# Patient Record
Sex: FEMALE | Race: WHITE | Employment: UNEMPLOYED | URBAN - METROPOLITAN AREA
[De-identification: names, ages, dates, MRNs, and addresses within clinical notes are randomized per-mention and may not be internally consistent; named-entity substitution may affect disease eponyms.]

---

## 2017-01-18 RX ORDER — DULOXETIN HYDROCHLORIDE 30 MG/1
30 CAPSULE, DELAYED RELEASE ORAL DAILY
Qty: 30 CAP | Refills: 2 | Status: SHIPPED | OUTPATIENT
Start: 2017-01-18 | End: 2017-02-08

## 2017-02-08 ENCOUNTER — APPOINTMENT (OUTPATIENT)
Dept: ULTRASOUND IMAGING | Age: 57
DRG: 811 | End: 2017-02-08
Attending: HOSPITALIST
Payer: MEDICARE

## 2017-02-08 ENCOUNTER — APPOINTMENT (OUTPATIENT)
Dept: CT IMAGING | Age: 57
DRG: 811 | End: 2017-02-08
Attending: EMERGENCY MEDICINE
Payer: MEDICARE

## 2017-02-08 ENCOUNTER — HOSPITAL ENCOUNTER (INPATIENT)
Age: 57
LOS: 2 days | Discharge: HOME | DRG: 811 | End: 2017-02-10
Attending: EMERGENCY MEDICINE | Admitting: HOSPITALIST
Payer: MEDICARE

## 2017-02-08 ENCOUNTER — APPOINTMENT (OUTPATIENT)
Dept: GENERAL RADIOLOGY | Age: 57
DRG: 811 | End: 2017-02-08
Attending: EMERGENCY MEDICINE
Payer: MEDICARE

## 2017-02-08 DIAGNOSIS — D64.9 ANEMIA, UNSPECIFIED TYPE: ICD-10-CM

## 2017-02-08 DIAGNOSIS — K92.2 GASTROINTESTINAL HEMORRHAGE, UNSPECIFIED GASTROINTESTINAL HEMORRHAGE TYPE: Primary | ICD-10-CM

## 2017-02-08 LAB
ALBUMIN SERPL BCP-MCNC: 3.3 G/DL (ref 3.5–5)
ALBUMIN/GLOB SERPL: 0.9 {RATIO} (ref 1.1–2.2)
ALP SERPL-CCNC: 86 U/L (ref 45–117)
ALT SERPL-CCNC: 17 U/L (ref 12–78)
ANION GAP BLD CALC-SCNC: 10 MMOL/L (ref 5–15)
ANION GAP BLD CALC-SCNC: 8 MMOL/L (ref 5–15)
APPEARANCE UR: CLEAR
AST SERPL W P-5'-P-CCNC: 18 U/L (ref 15–37)
ATRIAL RATE: 89 BPM
BACTERIA URNS QL MICRO: NEGATIVE /HPF
BASOPHILS # BLD AUTO: 0 K/UL (ref 0–0.1)
BASOPHILS # BLD: 0 % (ref 0–1)
BILIRUB SERPL-MCNC: 0.2 MG/DL (ref 0.2–1)
BILIRUB UR QL: NEGATIVE
BUN SERPL-MCNC: 24 MG/DL (ref 6–20)
BUN SERPL-MCNC: 25 MG/DL (ref 6–20)
BUN/CREAT SERPL: 25 (ref 12–20)
BUN/CREAT SERPL: 28 (ref 12–20)
CALCIUM SERPL-MCNC: 8.1 MG/DL (ref 8.5–10.1)
CALCIUM SERPL-MCNC: 8.9 MG/DL (ref 8.5–10.1)
CALCULATED R AXIS, ECG10: 72 DEGREES
CALCULATED T AXIS, ECG11: 50 DEGREES
CHLORIDE SERPL-SCNC: 102 MMOL/L (ref 97–108)
CHLORIDE SERPL-SCNC: 106 MMOL/L (ref 97–108)
CO2 SERPL-SCNC: 24 MMOL/L (ref 21–32)
CO2 SERPL-SCNC: 26 MMOL/L (ref 21–32)
COLOR UR: NORMAL
CREAT SERPL-MCNC: 0.85 MG/DL (ref 0.55–1.02)
CREAT SERPL-MCNC: 0.99 MG/DL (ref 0.55–1.02)
DIAGNOSIS, 93000: NORMAL
DIFFERENTIAL METHOD BLD: ABNORMAL
EOSINOPHIL # BLD: 0.2 K/UL (ref 0–0.4)
EOSINOPHIL NFR BLD: 3 % (ref 0–7)
EPITH CASTS URNS QL MICRO: NORMAL /LPF
ERYTHROCYTE [DISTWIDTH] IN BLOOD BY AUTOMATED COUNT: 15.5 % (ref 11.5–14.5)
GLOBULIN SER CALC-MCNC: 3.6 G/DL (ref 2–4)
GLUCOSE SERPL-MCNC: 123 MG/DL (ref 65–100)
GLUCOSE SERPL-MCNC: 125 MG/DL (ref 65–100)
GLUCOSE UR STRIP.AUTO-MCNC: NEGATIVE MG/DL
HCT VFR BLD AUTO: 22.7 % (ref 35–47)
HGB BLD-MCNC: 6.8 G/DL (ref 11.5–16)
HGB BLD-MCNC: 8.4 G/DL (ref 11.5–16)
HGB UR QL STRIP: NEGATIVE
HYALINE CASTS URNS QL MICRO: NORMAL /LPF (ref 0–5)
KETONES UR QL STRIP.AUTO: NEGATIVE MG/DL
LEUKOCYTE ESTERASE UR QL STRIP.AUTO: NEGATIVE
LYMPHOCYTES # BLD AUTO: 11 % (ref 12–49)
LYMPHOCYTES # BLD: 0.6 K/UL (ref 0.8–3.5)
MCH RBC QN AUTO: 30.5 PG (ref 26–34)
MCHC RBC AUTO-ENTMCNC: 30 G/DL (ref 30–36.5)
MCV RBC AUTO: 101.8 FL (ref 80–99)
MONOCYTES # BLD: 0.3 K/UL (ref 0–1)
MONOCYTES NFR BLD AUTO: 5 % (ref 5–13)
NEUTS SEG # BLD: 4.7 K/UL (ref 1.8–8)
NEUTS SEG NFR BLD AUTO: 81 % (ref 32–75)
NITRITE UR QL STRIP.AUTO: NEGATIVE
PH UR STRIP: 6 [PH] (ref 5–8)
PLATELET # BLD AUTO: 162 K/UL (ref 150–400)
POTASSIUM SERPL-SCNC: 3.4 MMOL/L (ref 3.5–5.1)
POTASSIUM SERPL-SCNC: 3.5 MMOL/L (ref 3.5–5.1)
PROT SERPL-MCNC: 6.9 G/DL (ref 6.4–8.2)
PROT UR STRIP-MCNC: NEGATIVE MG/DL
Q-T INTERVAL, ECG07: 362 MS
QRS DURATION, ECG06: 106 MS
QTC CALCULATION (BEZET), ECG08: 430 MS
RBC # BLD AUTO: 2.23 M/UL (ref 3.8–5.2)
RBC #/AREA URNS HPF: NORMAL /HPF (ref 0–5)
RBC MORPH BLD: ABNORMAL
SODIUM SERPL-SCNC: 136 MMOL/L (ref 136–145)
SODIUM SERPL-SCNC: 140 MMOL/L (ref 136–145)
SP GR UR REFRACTOMETRY: 1.01 (ref 1–1.03)
TROPONIN I SERPL-MCNC: <0.04 NG/ML
UROBILINOGEN UR QL STRIP.AUTO: 0.2 EU/DL (ref 0.2–1)
VENTRICULAR RATE, ECG03: 85 BPM
WBC # BLD AUTO: 5.8 K/UL (ref 3.6–11)
WBC URNS QL MICRO: NORMAL /HPF (ref 0–4)

## 2017-02-08 PROCEDURE — 36430 TRANSFUSION BLD/BLD COMPNT: CPT

## 2017-02-08 PROCEDURE — 76700 US EXAM ABDOM COMPLETE: CPT

## 2017-02-08 PROCEDURE — 85018 HEMOGLOBIN: CPT

## 2017-02-08 PROCEDURE — 74011250636 HC RX REV CODE- 250/636: Performed by: EMERGENCY MEDICINE

## 2017-02-08 PROCEDURE — 65660000001 HC RM ICU INTERMED STEPDOWN

## 2017-02-08 PROCEDURE — 74011250636 HC RX REV CODE- 250/636: Performed by: HOSPITALIST

## 2017-02-08 PROCEDURE — C9113 INJ PANTOPRAZOLE SODIUM, VIA: HCPCS | Performed by: HOSPITALIST

## 2017-02-08 PROCEDURE — 74011000250 HC RX REV CODE- 250: Performed by: HOSPITALIST

## 2017-02-08 PROCEDURE — 80048 BASIC METABOLIC PNL TOTAL CA: CPT

## 2017-02-08 PROCEDURE — 86902 BLOOD TYPE ANTIGEN DONOR EA: CPT | Performed by: EMERGENCY MEDICINE

## 2017-02-08 PROCEDURE — 65660000000 HC RM CCU STEPDOWN

## 2017-02-08 PROCEDURE — 86921 COMPATIBILITY TEST INCUBATE: CPT | Performed by: EMERGENCY MEDICINE

## 2017-02-08 PROCEDURE — 86922 COMPATIBILITY TEST ANTIGLOB: CPT | Performed by: EMERGENCY MEDICINE

## 2017-02-08 PROCEDURE — 86920 COMPATIBILITY TEST SPIN: CPT | Performed by: EMERGENCY MEDICINE

## 2017-02-08 PROCEDURE — 71010 XR CHEST PORT: CPT

## 2017-02-08 PROCEDURE — 99285 EMERGENCY DEPT VISIT HI MDM: CPT

## 2017-02-08 PROCEDURE — 81001 URINALYSIS AUTO W/SCOPE: CPT | Performed by: EMERGENCY MEDICINE

## 2017-02-08 PROCEDURE — 74011250637 HC RX REV CODE- 250/637: Performed by: HOSPITALIST

## 2017-02-08 PROCEDURE — 80053 COMPREHEN METABOLIC PANEL: CPT | Performed by: EMERGENCY MEDICINE

## 2017-02-08 PROCEDURE — 85025 COMPLETE CBC W/AUTO DIFF WBC: CPT | Performed by: EMERGENCY MEDICINE

## 2017-02-08 PROCEDURE — 30233N1 TRANSFUSION OF NONAUTOLOGOUS RED BLOOD CELLS INTO PERIPHERAL VEIN, PERCUTANEOUS APPROACH: ICD-10-PCS | Performed by: EMERGENCY MEDICINE

## 2017-02-08 PROCEDURE — P9016 RBC LEUKOCYTES REDUCED: HCPCS | Performed by: EMERGENCY MEDICINE

## 2017-02-08 PROCEDURE — 84484 ASSAY OF TROPONIN QUANT: CPT | Performed by: EMERGENCY MEDICINE

## 2017-02-08 PROCEDURE — 96360 HYDRATION IV INFUSION INIT: CPT

## 2017-02-08 PROCEDURE — 93005 ELECTROCARDIOGRAM TRACING: CPT

## 2017-02-08 PROCEDURE — 86900 BLOOD TYPING SEROLOGIC ABO: CPT | Performed by: EMERGENCY MEDICINE

## 2017-02-08 PROCEDURE — 36415 COLL VENOUS BLD VENIPUNCTURE: CPT | Performed by: EMERGENCY MEDICINE

## 2017-02-08 RX ORDER — AMOXICILLIN 250 MG
1 CAPSULE ORAL
Status: ON HOLD | COMMUNITY
End: 2020-05-26

## 2017-02-08 RX ORDER — GUAIFENESIN 100 MG/5ML
200 SOLUTION ORAL
COMMUNITY
End: 2017-10-01

## 2017-02-08 RX ORDER — OXYCODONE HYDROCHLORIDE 5 MG/1
5 TABLET ORAL
COMMUNITY
End: 2017-02-08

## 2017-02-08 RX ORDER — LORATADINE 10 MG/1
10 TABLET ORAL DAILY
COMMUNITY
End: 2017-10-01

## 2017-02-08 RX ORDER — DULOXETIN HYDROCHLORIDE 30 MG/1
30 CAPSULE, DELAYED RELEASE ORAL DAILY
COMMUNITY
End: 2017-10-01

## 2017-02-08 RX ORDER — MELATONIN 5 MG
5 CAPSULE ORAL
COMMUNITY
End: 2017-10-01

## 2017-02-08 RX ORDER — BUDESONIDE AND FORMOTEROL FUMARATE DIHYDRATE 160; 4.5 UG/1; UG/1
2 AEROSOL RESPIRATORY (INHALATION) 2 TIMES DAILY
Status: ON HOLD | COMMUNITY
End: 2020-03-16

## 2017-02-08 RX ORDER — ONDANSETRON 2 MG/ML
4 INJECTION INTRAMUSCULAR; INTRAVENOUS
Status: DISCONTINUED | OUTPATIENT
Start: 2017-02-08 | End: 2017-02-10 | Stop reason: HOSPADM

## 2017-02-08 RX ORDER — HYDROXYZINE 25 MG/1
50 TABLET, FILM COATED ORAL
Status: DISCONTINUED | OUTPATIENT
Start: 2017-02-08 | End: 2017-02-10 | Stop reason: HOSPADM

## 2017-02-08 RX ORDER — SPIRONOLACTONE 50 MG/1
50 TABLET, FILM COATED ORAL
Status: ON HOLD | COMMUNITY
End: 2020-05-26

## 2017-02-08 RX ORDER — SODIUM CHLORIDE 9 MG/ML
100 INJECTION, SOLUTION INTRAVENOUS AS NEEDED
Status: DISCONTINUED | OUTPATIENT
Start: 2017-02-08 | End: 2017-02-10 | Stop reason: HOSPADM

## 2017-02-08 RX ORDER — FUROSEMIDE 40 MG/1
40 TABLET ORAL
Status: ON HOLD | COMMUNITY
End: 2020-03-16

## 2017-02-08 RX ORDER — SODIUM CHLORIDE 0.9 % (FLUSH) 0.9 %
5-10 SYRINGE (ML) INJECTION AS NEEDED
Status: DISCONTINUED | OUTPATIENT
Start: 2017-02-08 | End: 2017-02-10 | Stop reason: HOSPADM

## 2017-02-08 RX ORDER — SODIUM CHLORIDE 0.9 % (FLUSH) 0.9 %
5-10 SYRINGE (ML) INJECTION EVERY 8 HOURS
Status: DISCONTINUED | OUTPATIENT
Start: 2017-02-08 | End: 2017-02-10 | Stop reason: HOSPADM

## 2017-02-08 RX ORDER — GUAIFENESIN 100 MG/5ML
200 SOLUTION ORAL
Status: DISCONTINUED | OUTPATIENT
Start: 2017-02-08 | End: 2017-02-10 | Stop reason: HOSPADM

## 2017-02-08 RX ORDER — AMOXICILLIN 250 MG
1-2 CAPSULE ORAL
Status: DISCONTINUED | OUTPATIENT
Start: 2017-02-08 | End: 2017-02-10 | Stop reason: HOSPADM

## 2017-02-08 RX ORDER — CITALOPRAM 20 MG/1
20 TABLET, FILM COATED ORAL DAILY
Status: DISCONTINUED | OUTPATIENT
Start: 2017-02-09 | End: 2017-02-10 | Stop reason: HOSPADM

## 2017-02-08 RX ORDER — SPIRONOLACTONE 25 MG/1
50 TABLET ORAL
Status: DISCONTINUED | OUTPATIENT
Start: 2017-02-08 | End: 2017-02-10 | Stop reason: HOSPADM

## 2017-02-08 RX ORDER — PANTOPRAZOLE SODIUM 40 MG/1
40 TABLET, DELAYED RELEASE ORAL DAILY
Status: ON HOLD | COMMUNITY
End: 2020-04-09 | Stop reason: SDUPTHER

## 2017-02-08 RX ORDER — LORATADINE 10 MG/1
10 TABLET ORAL DAILY
Status: DISCONTINUED | OUTPATIENT
Start: 2017-02-09 | End: 2017-02-10 | Stop reason: HOSPADM

## 2017-02-08 RX ORDER — SODIUM CHLORIDE 9 MG/ML
250 INJECTION, SOLUTION INTRAVENOUS AS NEEDED
Status: DISCONTINUED | OUTPATIENT
Start: 2017-02-08 | End: 2017-02-08

## 2017-02-08 RX ORDER — ARFORMOTEROL TARTRATE 15 UG/2ML
15 SOLUTION RESPIRATORY (INHALATION)
Status: DISCONTINUED | OUTPATIENT
Start: 2017-02-08 | End: 2017-02-10 | Stop reason: HOSPADM

## 2017-02-08 RX ORDER — DULOXETIN HYDROCHLORIDE 30 MG/1
30 CAPSULE, DELAYED RELEASE ORAL DAILY
Status: DISCONTINUED | OUTPATIENT
Start: 2017-02-09 | End: 2017-02-10 | Stop reason: HOSPADM

## 2017-02-08 RX ORDER — DICLOFENAC SODIUM 10 MG/G
2 GEL TOPICAL 4 TIMES DAILY
COMMUNITY
End: 2017-02-08

## 2017-02-08 RX ORDER — PREDNISONE 5 MG/1
5 TABLET ORAL DAILY
Status: DISCONTINUED | OUTPATIENT
Start: 2017-02-09 | End: 2017-02-10 | Stop reason: HOSPADM

## 2017-02-08 RX ORDER — IPRATROPIUM BROMIDE AND ALBUTEROL SULFATE 2.5; .5 MG/3ML; MG/3ML
3 SOLUTION RESPIRATORY (INHALATION)
Status: DISCONTINUED | OUTPATIENT
Start: 2017-02-08 | End: 2017-02-10 | Stop reason: HOSPADM

## 2017-02-08 RX ORDER — LANOLIN ALCOHOL/MO/W.PET/CERES
325 CREAM (GRAM) TOPICAL
Status: ON HOLD | COMMUNITY
End: 2020-03-16

## 2017-02-08 RX ORDER — ONDANSETRON 4 MG/1
4 TABLET, ORALLY DISINTEGRATING ORAL
Status: ON HOLD | COMMUNITY
End: 2020-03-16

## 2017-02-08 RX ORDER — POTASSIUM CHLORIDE 750 MG/1
20 TABLET, FILM COATED, EXTENDED RELEASE ORAL
Status: COMPLETED | OUTPATIENT
Start: 2017-02-08 | End: 2017-02-08

## 2017-02-08 RX ORDER — BUDESONIDE 0.5 MG/2ML
500 INHALANT ORAL
Status: DISCONTINUED | OUTPATIENT
Start: 2017-02-08 | End: 2017-02-10 | Stop reason: HOSPADM

## 2017-02-08 RX ORDER — HYDROXYZINE PAMOATE 50 MG/1
50 CAPSULE ORAL
COMMUNITY
End: 2017-10-01

## 2017-02-08 RX ORDER — CITALOPRAM 20 MG/1
20 TABLET, FILM COATED ORAL DAILY
COMMUNITY
End: 2017-10-01

## 2017-02-08 RX ORDER — ALBUTEROL SULFATE 90 UG/1
2 AEROSOL, METERED RESPIRATORY (INHALATION)
Status: ON HOLD | COMMUNITY
End: 2020-05-26

## 2017-02-08 RX ORDER — PREDNISONE 5 MG/1
5 TABLET ORAL DAILY
COMMUNITY
End: 2017-10-01

## 2017-02-08 RX ORDER — IPRATROPIUM BROMIDE AND ALBUTEROL SULFATE 2.5; .5 MG/3ML; MG/3ML
3 SOLUTION RESPIRATORY (INHALATION)
Status: ON HOLD | COMMUNITY
End: 2020-05-26

## 2017-02-08 RX ORDER — THERA TABS 400 MCG
1 TAB ORAL DAILY
Status: DISCONTINUED | OUTPATIENT
Start: 2017-02-09 | End: 2017-02-10 | Stop reason: HOSPADM

## 2017-02-08 RX ORDER — LANOLIN ALCOHOL/MO/W.PET/CERES
325 CREAM (GRAM) TOPICAL
Status: DISCONTINUED | OUTPATIENT
Start: 2017-02-08 | End: 2017-02-10 | Stop reason: HOSPADM

## 2017-02-08 RX ADMIN — Medication 10 ML: at 21:49

## 2017-02-08 RX ADMIN — POTASSIUM CHLORIDE 20 MEQ: 750 TABLET, FILM COATED, EXTENDED RELEASE ORAL at 17:15

## 2017-02-08 RX ADMIN — Medication 10 ML: at 18:24

## 2017-02-08 RX ADMIN — SODIUM CHLORIDE 40 MG: 9 INJECTION INTRAMUSCULAR; INTRAVENOUS; SUBCUTANEOUS at 21:48

## 2017-02-08 RX ADMIN — SODIUM CHLORIDE 1000 ML: 900 INJECTION, SOLUTION INTRAVENOUS at 10:25

## 2017-02-08 RX ADMIN — FERROUS SULFATE TAB 325 MG (65 MG ELEMENTAL FE) 325 MG: 325 (65 FE) TAB at 19:58

## 2017-02-08 NOTE — ED NOTES
Attempted to call report.  RN on floor states paging admitting provider for change of bed order and will call back for report when they get it changed

## 2017-02-08 NOTE — H&P
History and Physical  Primary Care Provider: Armando Harley MD    Subjective: Cesar Schulz is a 61 y.o. female with h/o cirrhosis, COPD, and prior GIB with h/o multiple AVMs s/p multiple cauterizations who resides at [facility] presenting with c/o fatigue and intermittent dizziness x 3days. Reports that she has been sleeping a lot, more than usual. Also reports loose stools. Pt denies any evidence of BRBPR, no hemoptysis, or hemetemsis. She does take fe tabs therefore reports that her stools are usually dark at baseline. Upon arrival to ED, pt was found to have Hb 6.8. Per ED physician, pt was heme positive on rectal exam.She denies any use of NSAIDs nor ASA. Denies any recen etoh use, last consumed approx 3yrs ago. Denies any swelling in legs or abd. Pt was T/S and already recv pRBC tfx in ED. Pt of note has h/o prior AVM. She was last hosp 10/25-10/27/16 for GIB, felt to be sec to small bowel AVMs. Pt has been found to have at least 7 AVMs req cauterization numerous times. She has h/o multiple EGDs and recently underwent Enteroscopy 9/16, found to have nonbleeding telangiectasias. During last hosp, pt was also seen by Hematology, Dr. Malia Noriega. Pt also prev tx with Venofer in past as well. She reports that she has not seen GI nor Hematology [facility] hosp in October sec to insurance issues and financial limitations. Prim GI-dr Genaro Bynum  Prim heme-dr garcía    Review of Systems:  Further 10 point review of systems is benign. A comprehensive review of systems was negative except for that written in the History of Present Illness. Past Medical History   Diagnosis Date    Anemia     Arthritis      Rheumatoid    CHF (congestive heart failure) (HCC)     Chronic obstructive pulmonary disease (HCC)     Cirrhosis of liver (HCC)     COPD (chronic obstructive pulmonary disease) (HCC)     Gastrointestinal disorder      \"lacerations in the large part of my small intestine. \"    Gastrointestinal disorder liver cirrhosis    Heart failure ([facility] [address]     Internal bleeding     Tachycardia 2016      Past Surgical History   Procedure Laterality Date    Hx gi       Cholecystectomoy    Hx gyn        x 2.    Pr endoscopy upper small intestine  2016          Hx cholecystectomy       Prior to Admission medications    Medication Sig Start Date End Date Taking? Authorizing Provider   DULoxetine (CYMBALTA) 30 mg capsule Take 30 mg by mouth daily. Yes Historical Provider   furosemide (LASIX) 40 mg tablet Take 40 mg by mouth every Monday, Wednesday, Friday. Yes Historical Provider   predniSONE (DELTASONE) 5 mg tablet Take 5 mg by mouth daily. Yes Historical Provider   citalopram (CELEXA) 20 mg tablet Take 20 mg by mouth daily. Yes Historical Provider   loratadine (CLARITIN) 10 mg tablet Take 10 mg by mouth daily. Yes Historical Provider   pantoprazole (PROTONIX) 40 mg tablet Take 40 mg by mouth daily. Yes Historical Provider   multivitamins-minerals-lutein (CEROVITE SENIOR) tab tablet Take 1 Tab by mouth daily. Yes Historical Provider   budesonide-formoterol (SYMBICORT) 160-4.5 mcg/actuation HFA inhaler Take 2 Puffs by inhalation two (2) times a day. Yes Historical Provider   ferrous sulfate 325 mg (65 mg iron) tablet Take 325 mg by mouth three (3) times daily (with meals). Yes Historical Provider   albuterol-ipratropium (DUO-NEB) 2.5 mg-0.5 mg/3 ml nebu 3 mL by Nebulization route three (3) times daily. Yes Historical Provider   melatonin 5 mg cap capsule Take 5 mg by mouth nightly. Yes Historical Provider   guaiFENesin (ROBITUSSIN) 100 mg/5 mL liquid Take 200 mg by mouth four (4) times daily as needed for Cough. Yes Historical Provider   senna-docusate (SENNA PLUS) 8.6-50 mg per tablet Take 1-2 Tabs by mouth nightly as needed for Constipation.    Yes Historical Provider   albuterol (VENTOLIN HFA) 90 mcg/actuation inhaler Take 2 Puffs by inhalation four (4) times daily as needed for Wheezing. Yes Historical Provider   hydrOXYzine pamoate (VISTARIL) 50 mg capsule Take 50 mg by mouth daily as needed for Anxiety. Yes Historical Provider   ondansetron (ZOFRAN ODT) 4 mg disintegrating tablet Take 4 mg by mouth every eight (8) hours as needed for Nausea. Yes Historical Provider   spironolactone (ALDACTONE) 50 mg tablet Take 50 mg by mouth daily as needed (excess fluid). Yes Historical Provider     Allergies   Allergen Reactions    Pcn [Penicillins] Angioedema     Childhood reaction      Family History   Problem Relation Age of Onset    Cancer Mother     Alcohol abuse Father     Cancer Sister         SOCIAL HISTORY:  Patient resides at [facility]. Smoking history: cigarettes, 3/4  per day  Alcohol history: denies, last etoh 3yrs ago    Objective:       Physical Exam:   Visit Vitals    /64    Pulse 78    Temp 99 °F (37.2 °C)    Resp 22    Ht 5' (1.524 m)    Wt 56.7 kg (125 lb)    SpO2 100%    BMI 24.41 kg/m2     General:  Alert, cooperative, no distress, appears stated age. Head:  Normocephalic, without obvious abnormality, atraumatic. Eyes:  Conjunctivae/corneas clear. PERRL, EOMs intact. Fundi benign. Ears:  Normal TMs and external ear canals both ears. Nose: Nares normal. Septum midline. Mucosa normal. No drainage or sinus tenderness. Throat: Lips, mucosa, and tongue normal. Teeth and gums normal.   Neck: Supple, symmetrical, trachea midline, no adenopathy, thyroid: no enlargement/tenderness/nodules, no carotid bruit and no JVD. Back:   Symmetric, no curvature. ROM normal. No CVA tenderness. Lungs:   Clear to auscultation bilaterally. Chest wall:  No tenderness or deformity. Heart:  Regular rate and rhythm, S1, S2 normal, no murmur, click, rub or gallop. Breast Exam:  No tenderness, masses, or nipple abnormality. Abdomen:   Soft, non-tender. Bowel sounds normal. No masses,  No organomegaly. Genitalia:  Normal female without lesion, discharge or tenderness. Rectal:  Normal tone,  no masses or tenderness  Guaiac negative stool. Extremities: Extremities normal, atraumatic, no cyanosis or edema. Pulses: 2+ and symmetric all extremities. Skin: Skin color, texture, turgor normal. No rashes or lesions. Lymph nodes: Cervical, supraclavicular, and axillary nodes normal.   Neurologic: CNII-XII intact. Normal strength, sensation and reflexes throughout. ECG:  normal EKG, normal sinus rhythm, unchanged from previous tracings     Data Review: All diagnostic labs and studies have been reviewed. Chest x-ray was negative for infiltrate, effusion, pneumothorax, or wide mediastinum. Assessment:     Active Problems:    GIB (gastrointestinal bleeding) (2/8/2017)        Plan:   MELENA/ GIB, with h/o prior multiple AVM req cauterization  ACUTE ON CHRONIC MACROCYTIC ANEMIA  CIRRHOSIS  TOBACCO ABUSE  COPD  H/O RA, ion chronic prednisone  MILD HYPOKALEMIA      PLAN:  -admit to IMCU if beds avail, inpt status  -currently revc pRBC tfx in ED.   -chk h/h serially  -chk occult stool. B12/folate in am. Will hold off on fe studies for now since actively being tfx  -chk abd us, also to assess for any ascites  -had CT a/p 12/28/16 with no evidence for active hemorrhage, no acute abnml. Will hold off on repeat for now and defer to GI if repeat ct needed  -c/s GI  -c/s Heme  -IVF, CLD for now no red products. Npo after MN in case any antic procedure  -iv ppi bid  -cont po Fe for now. May potentially benefit from repeat venofer, will defer to heme  -cont duoneb, symbicort alternative. O2 suppl prn  -pt declines nicotine patch. Smoking cessaation encouraged  -cont aldactone, hold off on lasix while recv ivf. For cirrhosis  -cont chronic prednisone use  -replete K.  Bmp in am    DVT p/x: SCDs only sec to severe anemia      Signed By: Koffi Swartz MD     February 8, 2017

## 2017-02-08 NOTE — ED TRIAGE NOTES
[facility] for dizziness and nausea x 4 days. Reports hx lower GI bleed, takes Iron supplement 3x daily. Denies bright blood in stool.  +generalized fatigue. Pt A&Ox4.

## 2017-02-08 NOTE — ED PROVIDER NOTES
Patient is a 61 y.o. female presenting with dizziness and nausea. Dizziness   Associated symptoms include nausea. Nausea           59y F with hx of CHF, COPD, cirrhosis here with generalized fatigue x 3 days. Also reports episodes of dizziness that last 2-3 seconds and are not positional in nature. No fever. No vomiting. (+) nausea. (+) lower abd pain. No back or flank pain. (+) loose stool. No blood in stool. No syncope. No rash. Has been sleeping more than normal - slept 34h in a row 3 days ago then 15h yesterday. No chest pain. No trouble breathing. No focal numbness/weakness. No gait or speech problems. Past Medical History:   Diagnosis Date    Anemia     Arthritis      Rheumatoid    CHF (congestive heart failure) (HCC)     Chronic obstructive pulmonary disease (HCC)     Cirrhosis of liver (HCC)     COPD (chronic obstructive pulmonary disease) (HCC)     Gastrointestinal disorder      \"lacerations in the large part of my small intestine. \"    Gastrointestinal disorder      liver cirrhosis    Heart failure ([facility] [address]     Internal bleeding     Tachycardia 2016       Past Surgical History:   Procedure Laterality Date    Hx gi       Cholecystectomoy    Hx gyn        x 2.    Pr endoscopy upper small intestine  2016          Hx cholecystectomy           Family History:   Problem Relation Age of Onset    Cancer Mother     Alcohol abuse Father     Cancer Sister        Social History     Social History    Marital status: SINGLE     Spouse name: N/A    Number of children: N/A    Years of education: N/A     Occupational History    Not on file. Social History Main Topics    Smoking status: Current Every Day Smoker     Packs/day: 0.75     Years: 45.00    Smokeless tobacco: Never Used    Alcohol use No      Comment: in the past was an alcoholic. Has been sober x 2 years.     Drug use: No    Sexual activity: Yes     Partners: Male     Other Topics Concern    Not on file Social History Narrative         ALLERGIES: Pcn [penicillins]    Review of Systems   Gastrointestinal: Positive for nausea. Neurological: Positive for dizziness. Review of Systems   Constitutional: (-) weight loss. HEENT: (-) stiff neck   Eyes: (-) discharge. Respiratory: (-) for cough. Cardiovascular: (-) syncope. Gastrointestinal: (-) blood in stool. Genitourinary: (-) hematuria. Musculoskeletal: (-) myalgias. Neurological: (-) seizure. Skin: (-) petechiae  Lymph/Immunologic: (-) enlarged lymph nodes  All other systems reviewed and are negative. Vitals:    02/08/17 0947   BP: 127/57   Pulse: 90   Resp: 16   Temp: 98.3 °F (36.8 °C)   SpO2: 100%   Weight: 56.7 kg (125 lb)   Height: 5' (1.524 m)            Physical Exam Nursing note and vitals reviewed. Constitutional: oriented to person, place, and time. appears elderly and frail. No distress. Head: Normocephalic and atraumatic. Sclera anicteric  Nose: No rhinorrhea  Mouth/Throat: Oropharynx is clear and moist. Pharynx normal  Eyes: Conjunctivae are normal. Pupils are equal, round, and reactive to light. Right eye exhibits no discharge. Left eye exhibits no discharge. Neck: Painless normal range of motion. Neck supple. No LAD. Cardiovascular: Normal rate, regular rhythm, normal heart sounds and intact distal pulses. Exam reveals no gallop and no friction rub. No murmur heard. Pulmonary/Chest:  No respiratory distress. No wheezes. No rales. No rhonchi. No increased work of breathing. No accessory muscle use. Good air exchange throughout. Abdominal: soft, non-tender, no rebound or guarding. No hepatosplenomegaly. Normal bowel sounds throughout. Back: no tenderness to palpation, no deformities, no CVA tenderness  Extremities/Musculoskeletal: Normal range of motion. no tenderness. No edema. Distal extremities are neurovasc intact. Lymphadenopathy:   No adenopathy. Neurological:  Alert and oriented to person, place, and time. Coordination normal. CN 2-12 intact. Motor and sensory function intact. Skin: Skin is warm and dry. No rash noted. No pallor. MDM 59y F here with generalized fatigue x 3 days. Non-focal exam. Will check labs, cxr, ct abd/pelvis and ct head. ED Course       Procedures      ED EKG interpretation:  Rhythm: sinus rhythm; and regular . Rate (approx.): 85; Axis: normal; P wave: prolonged; QRS interval: normal ; ST/T wave: normal;  This EKG was interpreted by Ger Merrill MD,ED Provider. 11:21 AM  H/H low. Melena from below. Blood ordered. Will admit.

## 2017-02-08 NOTE — PROGRESS NOTES
Admission Medication Reconciliation:    Information obtained from: [facility] MAR/RN at [facility]    Significant PMH/Disease States:   Past Medical History   Diagnosis Date    Anemia     Arthritis      Rheumatoid    CHF (congestive heart failure) (HCC)     Chronic obstructive pulmonary disease (HCC)     Cirrhosis of liver (HCC)     COPD (chronic obstructive pulmonary disease) (HCC)     Gastrointestinal disorder      \"lacerations in the large part of my small intestine. \"    Gastrointestinal disorder      liver cirrhosis    Heart failure ([address]     Internal bleeding     Tachycardia 1/27/2016     Chief Complaint for this Admission:    Chief Complaint   Patient presents with    Dizziness    Nausea     Allergies:  Pcn [penicillins]    Prior to Admission Medications:   Prior to Admission Medications   Prescriptions Last Dose Informant Patient Reported? Taking? DULoxetine (CYMBALTA) 30 mg capsule 2/8/2017 at Unknown time  Yes Yes   Sig: Take 30 mg by mouth daily. albuterol (VENTOLIN HFA) 90 mcg/actuation inhaler   Yes Yes   Sig: Take 2 Puffs by inhalation four (4) times daily as needed for Wheezing. albuterol-ipratropium (DUO-NEB) 2.5 mg-0.5 mg/3 ml nebu 2/8/2017 at Unknown time  Yes Yes   Sig: 3 mL by Nebulization route three (3) times daily. budesonide-formoterol (SYMBICORT) 160-4.5 mcg/actuation HFA inhaler 2/8/2017 at Unknown time  Yes Yes   Sig: Take 2 Puffs by inhalation two (2) times a day. citalopram (CELEXA) 20 mg tablet 2/8/2017 at Unknown time  Yes Yes   Sig: Take 20 mg by mouth daily. ferrous sulfate 325 mg (65 mg iron) tablet 2/8/2017 at Unknown time  Yes Yes   Sig: Take 325 mg by mouth three (3) times daily (with meals). furosemide (LASIX) 40 mg tablet 2/8/2017 at Unknown time  Yes Yes   Sig: Take 40 mg by mouth every Monday, Wednesday, Friday. guaiFENesin (ROBITUSSIN) 100 mg/5 mL liquid   Yes Yes   Sig: Take 200 mg by mouth four (4) times daily as needed for Cough. hydrOXYzine pamoate (VISTARIL) 50 mg capsule 2/8/2017 at Unknown time  Yes Yes   Sig: Take 50 mg by mouth daily as needed for Anxiety. loratadine (CLARITIN) 10 mg tablet 2/8/2017 at Unknown time  Yes Yes   Sig: Take 10 mg by mouth daily. melatonin 5 mg cap capsule 2/7/2017 at Unknown time  Yes Yes   Sig: Take 5 mg by mouth nightly. multivitamins-minerals-lutein (CEROVITE SENIOR) tab tablet 2/8/2017 at Unknown time  Yes Yes   Sig: Take 1 Tab by mouth daily. ondansetron (ZOFRAN ODT) 4 mg disintegrating tablet   Yes Yes   Sig: Take 4 mg by mouth every eight (8) hours as needed for Nausea. pantoprazole (PROTONIX) 40 mg tablet 2/8/2017 at Unknown time  Yes Yes   Sig: Take 40 mg by mouth daily. predniSONE (DELTASONE) 5 mg tablet 2/8/2017 at Unknown time  Yes Yes   Sig: Take 5 mg by mouth daily. senna-docusate (SENNA PLUS) 8.6-50 mg per tablet 2/7/2017 at Unknown time  Yes Yes   Sig: Take 1-2 Tabs by mouth nightly as needed for Constipation. spironolactone (ALDACTONE) 50 mg tablet   Yes Yes   Sig: Take 50 mg by mouth daily as needed (excess fluid). Facility-Administered Medications: None     Comments/Recommendations:   1)  Due to insurance issues patient has no been able to obtain Oxy IR 5 mg BID prn pain per RN at facility  2)  Patient reports not using Diclofenac d/t lack of efficacy per patient report.

## 2017-02-08 NOTE — PROGRESS NOTES
Pharmacist Note    The following herbal, alternative, and/or nutritional supplement product(s) has been discontinued per [employer] approved policy:    - melatonin    Please reorder upon discharge if appropriate. Vickie Schrader D., BCPS

## 2017-02-08 NOTE — PROGRESS NOTES
1645: Attempted to call report on pt for transfer to [facility], was on hold over 7 minutes. Awaiting call back. 1715: Received report from Agueda Rasmussen, [facility].    9006: Pt arrived to unit from ED. Primary Nurse Héctor Doll and Zaki Lloyd RN performed a dual skin assessment on this patient No impairment noted  Roger score is 20.        2000: Bedside shift change report given to Kalpana Hatfield RN (oncoming nurse) by Kecia Bradley RN (offgoing nurse). Report included the following information SBAR, Kardex, ED Summary, Procedure Summary, Intake/Output, MAR, Recent Results and Med Rec Status. Opportunity for questions provided. q1h rounds completed.

## 2017-02-08 NOTE — IP AVS SNAPSHOT
2700 [facility] [address] [place] [address] 
[phone] Patient: Ashlyn Childs MRN: [id number] GNY:[birth year] You are allergic to the following Allergen Reactions Pcn (Penicillins) Angioedema Childhood reaction Immunizations Administered for This Admission Name Date Influenza Vaccine (Quad) PF 2/10/2017 Recent Documentation Height Weight BMI OB Status Smoking Status 1.524 m 55.2 kg 23.77 kg/m2 Postmenopausal Current Every Day Smoker Unresulted Labs Order Current Status TYPE & CROSSMATCH Preliminary result Emergency Contacts  (Rel.) Home Phone Work Phone Mobile Phone Lyles Laughter (Boyfriend) [phone] -- --  
 Lyles Laughter (Friend) [phone] -- -- About your hospitalization You were admitted on:  February 8, 2017 You last received care in the:  [facility] You were discharged on:  February 10, 2017 Why you were hospitalized Your primary diagnosis was:  Gib (Gastrointestinal Bleeding) Providers Seen During Your Hospitalizations Provider Role Specialty Primary office phone Ahmet Rodriguez MD Attending Provider Emergency Medicine [phone] Marylee Rafter, MD Attending Provider Internal Medicine [phone] Marcus Barry MD Attending Provider Internal Medicine [phone] Your Primary Care Physician (PCP) Primary Care Physician Office Phone Office Fax Quitman Collet [phone] Follow-up Information Follow up With Details Comments Contact Info Josue Hurd MD  Please have Dr. Yousuf Pruett or his NP from [facility] evaluate you on Monday, 2/13 as follow up from the hospital [address] [place] [address] [address] [place] [address] 
[phone] Current Discharge Medication List  
  
START taking these medications Dose & Instructions Dispensing Information Comments Morning Noon Evening Bedtime  
 oxyCODONE IR 5 mg immediate release tablet Commonly known as:  [place] Erickal Your next dose is: Today, Tomorrow Other:  _________ Dose:  5 mg Take 1 Tab by mouth every twelve (12) hours as needed for Pain (Arthritis pain). Max Daily Amount: 10 mg.  
 Quantity:  15 Tab Refills:  0 CONTINUE these medications which have NOT CHANGED Dose & Instructions Dispensing Information Comments Morning Noon Evening Bedtime  
 albuterol-ipratropium 2.5 mg-0.5 mg/3 ml Nebu Commonly known as:  Ivon Torre Your next dose is: Today, Tomorrow Other:  _________ Dose:  3 mL  
3 mL by Nebulization route three (3) times daily. Refills:  0 ALDACTONE 50 mg tablet Generic drug:  spironolactone Your next dose is: Today, Tomorrow Other:  _________ Dose:  50 mg Take 50 mg by mouth daily as needed (excess fluid). Refills:  0  
     
   
   
   
  
 CEROVITE SENIOR Tab tablet Generic drug:  multivitamins-minerals-lutein Your next dose is: Today, Tomorrow Other:  _________ Dose:  1 Tab Take 1 Tab by mouth daily. Refills:  0  
     
   
   
   
  
 citalopram 20 mg tablet Commonly known as:  David Bio Your next dose is: Today, Tomorrow Other:  _________ Dose:  20 mg Take 20 mg by mouth daily. Refills:  0 DULoxetine 30 mg capsule Commonly known as:  CYMBALTA Your next dose is: Today, Tomorrow Other:  _________ Dose:  30 mg Take 30 mg by mouth daily. Refills:  0  
     
   
   
   
  
 ferrous sulfate 325 mg (65 mg iron) tablet Your next dose is: Today, Tomorrow Other:  _________ Dose:  325 mg Take 325 mg by mouth three (3) times daily (with meals). Refills:  0 guaiFENesin 100 mg/5 mL liquid Commonly known as:  ROBITUSSIN Your next dose is: Today, Tomorrow Other:  _________ Dose:  200 mg Take 200 mg by mouth four (4) times daily as needed for Cough. Refills:  0  
     
   
   
   
  
 LASIX 40 mg tablet Generic drug:  furosemide Your next dose is: Today, Tomorrow Other:  _________ Dose:  40 mg Take 40 mg by mouth every Monday, Wednesday, Friday. Refills:  0  
     
   
   
   
  
 loratadine 10 mg tablet Commonly known as:  Rosan Kanabec Your next dose is: Today, Tomorrow Other:  _________ Dose:  10 mg Take 10 mg by mouth daily. Refills:  0  
     
   
   
   
  
 melatonin 5 mg Cap capsule Your next dose is: Today, Tomorrow Other:  _________ Dose:  5 mg Take 5 mg by mouth nightly. Refills:  0  
     
   
   
   
  
 predniSONE 5 mg tablet Commonly known as:  Anaya Roers Your next dose is: Today, Tomorrow Other:  _________ Dose:  5 mg Take 5 mg by mouth daily. Refills:  0 PROTONIX 40 mg tablet Generic drug:  pantoprazole Your next dose is: Today, Tomorrow Other:  _________ Dose:  40 mg Take 40 mg by mouth daily. Refills:  0 SENNA PLUS 8.6-50 mg per tablet Generic drug:  senna-docusate Your next dose is: Today, Tomorrow Other:  _________ Dose:  1-2 Tab Take 1-2 Tabs by mouth nightly as needed for Constipation. Refills:  0  
     
   
   
   
  
 SYMBICORT 160-4.5 mcg/actuation HFA inhaler Generic drug:  budesonide-formoterol Your next dose is: Today, Tomorrow Other:  _________ Dose:  2 Puff Take 2 Puffs by inhalation two (2) times a day. Refills:  0 VENTOLIN HFA 90 mcg/actuation inhaler Generic drug:  albuterol Your next dose is: Today, Tomorrow Other:  _________ Dose:  2 Puff Take 2 Puffs by inhalation four (4) times daily as needed for Wheezing. Refills:  0  
     
   
   
   
  
 VISTARIL 50 mg capsule Generic drug:  hydrOXYzine pamoate Your next dose is: Today, Tomorrow Other:  _________ Dose:  50 mg Take 50 mg by mouth daily as needed for Anxiety. Refills:  0 ZOFRAN ODT 4 mg disintegrating tablet Generic drug:  ondansetron Your next dose is: Today, Tomorrow Other:  _________ Dose:  4 mg Take 4 mg by mouth every eight (8) hours as needed for Nausea. Refills:  0 Where to Get Your Medications Information on where to get these meds will be given to you by the nurse or doctor. ! Ask your nurse or doctor about these medications  
  oxyCODONE IR 5 mg immediate release tablet Discharge Instructions Discharge Instructions PATIENT ID: Ashlyn Childs MRN: [id number] YOB: 1960 DATE OF ADMISSION: 2/8/2017  9:38 AM   
DATE OF DISCHARGE: 2/10/2017 PRIMARY CARE PROVIDER: Jess Grover MD  
 
ATTENDING PHYSICIAN: Marcus Barry MD 
DISCHARGING PROVIDER: No Dorman NP To contact this individual call [phone] and ask the  to page. If unavailable ask to be transferred the Adult Hospitalist Department. 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 
IP CONSULT TO HEMATOLOGY PROCEDURES/SURGERIES: * No surgery found * PENDING TEST RESULTS:  
At the time of discharge the following test results are still pending: None FOLLOW UP APPOINTMENTS:  
Follow-up Information Follow up With Details Comments Contact Info Josue Hurd MD  Please have Dr. Yousuf Pruett or his NP from [facility] evaluate you on Monday, 2/13 as follow up from the hospital [address] [place] [address] [phone] ADDITIONAL CARE RECOMMENDATIONS:  
 
1. PCP/Haritha Kurtz - Hgb on Monday, February 13th. If less than 7.0 please call Dr. Lorri Freedman to see if outpatient transfusion is needed. 2. Ms. Rozina Burks needs to see Dr. Lorri Freedman with [facility] in 2-3 weeks as follow up for her anemia and likely need for iron transfusions as an outpatient. I discussed this and she is trying to get some Medicare issues straight but please ensure she gets this appointment. DIET: Resume previous diet ACTIVITY: Activity as tolerated DISCHARGE MEDICATIONS: 
 See Medication Reconciliation Form · It is important that you take the medication exactly as they are prescribed. · Keep your medication in the bottles provided by the pharmacist and keep a list of the medication names, dosages, and times to be taken in your wallet. · Do not take other medications without consulting your doctor. NOTIFY YOUR PHYSICIAN FOR ANY OF THE FOLLOWING:  
Fever over 101 degrees for 24 hours. Chest pain, shortness of breath, fever, chills, nausea, vomiting, diarrhea, change in mentation, falling, weakness, bleeding. Severe pain or pain not relieved by medications. Or, any other signs or symptoms that you may have questions about. DISPOSITION: 
 x Home With: 
 OT  PT  [facility]  RN  
  
 SNF/Inpatient Rehab/LTAC Independent/assisted living Hospice Other: CDMP Checked:  
Yes x PROBLEM LIST Updated: 
Yes x Signed:  
Michelle Gates NP 
2/10/2017 
9:12 AM 
 
Discharge Orders None Introducing [facility]! Maulik Grullon introduces Moodsnap patient portal. Now you can access parts of your medical record, email your doctor's office, and request medication refills online. 1. In your internet browser, go to [email or web address] 2. Click on the First Time User? Click Here link in the Sign In box. You will see the New Member Sign Up page. 3. Enter your DoorDash Access Code exactly as it appears below. You will not need to use this code after youve completed the sign-up process. If you do not sign up before the expiration date, you must request a new code. · DoorDash Access Code: [email or web address] Expires: 5/9/2017 10:04 AM 
 
4. Enter the last four digits of your Social Security Number (xxxx) and Date of Birth (mm/dd/yyyy) as indicated and click Submit. You will be taken to the next sign-up page. 5. Create a DoorDash ID. This will be your DoorDash login ID and cannot be changed, so think of one that is secure and easy to remember. 6. Create a DoorDash password. You can change your password at any time. 7. Enter your Password Reset Question and Answer. This can be used at a later time if you forget your password. 8. Enter your e-mail address. You will receive e-mail notification when new information is available in [address] 9. Click Sign Up. You can now view and download portions of your medical record. 10. Click the Download Summary menu link to download a portable copy of your medical information. If you have questions, please visit the Frequently Asked Questions section of the DoorDash website. Remember, DoorDash is NOT to be used for urgent needs. For medical emergencies, dial 911. Now available from your iPhone and Android! General Information Please provide this summary of care documentation to your next provider. Patient Signature:  ____________________________________________________________ Date:  ____________________________________________________________  
  
Janyth Mins Provider Signature:  ____________________________________________________________ Date:  ____________________________________________________________

## 2017-02-08 NOTE — ROUTINE PROCESS
TRANSFER - OUT REPORT:    Verbal report given to Chad Melchor RN (name) on Marai R Vogt  being transferred to [facility] (unit) for routine progression of care       Report consisted of patients Situation, Background, Assessment and   Recommendations(SBAR). Information from the following report(s) SBAR, ED Summary, Intake/Output, MAR and Recent Results was reviewed with the receiving nurse. Lines:   Peripheral IV 02/08/17 Left Forearm (Active)       Peripheral IV 02/08/17 Right Antecubital (Active)        Opportunity for questions and clarification was provided.       Patient transported with:   Monitor  Tech

## 2017-02-09 ENCOUNTER — TELEPHONE (OUTPATIENT)
Dept: ONCOLOGY | Age: 57
End: 2017-02-09

## 2017-02-09 LAB
BASOPHILS # BLD AUTO: 0 K/UL (ref 0–0.1)
BASOPHILS # BLD: 0 % (ref 0–1)
EOSINOPHIL # BLD: 0.2 K/UL (ref 0–0.4)
EOSINOPHIL NFR BLD: 4 % (ref 0–7)
ERYTHROCYTE [DISTWIDTH] IN BLOOD BY AUTOMATED COUNT: 17.4 % (ref 11.5–14.5)
FOLATE SERPL-MCNC: 35.2 NG/ML (ref 5–21)
HCT VFR BLD AUTO: 25.6 % (ref 35–47)
HGB BLD-MCNC: 8.1 G/DL (ref 11.5–16)
INR PPP: 1 (ref 0.9–1.1)
LYMPHOCYTES # BLD AUTO: 25 % (ref 12–49)
LYMPHOCYTES # BLD: 1.2 K/UL (ref 0.8–3.5)
MCH RBC QN AUTO: 30.3 PG (ref 26–34)
MCHC RBC AUTO-ENTMCNC: 31.6 G/DL (ref 30–36.5)
MCV RBC AUTO: 95.9 FL (ref 80–99)
MONOCYTES # BLD: 0.6 K/UL (ref 0–1)
MONOCYTES NFR BLD AUTO: 12 % (ref 5–13)
NEUTS SEG # BLD: 2.9 K/UL (ref 1.8–8)
NEUTS SEG NFR BLD AUTO: 59 % (ref 32–75)
PLATELET # BLD AUTO: 149 K/UL (ref 150–400)
PROTHROMBIN TIME: 10.3 SEC (ref 9–11.1)
RBC # BLD AUTO: 2.67 M/UL (ref 3.8–5.2)
VIT B12 SERPL-MCNC: 544 PG/ML (ref 211–911)
WBC # BLD AUTO: 4.9 K/UL (ref 3.6–11)

## 2017-02-09 PROCEDURE — 74011250637 HC RX REV CODE- 250/637: Performed by: HOSPITALIST

## 2017-02-09 PROCEDURE — 74011000250 HC RX REV CODE- 250: Performed by: HOSPITALIST

## 2017-02-09 PROCEDURE — 85610 PROTHROMBIN TIME: CPT

## 2017-02-09 PROCEDURE — 74011250637 HC RX REV CODE- 250/637: Performed by: FAMILY MEDICINE

## 2017-02-09 PROCEDURE — 74011636637 HC RX REV CODE- 636/637: Performed by: HOSPITALIST

## 2017-02-09 PROCEDURE — 74011250636 HC RX REV CODE- 250/636: Performed by: HOSPITALIST

## 2017-02-09 PROCEDURE — 82607 VITAMIN B-12: CPT

## 2017-02-09 PROCEDURE — 65270000032 HC RM SEMIPRIVATE

## 2017-02-09 PROCEDURE — 74011250636 HC RX REV CODE- 250/636: Performed by: INTERNAL MEDICINE

## 2017-02-09 PROCEDURE — 82746 ASSAY OF FOLIC ACID SERUM: CPT

## 2017-02-09 PROCEDURE — C9113 INJ PANTOPRAZOLE SODIUM, VIA: HCPCS | Performed by: HOSPITALIST

## 2017-02-09 PROCEDURE — 77030029684 HC NEB SM VOL KT MONA -A

## 2017-02-09 PROCEDURE — 36415 COLL VENOUS BLD VENIPUNCTURE: CPT

## 2017-02-09 PROCEDURE — 85025 COMPLETE CBC W/AUTO DIFF WBC: CPT

## 2017-02-09 PROCEDURE — 94640 AIRWAY INHALATION TREATMENT: CPT

## 2017-02-09 RX ORDER — OXYCODONE HCL 5 MG/5 ML
2.5 SOLUTION, ORAL ORAL
Status: COMPLETED | OUTPATIENT
Start: 2017-02-09 | End: 2017-02-09

## 2017-02-09 RX ADMIN — PREDNISONE 5 MG: 5 TABLET ORAL at 10:48

## 2017-02-09 RX ADMIN — IRON SUCROSE 300 MG: 20 INJECTION, SOLUTION INTRAVENOUS at 13:10

## 2017-02-09 RX ADMIN — ARFORMOTEROL TARTRATE 15 MCG: 15 SOLUTION RESPIRATORY (INHALATION) at 19:34

## 2017-02-09 RX ADMIN — Medication 10 ML: at 22:16

## 2017-02-09 RX ADMIN — DULOXETINE HYDROCHLORIDE 30 MG: 30 CAPSULE, DELAYED RELEASE ORAL at 10:48

## 2017-02-09 RX ADMIN — SODIUM CHLORIDE 40 MG: 9 INJECTION INTRAMUSCULAR; INTRAVENOUS; SUBCUTANEOUS at 10:48

## 2017-02-09 RX ADMIN — FERROUS SULFATE TAB 325 MG (65 MG ELEMENTAL FE) 325 MG: 325 (65 FE) TAB at 17:22

## 2017-02-09 RX ADMIN — CITALOPRAM HYDROBROMIDE 20 MG: 20 TABLET ORAL at 10:48

## 2017-02-09 RX ADMIN — LORATADINE 10 MG: 10 TABLET ORAL at 10:48

## 2017-02-09 RX ADMIN — Medication 5 ML: at 14:00

## 2017-02-09 RX ADMIN — SODIUM CHLORIDE 40 MG: 9 INJECTION INTRAMUSCULAR; INTRAVENOUS; SUBCUTANEOUS at 22:16

## 2017-02-09 RX ADMIN — Medication 10 ML: at 06:00

## 2017-02-09 RX ADMIN — Medication 2.5 MG: at 23:29

## 2017-02-09 RX ADMIN — BUDESONIDE 500 MCG: 0.5 INHALANT RESPIRATORY (INHALATION) at 19:34

## 2017-02-09 RX ADMIN — FERROUS SULFATE TAB 325 MG (65 MG ELEMENTAL FE) 325 MG: 325 (65 FE) TAB at 10:52

## 2017-02-09 RX ADMIN — THERA TABS 1 TABLET: TAB at 10:48

## 2017-02-09 NOTE — ROUTINE PROCESS
Patient admitted for GI bleed. Patient has a history of cirrhosis of liver, nicotine dependence, and acute blood loss anemia. Patient lives at [facility], [phone]. She uses a walker to ambulate. She is followed by Cayden Mckeon NP who visits [facility]. This NP works with Dr. Saleem Doll. The patient has home oxygen and a nebulizer from [address] [facility]. Patient listed as self-pay, however, she does have Medicaid. Message sent to [facility] asking them to update Facesheet on patient's EMS. Patient uses [address] [facility] [place] for her medications. Care Management will continue to follow her disposition. Care Management Interventions  PCP Verified by CM: Yes  Last Visit to PCP: 11/17/16  Mode of Transport at Discharge:  Other (see comment) (Friend or family)  Current Support Network: Assisted Living ([facility], [phone])  Discharge Location  Discharge Placement: Assisted Living

## 2017-02-09 NOTE — PROGRESS NOTES
Bedside shift change report given to MIKE Cole (oncoming nurse) by Rudolph Alexander RN (offgoing nurse). Report included the following information SBAR, Kardex, ED Summary, Procedure Summary, Intake/Output, MAR, Accordion, Recent Results and Cardiac Rhythm NSR.

## 2017-02-09 NOTE — PROGRESS NOTES
TRANSFER - IN REPORT:    Verbal report received from [address] RN(name) on [address]  being received from [facility]) for routine progression of care      Report consisted of patients Situation, Background, Assessment and   Recommendations(SBAR). Information from the following report(s) SBAR, MAR, Recent Results and Med Rec Status was reviewed with the receiving nurse. Opportunity for questions and clarification was provided. Assessment completed upon patients arrival to unit and care assumed.

## 2017-02-09 NOTE — CONSULTS
[address] [facility] [address] [facility]   [address] [facility]       Name:  Britton Alfonso   MR#:  [id number]   :  1960   Account #:  [de-identified]    Date of Consultation:  2017   Date of Adm:  2017       HISTORY OF PRESENT ILLNESS: The patient is a 50-year-old   woman with a history of cirrhosis and chronic GI blood loss, who was   admitted to [facility] on  for symptomatic anemia. The   patient takes oral iron at home and her stools are dark. She does not   recall having had any bright red blood in the stool of late. She has a   history of chronic gastrointestinal blood loss secondary to AVMs. She   has had multiple cauterizations procedures. She has undergone EGD   several times, as well as a capsule endoscopy in 2016. PAST MEDICAL HISTORY: Also includes congestive heart failure,   arthritis, COPD, cirrhosis. PAST SURGICAL HISTORY: Includes cholecystectomy,  x2,   upper endoscopy, colonoscopy, capsule endoscopy. SOCIAL HISTORY: She resides at [facility]. She is a current smoker. She has had no alcohol in 3 years. FAMILY HISTORY: Her mother and sister both had cancer. ALLERGIES: PENICILLIN. REVIEW OF SYSTEMS: As noted above, otherwise noncontributory. PHYSICAL EXAMINATION   GENERAL: She is a pleasant, well-developed, well-nourished female   in no apparent distress. VITAL SIGNS: Temperature 98, pulse 78, /58, respirations 20,   O2 saturation 94% on room air. HEENT: EOMI, nonicteric sclerae. NECK: Supple. LUNGS: Clear. CARDIAC: Regular rate and rhythm, no murmur, no S3.   ABDOMEN: Scaphoid, nontender. No hepatosplenomegaly noted. EXTREMITIES: No clubbing, cyanosis or edema. NEUROLOGIC: A and O x3, nonfocal.     LABORATORY DATA: Hemoglobin upon admission was 6.8, status   post transfusion, has come up to 8.1. White count and platelet count   are both normal. INR is 1.0.     IMPRESSION: Anemia secondary to chronic GI blood loss. This   patient has been worked up multiple times for this problem and sees   my partner, Dr. Georg Phalen in the office. No additional investigation   is required at this time. She did have a B12 and folate level drawn this   admission, which were normal. She may benefit from Venofer infusion,   I will order that for today. If Gastroenterology feels that her condition is   stable, she can be discharged at any point and follow up with Dr. Blayne Carrillo in the office within the next 2-3 weeks.         MD Kell Sales / Arina Boland   D:  02/09/2017   11:42   T:  02/09/2017   12:09   Job #:  [id number]

## 2017-02-09 NOTE — CONSULTS
[address] [address]        GASTROENTEROLOGY CONSULTATION NOTE  [facility]  [phone] office  [phone] NP in-hospital cell phone M-F until 4:30  After 5pm or on weekends, please call  for physician on call        NAME:  Kyle Anderson   :   1960   MRN:   [id number]       Referring Physician: Daiana Powers     Consult Date: 2017 3:18 PM    Chief Complaint: black stool     History of Present Illness:  Patient is a 61 y.o. who is seen in consultation at the request of Dr. Orlin Pablo for GI bleed. Pt PMH as below. Pt presented to ER for GI bleed. She is well-known to our practice and has been seen for recurrent GI bleeds, usually from distal AVMs which are not amenable or reachable by deep enteroscopy. We had discussed outpatient transfusions and pt states [facility] was planning for IV iron infusions and possible [place] when she thinks she is bleeding to prevent admissions. Pt states insurance was not cooperating and she has not seen any docs since last admission. Pt had dark stools, fatigue. Hgb in 6's. Has increased to 8s after transfusion. Pt denies any pain, dyspnea, rectal bleeding. No stool today. Pt states she feels much better and not bleeding. I have reviewed the emergency room note, hospital admission note, notes by all other physicians who have seen the patient during this hospitalization to date. I have reviewed the problem list and the reason for this hospitalization. I have reviewed the allergies and the medications the patient was taking at home prior to this hospitalization.     PMH:  Past Medical History   Diagnosis Date    Anemia     Arthritis      Rheumatoid    CHF (congestive heart failure) (HCC)     Chronic obstructive pulmonary disease (HCC)     Cirrhosis of liver (HCC)     COPD (chronic obstructive pulmonary disease) (HCC)     Gastrointestinal disorder      \"lacerations in the large part of my small intestine. \"    Gastrointestinal disorder      liver cirrhosis    Heart failure ([facility] [address]     Internal bleeding     Tachycardia 2016       PSH:  Past Surgical History   Procedure Laterality Date    Hx gi       Cholecystectomoy    Hx gyn        x 2.    Pr endoscopy upper small intestine  2016          Hx cholecystectomy         Allergies: Allergies   Allergen Reactions    Pcn [Penicillins] Angioedema     Childhood reaction       Home Medications:  Prior to Admission Medications   Prescriptions Last Dose Informant Patient Reported? Taking? DULoxetine (CYMBALTA) 30 mg capsule 2017 at Unknown time  Yes Yes   Sig: Take 30 mg by mouth daily. albuterol (VENTOLIN HFA) 90 mcg/actuation inhaler   Yes Yes   Sig: Take 2 Puffs by inhalation four (4) times daily as needed for Wheezing. albuterol-ipratropium (DUO-NEB) 2.5 mg-0.5 mg/3 ml nebu 2017 at Unknown time  Yes Yes   Sig: 3 mL by Nebulization route three (3) times daily. budesonide-formoterol (SYMBICORT) 160-4.5 mcg/actuation HFA inhaler 2017 at Unknown time  Yes Yes   Sig: Take 2 Puffs by inhalation two (2) times a day. citalopram (CELEXA) 20 mg tablet 2017 at Unknown time  Yes Yes   Sig: Take 20 mg by mouth daily. ferrous sulfate 325 mg (65 mg iron) tablet 2017 at Unknown time  Yes Yes   Sig: Take 325 mg by mouth three (3) times daily (with meals). furosemide (LASIX) 40 mg tablet 2017 at Unknown time  Yes Yes   Sig: Take 40 mg by mouth every Monday, Wednesday, Friday. guaiFENesin (ROBITUSSIN) 100 mg/5 mL liquid   Yes Yes   Sig: Take 200 mg by mouth four (4) times daily as needed for Cough. hydrOXYzine pamoate (VISTARIL) 50 mg capsule 2017 at Unknown time  Yes Yes   Sig: Take 50 mg by mouth daily as needed for Anxiety. loratadine (CLARITIN) 10 mg tablet 2017 at Unknown time  Yes Yes   Sig: Take 10 mg by mouth daily.    melatonin 5 mg cap capsule 2017 at Unknown time  Yes Yes   Sig: Take 5 mg by mouth nightly. multivitamins-minerals-lutein (CEROVITE SENIOR) tab tablet 2/8/2017 at Unknown time  Yes Yes   Sig: Take 1 Tab by mouth daily. ondansetron (ZOFRAN ODT) 4 mg disintegrating tablet   Yes Yes   Sig: Take 4 mg by mouth every eight (8) hours as needed for Nausea. pantoprazole (PROTONIX) 40 mg tablet 2/8/2017 at Unknown time  Yes Yes   Sig: Take 40 mg by mouth daily. predniSONE (DELTASONE) 5 mg tablet 2/8/2017 at Unknown time  Yes Yes   Sig: Take 5 mg by mouth daily. senna-docusate (SENNA PLUS) 8.6-50 mg per tablet 2/7/2017 at Unknown time  Yes Yes   Sig: Take 1-2 Tabs by mouth nightly as needed for Constipation. spironolactone (ALDACTONE) 50 mg tablet   Yes Yes   Sig: Take 50 mg by mouth daily as needed (excess fluid).       Facility-Administered Medications: None       Hospital Medications:  Current Facility-Administered Medications   Medication Dose Route Frequency    0.9% sodium chloride infusion  100 mL/hr IntraVENous PRN    hydrOXYzine HCl (ATARAX) tablet 50 mg  50 mg Oral DAILY PRN    loratadine (CLARITIN) tablet 10 mg  10 mg Oral DAILY    albuterol-ipratropium (DUO-NEB) 2.5 MG-0.5 MG/3 ML  3 mL Nebulization Q4H PRN    guaiFENesin (ROBITUSSIN) 100 mg/5 mL oral liquid 200 mg  200 mg Oral QID PRN    therapeutic multivitamin (THERAGRAN) tablet 1 Tab  1 Tab Oral DAILY    predniSONE (DELTASONE) tablet 5 mg  5 mg Oral DAILY    ferrous sulfate tablet 325 mg  325 mg Oral TID WITH MEALS    senna-docusate (PERICOLACE) 8.6-50 mg per tablet 1-2 Tab  1-2 Tab Oral QHS PRN    spironolactone (ALDACTONE) tablet 50 mg  50 mg Oral DAILY PRN    citalopram (CELEXA) tablet 20 mg  20 mg Oral DAILY    DULoxetine (CYMBALTA) capsule 30 mg  30 mg Oral DAILY    sodium chloride (NS) flush 5-10 mL  5-10 mL IntraVENous Q8H    sodium chloride (NS) flush 5-10 mL  5-10 mL IntraVENous PRN    ondansetron (ZOFRAN) injection 4 mg  4 mg IntraVENous Q4H PRN    pantoprazole (PROTONIX) 40 mg in sodium chloride 0.9 % 10 mL injection  40 mg IntraVENous BID    arformoterol (BROVANA) neb solution 15 mcg  15 mcg Nebulization BID RT    And    budesonide (PULMICORT) 500 mcg/2 ml nebulizer suspension  500 mcg Nebulization BID RT       Social History:  Social History   Substance Use Topics    Smoking status: Current Every Day Smoker     Packs/day: 0.75     Years: 45.00    Smokeless tobacco: Never Used    Alcohol use No      Comment: in the past was an alcoholic. Has been sober x 2 years.        Family History:  Family History   Problem Relation Age of Onset   Ronaldo Sridhar Cancer Mother     Alcohol abuse Father     Cancer Sister        Review of Systems:  Constitutional: negative fever, negative chills, negative weight loss  Eyes:   negative visual changes  ENT:   negative sore throat, tongue or lip swelling  Respiratory:  negative cough, negative dyspnea  Cards:  negative for chest pain, palpitations, lower extremity edema  GI:   See HPI  :  negative for frequency, dysuria  Integument:  negative for rash and pruritus  Heme:  negative for easy bruising and gum/nose bleeding  Musculoskel: negative for myalgias, back pain and muscle weakness  Neuro: negative for headaches, dizziness, vertigo  Psych:  negative for feelings of anxiety, depression     Objective:   Patient Vitals for the past 8 hrs:   BP Temp Pulse Resp SpO2   02/09/17 1129 106/58 98.2 °F (36.8 °C) 78 20 94 %   02/09/17 0721 110/43 97.7 °F (36.5 °C) 78 22 94 %             EXAM:     CONST:  Pale, pleasant   NEURO:  alert and oriented x 3   HEENT: EOMI, no scleral icterus   LUNGS: clear to ausculation, (-) wheeze   CARD:  regular rate and rhythm, S1 S2   ABD:  soft, no tenderness, no rebound, bowel sounds (+) all 4 quadrants, no masses, non distended   EXT:  no edema, warm   PSYCH: full, not anxious     Data Review     Recent Labs      02/09/17   0326  02/08/17   1823  02/08/17   0951   WBC  4.9   --   5.8   HGB  8.1*  8.4*  6.8*   HCT  25.6*   --   22.7*   PLT  149*   --   162 Recent Labs      02/08/17   1823  02/08/17   0951   NA  140  136   K  3.5  3.4*   CL  106  102   CO2  26  24   BUN  24*  25*   CREA  0.85  0.99   GLU  125*  123*   CA  8.1*  8.9     Recent Labs      02/08/17   0951   SGOT  18   AP  86   TP  6.9   ALB  3.3*   GLOB  3.6     Recent Labs      02/09/17   0326   INR  1.0   PTP  10.3          Assessment:   · Upper GI bleed: anemic with hemoccult positive stool. Patient Active Problem List   Diagnosis Code    Anemia D64.9    GI bleed K92.2    Cirrhosis of liver ([facility] [address] K74.60    Chronic respiratory failure with hypoxia (HCC) J96.11    Acute blood loss anemia D62    GIB (gastrointestinal bleeding) K92.2     Plan:   · Can eat general diet  · BID PPI  · Trend H/H  · Transfuse as necessary  · If hgb stable, can likely discharge. · Thank you for allowing me to participate in care of Kyle Anderson     Signed By: Say Davis. Lili Saab NP     2/9/2017  3:18 PM     I have examined the patient. I have reviewed the chart and agree with the documentation recorded by the NP, including the assessment, treatment plan, and disposition.         Shine Correa MD

## 2017-02-09 NOTE — CDMP QUERY
Based on the need for increased specificity in documentation for the new ICD 10 coding system, please include the following components in your documentation regarding:  CHF     Documentation for heart failure must:    Specify Acuity :   Acute, Chronic, acute on chronic   Identify Type:    Systolic, Diastolic, Combined systolic and diastolic failure    List the relationship of HTN to Heart Failure   Identify the underlying cause     Please clarify and document your clinical opinion in the progress notes and discharge summary including the definitive and/or presumptive diagnosis, (suspected or probable), related to the above clinical findings. Please include clinical findings supporting your diagnosis.     Thank you  Brittney Liu  [facility]  [phone]

## 2017-02-10 VITALS
WEIGHT: 121.69 LBS | DIASTOLIC BLOOD PRESSURE: 64 MMHG | HEIGHT: 60 IN | RESPIRATION RATE: 18 BRPM | TEMPERATURE: 98.6 F | HEART RATE: 76 BPM | BODY MASS INDEX: 23.89 KG/M2 | OXYGEN SATURATION: 94 % | SYSTOLIC BLOOD PRESSURE: 120 MMHG

## 2017-02-10 LAB
BASOPHILS # BLD AUTO: 0 K/UL (ref 0–0.1)
BASOPHILS # BLD: 0 % (ref 0–1)
EOSINOPHIL # BLD: 0.2 K/UL (ref 0–0.4)
EOSINOPHIL NFR BLD: 4 % (ref 0–7)
ERYTHROCYTE [DISTWIDTH] IN BLOOD BY AUTOMATED COUNT: 16.6 % (ref 11.5–14.5)
HCT VFR BLD AUTO: 24.7 % (ref 35–47)
HGB BLD-MCNC: 7.8 G/DL (ref 11.5–16)
LYMPHOCYTES # BLD AUTO: 20 % (ref 12–49)
LYMPHOCYTES # BLD: 1 K/UL (ref 0.8–3.5)
MCH RBC QN AUTO: 30.5 PG (ref 26–34)
MCHC RBC AUTO-ENTMCNC: 31.6 G/DL (ref 30–36.5)
MCV RBC AUTO: 96.5 FL (ref 80–99)
MONOCYTES # BLD: 0.6 K/UL (ref 0–1)
MONOCYTES NFR BLD AUTO: 11 % (ref 5–13)
NEUTS SEG # BLD: 3.3 K/UL (ref 1.8–8)
NEUTS SEG NFR BLD AUTO: 65 % (ref 32–75)
PLATELET # BLD AUTO: 137 K/UL (ref 150–400)
RBC # BLD AUTO: 2.56 M/UL (ref 3.8–5.2)
WBC # BLD AUTO: 5.2 K/UL (ref 3.6–11)

## 2017-02-10 PROCEDURE — 74011636637 HC RX REV CODE- 636/637: Performed by: HOSPITALIST

## 2017-02-10 PROCEDURE — 94640 AIRWAY INHALATION TREATMENT: CPT

## 2017-02-10 PROCEDURE — 74011000250 HC RX REV CODE- 250: Performed by: HOSPITALIST

## 2017-02-10 PROCEDURE — 85025 COMPLETE CBC W/AUTO DIFF WBC: CPT

## 2017-02-10 PROCEDURE — 74011250636 HC RX REV CODE- 250/636: Performed by: INTERNAL MEDICINE

## 2017-02-10 PROCEDURE — 74011250637 HC RX REV CODE- 250/637: Performed by: HOSPITALIST

## 2017-02-10 PROCEDURE — 77030029684 HC NEB SM VOL KT MONA -A

## 2017-02-10 PROCEDURE — 90686 IIV4 VACC NO PRSV 0.5 ML IM: CPT | Performed by: INTERNAL MEDICINE

## 2017-02-10 PROCEDURE — 74011250636 HC RX REV CODE- 250/636: Performed by: HOSPITALIST

## 2017-02-10 PROCEDURE — 36415 COLL VENOUS BLD VENIPUNCTURE: CPT

## 2017-02-10 PROCEDURE — C9113 INJ PANTOPRAZOLE SODIUM, VIA: HCPCS | Performed by: HOSPITALIST

## 2017-02-10 PROCEDURE — 90471 IMMUNIZATION ADMIN: CPT

## 2017-02-10 RX ORDER — OXYCODONE HYDROCHLORIDE 5 MG/1
5 TABLET ORAL
Qty: 15 TAB | Refills: 0 | Status: SHIPPED | OUTPATIENT
Start: 2017-02-10 | End: 2017-10-01

## 2017-02-10 RX ADMIN — CITALOPRAM HYDROBROMIDE 20 MG: 20 TABLET ORAL at 09:47

## 2017-02-10 RX ADMIN — SODIUM CHLORIDE 40 MG: 9 INJECTION INTRAMUSCULAR; INTRAVENOUS; SUBCUTANEOUS at 09:44

## 2017-02-10 RX ADMIN — DULOXETINE HYDROCHLORIDE 30 MG: 30 CAPSULE, DELAYED RELEASE ORAL at 09:47

## 2017-02-10 RX ADMIN — ARFORMOTEROL TARTRATE 15 MCG: 15 SOLUTION RESPIRATORY (INHALATION) at 07:39

## 2017-02-10 RX ADMIN — THERA TABS 1 TABLET: TAB at 09:47

## 2017-02-10 RX ADMIN — INFLUENZA VIRUS VACCINE 0.5 ML: 15; 15; 15; 15 SUSPENSION INTRAMUSCULAR at 09:46

## 2017-02-10 RX ADMIN — LORATADINE 10 MG: 10 TABLET ORAL at 09:47

## 2017-02-10 RX ADMIN — Medication 10 ML: at 07:33

## 2017-02-10 RX ADMIN — PREDNISONE 5 MG: 5 TABLET ORAL at 09:47

## 2017-02-10 RX ADMIN — BUDESONIDE 500 MCG: 0.5 INHALANT RESPIRATORY (INHALATION) at 07:39

## 2017-02-10 RX ADMIN — FERROUS SULFATE TAB 325 MG (65 MG ELEMENTAL FE) 325 MG: 325 (65 FE) TAB at 07:32

## 2017-02-10 NOTE — PROGRESS NOTES
Hospitalist Progress Note  Anisa Navas NP  Office: [phone]  Cell: [phone]      Date of Service:  2017  NAME:  Corrine Chung  :  1960  MRN:  [id number]      Admission Summary:     Patient presented to the hospital with weakness and lethargy. She is well known to GI practice and has been seen repeatedly for GI bleeds usually caused by distal AVMS which are not amenable or reachable by deep enteroscopy . Goal of care after last admission was outpatient IV iron transfusions/ blood transfusions as needed however patient states her insurance will not pay . She has been having dark stools ( which she attributes to PO iron) however on admission her hgb was 6.8 and occult stool heme +. Hgb increased to 8's after transfusion and she is feeling better. Interval history / Subjective:       \" I knew something was wrong when I was sleeping 36 hours at a time\". No complaints today , no BM in 2 days.       Assessment & Plan:     Acute Blood Loss Anemia in the setting of Chronic GI Bleed due to AVMs:   - PRBC x 1 unit, she is now in 8's   - s/p Venofer X 1 dose today   - in the past cause has been likely distal AVMs , repeated admissions for blood transfusions, would be helpful to have plan in place for outpatient transfusions   - followed closely by Dr. Jeremias Melendez with [facility] ( OP f/u in 2-3 weeks) although patient has not been compliant with followups since October   - advance to general diet   - per GI no interventions  - trend H/H     Compensated Alcoholic Liver Cirrhosis :   - continue aldactone  - resume lasix at discharge   - coags ok     Hx of COPD:   - continue inhalers     Tobacco Abuse:   - counseled, smokes 1 PPD     Hx of HTN:   - resume home meds     Code status: Full   DVT prophylaxis: SCDs    Care Plan discussed with: Patient/Family, Nurse,  and Consultant Paul Conde GI NP and Dr. Nik De León  Disposition: SNF/LTC - plan for discharge tomorrow if Hgb remains stable   Downgrade to medical      Hospital Problems  Date Reviewed: 11/17/2016          Codes Class Noted POA    GIB (gastrointestinal bleeding) ICD-10-CM: K92.2  ICD-9-CM: 578.9  2/8/2017 Unknown                Review of Systems:   A comprehensive review of systems was negative. Vital Signs:    Last 24hrs VS reviewed since prior progress note. Most recent are:  Visit Vitals    /73 (BP 1 Location: Right arm, BP Patient Position: At rest)    Pulse 69    Temp 97.1 °F (36.2 °C)    Resp 18    Ht 5' (1.524 m)    Wt 55.5 kg (122 lb 5.7 oz)    SpO2 92%    BMI 23.9 kg/m2         Intake/Output Summary (Last 24 hours) at 02/09/17 2012  Last data filed at 02/09/17 1811   Gross per 24 hour   Intake              240 ml   Output              400 ml   Net             -160 ml        Physical Examination:             Constitutional:  No acute distress, cooperative, pleasant    ENT:  Oral mucous moist, oropharynx benign. Neck supple,    Resp:  CTA bilaterally. No wheezing/rhonchi/rales. No accessory muscle use   CV:  Regular rhythm, normal rate, no murmurs, gallops, rubs    GI:  Soft, non distended, non tender. normoactive bowel sounds, no hepatosplenomegaly     Musculoskeletal:  No edema, warm, 2+ pulses throughout    Neurologic:  Moves all extremities. AAOx3  Flat Affect      Psych:  Good insight, Not anxious nor agitated.   Skin:  Good turgor, no rashes or ulcers       Data Review:    Review and/or order of clinical lab test  Review and/or order of tests in the radiology section of CPT  Review and/or order of tests in the medicine section of CPT      Labs:     Recent Labs      02/09/17   0326  02/08/17   1823  02/08/17   0951   WBC  4.9   --   5.8   HGB  8.1*  8.4*  6.8*   HCT  25.6*   --   22.7*   PLT  149*   --   162     Recent Labs      02/08/17   1823  02/08/17   0951   NA  140  136   K  3.5  3.4*   CL  106  102   CO2  26  24   BUN  24*  25*   CREA  0.85  0.99 GLU  125*  123*   CA  8.1*  8.9     Recent Labs      02/08/17   0951   SGOT  18   ALT  17   AP  86   TBILI  0.2   TP  6.9   ALB  3.3*   GLOB  3.6     Recent Labs      02/09/17   0326   INR  1.0   PTP  10.3      No results for input(s): FE, TIBC, PSAT, FERR in the last 72 hours. Lab Results   Component Value Date/Time    Folate 35.2 02/09/2017 03:26 AM      No results for input(s): PH, PCO2, PO2 in the last 72 hours.   Recent Labs      02/08/17   0951   TROIQ  <0.04     Lab Results   Component Value Date/Time    Cholesterol, total 191 05/31/2016 04:21 AM    HDL Cholesterol 69 05/31/2016 04:21 AM    LDL, calculated 108.8 05/31/2016 04:21 AM    Triglyceride 66 05/31/2016 04:21 AM    CHOL/HDL Ratio 2.8 05/31/2016 04:21 AM     Lab Results   Component Value Date/Time    Glucose (POC) 95 05/04/2016 02:48 PM     Lab Results   Component Value Date/Time    Color YELLOW/STRAW 02/08/2017 10:22 AM    Appearance CLEAR 02/08/2017 10:22 AM    Specific gravity 1.008 02/08/2017 10:22 AM    pH (UA) 6.0 02/08/2017 10:22 AM    Protein NEGATIVE  02/08/2017 10:22 AM    Glucose NEGATIVE  02/08/2017 10:22 AM    Ketone NEGATIVE  02/08/2017 10:22 AM    Bilirubin NEGATIVE  02/08/2017 10:22 AM    Urobilinogen 0.2 02/08/2017 10:22 AM    Nitrites NEGATIVE  02/08/2017 10:22 AM    Leukocyte Esterase NEGATIVE  02/08/2017 10:22 AM    Epithelial cells FEW 02/08/2017 10:22 AM    Bacteria NEGATIVE  02/08/2017 10:22 AM    WBC 0-4 02/08/2017 10:22 AM    RBC 0-5 02/08/2017 10:22 AM         Medications Reviewed:     Current Facility-Administered Medications   Medication Dose Route Frequency    0.9% sodium chloride infusion  100 mL/hr IntraVENous PRN    hydrOXYzine HCl (ATARAX) tablet 50 mg  50 mg Oral DAILY PRN    loratadine (CLARITIN) tablet 10 mg  10 mg Oral DAILY    albuterol-ipratropium (DUO-NEB) 2.5 MG-0.5 MG/3 ML  3 mL Nebulization Q4H PRN    guaiFENesin (ROBITUSSIN) 100 mg/5 mL oral liquid 200 mg  200 mg Oral QID PRN    therapeutic multivitamin (THERAGRAN) tablet 1 Tab  1 Tab Oral DAILY    predniSONE (DELTASONE) tablet 5 mg  5 mg Oral DAILY    ferrous sulfate tablet 325 mg  325 mg Oral TID WITH MEALS    senna-docusate (PERICOLACE) 8.6-50 mg per tablet 1-2 Tab  1-2 Tab Oral QHS PRN    spironolactone (ALDACTONE) tablet 50 mg  50 mg Oral DAILY PRN    citalopram (CELEXA) tablet 20 mg  20 mg Oral DAILY    DULoxetine (CYMBALTA) capsule 30 mg  30 mg Oral DAILY    sodium chloride (NS) flush 5-10 mL  5-10 mL IntraVENous Q8H    sodium chloride (NS) flush 5-10 mL  5-10 mL IntraVENous PRN    ondansetron (ZOFRAN) injection 4 mg  4 mg IntraVENous Q4H PRN    pantoprazole (PROTONIX) 40 mg in sodium chloride 0.9 % 10 mL injection  40 mg IntraVENous BID    arformoterol (BROVANA) neb solution 15 mcg  15 mcg Nebulization BID RT    And    budesonide (PULMICORT) 500 mcg/2 ml nebulizer suspension  500 mcg Nebulization BID RT     ______________________________________________________________________  EXPECTED LENGTH OF STAY: 2d 4h  ACTUAL LENGTH OF STAY:          1                 Jordana Avendano NP

## 2017-02-10 NOTE — PROGRESS NOTES
Pt. Did well with her iron infusion  No c/o this am  vss afeb   Exam no change    hgb 7.7    Imp . Iron def anemia. ... Secondary to chronic gi blood loss. .. Pt. Is stable for   D/c. .. Instructed to take 3 iron tabs daily. Will need to get in to see Dr. Jay Meade  In two weeks. ..     Yoselin Roe MD

## 2017-02-10 NOTE — DISCHARGE INSTRUCTIONS
Discharge Instructions       PATIENT ID: Jamila Ledbetter  MRN: [id number]   YOB: 1960    DATE OF ADMISSION: 2/8/2017  9:38 AM    DATE OF DISCHARGE: 2/10/2017    PRIMARY CARE PROVIDER: Albino Bean MD     ATTENDING PHYSICIAN: Martha Chappell MD  DISCHARGING PROVIDER: Alin Simms NP    To contact this individual call [phone] and ask the  to page. If unavailable ask to be transferred the Adult Hospitalist Department. DISCHARGE DIAGNOSES: Acute blood loss anemia from chronic AVMs    CONSULTATIONS: IP CONSULT TO GASTROENTEROLOGY  IP CONSULT TO HEMATOLOGY    PROCEDURES/SURGERIES: * No surgery found *    PENDING TEST RESULTS:   At the time of discharge the following test results are still pending: None    FOLLOW UP APPOINTMENTS:   Follow-up Information     Follow up With Details Comments Contact Info    Villa Siemens, MD  Please have  [facility] or his NP from [facility] evaluate you on Monday, 2/13 as follow up from the hospital [address] [place] [address]  [phone]             ADDITIONAL CARE RECOMMENDATIONS:     1. PCP/[facility] - [facility] on Monday, February 13th. If less than 7.0 please call Dr. Jay Meade to see if outpatient transfusion is needed. 2. Ms. Filipe Newton needs to see Dr. Jay Meade with [facility] in 2-3 weeks as follow up for her anemia and likely need for iron transfusions as an outpatient. I discussed this and she is trying to get some Medicare issues straight but please ensure she gets this appointment. DIET: Resume previous diet    ACTIVITY: Activity as tolerated      DISCHARGE MEDICATIONS:   See Medication Reconciliation Form    · It is important that you take the medication exactly as they are prescribed. · Keep your medication in the bottles provided by the pharmacist and keep a list of the medication names, dosages, and times to be taken in your wallet. · Do not take other medications without consulting your doctor.        NOTIFY YOUR PHYSICIAN FOR ANY OF THE FOLLOWING:   Fever over 101 degrees for 24 hours. Chest pain, shortness of breath, fever, chills, nausea, vomiting, diarrhea, change in mentation, falling, weakness, bleeding. Severe pain or pain not relieved by medications. Or, any other signs or symptoms that you may have questions about.       DISPOSITION:   x Home With:   OT  PT  [facility]  RN       SNF/Inpatient Rehab/LTAC    Independent/assisted living    Hospice    Other:     CDMP Checked:   Yes x     PROBLEM LIST Updated:  Yes x       Signed:   Luisa Sullivan NP  2/10/2017  9:12 AM

## 2017-02-10 NOTE — PROGRESS NOTES
[address]  [address], [address]       GI PROGRESS NOTE  [facility]  [phone]    NAME: Taiwo Leger   :  1960   MRN:  [id number]       Subjective:     No signs of bleeding or of symptomatic anemia    Objective:     VITALS:   Last 24hrs VS reviewed since prior progress note. Most recent are:  Visit Vitals    /64    Pulse 76    Temp 98.6 °F (37 °C)    Resp 18    Ht 5' (1.524 m)    Wt 55.2 kg (121 lb 11.1 oz)    SpO2 94%    BMI 23.77 kg/m2       PHYSICAL EXAM:  General: Cooperative, no acute distress    Neurologic:  Alert and oriented X 3. HEENT: PERRLA, EOMI. Lungs:  No distress  Heart:  s1 s2  Abdomen: Soft, Non distended, Non tender.  +Bowel sounds  Extremities: No edema      Lab Data Reviewed:     Recent Results (from the past 24 hour(s))   CBC WITH AUTOMATED DIFF    Collection Time: 02/10/17  4:48 AM   Result Value Ref Range    WBC 5.2 3.6 - 11.0 K/uL    RBC 2.56 (L) 3.80 - 5.20 M/uL    HGB 7.8 (L) 11.5 - 16.0 g/dL    HCT 24.7 (L) 35.0 - 47.0 %    MCV 96.5 80.0 - 99.0 FL    MCH 30.5 26.0 - 34.0 PG    MCHC 31.6 30.0 - 36.5 g/dL    RDW 16.6 (H) 11.5 - 14.5 %    PLATELET 239 (L) 750 - 400 K/uL    NEUTROPHILS 65 32 - 75 %    LYMPHOCYTES 20 12 - 49 %    MONOCYTES 11 5 - 13 %    EOSINOPHILS 4 0 - 7 %    BASOPHILS 0 0 - 1 %    ABS. NEUTROPHILS 3.3 1.8 - 8.0 K/UL    ABS. LYMPHOCYTES 1.0 0.8 - 3.5 K/UL    ABS. MONOCYTES 0.6 0.0 - 1.0 K/UL    ABS. EOSINOPHILS 0.2 0.0 - 0.4 K/UL    ABS.  BASOPHILS 0.0 0.0 - 0.1 K/UL         ________________________________________________________________________       Assessment:   · Chronic anemia with h/o small bowel AVMs - hgb low but stable     Patient Active Problem List   Diagnosis Code    Anemia D64.9    GI bleed K92.2    Cirrhosis of liver ([address] K74.60    Chronic respiratory failure with hypoxia (HCC) J96.11    Acute blood loss anemia D62    GIB (gastrointestinal bleeding) K92.2     Plan:   · Discussed with case management - her medicare and medicaid have been denied and she cannot get into see hematology.   They will look into her insurance coverage  · OK to go home per GI standpoint     Signed By: Manasa Juarez NP     2/10/2017  10:43 AM

## 2017-02-10 NOTE — PROGRESS NOTES
Bedside shift change report given to Fátima Alfaro (oncoming nurse) by Marivel Velazquez (offgoing nurse). Report included the following information SBAR.

## 2017-02-10 NOTE — PROGRESS NOTES
Bedside shift change report given to [facility], RN (oncoming nurse) by Sue Lara RN (offgoing nurse). Report included the following information SBAR.     2200: Patient complains of increased arthritis pain in lower back. Reports taking Oxycodone in past for this pain. Dr. Leoncio Kamara notified, ordered K pad and to monitor BP.    2300: Patient continues to complain of pain, received orders from Dr. Leoncio Kamara for one time dose of Oxycodone.

## 2017-02-12 LAB
ABO + RH BLD: NORMAL
ANTIGENS PRESENT RBC DONR: NORMAL
ANTIGENS PRESENT RBC DONR: NORMAL
BLD PROD TYP BPU: NORMAL
BLD PROD TYP BPU: NORMAL
BLOOD BANK CMNT PATIENT-IMP: NORMAL
BLOOD GROUP ANTIBODIES SERPL: NORMAL
BPU ID: NORMAL
BPU ID: NORMAL
CROSSMATCH RESULT,%XM: NORMAL
CROSSMATCH RESULT,%XM: NORMAL
SPECIMEN EXP DATE BLD: NORMAL
STATUS OF UNIT,%ST: NORMAL
STATUS OF UNIT,%ST: NORMAL
UNIT DIVISION, %UDIV: 0
UNIT DIVISION, %UDIV: 0

## 2017-02-13 NOTE — DISCHARGE SUMMARY
Discharge Summary       PATIENT ID: Taiwo Leger  MRN: [id number]   YOB: 1960    DATE OF ADMISSION: 2/8/2017  9:38 AM    DATE OF DISCHARGE: 2/10/2017   PRIMARY CARE PROVIDER: Pricilla Kumar MD     ATTENDING PHYSICIAN: Dr. Sofía Tijerina  DISCHARGING PROVIDER: Jose Suarez NP    To contact this individual call [phone] and ask the  to page. If unavailable ask to be transferred the Adult Hospitalist Department. CONSULTATIONS: None    PROCEDURES/SURGERIES: * No surgery found *    ADMITTING DIAGNOSES & HOSPITAL COURSE:   Ms. Jaren Santos is a 62 yo female with PMH significant for recurrent GI bleeds due to AVMs which have not been amenable to deep enteroscopy as per GI. She was supposed to be receiving IV iron infusions through [facility]; however, was unable to receive these due to insurance issues. She reportes feeling fairly weak recently with dark and tarry stools and came to the ED and was found to have a Hgb of < 7. She was transfused and felt much better and then received IV Iron with improvement. Seen by GI and Hematology during her admissions and again no intervention per GI and recommend supportive care. Dr. Efrem Villegas evaluated and she likely would benefit from OP transfusions of IV Venofer and/or PRBCs if this can be aranged with her insurance. Discussed the importance of F/U with Dr. Kathi Salazar which she said she will arrange after her insurance gets squared away. On day of d/c she is feeling well and is anxious to go home. Her Hgb is 7.8 and she is asymptomatic. Advised facility to recheck Hgb on 2/13 and to arrange transfusions if needed as an OP. She had no complaints on day of discharge and needs close F/U at her facility.      Acute Blood Loss Anemia in the setting of Chronic GI Bleed due to AVMs:   - Received 1 unit PRBC and Hgb 7.8 on day of discharge  - s/p Venofer X 1 this admission  - in the past cause has been likely distal AVMs , repeated admissions for blood transfusions, would be helpful to have plan in place for outpatient transfusions   - followed closely by Dr. Blayne Carrillo with [facility] ( OP f/u in 2-3 weeks) although patient has not been compliant with followups since October   - tolerating general diet  - per GI no interventions  - recheck hgb on 2/13     Compensated Alcoholic Liver Cirrhosis :   - continue aldactone  - resume lasix at discharge   - coags ok      Hx of COPD:   - continue inhalers      Tobacco Abuse:   - counseled, smokes 1 PPD      Hx of HTN:   - resume home meds     PENDING TEST RESULTS:   At the time of discharge the following test results are still pending: None    FOLLOW UP APPOINTMENTS:    Follow-up Information     Follow up With Details Comments Contact Info    Shikha Thomas MD  Please have Dr. Autumn Cha or his NP from [facility] evaluate you on Monday, 2/13 as follow up from the [address]  [phone]             ADDITIONAL CARE RECOMMENDATIONS:     1. PCP/[facility] [place] - Hgb on Monday, February 13th. If less than 7.0 please call Dr. Blayne Carrillo to see if outpatient transfusion is needed. 2. Ms. Jessica Calix needs to see Dr. Blayne Carrillo with [facility] in 2-3 weeks as follow up for her anemia and likely need for iron transfusions as an outpatient. I discussed this and she is trying to get some Medicare issues straight but please ensure she gets this appointment. DIET: Resume previous diet    ACTIVITY: Activity as tolerated        DISCHARGE MEDICATIONS:  Discharge Medication List as of 2/10/2017 11:22 AM      START taking these medications    Details   oxyCODONE IR (ROXICODONE) 5 mg immediate release tablet Take 1 Tab by mouth every twelve (12) hours as needed for Pain (Arthritis pain). Max Daily Amount: 10 mg., Print, Disp-15 Tab, R-0         CONTINUE these medications which have NOT CHANGED    Details   DULoxetine (CYMBALTA) 30 mg capsule Take 30 mg by mouth daily. , Historical Med      furosemide (LASIX) 40 mg tablet Take 40 mg by mouth every Monday, Wednesday, Friday., Historical Med      predniSONE (DELTASONE) 5 mg tablet Take 5 mg by mouth daily. , Historical Med      citalopram (CELEXA) 20 mg tablet Take 20 mg by mouth daily. , Historical Med      loratadine (CLARITIN) 10 mg tablet Take 10 mg by mouth daily. , Historical Med      pantoprazole (PROTONIX) 40 mg tablet Take 40 mg by mouth daily. , Historical Med      multivitamins-minerals-lutein (CEROVITE SENIOR) tab tablet Take 1 Tab by mouth daily. , Historical Med      budesonide-formoterol (SYMBICORT) 160-4.5 mcg/actuation HFA inhaler Take 2 Puffs by inhalation two (2) times a day., Historical Med      ferrous sulfate 325 mg (65 mg iron) tablet Take 325 mg by mouth three (3) times daily (with meals). , Historical Med      albuterol-ipratropium (DUO-NEB) 2.5 mg-0.5 mg/3 ml nebu 3 mL by Nebulization route three (3) times daily. , Historical Med      melatonin 5 mg cap capsule Take 5 mg by mouth nightly., Historical Med      guaiFENesin (ROBITUSSIN) 100 mg/5 mL liquid Take 200 mg by mouth four (4) times daily as needed for Cough., Historical Med      senna-docusate (SENNA PLUS) 8.6-50 mg per tablet Take 1-2 Tabs by mouth nightly as needed for Constipation. , Historical Med      albuterol (VENTOLIN HFA) 90 mcg/actuation inhaler Take 2 Puffs by inhalation four (4) times daily as needed for Wheezing., Historical Med      hydrOXYzine pamoate (VISTARIL) 50 mg capsule Take 50 mg by mouth daily as needed for Anxiety. , Historical Med      ondansetron (ZOFRAN ODT) 4 mg disintegrating tablet Take 4 mg by mouth every eight (8) hours as needed for Nausea., Historical Med      spironolactone (ALDACTONE) 50 mg tablet Take 50 mg by mouth daily as needed (excess fluid). , Historical Med               NOTIFY YOUR PHYSICIAN FOR ANY OF THE FOLLOWING:   Fever over 101 degrees for 24 hours. Chest pain, shortness of breath, fever, chills, nausea, vomiting, diarrhea, change in mentation, falling, weakness, bleeding.  Severe pain or pain not relieved by medications. Or, any other signs or symptoms that you may have questions about. DISPOSITION:  x  Home With:   OT  PT  HH  RN       Long term SNF/Inpatient Rehab    Independent/assisted living    Hospice    Other:       PATIENT CONDITION AT DISCHARGE:     Functional status    Poor     Deconditioned    x Independent      Cognition     Lucid     Forgetful     Dementia      Catheters/lines (plus indication)    Angel     PICC     PEG    x None      Code status   x  Full code     DNR      PHYSICAL EXAMINATION AT DISCHARGE:  General: No acute distress, cooperative, pleasant   EENT: EOMI. Anicteric sclerae. Oral mucous moist, oropharynx benign  Resp: CTA bilaterally. No wheezing/rhonchi/rales. No accessory muscle use  CV: Regular rhythm, normal rate, no murmurs, gallops, rubs  GI: Soft, non distended, non tender. normoactive bowel sounds,   Extremities: No edema, warm, 2+ pulses throughout  Neurologic: Moves all extremities. AAOx3, CN II-XII grossly intact  Psych: Good insight. Not anxious nor agitated.   Skin: Good Turgor, no rashes or ulcers      CHRONIC MEDICAL DIAGNOSES:  Problem List as of 2/10/2017  Date Reviewed: 2/10/2017          Codes Class Noted - Resolved    * (Principal)GIB (gastrointestinal bleeding) ICD-10-CM: K92.2  ICD-9-CM: 578.9  2/8/2017 - Present        Acute blood loss anemia ICD-10-CM: D62  ICD-9-CM: 285.1  10/25/2016 - Present        Cirrhosis of liver (HCC) (Chronic) ICD-10-CM: K74.60  ICD-9-CM: 571.5  8/30/2016 - Present        Chronic respiratory failure with hypoxia (HCC) (Chronic) ICD-10-CM: J96.11  ICD-9-CM: 518.83, 799.02  8/30/2016 - Present        Anemia ICD-10-CM: D64.9  ICD-9-CM: 285.9  7/19/2016 - Present        GI bleed ICD-10-CM: K92.2  ICD-9-CM: 578.9  7/19/2016 - Present        RESOLVED: Hypotension ICD-10-CM: I95.9  ICD-9-CM: 458.9  8/30/2016 - 10/18/2016        RESOLVED: Acute blood loss anemia ICD-10-CM: D62  ICD-9-CM: 285.1  8/30/2016 - 10/18/2016 RESOLVED: Nicotine dependence (Chronic) ICD-10-CM: F17.200  ICD-9-CM: 305.1  8/30/2016 - 11/17/2016        RESOLVED: Acute angina ([address] ICD-10-CM: I20.9  ICD-9-CM: 413.9  8/27/2016 - 8/30/2016        RESOLVED: GIB (gastrointestinal bleeding) ICD-10-CM: K92.2  ICD-9-CM: 578.9  7/8/2016 - 7/9/2016        RESOLVED: Tachycardia ICD-10-CM: R00.0  ICD-9-CM: 785.0  1/27/2016 - 6/11/2016        RESOLVED: GI bleed ICD-10-CM: K92.2  ICD-9-CM: 578.9  1/25/2016 - 6/11/2016              Greater than 25 minutes were spent with the patient on counseling and coordination of care    Signed:   Crystal Jcainto NP  2/13/2017  2:41 PM

## 2017-10-01 ENCOUNTER — HOSPITAL ENCOUNTER (INPATIENT)
Age: 57
LOS: 2 days | Discharge: HOME | DRG: 378 | End: 2017-10-03
Attending: STUDENT IN AN ORGANIZED HEALTH CARE EDUCATION/TRAINING PROGRAM | Admitting: INTERNAL MEDICINE
Payer: MEDICARE

## 2017-10-01 DIAGNOSIS — K92.0 COFFEE GROUND EMESIS: Primary | ICD-10-CM

## 2017-10-01 DIAGNOSIS — R19.5 STOOL GUAIAC POSITIVE: ICD-10-CM

## 2017-10-01 LAB
ALBUMIN SERPL-MCNC: 3.4 G/DL (ref 3.5–5)
ALBUMIN/GLOB SERPL: 1.1 {RATIO} (ref 1.1–2.2)
ALP SERPL-CCNC: 87 U/L (ref 45–117)
ALT SERPL-CCNC: 22 U/L (ref 12–78)
ANION GAP SERPL CALC-SCNC: 4 MMOL/L (ref 5–15)
APPEARANCE UR: CLEAR
APTT PPP: 24.4 SEC (ref 22.1–32.5)
AST SERPL-CCNC: 28 U/L (ref 15–37)
BACTERIA URNS QL MICRO: NEGATIVE /HPF
BASOPHILS # BLD: 0 K/UL (ref 0–0.1)
BASOPHILS NFR BLD: 0 % (ref 0–1)
BILIRUB SERPL-MCNC: 0.2 MG/DL (ref 0.2–1)
BILIRUB UR QL: NEGATIVE
BUN SERPL-MCNC: 17 MG/DL (ref 6–20)
BUN/CREAT SERPL: 19 (ref 12–20)
CALCIUM SERPL-MCNC: 9.2 MG/DL (ref 8.5–10.1)
CHLORIDE SERPL-SCNC: 104 MMOL/L (ref 97–108)
CO2 SERPL-SCNC: 33 MMOL/L (ref 21–32)
COLOR UR: NORMAL
CREAT SERPL-MCNC: 0.91 MG/DL (ref 0.55–1.02)
DIFFERENTIAL METHOD BLD: ABNORMAL
EOSINOPHIL # BLD: 0.2 K/UL (ref 0–0.4)
EOSINOPHIL NFR BLD: 4 % (ref 0–7)
EPITH CASTS URNS QL MICRO: NORMAL /LPF
ERYTHROCYTE [DISTWIDTH] IN BLOOD BY AUTOMATED COUNT: 17.1 % (ref 11.5–14.5)
GLOBULIN SER CALC-MCNC: 3.2 G/DL (ref 2–4)
GLUCOSE SERPL-MCNC: 92 MG/DL (ref 65–100)
GLUCOSE UR STRIP.AUTO-MCNC: NEGATIVE MG/DL
HCT VFR BLD AUTO: 30.2 % (ref 35–47)
HEMOCCULT STL QL: POSITIVE
HGB BLD-MCNC: 9.1 G/DL (ref 11.5–16)
HGB UR QL STRIP: NEGATIVE
HYALINE CASTS URNS QL MICRO: NORMAL /LPF (ref 0–5)
INR PPP: 1 (ref 0.9–1.1)
KETONES UR QL STRIP.AUTO: NEGATIVE MG/DL
LEUKOCYTE ESTERASE UR QL STRIP.AUTO: NEGATIVE
LYMPHOCYTES # BLD: 0.8 K/UL (ref 0.8–3.5)
LYMPHOCYTES NFR BLD: 17 % (ref 12–49)
MCH RBC QN AUTO: 31.1 PG (ref 26–34)
MCHC RBC AUTO-ENTMCNC: 30.1 G/DL (ref 30–36.5)
MCV RBC AUTO: 103.1 FL (ref 80–99)
MONOCYTES # BLD: 0.4 K/UL (ref 0–1)
MONOCYTES NFR BLD: 9 % (ref 5–13)
NEUTS SEG # BLD: 3.2 K/UL (ref 1.8–8)
NEUTS SEG NFR BLD: 70 % (ref 32–75)
NITRITE UR QL STRIP.AUTO: NEGATIVE
PH UR STRIP: 6 [PH] (ref 5–8)
PLATELET # BLD AUTO: 174 K/UL (ref 150–400)
POTASSIUM SERPL-SCNC: 4 MMOL/L (ref 3.5–5.1)
PROT SERPL-MCNC: 6.6 G/DL (ref 6.4–8.2)
PROT UR STRIP-MCNC: NEGATIVE MG/DL
PROTHROMBIN TIME: 9.8 SEC (ref 9–11.1)
RBC # BLD AUTO: 2.93 M/UL (ref 3.8–5.2)
RBC #/AREA URNS HPF: NORMAL /HPF (ref 0–5)
RBC MORPH BLD: ABNORMAL
SODIUM SERPL-SCNC: 141 MMOL/L (ref 136–145)
SP GR UR REFRACTOMETRY: 1.01 (ref 1–1.03)
THERAPEUTIC RANGE,PTTT: NORMAL SECS (ref 58–77)
UR CULT HOLD, URHOLD: NORMAL
UROBILINOGEN UR QL STRIP.AUTO: 0.2 EU/DL (ref 0.2–1)
WBC # BLD AUTO: 4.6 K/UL (ref 3.6–11)
WBC URNS QL MICRO: NORMAL /HPF (ref 0–4)

## 2017-10-01 PROCEDURE — 74011000250 HC RX REV CODE- 250: Performed by: STUDENT IN AN ORGANIZED HEALTH CARE EDUCATION/TRAINING PROGRAM

## 2017-10-01 PROCEDURE — 81001 URINALYSIS AUTO W/SCOPE: CPT | Performed by: STUDENT IN AN ORGANIZED HEALTH CARE EDUCATION/TRAINING PROGRAM

## 2017-10-01 PROCEDURE — 74011000250 HC RX REV CODE- 250: Performed by: INTERNAL MEDICINE

## 2017-10-01 PROCEDURE — 96361 HYDRATE IV INFUSION ADD-ON: CPT

## 2017-10-01 PROCEDURE — 74011250636 HC RX REV CODE- 250/636: Performed by: STUDENT IN AN ORGANIZED HEALTH CARE EDUCATION/TRAINING PROGRAM

## 2017-10-01 PROCEDURE — 86922 COMPATIBILITY TEST ANTIGLOB: CPT | Performed by: STUDENT IN AN ORGANIZED HEALTH CARE EDUCATION/TRAINING PROGRAM

## 2017-10-01 PROCEDURE — 85730 THROMBOPLASTIN TIME PARTIAL: CPT | Performed by: STUDENT IN AN ORGANIZED HEALTH CARE EDUCATION/TRAINING PROGRAM

## 2017-10-01 PROCEDURE — 36415 COLL VENOUS BLD VENIPUNCTURE: CPT | Performed by: STUDENT IN AN ORGANIZED HEALTH CARE EDUCATION/TRAINING PROGRAM

## 2017-10-01 PROCEDURE — 86921 COMPATIBILITY TEST INCUBATE: CPT | Performed by: STUDENT IN AN ORGANIZED HEALTH CARE EDUCATION/TRAINING PROGRAM

## 2017-10-01 PROCEDURE — 86920 COMPATIBILITY TEST SPIN: CPT | Performed by: STUDENT IN AN ORGANIZED HEALTH CARE EDUCATION/TRAINING PROGRAM

## 2017-10-01 PROCEDURE — 96374 THER/PROPH/DIAG INJ IV PUSH: CPT

## 2017-10-01 PROCEDURE — 74011000258 HC RX REV CODE- 258: Performed by: INTERNAL MEDICINE

## 2017-10-01 PROCEDURE — 80053 COMPREHEN METABOLIC PANEL: CPT | Performed by: STUDENT IN AN ORGANIZED HEALTH CARE EDUCATION/TRAINING PROGRAM

## 2017-10-01 PROCEDURE — 85025 COMPLETE CBC W/AUTO DIFF WBC: CPT | Performed by: STUDENT IN AN ORGANIZED HEALTH CARE EDUCATION/TRAINING PROGRAM

## 2017-10-01 PROCEDURE — C9113 INJ PANTOPRAZOLE SODIUM, VIA: HCPCS | Performed by: STUDENT IN AN ORGANIZED HEALTH CARE EDUCATION/TRAINING PROGRAM

## 2017-10-01 PROCEDURE — 82272 OCCULT BLD FECES 1-3 TESTS: CPT | Performed by: STUDENT IN AN ORGANIZED HEALTH CARE EDUCATION/TRAINING PROGRAM

## 2017-10-01 PROCEDURE — 65660000000 HC RM CCU STEPDOWN

## 2017-10-01 PROCEDURE — 99285 EMERGENCY DEPT VISIT HI MDM: CPT

## 2017-10-01 PROCEDURE — 85610 PROTHROMBIN TIME: CPT | Performed by: STUDENT IN AN ORGANIZED HEALTH CARE EDUCATION/TRAINING PROGRAM

## 2017-10-01 PROCEDURE — 86900 BLOOD TYPING SEROLOGIC ABO: CPT | Performed by: STUDENT IN AN ORGANIZED HEALTH CARE EDUCATION/TRAINING PROGRAM

## 2017-10-01 PROCEDURE — 74011250637 HC RX REV CODE- 250/637: Performed by: INTERNAL MEDICINE

## 2017-10-01 PROCEDURE — 96375 TX/PRO/DX INJ NEW DRUG ADDON: CPT

## 2017-10-01 RX ORDER — LANOLIN ALCOHOL/MO/W.PET/CERES
325 CREAM (GRAM) TOPICAL
Status: DISCONTINUED | OUTPATIENT
Start: 2017-10-01 | End: 2017-10-03 | Stop reason: HOSPADM

## 2017-10-01 RX ORDER — PANTOPRAZOLE SODIUM 40 MG/1
40 TABLET, DELAYED RELEASE ORAL
Status: DISCONTINUED | OUTPATIENT
Start: 2017-10-02 | End: 2017-10-01 | Stop reason: DRUGHIGH

## 2017-10-01 RX ORDER — AMOXICILLIN 250 MG
1-2 CAPSULE ORAL
Status: DISCONTINUED | OUTPATIENT
Start: 2017-10-01 | End: 2017-10-03 | Stop reason: HOSPADM

## 2017-10-01 RX ORDER — ARFORMOTEROL TARTRATE 15 UG/2ML
15 SOLUTION RESPIRATORY (INHALATION)
Status: DISCONTINUED | OUTPATIENT
Start: 2017-10-01 | End: 2017-10-03 | Stop reason: HOSPADM

## 2017-10-01 RX ORDER — ARIPIPRAZOLE 1 MG/ML
2 SOLUTION ORAL DAILY
Status: DISCONTINUED | OUTPATIENT
Start: 2017-10-02 | End: 2017-10-03 | Stop reason: HOSPADM

## 2017-10-01 RX ORDER — CARVEDILOL 3.12 MG/1
3.12 TABLET ORAL 2 TIMES DAILY WITH MEALS
COMMUNITY

## 2017-10-01 RX ORDER — IPRATROPIUM BROMIDE AND ALBUTEROL SULFATE 2.5; .5 MG/3ML; MG/3ML
3 SOLUTION RESPIRATORY (INHALATION)
Status: DISCONTINUED | OUTPATIENT
Start: 2017-10-01 | End: 2017-10-03

## 2017-10-01 RX ORDER — CARVEDILOL 3.12 MG/1
3.12 TABLET ORAL 2 TIMES DAILY WITH MEALS
Status: DISCONTINUED | OUTPATIENT
Start: 2017-10-01 | End: 2017-10-03 | Stop reason: HOSPADM

## 2017-10-01 RX ORDER — ALBUTEROL SULFATE 90 UG/1
2 AEROSOL, METERED RESPIRATORY (INHALATION)
Status: DISCONTINUED | OUTPATIENT
Start: 2017-10-01 | End: 2017-10-01 | Stop reason: CLARIF

## 2017-10-01 RX ORDER — GABAPENTIN 300 MG/1
300 CAPSULE ORAL 3 TIMES DAILY
Status: DISCONTINUED | OUTPATIENT
Start: 2017-10-01 | End: 2017-10-03 | Stop reason: HOSPADM

## 2017-10-01 RX ORDER — CHOLECALCIFEROL (VITAMIN D3) 125 MCG
4000 CAPSULE ORAL DAILY
COMMUNITY
End: 2020-06-12

## 2017-10-01 RX ORDER — FUROSEMIDE 40 MG/1
40 TABLET ORAL
Status: DISCONTINUED | OUTPATIENT
Start: 2017-10-02 | End: 2017-10-03 | Stop reason: HOSPADM

## 2017-10-01 RX ORDER — LANOLIN ALCOHOL/MO/W.PET/CERES
1000 CREAM (GRAM) TOPICAL DAILY
Status: DISCONTINUED | OUTPATIENT
Start: 2017-10-02 | End: 2017-10-03 | Stop reason: HOSPADM

## 2017-10-01 RX ORDER — BUDESONIDE 0.5 MG/2ML
500 INHALANT ORAL
Status: DISCONTINUED | OUTPATIENT
Start: 2017-10-01 | End: 2017-10-03 | Stop reason: HOSPADM

## 2017-10-01 RX ORDER — ARIPIPRAZOLE 2 MG/1
2 TABLET ORAL DAILY
Status: ON HOLD | COMMUNITY
End: 2020-03-16 | Stop reason: DRUGHIGH

## 2017-10-01 RX ORDER — LANOLIN ALCOHOL/MO/W.PET/CERES
1000 CREAM (GRAM) TOPICAL DAILY
Status: ON HOLD | COMMUNITY
End: 2020-03-16 | Stop reason: DRUGHIGH

## 2017-10-01 RX ORDER — ALBUTEROL SULFATE 0.83 MG/ML
2.5 SOLUTION RESPIRATORY (INHALATION)
Status: DISCONTINUED | OUTPATIENT
Start: 2017-10-01 | End: 2017-10-03 | Stop reason: HOSPADM

## 2017-10-01 RX ORDER — DEXTROSE MONOHYDRATE AND SODIUM CHLORIDE 5; .9 G/100ML; G/100ML
75 INJECTION, SOLUTION INTRAVENOUS CONTINUOUS
Status: DISCONTINUED | OUTPATIENT
Start: 2017-10-01 | End: 2017-10-02

## 2017-10-01 RX ORDER — FUROSEMIDE 20 MG/1
20 TABLET ORAL DAILY
COMMUNITY

## 2017-10-01 RX ORDER — GABAPENTIN 300 MG/1
300 CAPSULE ORAL 3 TIMES DAILY
Status: ON HOLD | COMMUNITY
End: 2020-03-16

## 2017-10-01 RX ORDER — THERA TABS 400 MCG
1 TAB ORAL DAILY
Status: DISCONTINUED | OUTPATIENT
Start: 2017-10-02 | End: 2017-10-03 | Stop reason: HOSPADM

## 2017-10-01 RX ORDER — MELATONIN
4000 DAILY
Status: DISCONTINUED | OUTPATIENT
Start: 2017-10-02 | End: 2017-10-03 | Stop reason: HOSPADM

## 2017-10-01 RX ORDER — SPIRONOLACTONE 25 MG/1
50 TABLET ORAL
Status: DISCONTINUED | OUTPATIENT
Start: 2017-10-01 | End: 2017-10-03 | Stop reason: HOSPADM

## 2017-10-01 RX ORDER — ONDANSETRON 2 MG/ML
4 INJECTION INTRAMUSCULAR; INTRAVENOUS
Status: COMPLETED | OUTPATIENT
Start: 2017-10-01 | End: 2017-10-01

## 2017-10-01 RX ORDER — FUROSEMIDE 20 MG/1
20 TABLET ORAL
Status: DISCONTINUED | OUTPATIENT
Start: 2017-10-01 | End: 2017-10-03 | Stop reason: HOSPADM

## 2017-10-01 RX ORDER — ONDANSETRON 4 MG/1
4 TABLET, ORALLY DISINTEGRATING ORAL
Status: DISCONTINUED | OUTPATIENT
Start: 2017-10-01 | End: 2017-10-03 | Stop reason: HOSPADM

## 2017-10-01 RX ORDER — LORATADINE 10 MG/1
10 TABLET ORAL DAILY
Status: DISCONTINUED | OUTPATIENT
Start: 2017-10-02 | End: 2017-10-03 | Stop reason: HOSPADM

## 2017-10-01 RX ORDER — LORATADINE 10 MG/1
10 TABLET ORAL DAILY
COMMUNITY

## 2017-10-01 RX ADMIN — SODIUM CHLORIDE 1000 ML: 900 INJECTION, SOLUTION INTRAVENOUS at 15:22

## 2017-10-01 RX ADMIN — IPRATROPIUM BROMIDE AND ALBUTEROL SULFATE 3 ML: .5; 3 SOLUTION RESPIRATORY (INHALATION) at 19:20

## 2017-10-01 RX ADMIN — DEXTROSE MONOHYDRATE AND SODIUM CHLORIDE 75 ML/HR: 5; .9 INJECTION, SOLUTION INTRAVENOUS at 19:21

## 2017-10-01 RX ADMIN — ONDANSETRON 4 MG: 2 INJECTION INTRAMUSCULAR; INTRAVENOUS at 15:22

## 2017-10-01 RX ADMIN — GABAPENTIN 300 MG: 300 CAPSULE ORAL at 21:45

## 2017-10-01 RX ADMIN — SODIUM CHLORIDE 80 MG: 9 INJECTION INTRAMUSCULAR; INTRAVENOUS; SUBCUTANEOUS at 16:15

## 2017-10-01 NOTE — PROGRESS NOTES
Admission Medication Reconciliation:    Information obtained from: [facility] at [facility] ([phone])    Significant PMH/Disease States:   Past Medical History:   Diagnosis Date    Anemia     Arthritis     Rheumatoid    CHF (congestive heart failure) (HCC)     Chronic obstructive pulmonary disease ([address]     Cirrhosis of liver ([address]     COPD (chronic obstructive pulmonary disease) ([address]     Gastrointestinal disorder     \"lacerations in the large part of my small intestine. \"    Gastrointestinal disorder     liver cirrhosis    Heart failure ([address]     Internal bleeding     Tachycardia 1/27/2016       Chief Complaint for this Admission:  GIB    Allergies:  Pcn [penicillins]    Prior to Admission Medications:   Prior to Admission Medications   Prescriptions Last Dose Informant Patient Reported? Taking? ARIPiprazole (ABILIFY) 2 mg tablet 10/1/2017 at am  Yes Yes   Sig: Take 2 mg by mouth daily. albuterol (VENTOLIN HFA) 90 mcg/actuation inhaler 10/1/2017 at Unknown time  Yes Yes   Sig: Take 2 Puffs by inhalation four (4) times daily as needed for Wheezing. albuterol-ipratropium (DUO-NEB) 2.5 mg-0.5 mg/3 ml nebu   Yes Yes   Sig: 3 mL by Nebulization route three (3) times daily. budesonide-formoterol (SYMBICORT) 160-4.5 mcg/actuation HFA inhaler 10/1/2017 at am  Yes Yes   Sig: Take 2 Puffs by inhalation two (2) times a day. carvedilol (COREG) 3.125 mg tablet 10/1/2017 at am  Yes Yes   Sig: Take 3.125 mg by mouth two (2) times daily (with meals). cholecalciferol, vitamin D3, (VITAMIN D3) 2,000 unit tab 10/1/2017 at am  Yes Yes   Sig: Take 4,000 Units by mouth daily. cyanocobalamin (VITAMIN B-12) 1,000 mcg tablet 10/1/2017 at am  Yes Yes   Sig: Take 1,000 mcg by mouth daily. ferrous sulfate 325 mg (65 mg iron) tablet 10/1/2017 at 0800  Yes Yes   Sig: Take 325 mg by mouth three (3) times daily (with meals).    furosemide (LASIX) 20 mg tablet 10/1/2017 at am  Yes Yes   Sig: Take 20 mg by mouth every Tuesday, Thursday, Saturday & Sunday. furosemide (LASIX) 40 mg tablet 9/30/2017 at am  Yes Yes   Sig: Take 40 mg by mouth every Monday, Wednesday, Friday. gabapentin (NEURONTIN) 300 mg capsule 10/1/2017 at am  Yes Yes   Sig: Take 300 mg by mouth three (3) times daily. loratadine (CLARITIN) 10 mg tablet 10/1/2017 at am  Yes Yes   Sig: Take 10 mg by mouth daily. multivitamins-minerals-lutein (CEROVITE SENIOR) tab tablet 10/1/2017 at am  Yes Yes   Sig: Take 1 Tab by mouth daily. ondansetron (ZOFRAN ODT) 4 mg disintegrating tablet 10/1/2017 at am  Yes Yes   Sig: Take 4 mg by mouth every eight (8) hours as needed for Nausea. pantoprazole (PROTONIX) 40 mg tablet 10/1/2017 at am  Yes Yes   Sig: Take 40 mg by mouth daily. senna-docusate (SENNA PLUS) 8.6-50 mg per tablet 9/30/2017 at pm  Yes Yes   Sig: Take 1-2 Tabs by mouth nightly as needed for Constipation. spironolactone (ALDACTONE) 50 mg tablet   Yes Yes   Sig: Take 50 mg by mouth daily as needed (excess fluid). Facility-Administered Medications: None         Comments/Recommendations: Patient is a poor historian, but she was able to tell me that some of her psych meds were stopped 9/28/2017 by NP at her facility (Citalopram and Duloxetine). I had review each medication with medication tech individually as lsit has changed considerably since last visit. Allergies were reviewed and confirmed. Added:  1. Ability  2. Gabapentin  3. Carvedilol  4. Loratadine  5. B 12  6. Vit D3    Revised:  1. Lasix (two doses, different schedules)    Deleted:  1. Citalopram  2, Duloxetine   3. Melatonin  4. Oxycodone  5. Prednisone (ended 9/29/17)  6. Guaifenesin    Thank you for allowing me to participate in the care of your patient.     Mihai Hoang PharmD, RN #[id number]

## 2017-10-01 NOTE — PROGRESS NOTES
TRANSFER - IN REPORT:    Verbal report received from [facility] on Homer Bachelor  being received from ED for routine progression of care      Report consisted of patients Situation, Background, Assessment and   Recommendations(SBAR). Information from the following report(s) ED Summary was reviewed with the receiving nurse. Opportunity for questions and clarification was provided. Assessment completed upon patients arrival to unit and care assumed. Received pt from ED. Vitals obtained. Discussed with Dr. Nilesh Lua for cardiac diet now, NPO after midnight, and to discontinue PO Protonix and start Protonix IV 40 mg Q12.

## 2017-10-01 NOTE — IP AVS SNAPSHOT
[address] 
[phone] Patient: Carlotta Ramirez MRN: [id number] HAM:7/36/0222 You are allergic to the following Allergen Reactions Pcn (Penicillins) Angioedema Childhood reaction Immunizations Administered for This Admission Name Date Influenza Vaccine (Quad) PF 10/3/2017 Recent Documentation Height Weight BMI OB Status Smoking Status 1.524 m 56.8 kg 24.46 kg/m2 Postmenopausal Current Every Day Smoker Unresulted Labs Order Current Status TYPE & SCREEN Preliminary result Emergency Contacts Name Discharge Info Relation Home Work Mobile Jorje Garcia DISCHARGE CAREGIVER [3] Boyfriend [17] [phone] About your hospitalization You were admitted on:  October 1, 2017 You last received care in the:  [facility] 4 SURG/BARIATRICS You were discharged on:  October 3, 2017 Unit phone number:  [phone] Why you were hospitalized Your primary diagnosis was:  Not on File Your diagnoses also included:  Gi Bleed Providers Seen During Your Hospitalizations Provider Role Specialty Primary office phone Ronaldo Cantu MD Attending Provider Emergency Medicine [phone] Claudean Spangle, MD Attending Provider Internal Medicine [phone] Percival Prader, MD Attending Provider Family Practice [phone] Your Primary Care Physician (PCP) Primary Care Physician Office Phone Office Fax OTHER, PHYS ** None ** ** None ** Follow-up Information Follow up With Details Comments Contact Info Not On File Bshsi   Not On File (62) Patient has a PCP but that physician is not listed in [facility]. Armand Sutton MD In 6 weeks  [address] [facility] [address] [place] [address] 
[phone] Current Discharge Medication List  
  
CONTINUE these medications which have NOT CHANGED Dose & Instructions Dispensing Information Comments Morning Noon Evening Bedtime ABILIFY 2 mg tablet Generic drug:  ARIPiprazole Your last dose was: Your next dose is:    
   
   
 Dose:  2 mg Take 2 mg by mouth daily. Refills:  0  
     
   
   
   
  
 albuterol-ipratropium 2.5 mg-0.5 mg/3 ml Nebu Commonly known as:  Maylon Reagin Your last dose was: Your next dose is:    
   
   
 Dose:  3 mL  
3 mL by Nebulization route three (3) times daily. Refills:  0 ALDACTONE 50 mg tablet Generic drug:  spironolactone Your last dose was: Your next dose is:    
   
   
 Dose:  50 mg Take 50 mg by mouth daily as needed (excess fluid). Refills:  0  
     
   
   
   
  
 CEROVITE SENIOR Tab tablet Generic drug:  multivitamins-minerals-lutein Your last dose was: Your next dose is:    
   
   
 Dose:  1 Tab Take 1 Tab by mouth daily. Refills:  0 COREG 3.125 mg tablet Generic drug:  carvedilol Your last dose was: Your next dose is:    
   
   
 Dose:  3.125 mg Take 3.125 mg by mouth two (2) times daily (with meals). Refills:  0  
     
   
   
   
  
 ferrous sulfate 325 mg (65 mg iron) tablet Your last dose was: Your next dose is:    
   
   
 Dose:  325 mg Take 325 mg by mouth three (3) times daily (with meals). Refills:  0  
     
   
   
   
  
 gabapentin 300 mg capsule Commonly known as:  NEURONTIN Your last dose was: Your next dose is:    
   
   
 Dose:  300 mg Take 300 mg by mouth three (3) times daily. Refills:  0  
     
   
   
   
  
 * LASIX 40 mg tablet Generic drug:  furosemide Your last dose was: Your next dose is:    
   
   
 Dose:  40 mg Take 40 mg by mouth every Monday, Wednesday, Friday. Refills:  0  
     
   
   
   
  
 * furosemide 20 mg tablet Commonly known as:  LASIX Your last dose was: Your next dose is:    
   
   
 Dose:  20 mg Take 20 mg by mouth every Tuesday, Thursday, Saturday & Sunday. Refills:  0  
     
   
   
   
  
 loratadine 10 mg tablet Commonly known as:  Araseli Birgitpurnima Your last dose was: Your next dose is:    
   
   
 Dose:  10 mg Take 10 mg by mouth daily. Refills:  0 PROTONIX 40 mg tablet Generic drug:  pantoprazole Your last dose was: Your next dose is:    
   
   
 Dose:  40 mg Take 40 mg by mouth daily. Refills:  0 SENNA PLUS 8.6-50 mg per tablet Generic drug:  senna-docusate Your last dose was: Your next dose is:    
   
   
 Dose:  1-2 Tab Take 1-2 Tabs by mouth nightly as needed for Constipation. Refills:  0  
     
   
   
   
  
 SYMBICORT 160-4.5 mcg/actuation Hfaa Generic drug:  budesonide-formoterol Your last dose was: Your next dose is:    
   
   
 Dose:  2 Puff Take 2 Puffs by inhalation two (2) times a day. Refills:  0 VENTOLIN HFA 90 mcg/actuation inhaler Generic drug:  albuterol Your last dose was: Your next dose is:    
   
   
 Dose:  2 Puff Take 2 Puffs by inhalation four (4) times daily as needed for Wheezing. Refills:  0  
     
   
   
   
  
 VITAMIN B-12 1,000 mcg tablet Generic drug:  cyanocobalamin Your last dose was: Your next dose is:    
   
   
 Dose:  1000 mcg Take 1,000 mcg by mouth daily. Refills:  0  
     
   
   
   
  
 VITAMIN D3 2,000 unit Tab Generic drug:  cholecalciferol (vitamin D3) Your last dose was: Your next dose is:    
   
   
 Dose:  4000 Units Take 4,000 Units by mouth daily. Refills:  0 ZOFRAN ODT 4 mg disintegrating tablet Generic drug:  ondansetron Your last dose was: Your next dose is:    
   
   
 Dose:  4 mg Take 4 mg by mouth every eight (8) hours as needed for Nausea. Refills:  0  
     
   
   
   
  
 * Notice: This list has 2 medication(s) that are the same as other medications prescribed for you. Read the directions carefully, and ask your doctor or other care provider to review them with you. Discharge Instructions Patient to avoid NSAIDs, alcohol, smoking. Follow up with physicians as scheduled. Discharge Orders None [employer] Announcement We are excited to announce that we are making your provider's discharge notes available to you in JLGOV. You will see these notes when they are completed and signed by the physician that discharged you from your recent hospital stay. If you have any questions or concerns about any information you see in JLGOV, please call the Health Information Department where you were seen or reach out to your Primary Care Provider for more information about your plan of care. Introducing [facility]! Jenna Retana introduces JLGOV patient portal. Now you can access parts of your medical record, email your doctor's office, and request medication refills online. 1. In your internet browser, go to [email or web address] 2. Click on the First Time User? Click Here link in the Sign In box. You will see the New Member Sign Up page. 3. Enter your JLGOV Access Code exactly as it appears below. You will not need to use this code after youve completed the sign-up process. If you do not sign up before the expiration date, you must request a new code. · JLGOV Access Code: [email or web address] Expires: 1/1/2018 11:07 AM 
 
4. Enter the last four digits of your Social Security Number (xxxx) and Date of Birth (mm/dd/yyyy) as indicated and click Submit. You will be taken to the next sign-up page. 5. Create a JLGOV ID.  This will be your JLGOV login ID and cannot be changed, so think of one that is secure and easy to remember. 6. Create a [employer] password. You can change your password at any time. 7. Enter your Password Reset Question and Answer. This can be used at a later time if you forget your password. 8. Enter your e-mail address. You will receive e-mail notification when new information is available in [address] 9. Click Sign Up. You can now view and download portions of your medical record. 10. Click the Download Summary menu link to download a portable copy of your medical information. If you have questions, please visit the Frequently Asked Questions section of the [employer] website. Remember, [employer] is NOT to be used for urgent needs. For medical emergencies, dial 911. Now available from your iPhone and Android! General Information Please provide this summary of care documentation to your next provider. Patient Signature:  ____________________________________________________________ Date:  ____________________________________________________________  
  
Jerrod Saldana Provider Signature:  ____________________________________________________________ Date:  ____________________________________________________________

## 2017-10-01 NOTE — ED TRIAGE NOTES
Pt states that for the last 3 days she has been dizzy with a headache, vomiting up coffee ground emesis and has had to have many blood transfusions in the past for \"internal bleeding\"

## 2017-10-01 NOTE — ROUTINE PROCESS
TRANSFER - OUT REPORT:    Verbal report given to Lul Pyle RN (name) on Corrine Chung  being transferred to [facility] (unit) for routine progression of care       Report consisted of patients Situation, Background, Assessment and   Recommendations(SBAR). Information from the following report(s) SBAR, ED Summary, Intake/Output, MAR and Recent Results was reviewed with the receiving nurse. Lines:   Peripheral IV 10/01/17 Left Antecubital (Active)   Site Assessment Clean, dry, & intact 10/1/2017  3:10 PM   Phlebitis Assessment 0 10/1/2017  3:10 PM   Infiltration Assessment 0 10/1/2017  3:10 PM   Dressing Status Clean, dry, & intact 10/1/2017  3:10 PM   Dressing Type Transparent 10/1/2017  3:10 PM   Hub Color/Line Status Pink;Flushed;Patent 10/1/2017  3:10 PM   Action Taken Blood drawn 10/1/2017  3:10 PM   Alcohol Cap Used No 10/1/2017  3:10 PM        Opportunity for questions and clarification was provided. Patient transported with:   Monitor     UPDATE: Pt assessment unchanged, VSS, no complaints at this time, waiting on transport to inpatient unit.      Visit Vitals    BP 99/51 (BP 1 Location: Left arm, BP Patient Position: At rest)    Pulse 75    Temp 97.8 °F (36.6 °C)    Resp 16    Ht 5' (1.524 m)    Wt 57.2 kg (126 lb)    SpO2 90%    BMI 24.61 kg/m2

## 2017-10-02 ENCOUNTER — ANESTHESIA (OUTPATIENT)
Dept: ENDOSCOPY | Age: 57
DRG: 378 | End: 2017-10-02
Payer: MEDICARE

## 2017-10-02 ENCOUNTER — ANESTHESIA EVENT (OUTPATIENT)
Dept: ENDOSCOPY | Age: 57
DRG: 378 | End: 2017-10-02
Payer: MEDICARE

## 2017-10-02 LAB
ANION GAP SERPL CALC-SCNC: 7 MMOL/L (ref 5–15)
BASOPHILS # BLD: 0 K/UL (ref 0–0.1)
BASOPHILS NFR BLD: 0 % (ref 0–1)
BUN SERPL-MCNC: 14 MG/DL (ref 6–20)
BUN/CREAT SERPL: 19 (ref 12–20)
CALCIUM SERPL-MCNC: 8.5 MG/DL (ref 8.5–10.1)
CHLORIDE SERPL-SCNC: 107 MMOL/L (ref 97–108)
CO2 SERPL-SCNC: 28 MMOL/L (ref 21–32)
CREAT SERPL-MCNC: 0.75 MG/DL (ref 0.55–1.02)
DIFFERENTIAL METHOD BLD: ABNORMAL
EOSINOPHIL # BLD: 0.1 K/UL (ref 0–0.4)
EOSINOPHIL # BLD: 0.2 K/UL (ref 0–0.4)
EOSINOPHIL # BLD: 0.2 K/UL (ref 0–0.4)
EOSINOPHIL NFR BLD: 5 % (ref 0–7)
ERYTHROCYTE [DISTWIDTH] IN BLOOD BY AUTOMATED COUNT: 16.8 % (ref 11.5–14.5)
ERYTHROCYTE [DISTWIDTH] IN BLOOD BY AUTOMATED COUNT: 16.9 % (ref 11.5–14.5)
ERYTHROCYTE [DISTWIDTH] IN BLOOD BY AUTOMATED COUNT: 17 % (ref 11.5–14.5)
GLUCOSE SERPL-MCNC: 149 MG/DL (ref 65–100)
HCT VFR BLD AUTO: 25.5 % (ref 35–47)
HCT VFR BLD AUTO: 26.5 % (ref 35–47)
HCT VFR BLD AUTO: 26.7 % (ref 35–47)
HGB BLD-MCNC: 7.6 G/DL (ref 11.5–16)
HGB BLD-MCNC: 7.9 G/DL (ref 11.5–16)
HGB BLD-MCNC: 8 G/DL (ref 11.5–16)
LYMPHOCYTES # BLD: 0.5 K/UL (ref 0.8–3.5)
LYMPHOCYTES # BLD: 0.6 K/UL (ref 0.8–3.5)
LYMPHOCYTES # BLD: 0.6 K/UL (ref 0.8–3.5)
LYMPHOCYTES NFR BLD: 17 % (ref 12–49)
LYMPHOCYTES NFR BLD: 18 % (ref 12–49)
LYMPHOCYTES NFR BLD: 19 % (ref 12–49)
MCH RBC QN AUTO: 30.4 PG (ref 26–34)
MCH RBC QN AUTO: 30.6 PG (ref 26–34)
MCH RBC QN AUTO: 30.9 PG (ref 26–34)
MCHC RBC AUTO-ENTMCNC: 29.8 G/DL (ref 30–36.5)
MCHC RBC AUTO-ENTMCNC: 29.8 G/DL (ref 30–36.5)
MCHC RBC AUTO-ENTMCNC: 30 G/DL (ref 30–36.5)
MCV RBC AUTO: 102 FL (ref 80–99)
MCV RBC AUTO: 102.7 FL (ref 80–99)
MCV RBC AUTO: 103.1 FL (ref 80–99)
MONOCYTES # BLD: 0.3 K/UL (ref 0–1)
MONOCYTES # BLD: 0.4 K/UL (ref 0–1)
MONOCYTES # BLD: 0.4 K/UL (ref 0–1)
MONOCYTES NFR BLD: 11 % (ref 5–13)
MONOCYTES NFR BLD: 12 % (ref 5–13)
MONOCYTES NFR BLD: 12 % (ref 5–13)
NEUTS SEG # BLD: 2 K/UL (ref 1.8–8)
NEUTS SEG # BLD: 2.1 K/UL (ref 1.8–8)
NEUTS SEG # BLD: 2.3 K/UL (ref 1.8–8)
NEUTS SEG NFR BLD: 64 % (ref 32–75)
NEUTS SEG NFR BLD: 66 % (ref 32–75)
NEUTS SEG NFR BLD: 66 % (ref 32–75)
PLATELET # BLD AUTO: 118 K/UL (ref 150–400)
PLATELET # BLD AUTO: 125 K/UL (ref 150–400)
PLATELET # BLD AUTO: 127 K/UL (ref 150–400)
POTASSIUM SERPL-SCNC: 3.3 MMOL/L (ref 3.5–5.1)
RBC # BLD AUTO: 2.5 M/UL (ref 3.8–5.2)
RBC # BLD AUTO: 2.58 M/UL (ref 3.8–5.2)
RBC # BLD AUTO: 2.59 M/UL (ref 3.8–5.2)
RBC MORPH BLD: ABNORMAL
SODIUM SERPL-SCNC: 142 MMOL/L (ref 136–145)
WBC # BLD AUTO: 2.9 K/UL (ref 3.6–11)
WBC # BLD AUTO: 3.3 K/UL (ref 3.6–11)
WBC # BLD AUTO: 3.5 K/UL (ref 3.6–11)

## 2017-10-02 PROCEDURE — 36415 COLL VENOUS BLD VENIPUNCTURE: CPT | Performed by: INTERNAL MEDICINE

## 2017-10-02 PROCEDURE — 77030010936 HC CLP LIG BSC -C: Performed by: INTERNAL MEDICINE

## 2017-10-02 PROCEDURE — 74011000250 HC RX REV CODE- 250: Performed by: INTERNAL MEDICINE

## 2017-10-02 PROCEDURE — 74011250636 HC RX REV CODE- 250/636

## 2017-10-02 PROCEDURE — 74011250636 HC RX REV CODE- 250/636: Performed by: INTERNAL MEDICINE

## 2017-10-02 PROCEDURE — 65660000000 HC RM CCU STEPDOWN

## 2017-10-02 PROCEDURE — 74011000258 HC RX REV CODE- 258: Performed by: INTERNAL MEDICINE

## 2017-10-02 PROCEDURE — 74011250637 HC RX REV CODE- 250/637: Performed by: INTERNAL MEDICINE

## 2017-10-02 PROCEDURE — 74011000258 HC RX REV CODE- 258

## 2017-10-02 PROCEDURE — 77030036656: Performed by: INTERNAL MEDICINE

## 2017-10-02 PROCEDURE — 94640 AIRWAY INHALATION TREATMENT: CPT

## 2017-10-02 PROCEDURE — 80048 BASIC METABOLIC PNL TOTAL CA: CPT | Performed by: INTERNAL MEDICINE

## 2017-10-02 PROCEDURE — 76060000031 HC ANESTHESIA FIRST 0.5 HR: Performed by: INTERNAL MEDICINE

## 2017-10-02 PROCEDURE — 77010033678 HC OXYGEN DAILY

## 2017-10-02 PROCEDURE — 77030035621 HC PRB COAG APC 360DEG ERBE -C: Performed by: INTERNAL MEDICINE

## 2017-10-02 PROCEDURE — 77030011640 HC PAD GRND REM COVD -A: Performed by: INTERNAL MEDICINE

## 2017-10-02 PROCEDURE — 0W3P8ZZ CONTROL BLEEDING IN GASTROINTESTINAL TRACT, VIA NATURAL OR ARTIFICIAL OPENING ENDOSCOPIC: ICD-10-PCS | Performed by: INTERNAL MEDICINE

## 2017-10-02 PROCEDURE — 74011000250 HC RX REV CODE- 250

## 2017-10-02 PROCEDURE — 85025 COMPLETE CBC W/AUTO DIFF WBC: CPT | Performed by: INTERNAL MEDICINE

## 2017-10-02 PROCEDURE — C9113 INJ PANTOPRAZOLE SODIUM, VIA: HCPCS | Performed by: INTERNAL MEDICINE

## 2017-10-02 PROCEDURE — 76040000019: Performed by: INTERNAL MEDICINE

## 2017-10-02 DEVICE — WORKING LENGTH 235CM, WORKING CHANNEL 2.8MM
Type: IMPLANTABLE DEVICE | Site: DUODENUM | Status: FUNCTIONAL
Brand: RESOLUTION 360 CLIP

## 2017-10-02 RX ORDER — EPINEPHRINE 0.1 MG/ML
1 INJECTION INTRACARDIAC; INTRAVENOUS
Status: DISCONTINUED | OUTPATIENT
Start: 2017-10-02 | End: 2017-10-02 | Stop reason: HOSPADM

## 2017-10-02 RX ORDER — DEXTROMETHORPHAN/PSEUDOEPHED 2.5-7.5/.8
1.2 DROPS ORAL
Status: DISCONTINUED | OUTPATIENT
Start: 2017-10-02 | End: 2017-10-02 | Stop reason: HOSPADM

## 2017-10-02 RX ORDER — POTASSIUM CHLORIDE 750 MG/1
40 TABLET, FILM COATED, EXTENDED RELEASE ORAL
Status: COMPLETED | OUTPATIENT
Start: 2017-10-03 | End: 2017-10-02

## 2017-10-02 RX ORDER — SODIUM CHLORIDE 9 MG/ML
100 INJECTION, SOLUTION INTRAVENOUS CONTINUOUS
Status: DISCONTINUED | OUTPATIENT
Start: 2017-10-02 | End: 2017-10-02

## 2017-10-02 RX ORDER — FENTANYL CITRATE 50 UG/ML
200 INJECTION, SOLUTION INTRAMUSCULAR; INTRAVENOUS
Status: DISCONTINUED | OUTPATIENT
Start: 2017-10-02 | End: 2017-10-02 | Stop reason: HOSPADM

## 2017-10-02 RX ORDER — FLUMAZENIL 0.1 MG/ML
0.2 INJECTION INTRAVENOUS
Status: DISCONTINUED | OUTPATIENT
Start: 2017-10-02 | End: 2017-10-02 | Stop reason: HOSPADM

## 2017-10-02 RX ORDER — MIDAZOLAM HYDROCHLORIDE 1 MG/ML
.25-1 INJECTION, SOLUTION INTRAMUSCULAR; INTRAVENOUS
Status: DISCONTINUED | OUTPATIENT
Start: 2017-10-02 | End: 2017-10-02 | Stop reason: HOSPADM

## 2017-10-02 RX ORDER — ATROPINE SULFATE 0.1 MG/ML
0.5 INJECTION INTRAVENOUS
Status: DISCONTINUED | OUTPATIENT
Start: 2017-10-02 | End: 2017-10-02 | Stop reason: HOSPADM

## 2017-10-02 RX ORDER — LIDOCAINE HYDROCHLORIDE 20 MG/ML
INJECTION, SOLUTION EPIDURAL; INFILTRATION; INTRACAUDAL; PERINEURAL AS NEEDED
Status: DISCONTINUED | OUTPATIENT
Start: 2017-10-02 | End: 2017-10-02 | Stop reason: HOSPADM

## 2017-10-02 RX ORDER — SODIUM CHLORIDE 0.9 % (FLUSH) 0.9 %
5-10 SYRINGE (ML) INJECTION EVERY 8 HOURS
Status: DISPENSED | OUTPATIENT
Start: 2017-10-02 | End: 2017-10-02

## 2017-10-02 RX ORDER — SODIUM CHLORIDE 0.9 % (FLUSH) 0.9 %
5-10 SYRINGE (ML) INJECTION AS NEEDED
Status: ACTIVE | OUTPATIENT
Start: 2017-10-02 | End: 2017-10-02

## 2017-10-02 RX ORDER — NALOXONE HYDROCHLORIDE 0.4 MG/ML
0.4 INJECTION, SOLUTION INTRAMUSCULAR; INTRAVENOUS; SUBCUTANEOUS
Status: DISCONTINUED | OUTPATIENT
Start: 2017-10-02 | End: 2017-10-02 | Stop reason: HOSPADM

## 2017-10-02 RX ORDER — SODIUM CHLORIDE 9 MG/ML
INJECTION, SOLUTION INTRAVENOUS
Status: DISCONTINUED | OUTPATIENT
Start: 2017-10-02 | End: 2017-10-02 | Stop reason: HOSPADM

## 2017-10-02 RX ORDER — PROPOFOL 10 MG/ML
INJECTION, EMULSION INTRAVENOUS AS NEEDED
Status: DISCONTINUED | OUTPATIENT
Start: 2017-10-02 | End: 2017-10-02 | Stop reason: HOSPADM

## 2017-10-02 RX ADMIN — SIMETHICONE 80 MG: 20 SUSPENSION/ DROPS ORAL at 11:47

## 2017-10-02 RX ADMIN — PROPOFOL 50 MG: 10 INJECTION, EMULSION INTRAVENOUS at 11:46

## 2017-10-02 RX ADMIN — POTASSIUM CHLORIDE 40 MEQ: 750 TABLET, FILM COATED, EXTENDED RELEASE ORAL at 23:50

## 2017-10-02 RX ADMIN — FERROUS SULFATE TAB 325 MG (65 MG ELEMENTAL FE) 325 MG: 325 (65 FE) TAB at 08:34

## 2017-10-02 RX ADMIN — VITAMIN D, TAB 1000IU (100/BT) 4000 UNITS: 25 TAB at 08:34

## 2017-10-02 RX ADMIN — PROPOFOL 25 MG: 10 INJECTION, EMULSION INTRAVENOUS at 11:56

## 2017-10-02 RX ADMIN — FERROUS SULFATE TAB 325 MG (65 MG ELEMENTAL FE) 325 MG: 325 (65 FE) TAB at 13:10

## 2017-10-02 RX ADMIN — PROPOFOL 50 MG: 10 INJECTION, EMULSION INTRAVENOUS at 11:43

## 2017-10-02 RX ADMIN — PROPOFOL 50 MG: 10 INJECTION, EMULSION INTRAVENOUS at 11:42

## 2017-10-02 RX ADMIN — PROPOFOL 25 MG: 10 INJECTION, EMULSION INTRAVENOUS at 11:48

## 2017-10-02 RX ADMIN — GABAPENTIN 300 MG: 300 CAPSULE ORAL at 08:35

## 2017-10-02 RX ADMIN — PROPOFOL 25 MG: 10 INJECTION, EMULSION INTRAVENOUS at 11:54

## 2017-10-02 RX ADMIN — FUROSEMIDE 40 MG: 40 TABLET ORAL at 16:10

## 2017-10-02 RX ADMIN — ONDANSETRON 4 MG: 4 TABLET, ORALLY DISINTEGRATING ORAL at 18:23

## 2017-10-02 RX ADMIN — DEXTROSE MONOHYDRATE AND SODIUM CHLORIDE 75 ML/HR: 5; .9 INJECTION, SOLUTION INTRAVENOUS at 13:10

## 2017-10-02 RX ADMIN — IPRATROPIUM BROMIDE AND ALBUTEROL SULFATE 3 ML: .5; 3 SOLUTION RESPIRATORY (INHALATION) at 16:06

## 2017-10-02 RX ADMIN — CARVEDILOL 3.12 MG: 3.12 TABLET, FILM COATED ORAL at 16:10

## 2017-10-02 RX ADMIN — SODIUM CHLORIDE: 9 INJECTION, SOLUTION INTRAVENOUS at 11:20

## 2017-10-02 RX ADMIN — CARVEDILOL 3.12 MG: 3.12 TABLET, FILM COATED ORAL at 08:34

## 2017-10-02 RX ADMIN — FERROUS SULFATE TAB 325 MG (65 MG ELEMENTAL FE) 325 MG: 325 (65 FE) TAB at 16:10

## 2017-10-02 RX ADMIN — IPRATROPIUM BROMIDE AND ALBUTEROL SULFATE 3 ML: .5; 3 SOLUTION RESPIRATORY (INHALATION) at 20:03

## 2017-10-02 RX ADMIN — GABAPENTIN 300 MG: 300 CAPSULE ORAL at 16:10

## 2017-10-02 RX ADMIN — SODIUM CHLORIDE 40 MG: 9 INJECTION INTRAMUSCULAR; INTRAVENOUS; SUBCUTANEOUS at 08:34

## 2017-10-02 RX ADMIN — BUDESONIDE 500 MCG: 0.5 INHALANT RESPIRATORY (INHALATION) at 20:03

## 2017-10-02 RX ADMIN — SODIUM CHLORIDE 40 MG: 9 INJECTION INTRAMUSCULAR; INTRAVENOUS; SUBCUTANEOUS at 21:24

## 2017-10-02 RX ADMIN — BUDESONIDE 500 MCG: 0.5 INHALANT RESPIRATORY (INHALATION) at 08:22

## 2017-10-02 RX ADMIN — THERA TABS 1 TABLET: TAB at 08:34

## 2017-10-02 RX ADMIN — GABAPENTIN 300 MG: 300 CAPSULE ORAL at 21:24

## 2017-10-02 RX ADMIN — LIDOCAINE HYDROCHLORIDE 60 MG: 20 INJECTION, SOLUTION EPIDURAL; INFILTRATION; INTRACAUDAL; PERINEURAL at 11:42

## 2017-10-02 RX ADMIN — Medication 1000 MCG: at 08:34

## 2017-10-02 RX ADMIN — PROPOFOL 50 MG: 10 INJECTION, EMULSION INTRAVENOUS at 11:44

## 2017-10-02 RX ADMIN — LORATADINE 10 MG: 10 TABLET ORAL at 08:34

## 2017-10-02 RX ADMIN — PROPOFOL 25 MG: 10 INJECTION, EMULSION INTRAVENOUS at 11:51

## 2017-10-02 RX ADMIN — ARFORMOTEROL TARTRATE 15 MCG: 15 SOLUTION RESPIRATORY (INHALATION) at 08:22

## 2017-10-02 NOTE — PROGRESS NOTES
TRANSFER - IN REPORT:    Verbal report received from [facility]) on Elliott Corbin  being received from [facility](unit) for routine progression of care  Lateral transfer    Report consisted of patients Situation, Background, Assessment and   Recommendations(SBAR). Information from the following report(s) SBAR, Kardex, Intake/Output, MAR, Med Rec Status and Cardiac Rhythm NSR was reviewed with the receiving nurse. Opportunity for questions and clarification was provided. Assessment completed upon patients arrival to unit and care assumed. Primary Nurse Brock Virk RN and Andrea Vasquez RN performed a dual skin assessment on this patient No impairment noted  Roger score is 20    0750 Bedside and Verbal shift change report given to Bell López (oncoming nurse) by Av Arevalo (offgoing nurse). Report included the following information SBAR, Kardex, ED Summary, Intake/Output, MAR, Accordion and Cardiac Rhythm NSR.

## 2017-10-02 NOTE — H&P
[address] [place] [address], [address] [place] [address]   HISTORY AND PHYSICAL       Name:  Britta Fernandes   MR#:  [id number]   :  1960   Account #:  [de-identified]        Date of Adm:  10/01/2017       CHIEF COMPLAINT: Vomiting, coffee-ground emesis x8 in the last 48   hours. HISTORY OF PRESENT ILLNESS: The patient is a 58-year-old female   with a past medical history of AV malformation in lower GI tract. As per   the patient, she was doing fine until a couple of days back and noted   yesterday that she had 4 episodes of vomiting, which was coffee   ground in color. Earlier today, she also had another 4 episodes of   vomiting, coffee ground. Patient has a history of chronic anemia   secondary to chronic GI blood loss. Also has  multiple AV malformations in   the GI tract, which tends to bleed. The patient is on a high dose of iron   supplements, claims that has a baseline dark, tarry, acholic stools on a   regular basis. However, she does claim to be feeling more tired and   short and breath more than normal. Did not complain of any chest pain   or chest pressure. She has been feeling dizziness. PAST MEDICAL HISTORY: History of chronic anemia secondary to   chronic GI blood loss. History of alcoholic cirrhosis. History of   congestive heart failure. COPD. History of rheumatoid arthritis. PAST SURGICAL HISTORY: History of cholecystectomy. History of   small bowel endoscopy. ALLERGIES: PATIENT ALLERGIC TO PENICILLIN, PER PATIENT. MEDICATIONS: Home medications as follows   1. cardura 2 mg p.o. daily. 2. Coreg 3.25 mg b.i.d.   3. Vitamin D3, 4000 international units once a day. 4. Vitamin B12 tablets 1000 mcg p.o. daily. 5. Iron supplements 325 mg 3 times day. 6. Lasix 20 mg 4 times a week. 7. Lasix 40 mg 3 times a week. 8. Neurontin 300 mg p.o. t.i.d.   9. Loratidine 10 mg p.o. daily. 10. Protonix 40 mg p.o. daily.    11. Albuterol DuoNebs every 4-6 hours as needed for inhalers. 12. Patient is also taking Aldactone 50 mg p.o. daily. FAMILY HISTORY: Father has a history of alcohol abuse. Mother and   sister both  of cancer. SOCIAL HISTORY: 35-pack-years of smoking. She had smoked a   pack a day for last 30 years approximately. Alcohol use: Has a history   of alcohol abuse in the past. Quit drinking about 2-1/2 years back. REVIEW OF SYSTEMS   CONSTITUTIONAL: Negative fever, negative chills, negative night   sweats. Positive fatigue. HEAD, EYES, EARS, NOSE AND THROAT: Negative sore throat,   negative cough, negative ear pain, negative headaches. PULMONARY: Negative cough. Negative shortness of breath. Negative   wheezing. CARDIOVASCULAR: Negative chest pain. GASTROINTESTINAL: Negative abdominal pain. Positive vomiting,   coffee ground. GENITOURINARY: Negative urgency. Negative frequency of urination. CENTRAL NERVOUS SYSTEM: Positive dizziness. Negative   numbness, tingling or weakness of the upper or lower extremities. PHYSICAL EXAMINATION   VITAL SIGNS: Patient's vitals are as follows: Blood pressure 117/55,   pulse rate 81, temperature 97.8, saturation 99%. HEAD, EYES, EARS, NOSE AND THROAT: Pupils equal, react to   light. Oral mucosa was moist.   NECK: Supple. LUNGS: Clear. CARDIOVASCULAR: S1, S2 audible. No S3, S4.   ABDOMEN: Soft, nontender. EXTREMITIES: There was no edema. LABORATORY DATA: Showed a WBC count of 4.6, hemoglobin of   9.1. The baseline being around 8.4. CMP shows a sodium of 141,   potassium of 4.0, bicarbonate of 33, BUN of 17, creatinine of 0.91. Liver enzymes are within normal limits. Urinalysis was within normal   limits. Stool occult was positive. ASSESSMENT AND PLAN   1. Anemia, secondary to gastrointestinal blood loss. The current   symptoms might be a result of the gastritis versus AV malformation. The patient has a history of alcoholic liver cirrhosis, possibility of an   Early varices is also present. Protonix will be administered. GI consult has   been taken. Patient will be kept n.p.o. after midnight for possible upper   endoscopy if needed in the morning. We will monitor the hemoglobin   A1c closely. 2. Congestive heart failure. Continue with carvedilol and Lasix for now at   the above dose. 3. Chronic obstructive pulmonary disease. Continue with the nebulizer   treatment at this time. Also will continue with Symbicort. 4. History of rheumatoid arthritis. Continue to monitor.          MD KISHA Mccollum / DONALD   D:  10/01/2017   17:50   T:  10/02/2017   09:46   Job #:  [id number]

## 2017-10-02 NOTE — PROCEDURES
[address] [place] [address], [address] [place] [address] (EGD) Procedure Note    Elliott Corbin  1960  [id number]      Procedure: Endoscopic Gastroduodenoscopy with control of bleeding    Indication:  Hematemesis     Pre-operative Diagnosis: see indication above    Post-operative Diagnosis: see findings below    : Ruben Jones MD    Referring Provider:  Not On File Bsi      Anesthesia/Sedation:  MAC anesthesia Propofol        Procedure Details     After infomed consent was obtained for the procedure, with all risks and benefits of procedure explained the patient was taken to the endoscopy suite and placed in the left lateral decubitus position. Following sequential administration of sedation as per above, the endoscope was inserted into the mouth and advanced under direct vision to third portion of the duodenum. A careful inspection was made as the gastroscope was withdrawn, including a retroflexed view of the proximal stomach; findings and interventions are described below. Findings:   Esophagus:hiatal hernia 3 Cm in size   Stomach: normal   Duodenum/jejunum: THERE was bleeding 1 cm AVM in second portion of duodenum, I treated first with APC without success then placed 2 hemoclips which stopped the bleeding      Therapies:  As above    Specimens: none         EBL: None      Complications:   None; patient tolerated the procedure well. Impression:    -See post-procedure diagnoses.     Recommendations:  -liquid diet for today  -monitor H/H  -will follow    Signed By: Ruben Jones MD     10/2/2017  11:59 AM

## 2017-10-02 NOTE — CONSULTS
[address] NOTE  [facility] office  [phone] NP in-hospital cell phone M-F until 4:30  After 5pm or on weekends, please call  for physician on call        NAME:  Brian Aguirre   :   1960   MRN:   [id number]       Referring Physician: Janneth Jules    Consult Date: 10/2/2017 8:22 AM    Chief Complaint: abdominal pain/GIB     History of Present Illness:  Patient is a 61 y.o. who is seen in consultation at the request of Dr. Cheema Reasons for GI bleed. Patient came to the hospital for a four day history of shortness of breath, fatigue, dizziness, and coffee ground emesis. She has had several episodes of GI bleeding in the past with AVMs. She is uncertain if she's had melena or hematochezia. She denies abdominal pain or chest pain. I have reviewed the emergency room note, hospital admission note, notes by all other clinicians who have seen the patient during this hospitalization to date. I have reviewed the problem list and the reason for this hospitalization. I have reviewed the allergies and the medications the patient was taking at home prior to this hospitalization. PMH:  Past Medical History:   Diagnosis Date    Anemia     Arthritis     Rheumatoid    CHF (congestive heart failure) (HCC)     Chronic obstructive pulmonary disease (HCC)     Cirrhosis of liver (HCC)     COPD (chronic obstructive pulmonary disease) (HCC)     Gastrointestinal disorder     \"lacerations in the large part of my small intestine. \"    Gastrointestinal disorder     liver cirrhosis    Heart failure ([address]     Internal bleeding     Tachycardia 2016       PSH:  Past Surgical History:   Procedure Laterality Date    HX CHOLECYSTECTOMY      HX GI      Cholecystectomoy    HX GYN       x 2.  [facility] UPPER SMALL INTESTINE  2016            Allergies:   Allergies   Allergen Reactions    Pcn [Penicillins] Angioedema     Childhood reaction       Home Medications:  Prior to Admission Medications   Prescriptions Last Dose Informant Patient Reported? Taking? ARIPiprazole (ABILIFY) 2 mg tablet 10/1/2017 at am  Yes Yes   Sig: Take 2 mg by mouth daily. albuterol (VENTOLIN HFA) 90 mcg/actuation inhaler 10/1/2017 at Unknown time  Yes Yes   Sig: Take 2 Puffs by inhalation four (4) times daily as needed for Wheezing. albuterol-ipratropium (DUO-NEB) 2.5 mg-0.5 mg/3 ml nebu   Yes Yes   Sig: 3 mL by Nebulization route three (3) times daily. budesonide-formoterol (SYMBICORT) 160-4.5 mcg/actuation HFA inhaler 10/1/2017 at am  Yes Yes   Sig: Take 2 Puffs by inhalation two (2) times a day. carvedilol (COREG) 3.125 mg tablet 10/1/2017 at am  Yes Yes   Sig: Take 3.125 mg by mouth two (2) times daily (with meals). cholecalciferol, vitamin D3, (VITAMIN D3) 2,000 unit tab 10/1/2017 at am  Yes Yes   Sig: Take 4,000 Units by mouth daily. cyanocobalamin (VITAMIN B-12) 1,000 mcg tablet 10/1/2017 at am  Yes Yes   Sig: Take 1,000 mcg by mouth daily. ferrous sulfate 325 mg (65 mg iron) tablet 10/1/2017 at 0800  Yes Yes   Sig: Take 325 mg by mouth three (3) times daily (with meals). furosemide (LASIX) 20 mg tablet 10/1/2017 at am  Yes Yes   Sig: Take 20 mg by mouth every Tuesday, Thursday, Saturday & Sunday. furosemide (LASIX) 40 mg tablet 9/30/2017 at am  Yes Yes   Sig: Take 40 mg by mouth every Monday, Wednesday, Friday. gabapentin (NEURONTIN) 300 mg capsule 10/1/2017 at am  Yes Yes   Sig: Take 300 mg by mouth three (3) times daily. loratadine (CLARITIN) 10 mg tablet 10/1/2017 at am  Yes Yes   Sig: Take 10 mg by mouth daily. multivitamins-minerals-lutein (CEROVITE SENIOR) tab tablet 10/1/2017 at am  Yes Yes   Sig: Take 1 Tab by mouth daily. ondansetron (ZOFRAN ODT) 4 mg disintegrating tablet 10/1/2017 at am  Yes Yes   Sig: Take 4 mg by mouth every eight (8) hours as needed for Nausea. pantoprazole (PROTONIX) 40 mg tablet 10/1/2017 at am  Yes Yes   Sig: Take 40 mg by mouth daily. senna-docusate (SENNA PLUS) 8.6-50 mg per tablet 9/30/2017 at pm  Yes Yes   Sig: Take 1-2 Tabs by mouth nightly as needed for Constipation. spironolactone (ALDACTONE) 50 mg tablet   Yes Yes   Sig: Take 50 mg by mouth daily as needed (excess fluid).       Facility-Administered Medications: None       Hospital Medications:  Current Facility-Administered Medications   Medication Dose Route Frequency    influenza vaccine 2017-18 (3 yrs+)(PF) (FLUZONE QUAD/FLUARIX QUAD) injection 0.5 mL  0.5 mL IntraMUSCular PRIOR TO DISCHARGE    ARIPiprazole (ABILIFY) 1 mg/mL oral soln 2 mg  2 mg Oral DAILY    carvedilol (COREG) tablet 3.125 mg  3.125 mg Oral BID WITH MEALS    cholecalciferol (VITAMIN D3) tablet 4,000 Units  4,000 Units Oral DAILY    cyanocobalamin (VITAMIN B12) tablet 1,000 mcg  1,000 mcg Oral DAILY    furosemide (LASIX) tablet 20 mg  20 mg Oral EVERY TUES,THUR,SAT,SUN    gabapentin (NEURONTIN) capsule 300 mg  300 mg Oral TID    loratadine (CLARITIN) tablet 10 mg  10 mg Oral DAILY    albuterol-ipratropium (DUO-NEB) 2.5 MG-0.5 MG/3 ML  3 mL Nebulization Q4H RT    ferrous sulfate tablet 325 mg  325 mg Oral TID WITH MEALS    furosemide (LASIX) tablet 40 mg  40 mg Oral Q MON, WED & FRI    therapeutic multivitamin (THERAGRAN) tablet 1 Tab  1 Tab Oral DAILY    ondansetron (ZOFRAN ODT) tablet 4 mg  4 mg Oral Q8H PRN    spironolactone (ALDACTONE) tablet 50 mg  50 mg Oral DAILY PRN    senna-docusate (PERICOLACE) 8.6-50 mg per tablet 1-2 Tab  1-2 Tab Oral QHS PRN    dextrose 5% and 0.9% NaCl infusion  75 mL/hr IntraVENous CONTINUOUS    albuterol (PROVENTIL VENTOLIN) nebulizer solution 2.5 mg  2.5 mg Nebulization Q4H PRN    arformoterol (BROVANA) neb solution 15 mcg  15 mcg Nebulization BID RT    And    budesonide (PULMICORT) 500 mcg/2 ml nebulizer suspension  500 mcg Nebulization BID RT    pantoprazole (PROTONIX) 40 mg in sodium chloride 0.9 % 10 mL injection  40 mg IntraVENous Q12H       Social History:  Social History   Substance Use Topics    Smoking status: Current Every Day Smoker     Packs/day: 0.75     Years: 45.00    Smokeless tobacco: Never Used    Alcohol use No      Comment: in the past was an alcoholic. Has been sober x 2 years.        Family History:  Family History   Problem Relation Age of Onset    Cancer Mother     Alcohol abuse Father     Cancer Sister        Review of Systems:  Constitutional: +fever, negative chills, negative weight loss  Eyes:   negative visual changes  ENT:   negative sore throat, tongue or lip swelling  Respiratory:  negative cough, +dyspnea  Cards:  negative for chest pain, palpitations, +lower extremity edema  GI:   See HPI  :  negative for frequency, dysuria  Integument:  negative for rash and pruritus  Heme:  + for easy bruising  Musculoskeletal:negative for myalgias, back pain and muscle weakness  Neuro:  negative for headaches, +dizziness  Psych: negative for feelings of anxiety, depression     Objective:   Patient Vitals for the past 8 hrs:   BP Temp Pulse Resp SpO2 Weight   10/02/17 0752 113/49 98.5 °F (36.9 °C) 75 18 100 % -   10/02/17 0200 114/56 98.3 °F (36.8 °C) 74 18 99 % 57.4 kg (126 lb 9.6 oz)   10/02/17 0124 129/65 98.1 °F (36.7 °C) 80 20 100 % -        09/30 1901 - 10/02 0700  In: 498.8 [I.V.:498.8]  Out: -     EXAM:     CONST:  Pleasant lady lying in bed, no acute distress   NEURO:  alert and oriented x 3   HEENT: EOMI, no scleral icterus   LUNGS: Coarse to ausculation, + wheeze, diminished bases   CARD:  regular rate and rhythm, S1 S2   ABD:  soft, no tenderness, no rebound, bowel sounds (+) all 4 quadrants, no masses, non distended   EXT:  +trace edema, warm   PSYCH: full, not anxious     Data Review     Recent Labs      10/02/17   0037  10/01/17   1451   WBC  3.3*  4.6   HGB  7.6*  9.1*   HCT  25.5*  30.2*   PLT  125*  174     Recent Labs 10/02/17   0037  10/01/17   1451   NA  142  141   K  3.3*  4.0   CL  107  104   CO2  28  33*   BUN  14  17   CREA  0.75  0.91   GLU  149*  92   CA  8.5  9.2     Recent Labs      10/01/17   1451   SGOT  28   AP  87   TP  6.6   ALB  3.4*   GLOB  3.2     Recent Labs      10/01/17   1451   INR  1.0   PTP  9.8   APTT  24.4      Assessment:   · GI bleed: anemic with hemoccult positive stool. Patient Active Problem List   Diagnosis Code    Anemia D64.9    GI bleed K92.2    Cirrhosis of liver ([facility] [address] K74.60    Chronic respiratory failure with hypoxia (HCC) J96.11    Acute blood loss anemia D62    GIB (gastrointestinal bleeding) K92.2     Plan:   · Plan for EGD today with Dr. Tereza Ortega  · NPO for procedure  · Agree with BID PPI  · Trend CBC, transfuse as necessary  · Thank you for allowing me to participate in care of [address] [place] [address] By: Malia Hadley NP     10/2/2017  8:22 AM     I have examined the patient. I have reviewed the chart and agree with the documentation recorded by the NP, including the assessment, treatment plan, and disposition.     EGD Desi Fair MD

## 2017-10-02 NOTE — ROUTINE PROCESS
TRANSFER - IN REPORT:    Verbal report received from Juju(name) on Camila Yu  being received from Endo(unit) for routine progression of care      Report consisted of patients Situation, Background, Assessment and   Recommendations(SBAR). Information from the following report(s) SBAR, Kardex, ED Summary, Procedure Summary, Intake/Output, MAR, Accordion, Recent Results, Med Rec Status and Cardiac Rhythm NSR was reviewed with the receiving nurse. Opportunity for questions and clarification was provided. Assessment completed upon patients arrival to unit and care assumed.

## 2017-10-02 NOTE — ANESTHESIA POSTPROCEDURE EVALUATION
Post-Anesthesia Evaluation and Assessment    Patient: Nakul Garcia MRN: [id number]  SSN: [id number]    YOB: 1960  Age: 61 y.o. Sex: female       Cardiovascular Function/Vital Signs  Visit Vitals    /64 (BP 1 Location: Left arm, BP Patient Position: At rest)    Pulse 74    Temp 36.9 °C (98.4 °F)    Resp 16    Ht 5' (1.524 m)    Wt 57.4 kg (126 lb 9.6 oz)    SpO2 100%    BMI 24.72 kg/m2       Patient is status post MAC anesthesia for Procedure(s):  ESOPHAGOGASTRODUODENOSCOPY (EGD)  ENDOSCOPIC ARGON PLASMA COAGULATION  RESOLUTION CLIP. Nausea/Vomiting: None    Postoperative hydration reviewed and adequate. Pain:  Pain Scale 1: Numeric (0 - 10) (10/02/17 1234)  Pain Intensity 1: 0 (10/02/17 1234)   Managed    Neurological Status: At baseline    Mental Status and Level of Consciousness: Arousable    Pulmonary Status:   O2 Device: Nasal cannula (10/02/17 1606)   Adequate oxygenation and airway patent    Complications related to anesthesia: None    Post-anesthesia assessment completed.  No concerns    Signed By: Claudine Gilbert MD     October 2, 2017

## 2017-10-02 NOTE — PROGRESS NOTES
Care Management Assessment    RRAT 21/1/0  Core Measure    Jv Estrada is a 61 y.o. female who presents from home with chief complaint of vomiting. Pt c/o vomiting, HA, cough, weakness, and dizziness with occasional sweats for the past 3 days. ED workup:  GI consult, serial hemoglobin, PPI, and possible EGD. Verified face sheet and demographics. PCP:  NP/Donaldo Cho    NOK:  Chadd Gravely - boyfriend [phone]    Care Management Interventions  PCP Verified by CM: Yes (Larissa Perla [phone] NP)  Last Visit to PCP: 09/27/17  Transition of Care Consult (CM Consult): Discharge Planning (RRAT 21/1/0, Lives at [address] [facility])  [address]: No  Discharge Durable Medical Equipment: No (02 at [address] [place] [address] , nebulizer at [facility], walker)  Health Maintenance Reviewed: Yes  Physical Therapy Consult: No  Occupational Therapy Consult: No  Speech Therapy Consult: No  Current Support Network: Assisted Living (Resides at [facility], [address] [place] [address],)  Plan discussed with Pt/Family/Caregiver: Yes (Met with Ms. Radha Vinay to discuss discharge planning, she anticipates returning to [facility].  )    Care Management will continue to follow for discharge planning.     Paula Torres, RN, BSN, [facility]  ED Care Management  [phone]

## 2017-10-02 NOTE — PROGRESS NOTES
Pt awake, A&O without complaints during bedside verbal report. Pt on RA with sats 86-89%. States that she uses 2.5-3L all the time at home. Placed on 3LNC. Primary Nurse Jillian Soria, MIKE and Ibeth Gomez RN performed a dual skin assessment on this patient No impairment noted  Roger score is 22      Pt given a Healthy Choice cheese ravioli meal and coke per her request.  Ate 100%. Denies complaints. Pt resting well, without complaints when woken for lab draw. Pt notified of pending lateral transfer to [facility]. Pt is agreeable. 0055 Attempted to call report to [facility]. Room is not clean at this time so staff was unable to take report. Bedside and Verbal shift change report given to Junior Meade (oncoming nurse) by Rachel Head (offgoing nurse).  Report included the following information SBAR, Kardex, Intake/Output, MAR, Recent Results and Cardiac Rhythm SR.

## 2017-10-02 NOTE — PROGRESS NOTES

## 2017-10-02 NOTE — ANESTHESIA PREPROCEDURE EVALUATION
Anesthetic History   No history of anesthetic complications            Review of Systems / Medical History  Patient summary reviewed, nursing notes reviewed and pertinent labs reviewed    Pulmonary    COPD               Neuro/Psych   Within defined limits           Cardiovascular                    Comments: EF 55% 1/2016   GI/Hepatic/Renal           Liver disease    Comments: Cirrhosis  Small bowel AVM Endo/Other        Arthritis     Other Findings            Physical Exam    Airway  Mallampati: II  TM Distance: > 6 cm  Neck ROM: normal range of motion   Mouth opening: Normal     Cardiovascular    Rhythm: regular  Rate: normal         Dental  No notable dental hx       Pulmonary  Breath sounds clear to auscultation               Abdominal         Other Findings            Anesthetic Plan    ASA: 3  Anesthesia type: MAC          Induction: Intravenous  Anesthetic plan and risks discussed with: Patient

## 2017-10-02 NOTE — PROGRESS NOTES
Spiritual Care Partner Volunteer visited patient in Rm 439 on10/02/2017.    Documented by:  Chaplain Garza MDiv, MS, [address] (2007)

## 2017-10-02 NOTE — PROGRESS NOTES
Geo Burris  1960  [id number]    Situation:    Scheduled Procedure: Procedure(s):  ESOPHAGOGASTRODUODENOSCOPY (EGD)  Verbal report received from: Rhiannon Machuca RN  Preoperative diagnosis: Abdominal pain    Background:    Procedure: Procedure(s):  ESOPHAGOGASTRODUODENOSCOPY (EGD)  Physician performing procedure; Dr. Federica Zhu RN    NPO Status/Last PO Intake: 10/1/17    Pregnancy Test:No If yes, result: none    Is the patient taking Blood Thinners: NO If yes, list:  and last taken   Is the patient diabetic:no       If yes, what was the last BS:    Time taken? Anything given? no           Does the patient have a Pacemaker/Defibrillator in place?: no   Does the patient need antibiotics before/during/after procedure: no   If the patient is having a colon, How much prep was drank? What were the Colon prep results? Does the patient have SCD in place:no   Is patient on CONTACT precautions:no        If yes, what kind of CONTACT precautions:     Assessment:  Are the vital signs stable prior to patient coming to ENDO?  yes  Is the patient alert/oriented and able to sign consent for the procedures:yes  How does the patient's abdomen feel prior to coming to ENDO? round and soft yes   Does the patient have a patient IV in place?  yes     Recommendation:  Family or Friend present no     Permission to share finding with Family or Friend n/a

## 2017-10-02 NOTE — ROUTINE PROCESS
Shana Jordan  1960  [id number]    Situation:  Verbal report received from: Jay Owen  Procedure: Procedure(s):  ESOPHAGOGASTRODUODENOSCOPY (EGD)  ENDOSCOPIC ARGON PLASMA COAGULATION  RESOLUTION CLIP    Background:    Preoperative diagnosis: Abdominal pain  Postoperative diagnosis: 1. AVM bleeding    :  Dr. Favio Ramos): Endoscopy Technician-1: Alli Fritz IV  Endoscopy RN-1: Caitlin Allen RN    Specimens: * No specimens in log *  H. Pylori  no    Assessment:  Intra-procedure medications     Anesthesia gave intra-procedure sedation and medications, see anesthesia flow sheet yes    Intravenous fluids: NS@ KVO     Vital signs stable     Abdominal assessment: round and soft     Recommendation:  Discharge patient per MD order.   Return to floor  Family or Friend   Permission to share finding with family or friend yes

## 2017-10-02 NOTE — PROGRESS NOTES
TRANSFER - OUT REPORT:    Verbal report given to Kati(name) on [facility]  being transferred to room 439(unit) for routine post - op       Report consisted of patients Situation, Background, Assessment and   Recommendations(SBAR). Information from the following report(s) Procedure Summary was reviewed with the receiving nurse. Opportunity for questions and clarification was provided.       Patient transported with:   O2 @ 3 liters

## 2017-10-03 VITALS
WEIGHT: 125.22 LBS | RESPIRATION RATE: 15 BRPM | HEART RATE: 75 BPM | TEMPERATURE: 98.1 F | BODY MASS INDEX: 24.58 KG/M2 | DIASTOLIC BLOOD PRESSURE: 66 MMHG | SYSTOLIC BLOOD PRESSURE: 101 MMHG | OXYGEN SATURATION: 100 % | HEIGHT: 60 IN

## 2017-10-03 LAB
ANION GAP SERPL CALC-SCNC: 4 MMOL/L (ref 5–15)
BASOPHILS # BLD: 0 K/UL (ref 0–0.1)
BASOPHILS NFR BLD: 0 % (ref 0–1)
BUN SERPL-MCNC: 12 MG/DL (ref 6–20)
BUN/CREAT SERPL: 14 (ref 12–20)
CALCIUM SERPL-MCNC: 9.1 MG/DL (ref 8.5–10.1)
CHLORIDE SERPL-SCNC: 107 MMOL/L (ref 97–108)
CO2 SERPL-SCNC: 30 MMOL/L (ref 21–32)
CREAT SERPL-MCNC: 0.83 MG/DL (ref 0.55–1.02)
EOSINOPHIL # BLD: 0.2 K/UL (ref 0–0.4)
EOSINOPHIL NFR BLD: 3 % (ref 0–7)
ERYTHROCYTE [DISTWIDTH] IN BLOOD BY AUTOMATED COUNT: 16.7 % (ref 11.5–14.5)
GLUCOSE SERPL-MCNC: 99 MG/DL (ref 65–100)
HCT VFR BLD AUTO: 27.4 % (ref 35–47)
HGB BLD-MCNC: 8.2 G/DL (ref 11.5–16)
LYMPHOCYTES # BLD: 0.8 K/UL (ref 0.8–3.5)
LYMPHOCYTES NFR BLD: 13 % (ref 12–49)
MAGNESIUM SERPL-MCNC: 1.8 MG/DL (ref 1.6–2.4)
MCH RBC QN AUTO: 30.1 PG (ref 26–34)
MCHC RBC AUTO-ENTMCNC: 29.9 G/DL (ref 30–36.5)
MCV RBC AUTO: 100.7 FL (ref 80–99)
MONOCYTES # BLD: 0.5 K/UL (ref 0–1)
MONOCYTES NFR BLD: 9 % (ref 5–13)
NEUTS SEG # BLD: 4.2 K/UL (ref 1.8–8)
NEUTS SEG NFR BLD: 75 % (ref 32–75)
PLATELET # BLD AUTO: 136 K/UL (ref 150–400)
POTASSIUM SERPL-SCNC: 4.3 MMOL/L (ref 3.5–5.1)
RBC # BLD AUTO: 2.72 M/UL (ref 3.8–5.2)
SODIUM SERPL-SCNC: 141 MMOL/L (ref 136–145)
WBC # BLD AUTO: 5.6 K/UL (ref 3.6–11)

## 2017-10-03 PROCEDURE — 85025 COMPLETE CBC W/AUTO DIFF WBC: CPT | Performed by: INTERNAL MEDICINE

## 2017-10-03 PROCEDURE — 83735 ASSAY OF MAGNESIUM: CPT | Performed by: INTERNAL MEDICINE

## 2017-10-03 PROCEDURE — 74011250636 HC RX REV CODE- 250/636: Performed by: INTERNAL MEDICINE

## 2017-10-03 PROCEDURE — 90471 IMMUNIZATION ADMIN: CPT

## 2017-10-03 PROCEDURE — 94640 AIRWAY INHALATION TREATMENT: CPT

## 2017-10-03 PROCEDURE — 90686 IIV4 VACC NO PRSV 0.5 ML IM: CPT | Performed by: INTERNAL MEDICINE

## 2017-10-03 PROCEDURE — C9113 INJ PANTOPRAZOLE SODIUM, VIA: HCPCS | Performed by: INTERNAL MEDICINE

## 2017-10-03 PROCEDURE — 77010033678 HC OXYGEN DAILY

## 2017-10-03 PROCEDURE — 74011000250 HC RX REV CODE- 250: Performed by: FAMILY MEDICINE

## 2017-10-03 PROCEDURE — 80048 BASIC METABOLIC PNL TOTAL CA: CPT | Performed by: INTERNAL MEDICINE

## 2017-10-03 PROCEDURE — 36415 COLL VENOUS BLD VENIPUNCTURE: CPT | Performed by: INTERNAL MEDICINE

## 2017-10-03 PROCEDURE — 74011000250 HC RX REV CODE- 250: Performed by: INTERNAL MEDICINE

## 2017-10-03 PROCEDURE — 74011250637 HC RX REV CODE- 250/637: Performed by: INTERNAL MEDICINE

## 2017-10-03 RX ORDER — IPRATROPIUM BROMIDE AND ALBUTEROL SULFATE 2.5; .5 MG/3ML; MG/3ML
SOLUTION RESPIRATORY (INHALATION)
Status: DISCONTINUED
Start: 2017-10-03 | End: 2017-10-03 | Stop reason: HOSPADM

## 2017-10-03 RX ORDER — IPRATROPIUM BROMIDE AND ALBUTEROL SULFATE 2.5; .5 MG/3ML; MG/3ML
3 SOLUTION RESPIRATORY (INHALATION)
Status: DISCONTINUED | OUTPATIENT
Start: 2017-10-03 | End: 2017-10-03 | Stop reason: HOSPADM

## 2017-10-03 RX ADMIN — FERROUS SULFATE TAB 325 MG (65 MG ELEMENTAL FE) 325 MG: 325 (65 FE) TAB at 08:58

## 2017-10-03 RX ADMIN — SODIUM CHLORIDE 40 MG: 9 INJECTION INTRAMUSCULAR; INTRAVENOUS; SUBCUTANEOUS at 08:58

## 2017-10-03 RX ADMIN — VITAMIN D, TAB 1000IU (100/BT) 4000 UNITS: 25 TAB at 08:58

## 2017-10-03 RX ADMIN — INFLUENZA VIRUS VACCINE 0.5 ML: 15; 15; 15; 15 SUSPENSION INTRAMUSCULAR at 11:45

## 2017-10-03 RX ADMIN — Medication 1000 MCG: at 08:58

## 2017-10-03 RX ADMIN — IPRATROPIUM BROMIDE AND ALBUTEROL SULFATE 3 ML: .5; 3 SOLUTION RESPIRATORY (INHALATION) at 08:14

## 2017-10-03 RX ADMIN — CARVEDILOL 3.12 MG: 3.12 TABLET, FILM COATED ORAL at 08:58

## 2017-10-03 RX ADMIN — BUDESONIDE 500 MCG: 0.5 INHALANT RESPIRATORY (INHALATION) at 08:14

## 2017-10-03 RX ADMIN — THERA TABS 1 TABLET: TAB at 08:58

## 2017-10-03 RX ADMIN — FERROUS SULFATE TAB 325 MG (65 MG ELEMENTAL FE) 325 MG: 325 (65 FE) TAB at 13:34

## 2017-10-03 RX ADMIN — ARIPIPRAZOLE 2 MG: 1 SOLUTION ORAL at 09:02

## 2017-10-03 RX ADMIN — LORATADINE 10 MG: 10 TABLET ORAL at 08:58

## 2017-10-03 RX ADMIN — ARFORMOTEROL TARTRATE 15 MCG: 15 SOLUTION RESPIRATORY (INHALATION) at 08:14

## 2017-10-03 RX ADMIN — GABAPENTIN 300 MG: 300 CAPSULE ORAL at 08:58

## 2017-10-03 NOTE — PROGRESS NOTES
[address] [facility] [address], [address] [facility]    GI PROGRESS NOTE    NAME: Thurman Leventhal   :  1960   MRN:  [id number]       Subjective:   Doing well, no more bleed    Review of Systems    Constitutional: negative fever, negative chills, negative weight loss  Eyes:   negative visual changes  ENT:   negative sore throat, tongue or lip swelling  Respiratory:  negative cough, negative dyspnea  Cards:  negative for chest pain, palpitations, lower extremity edema  GI:   See HPI  :  negative for frequency, dysuria  Integument:  negative for rash and pruritus  Heme:  negative for easy bruising and gum/nose bleeding  Musculoskel: negative for myalgias,  back pain and muscle weakness  Neuro: negative for headaches, dizziness, vertigo  Psych:  negative for feelings of anxiety, depression         Objective:     VITALS:   Last 24hrs VS reviewed since prior progress note. Most recent are:  Visit Vitals    /59 (BP 1 Location: Left arm, BP Patient Position: At rest)    Pulse 80    Temp 97.7 °F (36.5 °C)    Resp 17    Ht 5' (1.524 m)    Wt 56.8 kg (125 lb 3.5 oz)    SpO2 100%    BMI 24.46 kg/m2       Intake/Output Summary (Last 24 hours) at 10/03/17 0749  Last data filed at 10/03/17 0313   Gross per 24 hour   Intake           1647.5 ml   Output              250 ml   Net           1397.5 ml     PHYSICAL EXAM:  General: WD, WN. Alert, cooperative, no acute distress    HEENT: NC, Atraumatic. PERRLA, EOMI. Anicteric sclerae. Lungs:  CTA Bilaterally. No Wheezing/Rhonchi/Rales. Heart:  Regular  rhythm,  No murmur (), No Rubs, No Gallops  Abdomen: Soft, Non distended, Non tender.  +Bowel sounds, no HSM  Extremities: No c/c/e  Neurologic:  CN 2-12 gi, Alert and oriented X 3. No acute neurological distress   Psych:   Good insight. Not anxious nor agitated.     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

## 2017-10-03 NOTE — PROGRESS NOTES
Care Management Interventions  PCP Verified by CM: Yes (Donaldo Cho, Latoya Wiley [phone] NP)  Last Visit to PCP: 09/27/17  Mode of Transport at Discharge: BLS ([facility] transport)  Transition of Care Consult (CM Consult): Discharge Planning (RRAT 21/1/0, Lives at [address])  [address] [place]: No  Discharge Durable Medical Equipment: No (02 at [facility] [employer] , nebulizer at [facility], walker)  Health Maintenance Reviewed: Yes  Physical Therapy Consult: No  Occupational Therapy Consult: No  Speech Therapy Consult: No  Current Support Network: Assisted Living (Resides at [facility], [address] [place] [address] [phone],)  Confirm Follow Up Transport: Cab  Plan discussed with Pt/Family/Caregiver: Yes (Met with Ms. Aguilar Has to discuss discharge planning, she anticipates returning to [facility].  )  Freedom of Choice Offered: Yes  Discharge Location  Discharge Placement: Assisted Living    CM reviewed chart and received discharge order. CM met with pt to introduce self and discuss discharge planning. Pt is returning to [facility]. CM called [facility] ([phone]) to confirm they could accept pt back today and faxed [phone]) them copies of the AVS and discharge summary. CM called [facility] transport ([phone]) to arrange transportation, they are sending an ambulance due to pt's O2 requirements, reference # [id number]. Ambulance packet is on the paper chart. Pt follows up with Latoya Wiley NP who visits the facility where pt resides. Pt has O2 set up at [facility] with [employer].    Leonides Feldman, [place], ACM

## 2017-10-03 NOTE — DISCHARGE SUMMARY
Discharge Summary       PATIENT ID: Taylor Montalvo  MRN: [id number]   YOB: 1960    DATE OF ADMISSION: 10/1/2017  2:33 PM    DATE OF DISCHARGE: 10/3/2017   PRIMARY CARE PROVIDER: Not On File Bsi     ATTENDING PHYSICIAN: Alejandrina Fabian  DISCHARGING PROVIDER: Chad Bowling MD    To contact this individual call [phone] and ask the  to page. If unavailable ask to be transferred the Adult Hospitalist Department. CONSULTATIONS: IP CONSULT TO GASTROENTEROLOGY  IP CONSULT TO HOSPITALIST    PROCEDURES/SURGERIES: Procedure(s):  ESOPHAGOGASTRODUODENOSCOPY (EGD)  ENDOSCOPIC ARGON PLASMA COAGULATION  RESOLUTION CLIP    ADMITTING DIAGNOSES & HOSPITAL COURSE:     HISTORY OF PRESENT ILLNESS: The patient is a 53-year-old female   with a past medical history of AV malformation in lower GI tract. As per   the patient, she was doing fine until a couple of days back and noted   yesterday that she had 4 episodes of vomiting, which was coffee   ground in color. Earlier today, she also had another 4 episodes of   vomiting, coffee ground. Patient has a history of chronic anemia   secondary to chronic GI blood loss. Multiple AV malformations in   the GI tract, which tends to bleed. The patient is on a high dose of iron   supplements, claims that has a baseline dark, tarry, acholic stools on a   regular basis. However, she does claim to be feeling more tired and   short and breath more than normal. Did not complain of any chest pain   or chest pressure. She has been feeling dizziness. Hospital course    Patient admitted for further evaluation of her hematamesis. Evaluated by GI and EGD done showing AVM at the duodenum which were treated with cautery first but bleeding was not controlled, and had 2 clips placed with subsequent hemostasis. Patient's diet was advanced and tolerated well. Discussed with patient about avoiding NSAIDs. Follow up with GI as scheduled. DISCHARGE DIAGNOSES / PLAN:      1.  Upper GI bleed  2. AVM duodenum  3. Acute blood loss anemia  4. Cirrhosis  5. CHF  6. COPD  7. RA       PENDING TEST RESULTS:   At the time of discharge the following test results are still pending: None    FOLLOW UP APPOINTMENTS:    Follow-up Information     Follow up With Details Comments Contact Info    Not On File Bshsi   Not On File (62) Patient has a PCP but that physician is not listed in [facility]. Armand Sutton MD In 6 weeks  [address] [facility]  [address] [facility] [address]  [phone]             ADDITIONAL CARE RECOMMENDATIONS: None    DIET: Cardiac Diet  Oral Nutritional Supplements: No Oral Supplement prescribed    ACTIVITY: Activity as tolerated    WOUND CARE: None    EQUIPMENT needed: Patient to continue with home oxygen 3 LPM.    DISCHARGE MEDICATIONS:  Current Discharge Medication List      CONTINUE these medications which have NOT CHANGED    Details   !! furosemide (LASIX) 20 mg tablet Take 20 mg by mouth every Tuesday, Thursday, Saturday & Sunday. gabapentin (NEURONTIN) 300 mg capsule Take 300 mg by mouth three (3) times daily. ARIPiprazole (ABILIFY) 2 mg tablet Take 2 mg by mouth daily. carvedilol (COREG) 3.125 mg tablet Take 3.125 mg by mouth two (2) times daily (with meals). cyanocobalamin (VITAMIN B-12) 1,000 mcg tablet Take 1,000 mcg by mouth daily. loratadine (CLARITIN) 10 mg tablet Take 10 mg by mouth daily. cholecalciferol, vitamin D3, (VITAMIN D3) 2,000 unit tab Take 4,000 Units by mouth daily. !! furosemide (LASIX) 40 mg tablet Take 40 mg by mouth every Monday, Wednesday, Friday. pantoprazole (PROTONIX) 40 mg tablet Take 40 mg by mouth daily. multivitamins-minerals-lutein (CEROVITE SENIOR) tab tablet Take 1 Tab by mouth daily. budesonide-formoterol (SYMBICORT) 160-4.5 mcg/actuation HFA inhaler Take 2 Puffs by inhalation two (2) times a day.       ferrous sulfate 325 mg (65 mg iron) tablet Take 325 mg by mouth three (3) times daily (with meals). albuterol-ipratropium (DUO-NEB) 2.5 mg-0.5 mg/3 ml nebu 3 mL by Nebulization route three (3) times daily. senna-docusate (SENNA PLUS) 8.6-50 mg per tablet Take 1-2 Tabs by mouth nightly as needed for Constipation. albuterol (VENTOLIN HFA) 90 mcg/actuation inhaler Take 2 Puffs by inhalation four (4) times daily as needed for Wheezing. ondansetron (ZOFRAN ODT) 4 mg disintegrating tablet Take 4 mg by mouth every eight (8) hours as needed for Nausea. spironolactone (ALDACTONE) 50 mg tablet Take 50 mg by mouth daily as needed (excess fluid). !! - Potential duplicate medications found. Please discuss with provider. NOTIFY YOUR PHYSICIAN FOR ANY OF THE FOLLOWING:   Fever over 101 degrees for 24 hours. Chest pain, shortness of breath, fever, chills, nausea, vomiting, diarrhea, change in mentation, falling, weakness, bleeding. Severe pain or pain not relieved by medications. Or, any other signs or symptoms that you may have questions about. DISPOSITION:    Home With:   OT  PT  HH  RN       Long term SNF/Inpatient Rehab   * Independent/assisted living    Hospice    Other:       PATIENT CONDITION AT DISCHARGE:     Functional status    Poor     Deconditioned    * Independent      Cognition   *  Lucid     Forgetful     Dementia      Catheters/lines (plus indication)    Angel     PICC     PEG    * None      Code status   *  Full code     DNR      PHYSICAL EXAMINATION AT DISCHARGE:     The physical exam is generally normal. Patient appears well, alert and oriented x 3, pleasant, cooperative. Vitals are as noted. Neck supple and free of adenopathy, or masses. No thyromegaly. BRITTNI. Ears, throat are normal. Lungs are clear to auscultation. Heart sounds are normal, no murmurs, clicks, gallops or rubs. Abdomen is soft, no tenderness, masses or organomegaly. Breasts: Deferred. Self exam is encouraged. Pelvis: Deferred.  Extremities are normal. Peripheral pulses are normal. Screening neurological exam is normal without focal findings. Skin is normal without suspicious lesions noted.       CHRONIC MEDICAL DIAGNOSES:  Problem List as of 10/3/2017  Date Reviewed: 2/10/2017          Codes Class Noted - Resolved    GIB (gastrointestinal bleeding) ICD-10-CM: K92.2  ICD-9-CM: 578.9  2/8/2017 - Present        Acute blood loss anemia ICD-10-CM: D62  ICD-9-CM: 285.1  10/25/2016 - Present        Cirrhosis of liver (HCC) (Chronic) ICD-10-CM: K74.60  ICD-9-CM: 571.5  8/30/2016 - Present        Chronic respiratory failure with hypoxia (HCC) (Chronic) ICD-10-CM: J96.11  ICD-9-CM: 518.83, 799.02  8/30/2016 - Present        Anemia ICD-10-CM: D64.9  ICD-9-CM: 285.9  7/19/2016 - Present        GI bleed ICD-10-CM: K92.2  ICD-9-CM: 578.9  7/19/2016 - Present        RESOLVED: Hypotension ICD-10-CM: I95.9  ICD-9-CM: 458.9  8/30/2016 - 10/18/2016        RESOLVED: Acute blood loss anemia ICD-10-CM: D62  ICD-9-CM: 285.1  8/30/2016 - 10/18/2016        RESOLVED: Nicotine dependence (Chronic) ICD-10-CM: F17.200  ICD-9-CM: 305.1  8/30/2016 - 11/17/2016        RESOLVED: Acute angina ([facility] [address] ICD-10-CM: I20.9  ICD-9-CM: 413.9  8/27/2016 - 8/30/2016        RESOLVED: GIB (gastrointestinal bleeding) ICD-10-CM: K92.2  ICD-9-CM: 578.9  7/8/2016 - 7/9/2016        RESOLVED: Tachycardia ICD-10-CM: R00.0  ICD-9-CM: 785.0  1/27/2016 - 6/11/2016        RESOLVED: GI bleed ICD-10-CM: K92.2  ICD-9-CM: 578.9  1/25/2016 - 6/11/2016              Greater than 30 minutes were spent with the patient on counseling and coordination of care    Signed:   Lani Meyers MD  10/3/2017  10:59 AM

## 2017-10-03 NOTE — ROUTINE PROCESS
1930: Bedside shift change report given to [address] (oncoming nurse) by Richard Gagnon (offgoing nurse). Report included the following information SBAR, Kardex, ED Summary, Procedure Summary, Intake/Output, MAR, Accordion, Recent Results, Med Rec Status and Cardiac Rhythm NSR.

## 2017-10-03 NOTE — PROGRESS NOTES
Faculty or Preceptor Review of Student Work    10/3/2017  - Shift times - [phone] to [phone]    The student documentation of patient care for Prerna Yun has been reviewed and approved. All medications have been administered under the direct supervision of the faculty or preceptor.     Kala Sharpe RN

## 2017-10-03 NOTE — PROGRESS NOTES
Bedside shift change report given to [address]  (oncoming nurse) by Steven Vázquez (offgoing nurse). Report included the following information SBAR, ED Summary, OR Summary, Intake/Output, Med Rec Status and Cardiac Rhythm NSR.

## 2017-10-03 NOTE — ROUTINE PROCESS
Reviewed discharge instructions with patient; patient verbalized understanding, denied having any further questions. PIV d/annie, tele d/annie, patient to be discharged via transport.

## 2017-10-03 NOTE — PROGRESS NOTES
ADULT PROTOCOL: JET AEROSOL ASSESSMENT    Patient  Giorgi Fonseca     61 y.o.   female     10/3/2017  7:33 AM    Breath Sounds Pre Procedure: Right Breath Sounds: Diminished                               Left Breath Sounds: Diminished    Breath Sounds Post Procedure:                                        Breathing pattern: Pre procedure Breathing Pattern: Regular          Post procedure      Heart Rate: Pre procedure Pulse: 72           Post procedure      Resp Rate: Pre procedure Respirations: 16           Post procedure      Peak Flow: Pre bronchodilator             Post bronchodilator       FVC/FEV1:      Incentive Spirometry:             Cough: Pre procedure Cough: Non-productive               Post procedure      Suctioned: NO    Sputum: Pre procedure                   Post procedure      Oxygen: O2 Device: Nasal cannula   Flow rate (L/min) 3L     Changed: NO    SpO2: Pre procedure     with oxygen              Post procedure    with oxygen    Nebulizer Therapy: Current medications Aerosolized Medications: DuoNeb, Pulmicort      Changed: YES; Duoneb changed to TID    Smoking History:     Problem List:   Patient Active Problem List   Diagnosis Code    Anemia D64.9    GI bleed K92.2    Cirrhosis of liver ([facility] [address] K74.60    Chronic respiratory failure with hypoxia (HCC) J96.11    Acute blood loss anemia D62    GIB (gastrointestinal bleeding) K92.2       Respiratory Therapist: Amarjit Jacobson, RT

## 2017-10-03 NOTE — PROGRESS NOTES
Problem: UPPER AND LOWER GI BLEED: DISCHARGE OUTCOMES  Goal: *Ability to perform ADLs and demonstrates progressive mobility and function  Outcome: Progressing Towards Goal  Reinforced patient teaching about s/s to report to medical staff

## 2017-10-03 NOTE — PROGRESS NOTES
Spiritual Care Partner Volunteer visited patient in [phone] on 10.3. 17.   Documented by:    Elizabeth Wells M.Div, M.S, [address] available at [address] [place] [address]([phone])

## 2017-10-03 NOTE — PROGRESS NOTES
Hospitalist Progress Note  Ben Garcia MD  Answering service: [phone] from in house phone        Date of Service:  10/2/2017  NAME:  Marcus Gresham  :  1960  MRN:  [id number]      Admission Summary:   Pt admitted for UGIB/coffee-ground emesis. EGD done and showed AVM clipped by GI. Interval history / Subjective:   Pt has no complaints at the present time. Hematemesis has resolved. She denies chest pain, SOB, D/C, F/C. Assessment & Plan:     60yo WF with an UGIB found to have AVM on EGD today. PROBLEM LIST:  1. upper gastrointestinal bleed  2. acute blood loss anemia  3. hypokalemia  4. arteriovenous malformation (duodenum)  5. thrombocytopenia  6. chronic respiratory failure with hypoxia  7. chronic nicotine/cigarette dependence  8. chronic obstructive pulmonary disease, not in acute exacerbation  9. diastolic congestive heart failure, unknown type, not in acute decompensation  10. rheumatoid arthritis  11. cirrhosis    PLAN: GI placed 2 hemoclips on duodenal AVM today. Continue pantoprazole. She has a clear liquid diet and will be advanced. Will monitor H/H which was .7. Will transfuse if Hb<7 or she becomes Sx. Continue her ferrous sulfate for her chronic anemia. . 1. Continue her vitamin B12. Will supplement KCl for her K of 3.3. BMP and Mg level in AM. Will continue carvedilol and furosemide for her diastolic CHF which is not decompensated. Continue Pulmicort, Brovana, and PRN DuoNebs for her COPD which is not currently exacerbated. Pt has been counseled on the importance tobacco cessation particular since she is on continuous O2.     Code status: FULL CODE  DVT prophylaxis: SCD hose    Care Plan discussed with: Patient/Family  Disposition: Home w/Family and TBD     Hospital Problems  Date Reviewed: 2/10/2017          Codes Class Noted POA    GI bleed ICD-10-CM: K92.2  ICD-9-CM: 578.9  2016 Unknown Review of Systems:   A comprehensive review of systems was negative except for that written in the HPI. Vital Signs:    Last 24hrs VS reviewed since prior progress note. Most recent are:  Visit Vitals    /69 (BP 1 Location: Left arm, BP Patient Position: At rest)    Pulse 66    Temp 98.3 °F (36.8 °C)    Resp 16    Ht 5' (1.524 m)    Wt 57.4 kg (126 lb 9.6 oz)    SpO2 100%    BMI 24.72 kg/m2         Intake/Output Summary (Last 24 hours) at 10/02/17 2321  Last data filed at 10/02/17 1931   Gross per 24 hour   Intake          1786.25 ml   Output                0 ml   Net          1786.25 ml        Physical Examination:             Constitutional:  WF, lying in bed, no acute distress, cooperative, pleasant    ENT:  Oral mucous moist, oropharynx benign. Neck supple,    Resp:  CTA bilaterally. No wheezing/rhonchi/rales. No accessory muscle use   CV:  Regular rhythm, normal rate, no murmurs, gallops, rubs    GI:  Soft, non distended, non tender. normoactive bowel sounds, no hepatosplenomegaly     Musculoskeletal:  No edema, warm, 2+ pulses throughout    Neurologic:  Moves all extremities. AAOx3, CN II-XII reviewed     Eyes:  EOMI. Anicteric sclerae, PERRL. Data Review:    Review and/or order of clinical lab test      Labs:     Recent Labs      10/02/17   1720  10/02/17   0920   WBC  3.5*  2.9*   HGB  8.0*  7.9*   HCT  26.7*  26.5*   PLT  127*  118*     Recent Labs      10/02/17   0037  10/01/17   1451   NA  142  141   K  3.3*  4.0   CL  107  104   CO2  28  33*   BUN  14  17   CREA  0.75  0.91   GLU  149*  92   CA  8.5  9.2     Recent Labs      10/01/17   1451   SGOT  28   ALT  22   AP  87   TBILI  0.2   TP  6.6   ALB  3.4*   GLOB  3.2     Recent Labs      10/01/17   1451   INR  1.0   PTP  9.8   APTT  24.4      No results for input(s): FE, TIBC, PSAT, FERR in the last 72 hours.    Lab Results   Component Value Date/Time    Folate 35.2 02/09/2017 03:26 AM      No results for input(s): PH, PCO2, PO2 in the last 72 hours. No results for input(s): CPK, CKNDX, TROIQ in the last 72 hours.     No lab exists for component: CPKMB  Lab Results   Component Value Date/Time    Cholesterol, total 191 05/31/2016 04:21 AM    HDL Cholesterol 69 05/31/2016 04:21 AM    LDL, calculated 108.8 05/31/2016 04:21 AM    Triglyceride 66 05/31/2016 04:21 AM    CHOL/HDL Ratio 2.8 05/31/2016 04:21 AM     Lab Results   Component Value Date/Time    Glucose (POC) 95 05/04/2016 02:48 PM     Lab Results   Component Value Date/Time    Color YELLOW/STRAW 10/01/2017 02:51 PM    Appearance CLEAR 10/01/2017 02:51 PM    Specific gravity 1.009 10/01/2017 02:51 PM    pH (UA) 6.0 10/01/2017 02:51 PM    Protein NEGATIVE  10/01/2017 02:51 PM    Glucose NEGATIVE  10/01/2017 02:51 PM    Ketone NEGATIVE  10/01/2017 02:51 PM    Bilirubin NEGATIVE  10/01/2017 02:51 PM    Urobilinogen 0.2 10/01/2017 02:51 PM    Nitrites NEGATIVE  10/01/2017 02:51 PM    Leukocyte Esterase NEGATIVE  10/01/2017 02:51 PM    Epithelial cells FEW 10/01/2017 02:51 PM    Bacteria NEGATIVE  10/01/2017 02:51 PM    WBC 0-4 10/01/2017 02:51 PM    RBC 0-5 10/01/2017 02:51 PM         Medications Reviewed:     Current Facility-Administered Medications   Medication Dose Route Frequency    influenza vaccine 2017-18 (3 yrs+)(PF) (FLUZONE QUAD/FLUARIX QUAD) injection 0.5 mL  0.5 mL IntraMUSCular PRIOR TO DISCHARGE    ARIPiprazole (ABILIFY) 1 mg/mL oral soln 2 mg  2 mg Oral DAILY    carvedilol (COREG) tablet 3.125 mg  3.125 mg Oral BID WITH MEALS    cholecalciferol (VITAMIN D3) tablet 4,000 Units  4,000 Units Oral DAILY    cyanocobalamin (VITAMIN B12) tablet 1,000 mcg  1,000 mcg Oral DAILY    furosemide (LASIX) tablet 20 mg  20 mg Oral EVERY TUES,THUR,SAT,SUN    gabapentin (NEURONTIN) capsule 300 mg  300 mg Oral TID    loratadine (CLARITIN) tablet 10 mg  10 mg Oral DAILY    albuterol-ipratropium (DUO-NEB) 2.5 MG-0.5 MG/3 ML  3 mL Nebulization Q4H RT    ferrous sulfate tablet 325 mg  325 mg Oral TID WITH MEALS    furosemide (LASIX) tablet 40 mg  40 mg Oral Q MON, WED & FRI    therapeutic multivitamin (THERAGRAN) tablet 1 Tab  1 Tab Oral DAILY    ondansetron (ZOFRAN ODT) tablet 4 mg  4 mg Oral Q8H PRN    spironolactone (ALDACTONE) tablet 50 mg  50 mg Oral DAILY PRN    senna-docusate (PERICOLACE) 8.6-50 mg per tablet 1-2 Tab  1-2 Tab Oral QHS PRN    albuterol (PROVENTIL VENTOLIN) nebulizer solution 2.5 mg  2.5 mg Nebulization Q4H PRN    arformoterol (BROVANA) neb solution 15 mcg  15 mcg Nebulization BID RT    And    budesonide (PULMICORT) 500 mcg/2 ml nebulizer suspension  500 mcg Nebulization BID RT    pantoprazole (PROTONIX) 40 mg in sodium chloride 0.9 % 10 mL injection  40 mg IntraVENous Q12H     ______________________________________________________________________  EXPECTED LENGTH OF STAY: 3d 4h  ACTUAL LENGTH OF STAY:          1                 Dilip Hu MD

## 2017-12-06 ENCOUNTER — HOSPITAL ENCOUNTER (OUTPATIENT)
Dept: MRI IMAGING | Age: 57
Discharge: HOME | End: 2017-12-06
Attending: PAIN MEDICINE
Payer: MEDICARE

## 2017-12-06 DIAGNOSIS — M41.9 SCOLIOSIS: ICD-10-CM

## 2017-12-06 DIAGNOSIS — M43.06 LUMBAR SPONDYLOLYSIS: ICD-10-CM

## 2017-12-06 DIAGNOSIS — M79.10 MYALGIA: ICD-10-CM

## 2017-12-06 PROCEDURE — 72148 MRI LUMBAR SPINE W/O DYE: CPT

## 2018-01-20 ENCOUNTER — HOSPITAL ENCOUNTER (EMERGENCY)
Age: 58
Discharge: HOME | End: 2018-01-20
Attending: EMERGENCY MEDICINE
Payer: MEDICARE

## 2018-01-20 ENCOUNTER — APPOINTMENT (OUTPATIENT)
Dept: GENERAL RADIOLOGY | Age: 58
End: 2018-01-20
Attending: PHYSICIAN ASSISTANT
Payer: MEDICARE

## 2018-01-20 VITALS
HEIGHT: 60 IN | RESPIRATION RATE: 15 BRPM | WEIGHT: 123 LBS | HEART RATE: 66 BPM | OXYGEN SATURATION: 98 % | BODY MASS INDEX: 24.15 KG/M2 | TEMPERATURE: 98.1 F | SYSTOLIC BLOOD PRESSURE: 96 MMHG | DIASTOLIC BLOOD PRESSURE: 51 MMHG

## 2018-01-20 DIAGNOSIS — R06.02 SOB (SHORTNESS OF BREATH): Primary | ICD-10-CM

## 2018-01-20 LAB
ALBUMIN SERPL-MCNC: 3.5 G/DL (ref 3.5–5)
ALBUMIN/GLOB SERPL: 1 {RATIO} (ref 1.1–2.2)
ALP SERPL-CCNC: 103 U/L (ref 45–117)
ALT SERPL-CCNC: 27 U/L (ref 12–78)
ANION GAP SERPL CALC-SCNC: 5 MMOL/L (ref 5–15)
APPEARANCE UR: CLEAR
ARTERIAL PATENCY WRIST A: ABNORMAL
AST SERPL-CCNC: 32 U/L (ref 15–37)
ATRIAL RATE: 416 BPM
BACTERIA URNS QL MICRO: NEGATIVE /HPF
BASE EXCESS BLDV CALC-SCNC: 9 MMOL/L
BASOPHILS # BLD: 0 K/UL (ref 0–0.1)
BASOPHILS NFR BLD: 0 % (ref 0–1)
BDY SITE: ABNORMAL
BILIRUB SERPL-MCNC: 0.2 MG/DL (ref 0.2–1)
BILIRUB UR QL: NEGATIVE
BUN SERPL-MCNC: 13 MG/DL (ref 6–20)
BUN/CREAT SERPL: 15 (ref 12–20)
CALCIUM SERPL-MCNC: 9.4 MG/DL (ref 8.5–10.1)
CALCULATED R AXIS, ECG10: 70 DEGREES
CALCULATED T AXIS, ECG11: 62 DEGREES
CHLORIDE SERPL-SCNC: 103 MMOL/L (ref 97–108)
CO2 SERPL-SCNC: 33 MMOL/L (ref 21–32)
COLOR UR: NORMAL
CREAT SERPL-MCNC: 0.87 MG/DL (ref 0.55–1.02)
DIAGNOSIS, 93000: NORMAL
DIFFERENTIAL METHOD BLD: ABNORMAL
EOSINOPHIL # BLD: 0.5 K/UL (ref 0–0.4)
EOSINOPHIL NFR BLD: 11 % (ref 0–7)
EPITH CASTS URNS QL MICRO: NORMAL /LPF
ERYTHROCYTE [DISTWIDTH] IN BLOOD BY AUTOMATED COUNT: 14.4 % (ref 11.5–14.5)
ETHANOL SERPL-MCNC: <10 MG/DL
FLUAV AG NPH QL IA: NEGATIVE
FLUBV AG NOSE QL IA: NEGATIVE
GAS FLOW.O2 O2 DELIVERY SYS: ABNORMAL L/MIN
GAS FLOW.O2 SETTING OXYMISER: 3 L/M
GLOBULIN SER CALC-MCNC: 3.4 G/DL (ref 2–4)
GLUCOSE SERPL-MCNC: 92 MG/DL (ref 65–100)
GLUCOSE UR STRIP.AUTO-MCNC: NEGATIVE MG/DL
HCO3 BLDV-SCNC: 34.6 MMOL/L (ref 23–28)
HCT VFR BLD AUTO: 35.9 % (ref 35–47)
HGB BLD-MCNC: 10.9 G/DL (ref 11.5–16)
HGB UR QL STRIP: NEGATIVE
HYALINE CASTS URNS QL MICRO: NORMAL /LPF (ref 0–5)
KETONES UR QL STRIP.AUTO: NEGATIVE MG/DL
LEUKOCYTE ESTERASE UR QL STRIP.AUTO: NEGATIVE
LYMPHOCYTES # BLD: 0.8 K/UL (ref 0.8–3.5)
LYMPHOCYTES NFR BLD: 18 % (ref 12–49)
MCH RBC QN AUTO: 30.1 PG (ref 26–34)
MCHC RBC AUTO-ENTMCNC: 30.4 G/DL (ref 30–36.5)
MCV RBC AUTO: 99.2 FL (ref 80–99)
MONOCYTES # BLD: 0.4 K/UL (ref 0–1)
MONOCYTES NFR BLD: 9 % (ref 5–13)
NEUTS SEG # BLD: 2.6 K/UL (ref 1.8–8)
NEUTS SEG NFR BLD: 62 % (ref 32–75)
NITRITE UR QL STRIP.AUTO: NEGATIVE
PCO2 BLDV: 60.3 MMHG (ref 41–51)
PH BLDV: 7.37 [PH] (ref 7.32–7.42)
PH UR STRIP: 7 [PH] (ref 5–8)
PLATELET # BLD AUTO: 122 K/UL (ref 150–400)
PO2 BLDV: 31 MMHG (ref 25–40)
POTASSIUM SERPL-SCNC: 3.9 MMOL/L (ref 3.5–5.1)
PROT SERPL-MCNC: 6.9 G/DL (ref 6.4–8.2)
PROT UR STRIP-MCNC: NEGATIVE MG/DL
Q-T INTERVAL, ECG07: 380 MS
QRS DURATION, ECG06: 94 MS
QTC CALCULATION (BEZET), ECG08: 395 MS
RBC # BLD AUTO: 3.62 M/UL (ref 3.8–5.2)
RBC #/AREA URNS HPF: NORMAL /HPF (ref 0–5)
RBC MORPH BLD: ABNORMAL
RBC MORPH BLD: ABNORMAL
SAO2 % BLDV: 55 % (ref 65–88)
SODIUM SERPL-SCNC: 141 MMOL/L (ref 136–145)
SP GR UR REFRACTOMETRY: 1.01 (ref 1–1.03)
SPECIMEN TYPE: ABNORMAL
TROPONIN I SERPL-MCNC: <0.04 NG/ML
UROBILINOGEN UR QL STRIP.AUTO: 0.2 EU/DL (ref 0.2–1)
VENTRICULAR RATE, ECG03: 65 BPM
WBC # BLD AUTO: 4.3 K/UL (ref 3.6–11)
WBC URNS QL MICRO: NORMAL /HPF (ref 0–4)

## 2018-01-20 PROCEDURE — 82803 BLOOD GASES ANY COMBINATION: CPT

## 2018-01-20 PROCEDURE — 94640 AIRWAY INHALATION TREATMENT: CPT

## 2018-01-20 PROCEDURE — 80053 COMPREHEN METABOLIC PANEL: CPT | Performed by: PHYSICIAN ASSISTANT

## 2018-01-20 PROCEDURE — 84484 ASSAY OF TROPONIN QUANT: CPT | Performed by: PHYSICIAN ASSISTANT

## 2018-01-20 PROCEDURE — 85025 COMPLETE CBC W/AUTO DIFF WBC: CPT | Performed by: PHYSICIAN ASSISTANT

## 2018-01-20 PROCEDURE — 74011000250 HC RX REV CODE- 250: Performed by: PHYSICIAN ASSISTANT

## 2018-01-20 PROCEDURE — 87804 INFLUENZA ASSAY W/OPTIC: CPT | Performed by: PHYSICIAN ASSISTANT

## 2018-01-20 PROCEDURE — 99285 EMERGENCY DEPT VISIT HI MDM: CPT

## 2018-01-20 PROCEDURE — 77030029684 HC NEB SM VOL KT MONA -A

## 2018-01-20 PROCEDURE — 81001 URINALYSIS AUTO W/SCOPE: CPT | Performed by: PHYSICIAN ASSISTANT

## 2018-01-20 PROCEDURE — 93005 ELECTROCARDIOGRAM TRACING: CPT

## 2018-01-20 PROCEDURE — 36415 COLL VENOUS BLD VENIPUNCTURE: CPT | Performed by: PHYSICIAN ASSISTANT

## 2018-01-20 PROCEDURE — 80307 DRUG TEST PRSMV CHEM ANLYZR: CPT | Performed by: PHYSICIAN ASSISTANT

## 2018-01-20 PROCEDURE — 71045 X-RAY EXAM CHEST 1 VIEW: CPT

## 2018-01-20 PROCEDURE — 74011636637 HC RX REV CODE- 636/637: Performed by: PHYSICIAN ASSISTANT

## 2018-01-20 RX ORDER — PREDNISONE 20 MG/1
60 TABLET ORAL
Status: COMPLETED | OUTPATIENT
Start: 2018-01-20 | End: 2018-01-20

## 2018-01-20 RX ORDER — PREDNISONE 20 MG/1
60 TABLET ORAL DAILY
Qty: 15 TAB | Refills: 0 | Status: SHIPPED | OUTPATIENT
Start: 2018-01-20 | End: 2018-01-25

## 2018-01-20 RX ADMIN — PREDNISONE 60 MG: 20 TABLET ORAL at 10:51

## 2018-01-20 RX ADMIN — ALBUTEROL SULFATE 1 DOSE: 2.5 SOLUTION RESPIRATORY (INHALATION) at 10:52

## 2018-01-20 NOTE — ED PROVIDER NOTES
HPI Comments: 61 y.o. female with past medical history significant for heart failure, COPD, cirrhosis of the liver presents with complaints of wheezing and shortness of breath. The pt is on 3 L of O2 at home. States that today she has been more short of breath. Pt denies pleuritic chest pain, hemoptysis, unilateral leg swelling, estrogen supplements, hx of blood clot, recent trauma/surgery, or hx of CA. There are no other acute medical complaints at this time. PCP: MD Cat Cuevas PA-C    Patient is a 61 y.o. female presenting with shortness of breath. Shortness of Breath   Pertinent negatives include no fever, no rhinorrhea, no sore throat, no ear pain, no cough, no wheezing, no chest pain, no vomiting, no abdominal pain and no rash. Past Medical History:   Diagnosis Date    Anemia     Arthritis     Rheumatoid    CHF (congestive heart failure) (HCC)     Chronic obstructive pulmonary disease (HCC)     Cirrhosis of liver (HCC)     COPD (chronic obstructive pulmonary disease) (HCC)     Gastrointestinal disorder     \"lacerations in the large part of my small intestine. \"    Gastrointestinal disorder     liver cirrhosis    Heart failure ([address]     Internal bleeding     Psychiatric disorder     depression, anxiety    Tachycardia 2016       Past Surgical History:   Procedure Laterality Date    HX CHOLECYSTECTOMY      HX GI      Cholecystectomoy    HX GYN       x 2.  IN ENDOSCOPY UPPER SMALL INTESTINE  2016              Family History:   Problem Relation Age of Onset    Cancer Mother     Alcohol abuse Father     Cancer Sister        Social History     Social History    Marital status: SINGLE     Spouse name: N/A    Number of children: N/A    Years of education: N/A     Occupational History    Not on file.      Social History Main Topics    Smoking status: Current Every Day Smoker     Packs/day: 0.75     Years: 45.00    Smokeless tobacco: Never Used    Alcohol use No      Comment: in the past was an alcoholic. Has been sober x 2 years.  Drug use: No    Sexual activity: Yes     Partners: Male     Other Topics Concern    Not on file     Social History Narrative         ALLERGIES: Pcn [penicillins]    Review of Systems   Constitutional: Negative for activity change, appetite change, diaphoresis and fever. HENT: Negative for ear discharge, ear pain, facial swelling, rhinorrhea, sore throat, tinnitus, trouble swallowing and voice change. Eyes: Negative for photophobia, pain, discharge, redness and visual disturbance. Respiratory: Positive for shortness of breath. Negative for cough, chest tightness, wheezing and stridor. Cardiovascular: Negative for chest pain and palpitations. Gastrointestinal: Negative for abdominal pain, constipation, diarrhea, nausea and vomiting. Endocrine: Negative for polydipsia and polyuria. Genitourinary: Negative for dysuria, flank pain and hematuria. Musculoskeletal: Negative for arthralgias, back pain and myalgias. Skin: Negative for color change and rash. Neurological: Negative for dizziness, syncope, speech difficulty, light-headedness and numbness. Psychiatric/Behavioral: Negative for behavioral problems. Vitals:    01/20/18 1245 01/20/18 1300 01/20/18 1330 01/20/18 1345   BP: 107/56 109/49 114/51 96/51   Pulse: 63 64 62 66   Resp: 14 14 13 15   Temp:    98.1 °F (36.7 °C)   SpO2: 96% 98% 98% 98%   Weight:       Height:                Physical Exam   Constitutional: She is oriented to person, place, and time. She appears well-developed and well-nourished. HENT:   Head: Normocephalic and atraumatic. Eyes: Conjunctivae are normal. Pupils are equal, round, and reactive to light. Right eye exhibits no discharge. Left eye exhibits no discharge. Neck: Normal range of motion. Neck supple. No thyromegaly present. Cardiovascular: Normal rate, regular rhythm and normal heart sounds.   Exam reveals no gallop and no friction rub. No murmur heard. Pulmonary/Chest: Effort normal. No respiratory distress. She has wheezes. There are wheezes at both bilateral lung bases. Abdominal: Soft. Bowel sounds are normal. She exhibits no distension. There is no tenderness. There is no rebound and no guarding. Musculoskeletal: Normal range of motion. Neurological: She is alert and oriented to person, place, and time. Skin: Skin is warm. Psychiatric: She has a normal mood and affect. MDM  Number of Diagnoses or Management Options  SOB (shortness of breath):   Diagnosis management comments: Bloodwork and imaging unremarkable. Pt O2 sats dropped to 90 ambulating. Offered to admit the patient but she refused. Gave pt strict return precautions. Pt given steroids and advised to follow up with family doctor for further evaluation of symptoms. Reviewed treatment plan with attending and they agree.   Kingsley Sandoval PA-AMANDA    ED Course       Procedures

## 2018-01-20 NOTE — ED NOTES
Pt ambulated to the restroom to void on room air per her request and desated to 80%. Placed back on 3L with sats up to 94%.

## 2018-01-20 NOTE — ED NOTES
ED MD EKG interpretation: NSR with rate 65 BPM. No ectopy noted./ Axis and intervals are normal. No acute ischemic changes noted.  Roger Arce MD

## 2018-01-20 NOTE — ED TRIAGE NOTES
Pt called EMS re: SOB for the past 2 days. Given one combivent en route with some relief of wheezing.

## 2018-01-20 NOTE — DISCHARGE INSTRUCTIONS
Shortness of Breath: Care Instructions  Your Care Instructions  Shortness of breath has many causes. Sometimes conditions such as anxiety can lead to shortness of breath. Some people get mild shortness of breath when they exercise. Trouble breathing also can be a symptom of a serious problem, such as asthma, lung disease, emphysema, heart problems, and pneumonia. If your shortness of breath continues, you may need tests and treatment. Watch for any changes in your breathing and other symptoms. Follow-up care is a key part of your treatment and safety. Be sure to make and go to all appointments, and call your doctor if you are having problems. It's also a good idea to know your test results and keep a list of the medicines you take. How can you care for yourself at home? · Do not smoke or allow others to smoke around you. If you need help quitting, talk to your doctor about stop-smoking programs and medicines. These can increase your chances of quitting for good. · Get plenty of rest and sleep. · Take your medicines exactly as prescribed. Call your doctor if you think you are having a problem with your medicine. · Find healthy ways to deal with stress. ¨ Exercise daily. ¨ Get plenty of sleep. ¨ Eat regularly and well. When should you call for help? Call 911 anytime you think you may need emergency care. For example, call if:  ? · You have severe shortness of breath. ? · You have symptoms of a heart attack. These may include:  ¨ Chest pain or pressure, or a strange feeling in the chest.  ¨ Sweating. ¨ Shortness of breath. ¨ Nausea or vomiting. ¨ Pain, pressure, or a strange feeling in the back, neck, jaw, or upper belly or in one or both shoulders or arms. ¨ Lightheadedness or sudden weakness. ¨ A fast or irregular heartbeat. After you call 911, the  may tell you to chew 1 adult-strength or 2 to 4 low-dose aspirin. Wait for an ambulance. Do not try to drive yourself.    ?Call your doctor now or seek immediate medical care if:  ? · Your shortness of breath gets worse or you start to wheeze. Wheezing is a high-pitched sound when you breathe. ? · You wake up at night out of breath or have to prop your head up on several pillows to breathe. ? · You are short of breath after only light activity or while at rest.   ? Watch closely for changes in your health, and be sure to contact your doctor if:  ? · You do not get better over the next 1 to 2 days. Where can you learn more? Go to http://huseyin-linda.info/. Enter S780 in the search box to learn more about \"Shortness of Breath: Care Instructions. \"  Current as of: May 12, 2017  Content Version: 11.4  © 1247-4040 [employer]. Care instructions adapted under license by [employer] (which disclaims liability or warranty for this information). If you have questions about a medical condition or this instruction, always ask your healthcare professional. [address] any warranty or liability for your use of this information. We hope that we have addressed all of your medical concerns. The examination and treatment you received in the Emergency Department were for an emergent problem and were not intended as complete care. It is important that you follow up with your healthcare provider(s) for ongoing care. If your symptoms worsen or do not improve as expected, and you are unable to reach your usual health care provider(s), you should return to the Emergency Department. Today's healthcare is undergoing tremendous change, and patient satisfaction surveys are one of the many tools to assess the quality of medical care. You may receive a survey from the [employer] organization regarding your experience in the Emergency Department. I hope that your experience has been completely positive, particularly the medical care that I provided.   As such, please participate in the survey; anything less than excellent does not meet my expectations or intentions. [facility] and [facility] participate in nationally recognized quality of care measures. If your blood pressure is greater than 120/80, as reported below, we urge that you seek medical care to address the potential of high blood pressure, commonly known as hypertension. Hypertension can be hereditary or can be caused by certain medical conditions, pain, stress, or \"white coat syndrome. \"       Please make an appointment with your health care provider(s) for follow up of your Emergency Department visit. VITALS:   Patient Vitals for the past 8 hrs:   Temp Pulse Resp BP SpO2   01/20/18 1130 - (!) 58 13 114/62 97 %   01/20/18 1115 - 68 16 112/56 93 %   01/20/18 1100 - 69 15 114/63 96 %   01/20/18 1052 - - - - 97 %   01/20/18 1042 98.5 °F (36.9 °C) 65 13 119/52 96 %          Thank you for allowing us to provide you with medical care today. We realize that you have many choices for your emergency care needs. Please choose us in the future for any continued health care needs. Charis Bradshaween Favian, [facility].   Office: [phone]            Recent Results (from the past 24 hour(s))   EKG, 12 LEAD, INITIAL    Collection Time: 01/20/18 10:39 AM   Result Value Ref Range    Ventricular Rate 65 BPM    Atrial Rate 416 BPM    QRS Duration 94 ms    Q-T Interval 380 ms    QTC Calculation (Bezet) 395 ms    Calculated R Axis 70 degrees    Calculated T Axis 62 degrees    Diagnosis       ** Poor data quality, interpretation may be adversely affected  Junctional rhythm  When compared with ECG of 08-FEB-2017 09:54,  Previous ECG has undetermined rhythm, needs review  Nonspecific T wave abnormality now evident in Anterior leads     CBC WITH AUTOMATED DIFF    Collection Time: 01/20/18 10:44 AM   Result Value Ref Range    WBC 4.3 3.6 - 11.0 K/uL    RBC 3.62 (L) 3.80 - 5.20 M/uL    HGB 10.9 (L) 11.5 - 16.0 g/dL    HCT 35.9 35.0 - 47.0 %    MCV 99.2 (H) 80.0 - 99.0 FL    MCH 30.1 26.0 - 34.0 PG    MCHC 30.4 30.0 - 36.5 g/dL    RDW 14.4 11.5 - 14.5 %    PLATELET 185 (L) 134 - 400 K/uL    NEUTROPHILS 62 32 - 75 %    LYMPHOCYTES 18 12 - 49 %    MONOCYTES 9 5 - 13 %    EOSINOPHILS 11 (H) 0 - 7 %    BASOPHILS 0 0 - 1 %    ABS. NEUTROPHILS 2.6 1.8 - 8.0 K/UL    ABS. LYMPHOCYTES 0.8 0.8 - 3.5 K/UL    ABS. MONOCYTES 0.4 0.0 - 1.0 K/UL    ABS. EOSINOPHILS 0.5 (H) 0.0 - 0.4 K/UL    ABS. BASOPHILS 0.0 0.0 - 0.1 K/UL    DF MANUAL      RBC COMMENTS ANISOCYTOSIS  1+        RBC COMMENTS MACROCYTOSIS  1+       METABOLIC PANEL, COMPREHENSIVE    Collection Time: 01/20/18 10:44 AM   Result Value Ref Range    Sodium 141 136 - 145 mmol/L    Potassium 3.9 3.5 - 5.1 mmol/L    Chloride 103 97 - 108 mmol/L    CO2 33 (H) 21 - 32 mmol/L    Anion gap 5 5 - 15 mmol/L    Glucose 92 65 - 100 mg/dL    BUN 13 6 - 20 MG/DL    Creatinine 0.87 0.55 - 1.02 MG/DL    BUN/Creatinine ratio 15 12 - 20      GFR est AA >60 >60 ml/min/1.73m2    GFR est non-AA >60 >60 ml/min/1.73m2    Calcium 9.4 8.5 - 10.1 MG/DL    Bilirubin, total 0.2 0.2 - 1.0 MG/DL    ALT (SGPT) 27 12 - 78 U/L    AST (SGOT) 32 15 - 37 U/L    Alk.  phosphatase 103 45 - 117 U/L    Protein, total 6.9 6.4 - 8.2 g/dL    Albumin 3.5 3.5 - 5.0 g/dL    Globulin 3.4 2.0 - 4.0 g/dL    A-G Ratio 1.0 (L) 1.1 - 2.2     TROPONIN I    Collection Time: 01/20/18 10:44 AM   Result Value Ref Range    Troponin-I, Qt. <0.04 <0.05 ng/mL   ETHYL ALCOHOL    Collection Time: 01/20/18 10:44 AM   Result Value Ref Range    ALCOHOL(ETHYL),SERUM <10 <10 MG/DL   POC VENOUS BLOOD GAS    Collection Time: 01/20/18 11:37 AM   Result Value Ref Range    Device: NASAL CANNULA      Flow rate (POC) 3 L/M    pH, venous (POC) 7.367 7.32 - 7.42      pCO2, venous (POC) 60.3 (H) 41 - 51 MMHG    pO2, venous (POC) 31 25 - 40 mmHg    HCO3, venous (POC) 34.6 (H) 23.0 - 28.0 MMOL/L    sO2, venous (POC) 55 (L) 65 - 88 %    Base excess, venous (POC) 9 mmol/L    Allens test (POC) N/A      Site OTHER      Specimen type (POC) VENOUS BLOOD     URINALYSIS W/MICROSCOPIC    Collection Time: 01/20/18 11:42 AM   Result Value Ref Range    Color YELLOW/STRAW      Appearance CLEAR CLEAR      Specific gravity 1.010 1.003 - 1.030      pH (UA) 7.0 5.0 - 8.0      Protein NEGATIVE  NEG mg/dL    Glucose NEGATIVE  NEG mg/dL    Ketone NEGATIVE  NEG mg/dL    Bilirubin NEGATIVE  NEG      Blood NEGATIVE  NEG      Urobilinogen 0.2 0.2 - 1.0 EU/dL    Nitrites NEGATIVE  NEG      Leukocyte Esterase NEGATIVE  NEG      WBC 0-4 0 - 4 /hpf    RBC 0-5 0 - 5 /hpf    Epithelial cells FEW FEW /lpf    Bacteria NEGATIVE  NEG /hpf    Hyaline cast 0-2 0 - 5 /lpf   INFLUENZA A & B AG (RAPID TEST)    Collection Time: 01/20/18 11:42 AM   Result Value Ref Range    Influenza A Antigen NEGATIVE  NEG      Influenza B Antigen NEGATIVE  NEG         Xr Chest Port    Result Date: 1/20/2018  PORTABLE CHEST RADIOGRAPH/S: 1/20/2018 10:51 AM INDICATION: Dyspnea for 2 days, wheezing. COMPARISON: 2/8/2017, 8/26/2016, 6/10/2016; CT abdomen and pelvis 12/28/2016. TECHNIQUE: Portable frontal upright radiograph/s of the chest. FINDINGS: The lungs are clear. The central airways are patent. No pneumothorax or pleural effusion. Cholecystectomy clips in the right upper quadrant. IMPRESSION: Clear lungs.

## 2018-01-20 NOTE — ED NOTES
Ride arranged for pt to get home through her insurance. Pt waiting out in the waiting room for her ride.

## 2018-01-20 NOTE — ED NOTES
Pt left ambulatory with discharge instructions on her own out to the waiting room to wait on her ride.

## 2018-04-04 ENCOUNTER — HOSPITAL ENCOUNTER (OUTPATIENT)
Dept: MAMMOGRAPHY | Age: 58
Discharge: HOME | End: 2018-04-04
Payer: MEDICARE

## 2018-04-04 DIAGNOSIS — Z12.39 BREAST SCREENING: ICD-10-CM

## 2018-04-04 PROCEDURE — 77067 SCR MAMMO BI INCL CAD: CPT

## 2018-04-11 ENCOUNTER — HOSPITAL ENCOUNTER (OUTPATIENT)
Dept: MAMMOGRAPHY | Age: 58
Discharge: HOME | End: 2018-04-11
Attending: INTERNAL MEDICINE
Payer: MEDICARE

## 2018-04-11 ENCOUNTER — HOSPITAL ENCOUNTER (OUTPATIENT)
Dept: MAMMOGRAPHY | Age: 58
Discharge: HOME | End: 2018-04-11
Payer: MEDICARE

## 2018-04-11 DIAGNOSIS — R92.8 ABNORMAL MAMMOGRAM: ICD-10-CM

## 2018-04-11 PROCEDURE — 76642 ULTRASOUND BREAST LIMITED: CPT

## 2018-04-11 PROCEDURE — 77065 DX MAMMO INCL CAD UNI: CPT

## 2018-08-20 ENCOUNTER — HOSPITAL ENCOUNTER (OUTPATIENT)
Dept: INFUSION THERAPY | Age: 58
Discharge: HOME | End: 2018-08-20
Payer: MEDICARE

## 2018-08-20 VITALS
SYSTOLIC BLOOD PRESSURE: 110 MMHG | DIASTOLIC BLOOD PRESSURE: 69 MMHG | RESPIRATION RATE: 18 BRPM | TEMPERATURE: 98.6 F | HEART RATE: 83 BPM

## 2018-08-20 LAB
BASOPHILS # BLD: 0 K/UL (ref 0–0.1)
BASOPHILS NFR BLD: 0 % (ref 0–1)
DIFFERENTIAL METHOD BLD: ABNORMAL
EOSINOPHIL # BLD: 0.2 K/UL (ref 0–0.4)
EOSINOPHIL NFR BLD: 3 % (ref 0–7)
ERYTHROCYTE [DISTWIDTH] IN BLOOD BY AUTOMATED COUNT: 16.2 % (ref 11.5–14.5)
HCT VFR BLD AUTO: 24.8 % (ref 35–47)
HGB BLD-MCNC: 7.3 G/DL (ref 11.5–16)
IMM GRANULOCYTES # BLD: 0 K/UL (ref 0–0.04)
IMM GRANULOCYTES NFR BLD AUTO: 0 % (ref 0–0.5)
LYMPHOCYTES # BLD: 0.9 K/UL (ref 0.8–3.5)
LYMPHOCYTES NFR BLD: 15 % (ref 12–49)
MCH RBC QN AUTO: 32.3 PG (ref 26–34)
MCHC RBC AUTO-ENTMCNC: 29.4 G/DL (ref 30–36.5)
MCV RBC AUTO: 109.7 FL (ref 80–99)
MONOCYTES # BLD: 0.7 K/UL (ref 0–1)
MONOCYTES NFR BLD: 13 % (ref 5–13)
NEUTS SEG # BLD: 3.9 K/UL (ref 1.8–8)
NEUTS SEG NFR BLD: 69 % (ref 32–75)
NRBC # BLD: 0 K/UL (ref 0–0.01)
NRBC BLD-RTO: 0 PER 100 WBC
PLATELET # BLD AUTO: 128 K/UL (ref 150–400)
PMV BLD AUTO: 10.7 FL (ref 8.9–12.9)
RBC # BLD AUTO: 2.26 M/UL (ref 3.8–5.2)
RBC MORPH BLD: ABNORMAL
WBC # BLD AUTO: 5.7 K/UL (ref 3.6–11)

## 2018-08-20 PROCEDURE — 86900 BLOOD TYPING SEROLOGIC ABO: CPT | Performed by: INTERNAL MEDICINE

## 2018-08-20 PROCEDURE — 86921 COMPATIBILITY TEST INCUBATE: CPT | Performed by: INTERNAL MEDICINE

## 2018-08-20 PROCEDURE — 85025 COMPLETE CBC W/AUTO DIFF WBC: CPT | Performed by: INTERNAL MEDICINE

## 2018-08-20 PROCEDURE — 86902 BLOOD TYPE ANTIGEN DONOR EA: CPT | Performed by: INTERNAL MEDICINE

## 2018-08-20 PROCEDURE — 86920 COMPATIBILITY TEST SPIN: CPT | Performed by: INTERNAL MEDICINE

## 2018-08-20 PROCEDURE — 36415 COLL VENOUS BLD VENIPUNCTURE: CPT | Performed by: INTERNAL MEDICINE

## 2018-08-20 PROCEDURE — 86922 COMPATIBILITY TEST ANTIGLOB: CPT | Performed by: INTERNAL MEDICINE

## 2018-08-20 RX ORDER — ACETAMINOPHEN 325 MG/1
650 TABLET ORAL ONCE
Status: ACTIVE | OUTPATIENT
Start: 2018-08-21 | End: 2018-08-21

## 2018-08-20 RX ORDER — DIPHENHYDRAMINE HCL 25 MG
25 CAPSULE ORAL ONCE
Status: ACTIVE | OUTPATIENT
Start: 2018-08-21 | End: 2018-08-21

## 2018-08-20 NOTE — PROGRESS NOTES
[facility] [address] and s/w MD office regarding incomplete Blood Consent; made aware that if completed consent isn't received by pt's [facility] appointment tomorrow morning then pt will not be able to receive blood  transfusion.

## 2018-08-20 NOTE — PROGRESS NOTES
Outpatient [facility]fusion [facility] Short Visit Progress Note    1600 Pt admit to [facility] for lab draw (CBC/ T&C) ambulatory with rolling walker in stable condition. Assessment completed. No new concerns voiced. Labs drawn peripherally and sent for processing. Patient Vitals for the past 12 hrs:   Temp Pulse Resp BP   08/20/18 1600 98.6 °F (37 °C) 83 18 110/69       1610 Pt tolerated treatment well. D/c home ambulatory with rolling walker in no distress. Pt aware of next appointment scheduled for 08/21/2018 for blood transfusion.

## 2018-08-21 ENCOUNTER — HOSPITAL ENCOUNTER (OUTPATIENT)
Dept: INFUSION THERAPY | Age: 58
Discharge: HOME | End: 2018-08-21
Payer: MEDICARE

## 2018-08-21 NOTE — PROGRESS NOTES
Called patients cell phone twice to ensure patient is coming. There was no response. Patient stated yesterday she was unsure if she would becoming due to transpiration issues.

## 2018-08-22 ENCOUNTER — HOSPITAL ENCOUNTER (OUTPATIENT)
Dept: INFUSION THERAPY | Age: 58
Discharge: HOME | End: 2018-08-22
Payer: MEDICARE

## 2018-08-22 VITALS
OXYGEN SATURATION: 97 % | TEMPERATURE: 98.4 F | RESPIRATION RATE: 18 BRPM | DIASTOLIC BLOOD PRESSURE: 66 MMHG | HEART RATE: 84 BPM | SYSTOLIC BLOOD PRESSURE: 100 MMHG

## 2018-08-22 PROCEDURE — 77030013169 SET IV BLD ICUM -A

## 2018-08-22 PROCEDURE — P9016 RBC LEUKOCYTES REDUCED: HCPCS | Performed by: INTERNAL MEDICINE

## 2018-08-22 PROCEDURE — 36430 TRANSFUSION BLD/BLD COMPNT: CPT

## 2018-08-22 PROCEDURE — 74011250637 HC RX REV CODE- 250/637: Performed by: INTERNAL MEDICINE

## 2018-08-22 RX ORDER — SODIUM CHLORIDE 0.9 % (FLUSH) 0.9 %
5-10 SYRINGE (ML) INJECTION AS NEEDED
Status: ACTIVE | OUTPATIENT
Start: 2018-08-22 | End: 2018-08-23

## 2018-08-22 RX ORDER — DIPHENHYDRAMINE HCL 25 MG
25 CAPSULE ORAL ONCE
Status: COMPLETED | OUTPATIENT
Start: 2018-08-22 | End: 2018-08-22

## 2018-08-22 RX ORDER — ACETAMINOPHEN 325 MG/1
650 TABLET ORAL ONCE
Status: DISPENSED | OUTPATIENT
Start: 2018-08-22 | End: 2018-08-22

## 2018-08-22 RX ORDER — SODIUM CHLORIDE 9 MG/ML
250 INJECTION, SOLUTION INTRAVENOUS AS NEEDED
Status: DISCONTINUED | OUTPATIENT
Start: 2018-08-22 | End: 2018-08-26 | Stop reason: HOSPADM

## 2018-08-22 RX ADMIN — Medication 10 ML: at 09:45

## 2018-08-22 RX ADMIN — DIPHENHYDRAMINE HYDROCHLORIDE 25 MG: 25 CAPSULE ORAL at 09:52

## 2018-08-22 NOTE — PROGRESS NOTES
[facility] Progress Note    Date: 2018    Name: Earnestine Milligan    MRN: [id number]         : 1960    0940. Ms. Ten Wilson Arrived ambulatory and in no distress for 1 U PRBC. Assessment was completed, patient reports that her dizziness, weakness & SOB continues. 22 G PIV established to L hand without difficulty. Clarified with Dr. Karrie East that patient does not need additional CBC drawn today and that her PRBC today do not have to be irratiated. Signs/symptoms of adverse blood reaction discussed with pt, voiced understanding. Patient Vitals for the past 12 hrs:   Temp Pulse Resp BP SpO2   18 1214 98.4 °F (36.9 °C) 84 18 100/66 97 %   18 1115 98.2 °F (36.8 °C) 82 18 (!) 89/58 95 %   18 1045 98.6 °F (37 °C) 79 18 94/62 95 %   18 1030 98.4 °F (36.9 °C) 86 18 92/60 95 %   18 1010 98.5 °F (36.9 °C) 79 18 (!) 89/57 95 %   18 0939 98.1 °F (36.7 °C) 89 18 93/57 96 %       Medications received:  NS @ KVO  Benadryl 25 mg PO  *Patient refused Tylenol     1015:  1st unit PRBCs started and infusing without difficulty, observed x 15 minutes  1214:  1st unit completed without adverse reaction noted, NS flushing line. 1315. Ms. Ten Wilson tolerated treatment well and was discharged from [place] [address] in stable condition. No further appointments needed at this time. D/C instructions reviewed, copy to pt, voiced understanding. Patient was monitored for 60 minutes post transfusion with no adverse effects noted.     Hilda Trevino RN  2018

## 2018-09-01 ENCOUNTER — HOSPITAL ENCOUNTER (EMERGENCY)
Age: 58
Discharge: HOME | End: 2018-09-01
Attending: EMERGENCY MEDICINE | Admitting: EMERGENCY MEDICINE
Payer: MEDICARE

## 2018-09-01 VITALS
TEMPERATURE: 98.3 F | HEART RATE: 83 BPM | DIASTOLIC BLOOD PRESSURE: 63 MMHG | HEIGHT: 60 IN | WEIGHT: 134.04 LBS | OXYGEN SATURATION: 100 % | BODY MASS INDEX: 26.32 KG/M2 | SYSTOLIC BLOOD PRESSURE: 114 MMHG | RESPIRATION RATE: 15 BRPM

## 2018-09-01 DIAGNOSIS — R53.83 MALAISE AND FATIGUE: ICD-10-CM

## 2018-09-01 DIAGNOSIS — D64.9 ANEMIA, UNSPECIFIED TYPE: Primary | ICD-10-CM

## 2018-09-01 DIAGNOSIS — R53.81 MALAISE AND FATIGUE: ICD-10-CM

## 2018-09-01 LAB
ALBUMIN SERPL-MCNC: 3.1 G/DL (ref 3.5–5)
ALBUMIN/GLOB SERPL: 0.8 {RATIO} (ref 1.1–2.2)
ALP SERPL-CCNC: 120 U/L (ref 45–117)
ALT SERPL-CCNC: 24 U/L (ref 12–78)
ANION GAP SERPL CALC-SCNC: 4 MMOL/L (ref 5–15)
APPEARANCE UR: CLEAR
AST SERPL-CCNC: 62 U/L (ref 15–37)
ATRIAL RATE: 77 BPM
BACTERIA URNS QL MICRO: NEGATIVE /HPF
BASOPHILS # BLD: 0 K/UL (ref 0–0.1)
BASOPHILS NFR BLD: 0 % (ref 0–1)
BILIRUB SERPL-MCNC: 0.3 MG/DL (ref 0.2–1)
BILIRUB UR QL: NEGATIVE
BUN SERPL-MCNC: 12 MG/DL (ref 6–20)
BUN/CREAT SERPL: 11 (ref 12–20)
CALCIUM SERPL-MCNC: 8.2 MG/DL (ref 8.5–10.1)
CALCULATED P AXIS, ECG09: 35 DEGREES
CALCULATED R AXIS, ECG10: 77 DEGREES
CALCULATED T AXIS, ECG11: 64 DEGREES
CHLORIDE SERPL-SCNC: 105 MMOL/L (ref 97–108)
CO2 SERPL-SCNC: 30 MMOL/L (ref 21–32)
COLOR UR: YELLOW
COMMENT, HOLDF: NORMAL
CREAT SERPL-MCNC: 1.09 MG/DL (ref 0.55–1.02)
DIAGNOSIS, 93000: NORMAL
DIFFERENTIAL METHOD BLD: ABNORMAL
EOSINOPHIL # BLD: 0.3 K/UL (ref 0–0.4)
EOSINOPHIL NFR BLD: 4 % (ref 0–7)
EPITH CASTS URNS QL MICRO: NORMAL /LPF
ERYTHROCYTE [DISTWIDTH] IN BLOOD BY AUTOMATED COUNT: 14.5 % (ref 11.5–14.5)
FERRITIN SERPL-MCNC: 18 NG/ML (ref 8–252)
GLOBULIN SER CALC-MCNC: 3.7 G/DL (ref 2–4)
GLUCOSE SERPL-MCNC: 152 MG/DL (ref 65–100)
GLUCOSE UR STRIP.AUTO-MCNC: NEGATIVE MG/DL
HCT VFR BLD AUTO: 27 % (ref 35–47)
HGB BLD-MCNC: 8.1 G/DL (ref 11.5–16)
HGB UR QL STRIP: NEGATIVE
IMM GRANULOCYTES # BLD: 0.1 K/UL (ref 0–0.04)
IMM GRANULOCYTES NFR BLD AUTO: 1 % (ref 0–0.5)
KETONES UR QL STRIP.AUTO: NEGATIVE MG/DL
LEUKOCYTE ESTERASE UR QL STRIP.AUTO: NEGATIVE
LIPASE SERPL-CCNC: 39 U/L (ref 73–393)
LYMPHOCYTES # BLD: 0.7 K/UL (ref 0.8–3.5)
LYMPHOCYTES NFR BLD: 11 % (ref 12–49)
MCH RBC QN AUTO: 31.4 PG (ref 26–34)
MCHC RBC AUTO-ENTMCNC: 30 G/DL (ref 30–36.5)
MCV RBC AUTO: 104.7 FL (ref 80–99)
MONOCYTES # BLD: 0.6 K/UL (ref 0–1)
MONOCYTES NFR BLD: 9 % (ref 5–13)
NEUTS SEG # BLD: 4.6 K/UL (ref 1.8–8)
NEUTS SEG NFR BLD: 75 % (ref 32–75)
NITRITE UR QL STRIP.AUTO: NEGATIVE
NRBC # BLD: 0 K/UL (ref 0–0.01)
NRBC BLD-RTO: 0 PER 100 WBC
P-R INTERVAL, ECG05: 148 MS
PH UR STRIP: 6 [PH] (ref 5–8)
PLATELET # BLD AUTO: 152 K/UL (ref 150–400)
PMV BLD AUTO: 11.1 FL (ref 8.9–12.9)
POTASSIUM SERPL-SCNC: 4.5 MMOL/L (ref 3.5–5.1)
PROT SERPL-MCNC: 6.8 G/DL (ref 6.4–8.2)
PROT UR STRIP-MCNC: NEGATIVE MG/DL
Q-T INTERVAL, ECG07: 360 MS
QRS DURATION, ECG06: 104 MS
QTC CALCULATION (BEZET), ECG08: 407 MS
RBC # BLD AUTO: 2.58 M/UL (ref 3.8–5.2)
RBC #/AREA URNS HPF: NORMAL /HPF (ref 0–5)
RBC MORPH BLD: ABNORMAL
RBC MORPH BLD: ABNORMAL
SAMPLES BEING HELD,HOLD: NORMAL
SODIUM SERPL-SCNC: 139 MMOL/L (ref 136–145)
SP GR UR REFRACTOMETRY: 1.01 (ref 1–1.03)
TROPONIN I SERPL-MCNC: <0.05 NG/ML
UR CULT HOLD, URHOLD: NORMAL
UROBILINOGEN UR QL STRIP.AUTO: 0.2 EU/DL (ref 0.2–1)
VENTRICULAR RATE, ECG03: 77 BPM
WBC # BLD AUTO: 6.3 K/UL (ref 3.6–11)
WBC URNS QL MICRO: NORMAL /HPF (ref 0–4)

## 2018-09-01 PROCEDURE — 82728 ASSAY OF FERRITIN: CPT | Performed by: EMERGENCY MEDICINE

## 2018-09-01 PROCEDURE — 36415 COLL VENOUS BLD VENIPUNCTURE: CPT | Performed by: EMERGENCY MEDICINE

## 2018-09-01 PROCEDURE — 96374 THER/PROPH/DIAG INJ IV PUSH: CPT

## 2018-09-01 PROCEDURE — 74011250636 HC RX REV CODE- 250/636: Performed by: EMERGENCY MEDICINE

## 2018-09-01 PROCEDURE — 83690 ASSAY OF LIPASE: CPT | Performed by: EMERGENCY MEDICINE

## 2018-09-01 PROCEDURE — 84484 ASSAY OF TROPONIN QUANT: CPT | Performed by: EMERGENCY MEDICINE

## 2018-09-01 PROCEDURE — 93005 ELECTROCARDIOGRAM TRACING: CPT

## 2018-09-01 PROCEDURE — 99285 EMERGENCY DEPT VISIT HI MDM: CPT

## 2018-09-01 PROCEDURE — 81003 URINALYSIS AUTO W/O SCOPE: CPT | Performed by: EMERGENCY MEDICINE

## 2018-09-01 PROCEDURE — 96375 TX/PRO/DX INJ NEW DRUG ADDON: CPT

## 2018-09-01 PROCEDURE — 74011000250 HC RX REV CODE- 250: Performed by: EMERGENCY MEDICINE

## 2018-09-01 PROCEDURE — 85025 COMPLETE CBC W/AUTO DIFF WBC: CPT | Performed by: EMERGENCY MEDICINE

## 2018-09-01 PROCEDURE — 80053 COMPREHEN METABOLIC PANEL: CPT | Performed by: EMERGENCY MEDICINE

## 2018-09-01 PROCEDURE — 96361 HYDRATE IV INFUSION ADD-ON: CPT

## 2018-09-01 RX ORDER — ONDANSETRON 2 MG/ML
4 INJECTION INTRAMUSCULAR; INTRAVENOUS
Status: COMPLETED | OUTPATIENT
Start: 2018-09-01 | End: 2018-09-01

## 2018-09-01 RX ORDER — OMEPRAZOLE 10 MG/1
10 CAPSULE, DELAYED RELEASE ORAL DAILY
Qty: 20 CAP | Refills: 0 | Status: SHIPPED | OUTPATIENT
Start: 2018-09-01 | End: 2018-09-21

## 2018-09-01 RX ORDER — SODIUM CHLORIDE 9 MG/ML
150 INJECTION, SOLUTION INTRAVENOUS CONTINUOUS
Status: DISCONTINUED | OUTPATIENT
Start: 2018-09-01 | End: 2018-09-01 | Stop reason: HOSPADM

## 2018-09-01 RX ORDER — KETOROLAC TROMETHAMINE 30 MG/ML
15 INJECTION, SOLUTION INTRAMUSCULAR; INTRAVENOUS
Status: COMPLETED | OUTPATIENT
Start: 2018-09-01 | End: 2018-09-01

## 2018-09-01 RX ADMIN — KETOROLAC TROMETHAMINE 15 MG: 30 INJECTION, SOLUTION INTRAMUSCULAR at 10:47

## 2018-09-01 RX ADMIN — FAMOTIDINE 20 MG: 10 INJECTION, SOLUTION INTRAVENOUS at 10:22

## 2018-09-01 RX ADMIN — SODIUM CHLORIDE 150 ML/HR: 900 INJECTION, SOLUTION INTRAVENOUS at 10:22

## 2018-09-01 RX ADMIN — ONDANSETRON 4 MG: 2 INJECTION INTRAMUSCULAR; INTRAVENOUS at 10:22

## 2018-09-01 NOTE — ED TRIAGE NOTES
TRIAGE NOTE: Arrives via EMS from home. Started with general malaise on Thursday. Received a unit of blood on Wednesday, hx of GI bleed in capillaries in large part of small intestine. Patient with dark tarry stools since Wednesday. Patient admits to taking iron supplements. Denies abdominal pain. Wears 3L of O2 at home for COPD.  Dustin is GI MD.

## 2018-09-01 NOTE — ED NOTES
Patient remains alert and oriented x4. NSR on the cardiac monitor, 99% on 3L. Pt denies any pain at this time. Pt aware of plan of care. Patient verbalizes understanding of discharge instructions. Opportunity for questions provided. Patient in no apparent distress,VSS. Patient ambulatory upon discharge. Visit Vitals  /63 (BP 1 Location: Left arm, BP Patient Position: At rest)  Pulse 83  Temp 98.3 °F (36.8 °C)  Resp 15  Ht 5' (1.524 m)  Wt 60.8 kg (134 lb 0.6 oz)  SpO2 100%  BMI 26.18 kg/m2 1218: Pt requested transportation. Contact Fredy SALAZAR, spoke with Bennie Palmer, set up transportation to arrive around [phone] today. Confirmation #: [id number]. Advised patient of  time.

## 2018-09-01 NOTE — DISCHARGE INSTRUCTIONS
Anemia: Care Instructions  Your Care Instructions    Anemia is a low level of red blood cells, which carry oxygen throughout your body. Many things can cause anemia. Lack of iron is one of the most common causes. Your body needs iron to make hemoglobin, a substance in red blood cells that carries oxygen from the lungs to your body's cells. Without enough iron, the body produces fewer and smaller red blood cells. As a result, your body's cells do not get enough oxygen, and you feel tired and weak. And you may have trouble concentrating. Bleeding is the most common cause of a lack of iron. You may have heavy menstrual bleeding or bleeding caused by conditions such as ulcers, hemorrhoids, or cancer. Regular use of aspirin or other anti-inflammatory medicines (such as ibuprofen) also can cause bleeding in some people. A lack of iron in your diet also can cause anemia, especially at times when the body needs more iron, such as during pregnancy, infancy, and the teen years. Your doctor may have prescribed iron pills. It may take several months of treatment for your iron levels to return to normal. Your doctor also may suggest that you eat foods that are rich in iron, such as meat and beans. There are many other causes of anemia. It is not always due to a lack of iron. Finding the specific cause of your anemia will help your doctor find the right treatment for you. Follow-up care is a key part of your treatment and safety. Be sure to make and go to all appointments, and call your doctor if you are having problems. It's also a good idea to know your test results and keep a list of the medicines you take. How can you care for yourself at home? · Take your medicines exactly as prescribed. Call your doctor if you think you are having a problem with your medicine. · If your doctor recommends iron pills, take them as directed:  ¨ Try to take the pills on an empty stomach about 1 hour before or 2 hours after meals. But you may need to take iron with food to avoid an upset stomach. ¨ Do not take antacids or drink milk or caffeine drinks (such as coffee, tea, or cola) at the same time or within 2 hours of the time that you take your iron. They can make it hard for your body to absorb the iron. ¨ Vitamin C (from food or supplements) helps your body absorb iron. Try taking iron pills with a glass of orange juice or some other food that is high in vitamin C, such as citrus fruits. ¨ Iron pills may cause stomach problems, such as heartburn, nausea, diarrhea, constipation, and cramps. Be sure to drink plenty of fluids, and include fruits, vegetables, and fiber in your diet each day. Iron pills often make your bowel movements dark or green. ¨ If you forget to take an iron pill, do not take a double dose of iron the next time you take a pill. ¨ Keep iron pills out of the reach of small children. An overdose of iron can be very dangerous. · Follow your doctor's advice about eating iron-rich foods. These include red meat, shellfish, poultry, eggs, beans, raisins, whole-grain bread, and leafy green vegetables. · Steam vegetables to help them keep their iron content. When should you call for help? Call 911 anytime you think you may need emergency care. For example, call if:    · You have symptoms of a heart attack. These may include:  ¨ Chest pain or pressure, or a strange feeling in the chest.  ¨ Sweating. ¨ Shortness of breath. ¨ Nausea or vomiting. ¨ Pain, pressure, or a strange feeling in the back, neck, jaw, or upper belly or in one or both shoulders or arms. ¨ Lightheadedness or sudden weakness. ¨ A fast or irregular heartbeat. After you call 911, the  may tell you to chew 1 adult-strength or 2 to 4 low-dose aspirin. Wait for an ambulance.  Do not try to drive yourself.     · You passed out (lost consciousness).    Call your doctor now or seek immediate medical care if:    · You have new or increased shortness of breath.     · You are dizzy or lightheaded, or you feel like you may faint.     · Your fatigue and weakness continue or get worse.     · You have any abnormal bleeding, such as:  ¨ Nosebleeds. ¨ Vaginal bleeding that is different (heavier, more frequent, at a different time of the month) than what you are used to. ¨ Bloody or black stools, or rectal bleeding. ¨ Bloody or pink urine.    Watch closely for changes in your health, and be sure to contact your doctor if:    · You do not get better as expected. Where can you learn more? Go to http://huseyin-linda.info/. Enter R301 in the search box to learn more about \"Anemia: Care Instructions. \"  Current as of: October 9, 2017  Content Version: 11.7  © 2431-7064 [employer]. Care instructions adapted under license by [employer] (which disclaims liability or warranty for this information). If you have questions about a medical condition or this instruction, always ask your healthcare professional. [place] [address] any warranty or liability for your use of this information. Fatigue: Care Instructions  Your Care Instructions    Fatigue is a feeling of tiredness, exhaustion, or lack of energy. You may feel fatigue because of too much or not enough activity. It can also come from stress, lack of sleep, boredom, and poor diet. Many medical problems, such as viral infections, can cause fatigue. Emotional problems, especially depression, are often the cause of fatigue. Fatigue is most often a symptom of another problem. Treatment for fatigue depends on the cause. For example, if you have fatigue because you have a certain health problem, treating this problem also treats your fatigue. If depression or anxiety is the cause, treatment may help. Follow-up care is a key part of your treatment and safety. Be sure to make and go to all appointments, and call your doctor if you are having problems.  It's also a good idea to know your test results and keep a list of the medicines you take. How can you care for yourself at home? · Get regular exercise. But don't overdo it. Go back and forth between rest and exercise. · Get plenty of rest.  · Eat a healthy diet. Do not skip meals, especially breakfast.  · Reduce your use of caffeine, tobacco, and alcohol. Caffeine is most often found in coffee, tea, cola drinks, and chocolate. · Limit medicines that can cause fatigue. This includes tranquilizers and cold and allergy medicines. When should you call for help? Watch closely for changes in your health, and be sure to contact your doctor if:    · You have new symptoms such as fever or a rash.     · Your fatigue gets worse.     · You have been feeling down, depressed, or hopeless. Or you may have lost interest in things that you usually enjoy.     · You are not getting better as expected. Where can you learn more? Go to http://huseyin-linda.info/. Enter W886 in the search box to learn more about \"Fatigue: Care Instructions. \"  Current as of: November 20, 2017  Content Version: 11.7  © 2917-9340 [employer]. Care instructions adapted under license by [employer] (which disclaims liability or warranty for this information). If you have questions about a medical condition or this instruction, always ask your healthcare professional. [place] [address] any warranty or liability for your use of this information. Weakness: Care Instructions  Your Care Instructions    Weakness is a lack of physical or muscle strength. You may feel that you need to make extra effort to move your arms, legs, or other muscles. Generalized weakness means that you feel weak in most areas of your body. Another type of weakness may affect just one muscle or group of muscles. You may feel weak and tired after you have done too much activity, such as taking an extra-long hike.  This is not a serious problem. It often goes away on its own. Feeling weak can also be caused by medical conditions like thyroid problems, depression, or a virus. Sometimes the cause can be serious. Your doctor may want to do more tests to try to find the cause of the weakness. The doctor has checked you carefully, but problems can develop later. If you notice any problems or new symptoms, get medical treatment right away. Follow-up care is a key part of your treatment and safety. Be sure to make and go to all appointments, and call your doctor if you are having problems. It's also a good idea to know your test results and keep a list of the medicines you take. How can you care for yourself at home? · Rest when you feel tired. · Be safe with medicines. If your doctor prescribed medicine, take it exactly as prescribed. Call your doctor if you think you are having a problem with your medicine. You will get more details on the specific medicines your doctor prescribes. · Do not skip meals. Eating a balanced diet may increase your energy level. · Get some physical activity every day, but do not get too tired. When should you call for help? Call your doctor now or seek immediate medical care if:    · You have new or worse weakness.     · You are dizzy or lightheaded, or you feel like you may faint.    Watch closely for changes in your health, and be sure to contact your doctor if:    · You do not get better as expected. Where can you learn more? Go to http://huseyin-linda.info/. Enter 808 [id number] 9233 in the search box to learn more about \"Weakness: Care Instructions. \"  Current as of: November 20, 2017  Content Version: 11.7  © 5531-9710 [employer]. Care instructions adapted under license by [employer] (which disclaims liability or warranty for this information).  If you have questions about a medical condition or this instruction, always ask your healthcare professional. [employer] disclaims any warranty or liability for your use of this information.

## 2018-09-01 NOTE — ED PROVIDER NOTES
Patient is a 60 y.o. female presenting with fatigue and melena. Fatigue Pertinent negatives include no shortness of breath, no vomiting and no headaches. Melena Pertinent negatives include no abdominal pain, no diarrhea, no vomiting and no headaches. Pt reports decreased energy level, dark stools (takes iron supplements) and one episode of non bloody vomiting this morning. She states that she received a blood transfusion on 18 and continues to have lack of SOB and lack of energy. Denies fever, cough, cold symptoms, headache, neck pain, visual changes, focal weakness or rash. Denies any difficulty breathing, difficulty swallowing, chest pain or abdominal pain. Denies any urinary symptoms, constipation or diarrhea. Pt. Reports that she has tolerated grits and coffee this morning after her one episode of vomiting. She has not had any medications today prior to arrival. 
 
Old charts reviewed Past Medical History:  
Diagnosis Date  Anemia  Arthritis Rheumatoid  CHF (congestive heart failure) [facility] [facility]  Chronic obstructive pulmonary disease ([facility]  Cirrhosis of liver ([facility] [address] [facility]  COPD (chronic obstructive pulmonary disease) ([facility])  Gastrointestinal disorder \"lacerations in the large part of my small intestine. \"  Gastrointestinal disorder   
 liver cirrhosis  Heart failure ([facility] [address] [facility]  Internal bleeding  Psychiatric disorder   
 depression, anxiety  Tachycardia 2016 Past Surgical History:  
Procedure Laterality Date  HX CHOLECYSTECTOMY  HX GI Cholecystectomoy  HX GYN    
  x 2.  MA ENDOSCOPY UPPER SMALL INTESTINE  2016 Family History:  
Problem Relation Age of Onset  Cancer Mother  Alcohol abuse Father  Cancer Sister Social History Social History  Marital status: SINGLE Spouse name: N/A  
 Number of children: N/A  
 Years of education: N/A Occupational History  Not on file. Social History Main Topics  Smoking status: Current Every Day Smoker Packs/day: 0.75 Years: 45.00  Smokeless tobacco: Never Used  Alcohol use No  
   Comment: in the past was an alcoholic. Has been sober x 2 years.  Drug use: No  
 Sexual activity: Yes  
  Partners: Male Other Topics Concern  Not on file Social History Narrative ALLERGIES: Pcn [penicillins] Review of Systems Constitutional: Positive for fatigue. Negative for activity change and appetite change. HENT: Negative for facial swelling, sore throat and trouble swallowing. Eyes: Negative. Respiratory: Negative for shortness of breath. Cardiovascular: Negative. Gastrointestinal: Positive for melena. Negative for abdominal pain, diarrhea and vomiting. Genitourinary: Negative for dysuria. Musculoskeletal: Negative for back pain and neck pain. Skin: Negative for color change. Neurological: Negative for headaches. Psychiatric/Behavioral: Negative. Vitals:  
 09/01/18 0945 BP: 118/54 Pulse: 83 Resp: 18 Temp: 98.5 °F (36.9 °C) SpO2: 100% Weight: 60.8 kg (134 lb 0.6 oz) Height: 5' (1.524 m) Physical Exam  
Constitutional: She is oriented to person, place, and time. She appears well-nourished. White female; smoker HENT:  
Head: Normocephalic. Mouth/Throat: Oropharynx is clear and moist.  
Eyes: Pupils are equal, round, and reactive to light. Neck: Normal range of motion. Neck supple. Cardiovascular: Normal rate and regular rhythm. Pulmonary/Chest: Effort normal and breath sounds normal.  
Abdominal: Soft. Bowel sounds are normal.  
Musculoskeletal: Normal range of motion. She exhibits edema. Lymphadenopathy:  
  She has no cervical adenopathy. Neurological: She is alert and oriented to person, place, and time. Skin: Skin is warm and dry. There is pallor. Nursing note and vitals reviewed. MDM 
 
 
ED Course Procedures EKG reveals NSR with ventricular rate of 77; without ectopy. Reviewed by Dr. Lillian Sneed and me. Cheri Jorgensen NP Pt has been re-examined and denies any c/o pain or discomfort. Patient's results and plan of care have been reviewed with her. Patient and/or family have verbally conveyed their understanding and agreement of the patient's signs, symptoms, diagnosis, treatment and prognosis and additionally agree to follow up as recommended or return to the Emergency Room should her condition change prior to follow-up. Discharge instructions have also been provided to the patient with some educational information regarding her diagnosis as well a list of reasons why she would want to return to the ER prior to her follow-up appointment should her condition change. Discussed plan of care with Dr. Mary Kate Villar.  Cheri Jorgensen NP

## 2018-10-03 ENCOUNTER — HOSPITAL ENCOUNTER (OUTPATIENT)
Dept: INFUSION THERAPY | Age: 58
End: 2018-10-03

## 2018-10-03 RX ORDER — ACETAMINOPHEN 325 MG/1
650 TABLET ORAL ONCE
Status: ACTIVE | OUTPATIENT
Start: 2018-10-04 | End: 2018-10-04

## 2018-10-03 RX ORDER — DIPHENHYDRAMINE HCL 25 MG
25 CAPSULE ORAL ONCE
Status: ACTIVE | OUTPATIENT
Start: 2018-10-04 | End: 2018-10-04

## 2018-10-04 ENCOUNTER — HOSPITAL ENCOUNTER (OUTPATIENT)
Dept: INFUSION THERAPY | Age: 58
Discharge: HOME | End: 2018-10-04
Payer: MEDICARE

## 2018-10-04 VITALS
RESPIRATION RATE: 16 BRPM | OXYGEN SATURATION: 92 % | TEMPERATURE: 98.2 F | DIASTOLIC BLOOD PRESSURE: 66 MMHG | HEART RATE: 75 BPM | SYSTOLIC BLOOD PRESSURE: 117 MMHG

## 2018-10-04 PROCEDURE — 86922 COMPATIBILITY TEST ANTIGLOB: CPT | Performed by: INTERNAL MEDICINE

## 2018-10-04 PROCEDURE — 77030013169 SET IV BLD ICUM -A

## 2018-10-04 PROCEDURE — 86920 COMPATIBILITY TEST SPIN: CPT | Performed by: INTERNAL MEDICINE

## 2018-10-04 PROCEDURE — 36415 COLL VENOUS BLD VENIPUNCTURE: CPT | Performed by: INTERNAL MEDICINE

## 2018-10-04 PROCEDURE — 86900 BLOOD TYPING SEROLOGIC ABO: CPT | Performed by: INTERNAL MEDICINE

## 2018-10-04 PROCEDURE — 86902 BLOOD TYPE ANTIGEN DONOR EA: CPT | Performed by: INTERNAL MEDICINE

## 2018-10-04 PROCEDURE — 86921 COMPATIBILITY TEST INCUBATE: CPT | Performed by: INTERNAL MEDICINE

## 2018-10-04 PROCEDURE — P9016 RBC LEUKOCYTES REDUCED: HCPCS | Performed by: INTERNAL MEDICINE

## 2018-10-04 PROCEDURE — 74011250636 HC RX REV CODE- 250/636: Performed by: INTERNAL MEDICINE

## 2018-10-04 PROCEDURE — 36430 TRANSFUSION BLD/BLD COMPNT: CPT

## 2018-10-04 RX ORDER — SODIUM CHLORIDE 9 MG/ML
250 INJECTION, SOLUTION INTRAVENOUS AS NEEDED
Status: DISCONTINUED | OUTPATIENT
Start: 2018-10-04 | End: 2018-10-08 | Stop reason: HOSPADM

## 2018-10-04 RX ADMIN — SODIUM CHLORIDE 250 ML: 900 INJECTION, SOLUTION INTRAVENOUS at 11:01

## 2018-10-04 NOTE — PROGRESS NOTES
Problem: Anemia Care Plan (Adult and Pediatric) Goal: *Labs within defined limits Outcome: Progressing Towards Goal 
Patient receiving one unit of PRBCs for HGB of 6.7 Problem: Knowledge Deficit Goal: *Verbalizes understanding of procedures and medications Outcome: Progressing Towards Goal 
Patient verbalized reason and understanding of today's infusion. Problem: Patient Education:  Go to Education Activity Goal: Patient/Family Education Outcome: Progressing Towards Goal 
Education provided regarding reason for blood transfusion and possible reactions. All questions and concerns regarding blood transfusion answered, patient voiced understanding.

## 2018-10-04 NOTE — PROGRESS NOTES
\[facility]\"" Progress Note Date: 2018 Name: Robert Thurman MRN: [id number] : 1960 
 
 
0800: Pt arrived at [facility] ambulatory with walker and in no distress for transfusion of 1 unit PRBCs. Assessment completed, no new complaints voiced. IV established in right wrist without difficulty. Signs/symptoms of adverse blood reaction discussed with pt, voiced understanding. Medications received: 
NS @ KVO 
*Pateint refused Tylenol and Benadryl due to compromised liver function* 
 
1105:  1st unit PRBCs started and infusing without difficulty, observed x 15 minutes 1312:  1st unit completed without adverse reaction noted, NS flushing line. Patient Vitals for the past 12 hrs: 
 Temp Pulse Resp BP SpO2  
10/04/18 1333 98.2 °F (36.8 °C) 75 16 117/66 -  
10/04/18 1305 98.5 °F (36.9 °C) 76 16 101/52 -  
10/04/18 1205 98.4 °F (36.9 °C) 74 16 106/51 -  
10/04/18 1135 98 °F (36.7 °C) 72 16 105/49 -  
10/04/18 1120 98.3 °F (36.8 °C) 75 16 95/55 -  
10/04/18 1102 97.9 °F (36.6 °C) 73 16 107/58 -  
10/04/18 0825 98.4 °F (36.9 °C) 76 18 96/55 92 % 1340 Tolerated transfusion  well, no adverse reaction noted. D/C instructions reviewed, copy to pt, voiced understanding. Patient declined 1 hour post transfusion observation. D/Cd from [facility] using walker and in no distress accompanied by self. Next appt: patient to follow up with referring MD.    
 
Olena Blizzard, RN 2018

## 2018-10-05 LAB
ABO + RH BLD: NORMAL
ANTIGENS PRESENT RBC DONR: NORMAL
BLD PROD TYP BPU: NORMAL
BLOOD BANK CMNT PATIENT-IMP: NORMAL
BLOOD GROUP ANTIBODIES SERPL: NORMAL
BPU ID: NORMAL
CROSSMATCH RESULT,%XM: NORMAL
SPECIMEN EXP DATE BLD: NORMAL
STATUS OF UNIT,%ST: NORMAL
UNIT DIVISION, %UDIV: 0

## 2019-04-12 ENCOUNTER — HOSPITAL ENCOUNTER (OUTPATIENT)
Dept: MAMMOGRAPHY | Age: 59
Discharge: HOME | End: 2019-04-12
Attending: INTERNAL MEDICINE
Payer: MEDICARE

## 2019-04-12 DIAGNOSIS — Z12.31 VISIT FOR SCREENING MAMMOGRAM: ICD-10-CM

## 2019-04-12 PROCEDURE — 77063 BREAST TOMOSYNTHESIS BI: CPT

## 2019-06-27 ENCOUNTER — OFFICE VISIT (OUTPATIENT)
Dept: HEMATOLOGY | Age: 59
End: 2019-06-27

## 2019-06-27 VITALS
HEIGHT: 60 IN | SYSTOLIC BLOOD PRESSURE: 89 MMHG | OXYGEN SATURATION: 96 % | WEIGHT: 133.2 LBS | HEART RATE: 72 BPM | BODY MASS INDEX: 26.15 KG/M2 | TEMPERATURE: 98.2 F | DIASTOLIC BLOOD PRESSURE: 55 MMHG | RESPIRATION RATE: 16 BRPM

## 2019-06-27 DIAGNOSIS — R74.8 ELEVATED LIVER ENZYMES: Primary | ICD-10-CM

## 2019-06-27 PROBLEM — I50.9 CHF (CONGESTIVE HEART FAILURE) (HCC): Status: ACTIVE | Noted: 2019-06-27

## 2019-06-27 LAB
BASOPHILS # BLD AUTO: 0 X10E3/UL (ref 0–0.2)
BASOPHILS NFR BLD AUTO: 0 %
EOSINOPHIL # BLD AUTO: 0.3 X10E3/UL (ref 0–0.4)
EOSINOPHIL NFR BLD AUTO: 5 %
ERYTHROCYTE [DISTWIDTH] IN BLOOD BY AUTOMATED COUNT: 16.6 % (ref 12.3–15.4)
HCT VFR BLD AUTO: 33.5 % (ref 34–46.6)
HGB BLD-MCNC: 10.4 G/DL (ref 11.1–15.9)
IMM GRANULOCYTES # BLD AUTO: 0 X10E3/UL (ref 0–0.1)
IMM GRANULOCYTES NFR BLD AUTO: 0 %
LYMPHOCYTES # BLD AUTO: 0.8 X10E3/UL (ref 0.7–3.1)
LYMPHOCYTES NFR BLD AUTO: 14 %
MCH RBC QN AUTO: 31.6 PG (ref 26.6–33)
MCHC RBC AUTO-ENTMCNC: 31 G/DL (ref 31.5–35.7)
MCV RBC AUTO: 102 FL (ref 79–97)
MONOCYTES # BLD AUTO: 0.6 X10E3/UL (ref 0.1–0.9)
MONOCYTES NFR BLD AUTO: 10 %
NEUTROPHILS # BLD AUTO: 3.9 X10E3/UL (ref 1.4–7)
NEUTROPHILS NFR BLD AUTO: 71 %
PLATELET # BLD AUTO: 145 X10E3/UL (ref 150–450)
RBC # BLD AUTO: 3.29 X10E6/UL (ref 3.77–5.28)
WBC # BLD AUTO: 5.5 X10E3/UL (ref 3.4–10.8)

## 2019-06-27 NOTE — PROGRESS NOTES
500 [facility] 137 [facility] Rita Henderson MD, Rudolph Ga [facility] MD Brennan Joe, PA-C Shay Apodaca, [facility] Meena AMBROSE [place], [facility]   Gladys Shelby, [facility] Jordana Escudero, [facility] Bebe Munoz [facility] De Rizvi 136 
  at [address], [facility] Yeimi Phelps  22. 
  [phone] FAX: [address] [place] [address] 
  [address], [address] [place], [address] 
  [phone] FAX: [phone] Patient Care Team: 
Tomasa Sexton MD as PCP - General (Internal Medicine) Anju Curiel MD (Gastroenterology) Nico Corona MD (Hematology and Oncology) Problem List  Date Reviewed: 2/10/2017 Codes Class Noted GIB (gastrointestinal bleeding) ICD-10-CM: K92.2 ICD-9-CM: 578.9  2/8/2017 Acute blood loss anemia ICD-10-CM: D62 
ICD-9-CM: 285.1  10/25/2016 Cirrhosis of liver (HCC) (Chronic) ICD-10-CM: K74.60 ICD-9-CM: 571.5  8/30/2016 Chronic respiratory failure with hypoxia (HCC) (Chronic) ICD-10-CM: J96.11 
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 The active problem list, all pertinent past medical history, medications,  
endoscopic studies, radiologic findings and laboratory findings related to the liver disorder were reviewed with the patient. The patient is a 60 y.o.  female who was found to have cirrhosis in 2014 when she developed iron deficiency anemia.   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 This is the first appointment at [place] [address] since 10/2016. Serologic studies for causes of chronic liver disease were positive for ASMA. THe patient became abstinent from alcohol in 2014 when she first found out she had cirrhosis. The patient has not developed any major complications of cirrhosis to date. The patient notes fatigue, shortness of breath, The patient has limitations in functional activities secondary to these symptoms. The patient has not experienced problems concentrating, swelling of the abdomen, swelling of the lower extremities, hematemesis, hematochezia. ASSESSMENT AND PLAN: 
Cirrhosis Cryptogenic cirrhosis. This may be due to previous alcohol use or AIH The diagnosis of cirrhosis is based upon imaging, laboratory studies The patient has normal liver function. The patient has never developed any complications of cirrhosis to date. The CTP is 5. Child class A. The MELD score is 8. Liver transaminases are elevated. ALP is elevated. Liver function is normal.  The platelet count is depressed. The ASMA is positive suggestinbg she may have autoimmune liver disease. Hypoxia This has been attributed to COPD but chest XR have been repeatedly normal.   
She may have hepatopulmonary syndrome with shunting. A previous ECHO in 2016 did not evalaute for shunt with bubble study. We should repeat this. Anemia This is due to chronic gI blood loss from AVMs and possibly portal HTN. HPS cn also be associated with intestinal AVMs. She has been undergoing EGD with APC treatment of AVMs. This is rarely on only partially effective Will obtain FE panel to assess for iron stores. She has been on PO Fe but the HB is only 10.4 gm. She will likely need IV Fe. Treatment of other medical problems in patients with chronic liver disease There are no contraindications for the patient to take most medications that are necessary for treatment of other medical issues. The patient has cirrhrosis and should avoid taking NSAIDs which are associated with a higher rate of developing XIN. Counseling for alcohol in patients with chronic liver disease The patient has cirrhosis and was advised to be abstinent from all alcohol including non-alcoholic beer which does contain some alcohol. The patient has not consumed alcohol since 2014. Vaccinations Vaccination for viral hepatitis A and B is recommended since the patient has no serologic evidence of previous exposure or vaccination with immunity. Routine vaccinations against other bacterial and viral agents can be performed as indicated. Annual flu vaccination should be administered if indicated. ALLERGIES Allergies Allergen Reactions  Pcn [Penicillins] Angioedema Childhood reaction MEDICATIONS Current Outpatient Medications Medication Sig  pregabalin (LYRICA PO) Take  by mouth.  oxycodone HCl/acetaminophen (OXYCODONE-ACETAMINOPHEN PO) Take  by mouth.  furosemide (LASIX) 20 mg tablet Take 20 mg by mouth every Tuesday, Thursday, Saturday & Sunday.  ARIPiprazole (ABILIFY) 2 mg tablet Take 2 mg by mouth daily.  carvedilol (COREG) 3.125 mg tablet Take 3.125 mg by mouth two (2) times daily (with meals).  cyanocobalamin (VITAMIN B-12) 1,000 mcg tablet Take 1,000 mcg by mouth daily.  loratadine (CLARITIN) 10 mg tablet Take 10 mg by mouth daily.  cholecalciferol, vitamin D3, (VITAMIN D3) 2,000 unit tab Take 4,000 Units by mouth daily.  furosemide (LASIX) 40 mg tablet Take 40 mg by mouth every Monday, Wednesday, Friday.  pantoprazole (PROTONIX) 40 mg tablet Take 40 mg by mouth daily.  multivitamins-minerals-lutein (CEROVITE SENIOR) tab tablet Take 1 Tab by mouth daily.   
 budesonide-formoterol (SYMBICORT) 160-4.5 mcg/actuation HFA inhaler Take 2 Puffs by inhalation two (2) times a day.  ferrous sulfate 325 mg (65 mg iron) tablet Take 325 mg by mouth three (3) times daily (with meals).  albuterol-ipratropium (DUO-NEB) 2.5 mg-0.5 mg/3 ml nebu 3 mL by Nebulization route three (3) times daily.  senna-docusate (SENNA PLUS) 8.6-50 mg per tablet Take 1-2 Tabs by mouth nightly as needed for Constipation.  albuterol (VENTOLIN HFA) 90 mcg/actuation inhaler Take 2 Puffs by inhalation four (4) times daily as needed for Wheezing.  ondansetron (ZOFRAN ODT) 4 mg disintegrating tablet Take 4 mg by mouth every eight (8) hours as needed for Nausea.  spironolactone (ALDACTONE) 50 mg tablet Take 50 mg by mouth daily as needed (excess fluid).  gabapentin (NEURONTIN) 300 mg capsule Take 300 mg by mouth three (3) times daily. No current facility-administered medications for this visit. SYSTEM REVIEW NOT RELATED TO LIVER DISEASE OR REVIEWED ABOVE: 
Constitution systems: Negative for fever, chills, weight gain, weight loss. Eyes: Negative for visual changes. ENT: Negative for sore throat, painful swallowing. Respiratory: Negative for cough, hemoptysis, SOB. Cardiology: Negative for chest pain, palpitations. GI:  Negative for constipation or diarrhea. : Negative for urinary frequency, dysuria, hematuria, nocturia. Skin: Negative for rash. Hematology: Negative for easy bruising, blood clots. Musculo-skelatal: Negative for back pain, muscle pain, weakness. Neurologic: Negative for headaches, dizziness, vertigo, memory problems not related to HE. Psychology: Negative for anxiety, depression. FAMILY HISTORY: 
The father  of CVA. He was an alcoholic The mother  of cancer.   
There is no family history of liver disease.  
  
SOCIAL HISTORY: 
The patient is .   
The patient has 2 children, The patient currently smokes 1 pack of tobacco daily. The patient has previously consumed alcohol in excess. The patient has been abstinent from alcohol since 2014. The patient used to work as a  at [employer]. The patient is currently receiving disability.   
  
 
PHYSICAL EXAMINATION: 
Visit Vitals BP (!) 89/55 (BP 1 Location: Left arm, BP Patient Position: Sitting) Pulse 72 Temp 98.2 °F (36.8 °C) (Tympanic) Resp 16 Ht 5' (1.524 m) Wt 133 lb 3.2 oz (60.4 kg) SpO2 96% BMI 26.01 kg/m² General: No acute distress. Eyes: Sclera anicteric. ENT: No oral lesions. Thyroid normal.  Nasal O2. Nodes: No adenopathy. Skin: No spider angiomata. No jaundice. No palmar erythema. Respiratory: Lungs clear to auscultation. Cardiovascular: Regular heart rate. No murmurs. No JVD. Abdomen: Soft non-tender. Liver size normal to percussion/palpation. Spleen not palpable. No obvious ascites. Extremities: No edema. No muscle wasting. No gross arthritic changes. Neurologic: Alert and oriented. Cranial nerves grossly intact. No asterixis. LABORATORY STUDIES: 
Liver [place] of [address] Units 6/27/2019 9/1/2018 WBC 3.4 - 10.8 x10E3/uL 5.5 6.3 ANC 1.4 - 7.0 x10E3/uL 3.9 4.6 HGB 11.1 - 15.9 g/dL 10.4 (L) 8.1 (L)  - 450 x10E3/uL 145 (L) 152 INR 0.8 - 1.2 1.0 AST 0 - 40 IU/L 48 (H) 62 (H) ALT 0 - 32 IU/L 34 (H) 24 Alk Phos 39 - 117 IU/L 159 (H) 120 (H) Bili, Total 0.0 - 1.2 mg/dL 0.3 0.3 Bili, Direct 0.00 - 0.40 mg/dL 0.09 Albumin 3.6 - 4.8 g/dL 4.1 3.1 (L) BUN 8 - 27 mg/dL 13 12 Creat 0.57 - 1.00 mg/dL 0.96 1.09 (H) Na 134 - 144 mmol/L 141 139  
K 3.5 - 5.2 mmol/L 5.1 4.5 Cl 96 - 106 mmol/L 100 105 CO2 20 - 29 mmol/L 30 (H) 30 Glucose 65 - 99 mg/dL 89 152 (H) SEROLOGIES: 
Serologies Latest Ref Rng 5/6/2016 Hep A Ab, Total NEGATIVE   NEGATIVE Hep B Surface Ag Index <0.10 Hep B Surface Ag Interp NEG   NEGATIVE Hep B Core Ab, Total NEGATIVE   NEGATIVE  
 Hep B Surface Ab mIU/mL <3.10 Hep B Surface Ab Interp  NONREACTIVE Ferritin 8 - 252 NG/ML 16 Iron % Saturation 20 - 50 % 6 (L) C-ANCA Neg:<1:20 titer <1:20  
P-ANCA Neg:<1:20 titer <1:20  
ANCA Neg:<1:20 titer <1:20  
ASMCA 0 - 19 Units 66 (H)  
M2 Ab 0.0 - 20.0 Units 4.3 Alpha-1 antitrypsin level 90 - 200 mg/dL 143 LIVER HISTOLOGY: 
Not available or performed ENDOSCOPIC PROCEDURES: 
2016. Enteroscopy by Dr Zheng Rae. Several AVMs were cauterized. 2016. M2 capsule. AVMS in jejunum. RADIOLOGY: 
2016. Ultrasound of liver. Echogenic consistent with cirrhosis. No liver mass lesions. No dilated bile ducts. No ascites. 2016. CT scan abdomen with IV contrast.  Changes consistent with cirrhosis. No liver mass lesions. No dilated bile ducts. No ascites. 2016. CXR. Clear lung fields. Normal heart size. OTHER TESTIN2016. Cardiac catheterization. Normal coronary arteries. 10/2016. ECHO heart. LVEF 60%, PAP 37 mm HG suggesting mild pulmonary HTN, Mild TR. FOLLOW-UP: 
All of the issues listed above in the Assessment and Plan were discussed with the patient. All questions were answered. The patient expressed a clear understanding of the above. [address] [place] [address] in 2 weeks to review all data and determine the treatment plan. Carletha Sandifer, MD [facility] 136 [address] [place] [address], [address] [facility] 22. 
[phone] [facility]7 [address]

## 2019-06-27 NOTE — PROGRESS NOTES
Visit Vitals BP (!) 89/55 (BP 1 Location: Left arm, BP Patient Position: Sitting) Pulse 72 Temp 98.2 °F (36.8 °C) (Tympanic) Resp 16 Ht 5' (1.524 m) Wt 133 lb 3.2 oz (60.4 kg) SpO2 96% BMI 26.01 kg/m² Carlo Quiroz is a 60 y.o. female Chief Complaint Patient presents with  Chronic Liver Disease Pt is here for a f/u for liver disease. 1. Have you been to the ER, urgent care clinic since your last visit? Hospitalized since your last visit? Yes  [facility] ER 17 June 2019. 
 
2. Have you seen or consulted any other health care providers outside of the [address] [place] [address] since your last visit? Include any pap smears or colon screening. NO Health Maintenance Due Topic Date Due  Pneumococcal 0-64 years (1 of 1 - PPSV23) 05/10/1965  PAP AKA CERVICAL CYTOLOGY  05/10/1980  Shingrix Vaccine Age 50> (1 of 2) 05/10/2009  MEDICARE YEARLY EXAM  03/20/2018  FOBT Q 1 YEAR AGE 50-75  10/01/2018

## 2019-06-27 NOTE — ASSESSMENT & PLAN NOTE
Key CAD CHF Meds   
    
  
 furosemide (LASIX) 20 mg tablet (Taking) Take 20 mg by mouth every Tuesday, Thursday, Saturday & Sunday. carvedilol (COREG) 3.125 mg tablet (Taking) Take 3.125 mg by mouth two (2) times daily (with meals). furosemide (LASIX) 40 mg tablet (Taking) Take 40 mg by mouth every Monday, Wednesday, Friday. spironolactone (ALDACTONE) 50 mg tablet (Taking) Take 50 mg by mouth daily as needed (excess fluid). Lab Results Component Value Date/Time  Sodium 139 09/01/2018 09:48 AM  
 Potassium 4.5 09/01/2018 09:48 AM

## 2019-06-27 NOTE — Clinical Note
8/18/19 Patient: Roseline Hare YOB: 1960 Date of Visit: 6/27/2019 Rohit Cole MD 
[address] [place] [id number] VIA In Basket Dear Rohit Cole MD, Thank you for referring Ms. Steve Garcia to [address] for evaluation. My notes for this consultation are attached. If you have questions, please do not hesitate to call me. I look forward to following your patient along with you. Sincerely, Santiago Swanson MD

## 2019-06-28 LAB
ALBUMIN SERPL-MCNC: 4.1 G/DL (ref 3.6–4.8)
ALP SERPL-CCNC: 159 IU/L (ref 39–117)
ALT SERPL-CCNC: 34 IU/L (ref 0–32)
AST SERPL-CCNC: 48 IU/L (ref 0–40)
BILIRUB DIRECT SERPL-MCNC: 0.09 MG/DL (ref 0–0.4)
BILIRUB SERPL-MCNC: 0.3 MG/DL (ref 0–1.2)
BUN SERPL-MCNC: 13 MG/DL (ref 8–27)
BUN/CREAT SERPL: 14 (ref 12–28)
CALCIUM SERPL-MCNC: 9.7 MG/DL (ref 8.7–10.3)
CHLORIDE SERPL-SCNC: 100 MMOL/L (ref 96–106)
CO2 SERPL-SCNC: 30 MMOL/L (ref 20–29)
CREAT SERPL-MCNC: 0.96 MG/DL (ref 0.57–1)
GLUCOSE SERPL-MCNC: 89 MG/DL (ref 65–99)
INR PPP: 1 (ref 0.8–1.2)
POTASSIUM SERPL-SCNC: 5.1 MMOL/L (ref 3.5–5.2)
PROT SERPL-MCNC: 7 G/DL (ref 6–8.5)
PROTHROMBIN TIME: 10.3 SEC (ref 9.1–12)
SODIUM SERPL-SCNC: 141 MMOL/L (ref 134–144)

## 2019-10-15 ENCOUNTER — HOSPITAL ENCOUNTER (OUTPATIENT)
Dept: INFUSION THERAPY | Age: 59
Discharge: HOME | End: 2019-10-15
Payer: MEDICARE

## 2019-10-15 VITALS
RESPIRATION RATE: 18 BRPM | DIASTOLIC BLOOD PRESSURE: 76 MMHG | HEART RATE: 81 BPM | SYSTOLIC BLOOD PRESSURE: 131 MMHG | TEMPERATURE: 99 F | OXYGEN SATURATION: 90 %

## 2019-10-15 PROCEDURE — 86921 COMPATIBILITY TEST INCUBATE: CPT

## 2019-10-15 PROCEDURE — P9040 RBC LEUKOREDUCED IRRADIATED: HCPCS

## 2019-10-15 PROCEDURE — 36430 TRANSFUSION BLD/BLD COMPNT: CPT

## 2019-10-15 PROCEDURE — 86902 BLOOD TYPE ANTIGEN DONOR EA: CPT

## 2019-10-15 PROCEDURE — 36415 COLL VENOUS BLD VENIPUNCTURE: CPT

## 2019-10-15 PROCEDURE — 86870 RBC ANTIBODY IDENTIFICATION: CPT

## 2019-10-15 PROCEDURE — 86922 COMPATIBILITY TEST ANTIGLOB: CPT

## 2019-10-15 PROCEDURE — 77030013169 SET IV BLD ICUM -A

## 2019-10-15 PROCEDURE — 74011250636 HC RX REV CODE- 250/636: Performed by: INTERNAL MEDICINE

## 2019-10-15 PROCEDURE — 74011250637 HC RX REV CODE- 250/637: Performed by: INTERNAL MEDICINE

## 2019-10-15 PROCEDURE — 86900 BLOOD TYPING SEROLOGIC ABO: CPT

## 2019-10-15 PROCEDURE — 86920 COMPATIBILITY TEST SPIN: CPT

## 2019-10-15 PROCEDURE — P9016 RBC LEUKOCYTES REDUCED: HCPCS

## 2019-10-15 RX ORDER — SODIUM CHLORIDE 9 MG/ML
250 INJECTION, SOLUTION INTRAVENOUS AS NEEDED
Status: DISCONTINUED | OUTPATIENT
Start: 2019-10-15 | End: 2019-10-19 | Stop reason: HOSPADM

## 2019-10-15 RX ORDER — ACETAMINOPHEN 325 MG/1
650 TABLET ORAL ONCE
Status: ACTIVE | OUTPATIENT
Start: 2019-10-15 | End: 2019-10-15

## 2019-10-15 RX ORDER — SODIUM CHLORIDE 9 MG/ML
25 INJECTION, SOLUTION INTRAVENOUS CONTINUOUS
Status: DISCONTINUED | OUTPATIENT
Start: 2019-10-15 | End: 2019-10-16 | Stop reason: HOSPADM

## 2019-10-15 RX ORDER — SODIUM CHLORIDE 9 MG/ML
250 INJECTION, SOLUTION INTRAVENOUS AS NEEDED
Status: DISCONTINUED | OUTPATIENT
Start: 2019-10-15 | End: 2019-10-16 | Stop reason: HOSPADM

## 2019-10-15 RX ORDER — DIPHENHYDRAMINE HCL 25 MG
25 CAPSULE ORAL ONCE
Status: COMPLETED | OUTPATIENT
Start: 2019-10-15 | End: 2019-10-15

## 2019-10-15 RX ADMIN — SODIUM CHLORIDE 25 ML/HR: 900 INJECTION, SOLUTION INTRAVENOUS at 15:01

## 2019-10-15 RX ADMIN — DIPHENHYDRAMINE HYDROCHLORIDE 25 MG: 25 CAPSULE ORAL at 11:55

## 2019-10-15 RX ADMIN — SODIUM CHLORIDE 25 ML/HR: 900 INJECTION, SOLUTION INTRAVENOUS at 12:00

## 2019-10-15 NOTE — PROGRESS NOTES
[facility] Note: 
0800Pt arrived at [facility] ambulatory and in no distress for Transfusion. Assessment stable, no new complaints voiced. O2 at 3 l prn  Hunter . Low BP Swelling in LE bilat. Pt did not start blood until afternoon because of antibody. Medications received: 
 
Tylenol not given because pt declined because of liver issues. Benadryl Pt declined staying for observation. 1800 Tolerated treatment well, no adverse reaction noted. D/Cd from [facility] ambulatory and in no distress accompanied by self. Next appt None Visit Vitals BP (P) 90/54 Pulse (P) 94 Temp (P) 98.3 °F (36.8 °C) Resp (P) 16 No results found for this or any previous visit (from the past 12 hour(s)).

## 2019-10-16 LAB
ABO + RH BLD: NORMAL
ANTIGENS PRESENT RBC DONR: NORMAL
ANTIGENS PRESENT RBC DONR: NORMAL
BLD PROD TYP BPU: NORMAL
BLD PROD TYP BPU: NORMAL
BLOOD BANK CMNT PATIENT-IMP: NORMAL
BLOOD GROUP ANTIBODIES SERPL: NORMAL
BLOOD GROUP ANTIBODIES SERPL: NORMAL
BPU ID: NORMAL
BPU ID: NORMAL
CROSSMATCH RESULT,%XM: NORMAL
CROSSMATCH RESULT,%XM: NORMAL
SPECIMEN EXP DATE BLD: NORMAL
STATUS OF UNIT,%ST: NORMAL
STATUS OF UNIT,%ST: NORMAL
UNIT DIVISION, %UDIV: 0
UNIT DIVISION, %UDIV: NORMAL

## 2019-10-24 ENCOUNTER — HOSPITAL ENCOUNTER (OUTPATIENT)
Dept: INFUSION THERAPY | Age: 59
Discharge: HOME | End: 2019-10-24
Payer: MEDICARE

## 2019-10-24 PROCEDURE — 86900 BLOOD TYPING SEROLOGIC ABO: CPT

## 2019-10-24 PROCEDURE — 36415 COLL VENOUS BLD VENIPUNCTURE: CPT

## 2019-10-24 PROCEDURE — 86920 COMPATIBILITY TEST SPIN: CPT

## 2019-10-24 PROCEDURE — 86921 COMPATIBILITY TEST INCUBATE: CPT

## 2019-10-24 PROCEDURE — 86902 BLOOD TYPE ANTIGEN DONOR EA: CPT

## 2019-10-24 PROCEDURE — 86922 COMPATIBILITY TEST ANTIGLOB: CPT

## 2019-10-24 RX ORDER — ACETAMINOPHEN 325 MG/1
650 TABLET ORAL ONCE
Status: ACTIVE | OUTPATIENT
Start: 2019-10-25 | End: 2019-10-25

## 2019-10-24 RX ORDER — DIPHENHYDRAMINE HCL 25 MG
25 CAPSULE ORAL ONCE
Status: ACTIVE | OUTPATIENT
Start: 2019-10-25 | End: 2019-10-25

## 2019-10-24 NOTE — PROGRESS NOTES
Pt arrived ambulatory and in no distress for a type and cross. Pt has appt for tomorrow. Advised patient that she does not have a consent from her DrDouglas And will need one prior to receiving blood.

## 2019-10-25 ENCOUNTER — HOSPITAL ENCOUNTER (OUTPATIENT)
Dept: INFUSION THERAPY | Age: 59
Discharge: HOME | End: 2019-10-25
Payer: MEDICARE

## 2019-10-25 VITALS
HEART RATE: 85 BPM | TEMPERATURE: 98.2 F | DIASTOLIC BLOOD PRESSURE: 73 MMHG | RESPIRATION RATE: 18 BRPM | SYSTOLIC BLOOD PRESSURE: 112 MMHG

## 2019-10-25 PROCEDURE — 36430 TRANSFUSION BLD/BLD COMPNT: CPT

## 2019-10-25 PROCEDURE — 77030013169 SET IV BLD ICUM -A

## 2019-10-25 PROCEDURE — P9016 RBC LEUKOCYTES REDUCED: HCPCS

## 2019-10-25 RX ORDER — SODIUM CHLORIDE 9 MG/ML
250 INJECTION, SOLUTION INTRAVENOUS AS NEEDED
Status: DISCONTINUED | OUTPATIENT
Start: 2019-10-25 | End: 2019-10-26 | Stop reason: HOSPADM

## 2019-10-25 NOTE — PROGRESS NOTES
Spiritual Care Partner Volunteer visited patient in [facility] on 10/25/19. Documented by: Chaplain Tucker MDiv, MACE 
[phone] PRAY ([phone])

## 2019-10-25 NOTE — PROGRESS NOTES
[facility] Progress Note Date: 2019 Name: Baldomero Michael MRN: [id number] : 1960 
 
 
0800Pt arrived at [facility] 2 units PRBC and in no distress for transfusion of 2 units PRBCs. Assessment completed, no new complaints voiced. IV established in PIV without difficulty. Signs/symptoms of adverse blood reaction discussed with pt, voiced understanding. Medications received: 
NS @ Shraddha Amadoprasannashelly Patient refused pre meds 8634:  1st unit PRBCs started and infusing without difficulty, observed x 15 minutes 1120:  1st unit completed without adverse reaction noted, NS flushing line. 1300:  2nd unit PRBCs started and infusing without difficulty, observed x 15 minutes 1530:  2nd unit completed without adverse reaction noted, NS flushing line. Patient Vitals for the past 12 hrs: 
 Temp Pulse Resp BP  
10/25/19 1630 98.2 °F (36.8 °C) 85 18 112/73  
10/25/19 1530 97.9 °F (36.6 °C) 77 18 119/76  
10/25/19 1500 98.6 °F (37 °C) 73 18 108/79  
10/25/19 1400 98 °F (36.7 °C) 88 18 113/65  
10/25/19 1330 97.7 °F (36.5 °C) 65 18 100/60  
10/25/19 1315 98.2 °F (36.8 °C) 80 18 119/68  
10/25/19 1250 97.9 °F (36.6 °C) 85 16 118/68  
10/25/19 1120 98.1 °F (36.7 °C) 82 16 108/77  
10/25/19 1055 97.7 °F (36.5 °C) 80 16 108/77  
10/25/19 0955 98.3 °F (36.8 °C) 80 16 112/60  
10/25/19 0925 97.9 °F (36.6 °C) 62 16 98/62  
10/25/19 0910 98 °F (36.7 °C) 65 18 (!) 88/68  
10/25/19 0850 97.9 °F (36.6 °C) 75 16 92/56  
10/25/19 0808 98.6 °F (37 °C) 99 (!) 99 98/61  
 
 
1630 Tolerated transfusion  well, no adverse reaction noted. D/C instructions reviewed, copy to pt, voiced understanding. Patient stayed 1 hour post transfusion observation. D/Cd from [address] [place] [address] and in no distress. Tierney Paredes RN 2019

## 2019-12-12 ENCOUNTER — APPOINTMENT (OUTPATIENT)
Dept: INFUSION THERAPY | Age: 59
End: 2019-12-12

## 2019-12-13 ENCOUNTER — HOSPITAL ENCOUNTER (OUTPATIENT)
Dept: INFUSION THERAPY | Age: 59
Discharge: HOME | End: 2019-12-13

## 2020-01-23 ENCOUNTER — HOSPITAL ENCOUNTER (OUTPATIENT)
Dept: INFUSION THERAPY | Age: 60
Discharge: HOME | End: 2020-01-23
Payer: MEDICARE

## 2020-01-23 VITALS
RESPIRATION RATE: 18 BRPM | TEMPERATURE: 98.4 F | HEART RATE: 85 BPM | DIASTOLIC BLOOD PRESSURE: 66 MMHG | OXYGEN SATURATION: 100 % | SYSTOLIC BLOOD PRESSURE: 113 MMHG

## 2020-01-23 PROCEDURE — 86902 BLOOD TYPE ANTIGEN DONOR EA: CPT

## 2020-01-23 PROCEDURE — 86920 COMPATIBILITY TEST SPIN: CPT

## 2020-01-23 PROCEDURE — 86900 BLOOD TYPING SEROLOGIC ABO: CPT

## 2020-01-23 PROCEDURE — 86922 COMPATIBILITY TEST ANTIGLOB: CPT

## 2020-01-23 PROCEDURE — 86921 COMPATIBILITY TEST INCUBATE: CPT

## 2020-01-23 RX ORDER — SODIUM CHLORIDE 9 MG/ML
250 INJECTION, SOLUTION INTRAVENOUS AS NEEDED
Status: DISCONTINUED | OUTPATIENT
Start: 2020-01-23 | End: 2020-01-24 | Stop reason: SDUPTHER

## 2020-01-23 NOTE — PROGRESS NOTES
[facility] Lab Visit: 
 
 
 
7674  Pt arrived ambulatory to [facility] to in stable condition, for lab draw. Labs drawn peripherally from Right AC arm and sent for processing. Tolerated well. Visit Vitals /66 (BP 1 Location: Right arm, BP Patient Position: Sitting) Pulse 85 Temp 98.4 °F (36.9 °C) Resp 18 SpO2 100% 1445 No new concerns voiced. D/c home ambulatory in no distress. Pt aware of next [facility] appointment scheduled. Labs available in CC once resulted.

## 2020-01-24 ENCOUNTER — HOSPITAL ENCOUNTER (OUTPATIENT)
Dept: INFUSION THERAPY | Age: 60
Discharge: HOME | End: 2020-01-24
Payer: MEDICARE

## 2020-01-24 VITALS
DIASTOLIC BLOOD PRESSURE: 63 MMHG | OXYGEN SATURATION: 97 % | HEART RATE: 81 BPM | RESPIRATION RATE: 18 BRPM | SYSTOLIC BLOOD PRESSURE: 117 MMHG | TEMPERATURE: 97.1 F

## 2020-01-24 PROCEDURE — 86922 COMPATIBILITY TEST ANTIGLOB: CPT

## 2020-01-24 PROCEDURE — 74011000258 HC RX REV CODE- 258

## 2020-01-24 PROCEDURE — 77030013169 SET IV BLD ICUM -A

## 2020-01-24 PROCEDURE — 36430 TRANSFUSION BLD/BLD COMPNT: CPT

## 2020-01-24 PROCEDURE — 86921 COMPATIBILITY TEST INCUBATE: CPT

## 2020-01-24 PROCEDURE — P9016 RBC LEUKOCYTES REDUCED: HCPCS

## 2020-01-24 RX ORDER — DIPHENHYDRAMINE HCL 25 MG
25 CAPSULE ORAL ONCE
Status: ACTIVE | OUTPATIENT
Start: 2020-01-24 | End: 2020-01-24

## 2020-01-24 RX ORDER — ACETAMINOPHEN 325 MG/1
650 TABLET ORAL ONCE
Status: ACTIVE | OUTPATIENT
Start: 2020-01-24 | End: 2020-01-24

## 2020-01-24 RX ORDER — SODIUM CHLORIDE 9 MG/ML
250 INJECTION, SOLUTION INTRAVENOUS AS NEEDED
Status: DISCONTINUED | OUTPATIENT
Start: 2020-01-24 | End: 2020-01-28 | Stop reason: HOSPADM

## 2020-01-24 RX ORDER — SODIUM CHLORIDE 9 MG/ML
250 INJECTION, SOLUTION INTRAVENOUS AS NEEDED
Status: DISCONTINUED | OUTPATIENT
Start: 2020-01-24 | End: 2020-01-24 | Stop reason: SDUPTHER

## 2020-01-24 RX ADMIN — SODIUM CHLORIDE 100 ML: 900 INJECTION, SOLUTION INTRAVENOUS at 08:20

## 2020-01-24 RX ADMIN — SODIUM CHLORIDE 100 ML: 900 INJECTION, SOLUTION INTRAVENOUS at 11:30

## 2020-01-24 NOTE — PROGRESS NOTES
[facility] Progress Note Date: 2020 Name: Jo Ann Salgado MRN: [id number] : 1960 
 
 
0810 Pt arrived at [facility] ambulatory with walker and in no distress for transfusion of 2 units PRBCs. Assessment completed, no new complaints voiced. IV established in right forearm without difficulty. Signs/symptoms of adverse blood reaction discussed with pt, voiced understanding. Medications received: 
NS @ Chiquis Anthony Pt refused pre-meds 0845:  1st unit PRBCs started and infusing without difficulty, observed x 15 minutes 1058:  1st unit completed without adverse reaction noted, NS flushing line. 1130:  2nd unit PRBCs started and infusing without difficulty, observed x 15 minutes 1349:  2nd unit completed without adverse reaction noted, NS flushing line. Patient Vitals for the past 12 hrs: 
 Temp Pulse Resp BP SpO2  
20 1417 97.1 °F (36.2 °C) 81 18 117/63   
20 1349 97.9 °F (36.6 °C) 78 18 112/67   
20 1330 98 °F (36.7 °C) 76 16 111/63   
20 1230 97.5 °F (36.4 °C) 82 16 115/65   
20 1200 97.8 °F (36.6 °C) 74 16 109/59   
20 1145 97.8 °F (36.6 °C) 76 16 106/66   
20 1118 98.5 °F (36.9 °C) 76 16 109/62   
20 1058 98.3 °F (36.8 °C) 71 16 103/60   
20 1045 97.2 °F (36.2 °C) 69 16 114/66   
20 0945 98 °F (36.7 °C) 72 16 106/65   
20 0915 98 °F (36.7 °C) 78 16 106/61   
20 0900 97.5 °F (36.4 °C) 78 18 112/65   
20 0842 98.3 °F (36.8 °C) 78 18 109/66   
20 0811 98.3 °F (36.8 °C) 77 18 100/57 97 % 1420 Tolerated transfusion  well, no adverse reaction noted. D/C instructions reviewed, copy to pt, voiced understanding. Patient declined 1 hour post transfusion observation. D/Cd from [facility] ambulatory and in no distress. No future appointments. Tammy Ramirez RN 
2020

## 2020-02-24 ENCOUNTER — HOSPITAL ENCOUNTER (OUTPATIENT)
Dept: INFUSION THERAPY | Age: 60
Discharge: HOME | End: 2020-02-24
Payer: MEDICARE

## 2020-02-24 VITALS
TEMPERATURE: 97.4 F | SYSTOLIC BLOOD PRESSURE: 102 MMHG | HEART RATE: 84 BPM | DIASTOLIC BLOOD PRESSURE: 56 MMHG | RESPIRATION RATE: 20 BRPM

## 2020-02-24 PROCEDURE — 36415 COLL VENOUS BLD VENIPUNCTURE: CPT

## 2020-02-24 PROCEDURE — 86922 COMPATIBILITY TEST ANTIGLOB: CPT

## 2020-02-24 PROCEDURE — 86921 COMPATIBILITY TEST INCUBATE: CPT

## 2020-02-24 PROCEDURE — 86900 BLOOD TYPING SEROLOGIC ABO: CPT

## 2020-02-24 PROCEDURE — 86920 COMPATIBILITY TEST SPIN: CPT

## 2020-02-24 NOTE — PROGRESS NOTES
[facility] @ [facility] VISIT NOTE 
 
1020 Patient arrives for Type & Cross for 2u PRBC tomorrow without acute problems. Please see connect care for complete assessment and education provided. Vital signs stable throughout and prior to discharge, Pt. Tolerated treatment well and discharged without incident. Patient/parent is aware of next [facility] appointment on 2/25/2020. VITAL SIGNS Patient Vitals for the past 12 hrs: 
 Temp Pulse Resp BP  
02/24/20 1020 97.4 °F (36.3 °C) 84 20 102/56 LAB WORK Lab results pending, please see Connect [facility] for results. No results found for this or any previous visit (from the past 12 hour(s)).

## 2020-02-25 ENCOUNTER — HOSPITAL ENCOUNTER (OUTPATIENT)
Dept: INFUSION THERAPY | Age: 60
Discharge: HOME | End: 2020-02-25
Payer: MEDICARE

## 2020-02-25 VITALS
RESPIRATION RATE: 18 BRPM | SYSTOLIC BLOOD PRESSURE: 112 MMHG | HEART RATE: 82 BPM | OXYGEN SATURATION: 99 % | DIASTOLIC BLOOD PRESSURE: 76 MMHG | TEMPERATURE: 98.4 F

## 2020-02-25 PROCEDURE — P9016 RBC LEUKOCYTES REDUCED: HCPCS

## 2020-02-25 PROCEDURE — 77030013169 SET IV BLD ICUM -A

## 2020-02-25 PROCEDURE — 36430 TRANSFUSION BLD/BLD COMPNT: CPT

## 2020-02-25 PROCEDURE — 74011250636 HC RX REV CODE- 250/636

## 2020-02-25 PROCEDURE — 86922 COMPATIBILITY TEST ANTIGLOB: CPT

## 2020-02-25 PROCEDURE — 86921 COMPATIBILITY TEST INCUBATE: CPT

## 2020-02-25 PROCEDURE — 77010026065 HC OXYGEN MINIMUM MEDICAL AIR

## 2020-02-25 RX ORDER — SODIUM CHLORIDE 0.9 % (FLUSH) 0.9 %
5-10 SYRINGE (ML) INJECTION AS NEEDED
Status: DISCONTINUED | OUTPATIENT
Start: 2020-02-25 | End: 2020-02-26 | Stop reason: HOSPADM

## 2020-02-25 RX ORDER — SODIUM CHLORIDE 9 MG/ML
250 INJECTION, SOLUTION INTRAVENOUS AS NEEDED
Status: DISCONTINUED | OUTPATIENT
Start: 2020-02-25 | End: 2020-02-26 | Stop reason: HOSPADM

## 2020-02-25 RX ORDER — DIPHENHYDRAMINE HCL 25 MG
25 CAPSULE ORAL ONCE
Status: ACTIVE | OUTPATIENT
Start: 2020-02-25 | End: 2020-02-25

## 2020-02-25 RX ORDER — ACETAMINOPHEN 325 MG/1
650 TABLET ORAL ONCE
Status: ACTIVE | OUTPATIENT
Start: 2020-02-25 | End: 2020-02-25

## 2020-02-25 RX ADMIN — SODIUM CHLORIDE 250 ML: 900 INJECTION, SOLUTION INTRAVENOUS at 09:31

## 2020-02-25 RX ADMIN — Medication 10 ML: at 09:30

## 2020-02-25 NOTE — PROGRESS NOTES
[address] [facility] @ [facility] VISIT NOTE 
 
2872 Patient arrives for Blood Transfusion without acute problems. Please see connect care for complete assessment and education provided. All central lines follow the [facility]. Vital signs stable throughout and prior to discharge, Pt. Tolerated treatment well and discharged without incident. Patient on 02 @ 2 L/Min via NC throughout duration of visit. Patient is aware of no further [facility] appointments @ this time & to follow up with healthcare provider for next appointment. Medications Verified by Mohan Hussein RN & Cydney Adkins RN via [employer]rom[employer]: 1. PRBC's  2 units 2. NS [address] [facility] Patient Vitals for the past 12 hrs: 
 Temp Pulse Resp BP SpO2  
02/25/20 1440 98.4 °F (36.9 °C) 82 18 112/76 99 % 02/25/20 1427 98.5 °F (36.9 °C) 83 18 111/64 99 % 02/25/20 1327 98.4 °F (36.9 °C) 77 18 109/71 99 % 02/25/20 1257 98.2 °F (36.8 °C) 80 18 106/65 99 % 02/25/20 1242 98 °F (36.7 °C) 74 18 106/59   
02/25/20 1223 98.3 °F (36.8 °C) 81 16 95/54   
02/25/20 1203 98.7 °F (37.1 °C) 87 16 100/63   
02/25/20 1103 98.4 °F (36.9 °C) 74 18 94/60 100 % 02/25/20 1033 98 °F (36.7 °C) 84 18 91/55 100 % 02/25/20 1018 98.8 °F (37.1 °C) 79 18 96/61 99 % 02/25/20 0957 97.9 °F (36.6 °C) 81 18 (!) 89/54 99 % 02/25/20 0917 98.7 °F (37.1 °C) 84 18 107/64 99 % Patient declined to wait the 1 hour post transfusion per [facility], Discharge Instructions provided to patient, patient verbalized understanding but denied the request for a copy of d/c instructions.

## 2020-03-16 ENCOUNTER — APPOINTMENT (OUTPATIENT)
Dept: CT IMAGING | Age: 60
End: 2020-03-16
Attending: FAMILY MEDICINE
Payer: MEDICARE

## 2020-03-16 ENCOUNTER — APPOINTMENT (OUTPATIENT)
Dept: GENERAL RADIOLOGY | Age: 60
End: 2020-03-16
Attending: EMERGENCY MEDICINE
Payer: MEDICARE

## 2020-03-16 ENCOUNTER — HOSPITAL ENCOUNTER (OUTPATIENT)
Age: 60
Setting detail: OBSERVATION
Discharge: HOME | End: 2020-03-20
Attending: EMERGENCY MEDICINE | Admitting: FAMILY MEDICINE
Payer: MEDICARE

## 2020-03-16 DIAGNOSIS — K92.2 GASTROINTESTINAL HEMORRHAGE, UNSPECIFIED GASTROINTESTINAL HEMORRHAGE TYPE: Primary | ICD-10-CM

## 2020-03-16 LAB
ALBUMIN SERPL-MCNC: 3.1 G/DL (ref 3.5–5)
ALBUMIN/GLOB SERPL: 1 {RATIO} (ref 1.1–2.2)
ALP SERPL-CCNC: 92 U/L (ref 45–117)
ALT SERPL-CCNC: 23 U/L (ref 12–78)
ANION GAP SERPL CALC-SCNC: 3 MMOL/L (ref 5–15)
APTT PPP: 21.1 SEC (ref 22.1–32)
AST SERPL-CCNC: 19 U/L (ref 15–37)
BASOPHILS # BLD: 0 K/UL (ref 0–0.1)
BASOPHILS NFR BLD: 0 % (ref 0–1)
BILIRUB SERPL-MCNC: 0.3 MG/DL (ref 0.2–1)
BUN SERPL-MCNC: 17 MG/DL (ref 6–20)
BUN/CREAT SERPL: 17 (ref 12–20)
CALCIUM SERPL-MCNC: 9.2 MG/DL (ref 8.5–10.1)
CHLORIDE SERPL-SCNC: 102 MMOL/L (ref 97–108)
CO2 SERPL-SCNC: 35 MMOL/L (ref 21–32)
COMMENT, HOLDF: NORMAL
CREAT SERPL-MCNC: 0.99 MG/DL (ref 0.55–1.02)
DIFFERENTIAL METHOD BLD: ABNORMAL
EOSINOPHIL # BLD: 0.1 K/UL (ref 0–0.4)
EOSINOPHIL NFR BLD: 1 % (ref 0–7)
ERYTHROCYTE [DISTWIDTH] IN BLOOD BY AUTOMATED COUNT: 18 % (ref 11.5–14.5)
FERRITIN SERPL-MCNC: 9 NG/ML (ref 8–252)
GLOBULIN SER CALC-MCNC: 3.2 G/DL (ref 2–4)
GLUCOSE SERPL-MCNC: 105 MG/DL (ref 65–100)
HCT VFR BLD AUTO: 22.9 % (ref 35–47)
HEMOCCULT STL QL: POSITIVE
HGB BLD-MCNC: 6.2 G/DL (ref 11.5–16)
IMM GRANULOCYTES # BLD AUTO: 0.1 K/UL (ref 0–0.04)
IMM GRANULOCYTES NFR BLD AUTO: 1 % (ref 0–0.5)
INR PPP: 1 (ref 0.9–1.1)
IRON SATN MFR SERPL: 21 % (ref 20–50)
IRON SERPL-MCNC: 73 UG/DL (ref 35–150)
LYMPHOCYTES # BLD: 0.3 K/UL (ref 0.8–3.5)
LYMPHOCYTES NFR BLD: 5 % (ref 12–49)
MAGNESIUM SERPL-MCNC: 2 MG/DL (ref 1.6–2.4)
MCH RBC QN AUTO: 27 PG (ref 26–34)
MCHC RBC AUTO-ENTMCNC: 27.1 G/DL (ref 30–36.5)
MCV RBC AUTO: 99.6 FL (ref 80–99)
MONOCYTES # BLD: 0.4 K/UL (ref 0–1)
MONOCYTES NFR BLD: 6 % (ref 5–13)
NEUTS SEG # BLD: 5.8 K/UL (ref 1.8–8)
NEUTS SEG NFR BLD: 87 % (ref 32–75)
NRBC # BLD: 0 K/UL (ref 0–0.01)
NRBC BLD-RTO: 0 PER 100 WBC
PLATELET # BLD AUTO: 103 K/UL (ref 150–400)
PMV BLD AUTO: 12 FL (ref 8.9–12.9)
POTASSIUM SERPL-SCNC: 4.6 MMOL/L (ref 3.5–5.1)
PROT SERPL-MCNC: 6.3 G/DL (ref 6.4–8.2)
PROTHROMBIN TIME: 10.2 SEC (ref 9–11.1)
RBC # BLD AUTO: 2.3 M/UL (ref 3.8–5.2)
RBC MORPH BLD: ABNORMAL
SAMPLES BEING HELD,HOLD: NORMAL
SODIUM SERPL-SCNC: 140 MMOL/L (ref 136–145)
THERAPEUTIC RANGE,PTTT: ABNORMAL SECS (ref 58–77)
TIBC SERPL-MCNC: 353 UG/DL (ref 250–450)
WBC # BLD AUTO: 6.7 K/UL (ref 3.6–11)

## 2020-03-16 PROCEDURE — 99218 HC RM OBSERVATION: CPT

## 2020-03-16 PROCEDURE — 36415 COLL VENOUS BLD VENIPUNCTURE: CPT

## 2020-03-16 PROCEDURE — 80053 COMPREHEN METABOLIC PANEL: CPT

## 2020-03-16 PROCEDURE — 85025 COMPLETE CBC W/AUTO DIFF WBC: CPT

## 2020-03-16 PROCEDURE — 86922 COMPATIBILITY TEST ANTIGLOB: CPT

## 2020-03-16 PROCEDURE — 86921 COMPATIBILITY TEST INCUBATE: CPT

## 2020-03-16 PROCEDURE — 99285 EMERGENCY DEPT VISIT HI MDM: CPT

## 2020-03-16 PROCEDURE — 74011250637 HC RX REV CODE- 250/637: Performed by: FAMILY MEDICINE

## 2020-03-16 PROCEDURE — 85610 PROTHROMBIN TIME: CPT

## 2020-03-16 PROCEDURE — 85730 THROMBOPLASTIN TIME PARTIAL: CPT

## 2020-03-16 PROCEDURE — 83735 ASSAY OF MAGNESIUM: CPT

## 2020-03-16 PROCEDURE — 83540 ASSAY OF IRON: CPT

## 2020-03-16 PROCEDURE — 74011000250 HC RX REV CODE- 250: Performed by: FAMILY MEDICINE

## 2020-03-16 PROCEDURE — 94664 DEMO&/EVAL PT USE INHALER: CPT

## 2020-03-16 PROCEDURE — 36430 TRANSFUSION BLD/BLD COMPNT: CPT

## 2020-03-16 PROCEDURE — 86902 BLOOD TYPE ANTIGEN DONOR EA: CPT

## 2020-03-16 PROCEDURE — C9113 INJ PANTOPRAZOLE SODIUM, VIA: HCPCS | Performed by: FAMILY MEDICINE

## 2020-03-16 PROCEDURE — 74011250636 HC RX REV CODE- 250/636: Performed by: EMERGENCY MEDICINE

## 2020-03-16 PROCEDURE — 74011000250 HC RX REV CODE- 250: Performed by: EMERGENCY MEDICINE

## 2020-03-16 PROCEDURE — 86900 BLOOD TYPING SEROLOGIC ABO: CPT

## 2020-03-16 PROCEDURE — 96375 TX/PRO/DX INJ NEW DRUG ADDON: CPT

## 2020-03-16 PROCEDURE — 71250 CT THORAX DX C-: CPT

## 2020-03-16 PROCEDURE — 82272 OCCULT BLD FECES 1-3 TESTS: CPT

## 2020-03-16 PROCEDURE — 94640 AIRWAY INHALATION TREATMENT: CPT

## 2020-03-16 PROCEDURE — 82728 ASSAY OF FERRITIN: CPT

## 2020-03-16 PROCEDURE — 74011250636 HC RX REV CODE- 250/636: Performed by: FAMILY MEDICINE

## 2020-03-16 PROCEDURE — 96374 THER/PROPH/DIAG INJ IV PUSH: CPT

## 2020-03-16 PROCEDURE — 71045 X-RAY EXAM CHEST 1 VIEW: CPT

## 2020-03-16 PROCEDURE — 86920 COMPATIBILITY TEST SPIN: CPT

## 2020-03-16 PROCEDURE — P9016 RBC LEUKOCYTES REDUCED: HCPCS

## 2020-03-16 RX ORDER — ARIPIPRAZOLE 10 MG/1
10 TABLET ORAL DAILY
COMMUNITY

## 2020-03-16 RX ORDER — ALBUTEROL SULFATE 0.83 MG/ML
2.5 SOLUTION RESPIRATORY (INHALATION)
Status: COMPLETED | OUTPATIENT
Start: 2020-03-16 | End: 2020-03-16

## 2020-03-16 RX ORDER — SUCRALFATE 1 G/1
1 TABLET ORAL
COMMUNITY

## 2020-03-16 RX ORDER — FLUTICASONE PROPIONATE 44 UG/1
2 AEROSOL, METERED RESPIRATORY (INHALATION) 2 TIMES DAILY
COMMUNITY

## 2020-03-16 RX ORDER — IPRATROPIUM BROMIDE AND ALBUTEROL SULFATE 2.5; .5 MG/3ML; MG/3ML
3 SOLUTION RESPIRATORY (INHALATION)
Status: DISCONTINUED | OUTPATIENT
Start: 2020-03-16 | End: 2020-03-20 | Stop reason: HOSPADM

## 2020-03-16 RX ORDER — SUCRALFATE 1 G/1
1 TABLET ORAL
Status: DISCONTINUED | OUTPATIENT
Start: 2020-03-17 | End: 2020-03-20 | Stop reason: HOSPADM

## 2020-03-16 RX ORDER — GABAPENTIN 300 MG/1
300 CAPSULE ORAL 3 TIMES DAILY
Status: DISCONTINUED | OUTPATIENT
Start: 2020-03-16 | End: 2020-03-16

## 2020-03-16 RX ORDER — BUPROPION HYDROCHLORIDE 150 MG/1
150 TABLET, EXTENDED RELEASE ORAL DAILY
Status: DISCONTINUED | OUTPATIENT
Start: 2020-03-17 | End: 2020-03-20 | Stop reason: HOSPADM

## 2020-03-16 RX ORDER — PREGABALIN 75 MG/1
150 CAPSULE ORAL 2 TIMES DAILY
Status: DISCONTINUED | OUTPATIENT
Start: 2020-03-16 | End: 2020-03-20 | Stop reason: HOSPADM

## 2020-03-16 RX ORDER — SODIUM CHLORIDE 9 MG/ML
50 INJECTION, SOLUTION INTRAVENOUS CONTINUOUS
Status: DISCONTINUED | OUTPATIENT
Start: 2020-03-16 | End: 2020-03-17

## 2020-03-16 RX ORDER — DULOXETIN HYDROCHLORIDE 60 MG/1
60 CAPSULE, DELAYED RELEASE ORAL DAILY
COMMUNITY

## 2020-03-16 RX ORDER — SODIUM CHLORIDE 0.9 % (FLUSH) 0.9 %
5-40 SYRINGE (ML) INJECTION AS NEEDED
Status: DISCONTINUED | OUTPATIENT
Start: 2020-03-16 | End: 2020-03-20 | Stop reason: HOSPADM

## 2020-03-16 RX ORDER — PREGABALIN 150 MG/1
150 CAPSULE ORAL 2 TIMES DAILY
COMMUNITY

## 2020-03-16 RX ORDER — ATORVASTATIN CALCIUM 20 MG/1
20 TABLET, FILM COATED ORAL
COMMUNITY
End: 2020-06-12

## 2020-03-16 RX ORDER — CARVEDILOL 3.12 MG/1
3.12 TABLET ORAL 2 TIMES DAILY WITH MEALS
Status: DISCONTINUED | OUTPATIENT
Start: 2020-03-16 | End: 2020-03-20 | Stop reason: HOSPADM

## 2020-03-16 RX ORDER — OXYCODONE AND ACETAMINOPHEN 7.5; 325 MG/1; MG/1
1 TABLET ORAL 3 TIMES DAILY
COMMUNITY
End: 2020-04-01 | Stop reason: DRUGHIGH

## 2020-03-16 RX ORDER — ATORVASTATIN CALCIUM 20 MG/1
20 TABLET, FILM COATED ORAL
Status: DISCONTINUED | OUTPATIENT
Start: 2020-03-16 | End: 2020-03-20 | Stop reason: HOSPADM

## 2020-03-16 RX ORDER — SODIUM CHLORIDE 0.9 % (FLUSH) 0.9 %
5-40 SYRINGE (ML) INJECTION EVERY 8 HOURS
Status: DISCONTINUED | OUTPATIENT
Start: 2020-03-16 | End: 2020-03-20 | Stop reason: HOSPADM

## 2020-03-16 RX ORDER — BUPROPION HYDROCHLORIDE 150 MG/1
150 TABLET ORAL
COMMUNITY

## 2020-03-16 RX ORDER — CYANOCOBALAMIN (VITAMIN B-12) 250 MCG
250 TABLET ORAL DAILY
COMMUNITY
End: 2020-06-12

## 2020-03-16 RX ORDER — BUDESONIDE 0.25 MG/2ML
250 INHALANT ORAL
Status: DISCONTINUED | OUTPATIENT
Start: 2020-03-16 | End: 2020-03-20 | Stop reason: HOSPADM

## 2020-03-16 RX ORDER — ALBUTEROL SULFATE 90 UG/1
2 AEROSOL, METERED RESPIRATORY (INHALATION)
Status: DISCONTINUED | OUTPATIENT
Start: 2020-03-16 | End: 2020-03-16 | Stop reason: ALTCHOICE

## 2020-03-16 RX ORDER — SODIUM CHLORIDE 9 MG/ML
250 INJECTION, SOLUTION INTRAVENOUS AS NEEDED
Status: DISCONTINUED | OUTPATIENT
Start: 2020-03-16 | End: 2020-03-20 | Stop reason: HOSPADM

## 2020-03-16 RX ORDER — DULOXETIN HYDROCHLORIDE 60 MG/1
60 CAPSULE, DELAYED RELEASE ORAL DAILY
Status: DISCONTINUED | OUTPATIENT
Start: 2020-03-17 | End: 2020-03-20 | Stop reason: HOSPADM

## 2020-03-16 RX ADMIN — GABAPENTIN 300 MG: 300 CAPSULE ORAL at 16:43

## 2020-03-16 RX ADMIN — BUDESONIDE 250 MCG: 0.25 SUSPENSION RESPIRATORY (INHALATION) at 19:25

## 2020-03-16 RX ADMIN — ALBUTEROL SULFATE 2.5 MG: 2.5 SOLUTION RESPIRATORY (INHALATION) at 12:25

## 2020-03-16 RX ADMIN — OCTREOTIDE ACETATE 25 MCG/HR: 500 INJECTION, SOLUTION INTRAVENOUS; SUBCUTANEOUS at 19:23

## 2020-03-16 RX ADMIN — IPRATROPIUM BROMIDE AND ALBUTEROL SULFATE 3 ML: .5; 3 SOLUTION RESPIRATORY (INHALATION) at 19:25

## 2020-03-16 RX ADMIN — Medication 10 ML: at 16:44

## 2020-03-16 RX ADMIN — SODIUM CHLORIDE 50 ML/HR: 900 INJECTION, SOLUTION INTRAVENOUS at 16:44

## 2020-03-16 RX ADMIN — SODIUM CHLORIDE 500 ML: 900 INJECTION, SOLUTION INTRAVENOUS at 12:08

## 2020-03-16 RX ADMIN — CARVEDILOL 3.12 MG: 3.12 TABLET, FILM COATED ORAL at 16:43

## 2020-03-16 RX ADMIN — Medication 10 ML: at 20:35

## 2020-03-16 RX ADMIN — SODIUM CHLORIDE 40 MG: 9 INJECTION INTRAMUSCULAR; INTRAVENOUS; SUBCUTANEOUS at 17:03

## 2020-03-16 NOTE — PROGRESS NOTES
Admission Medication Reconciliation: 
 
Information obtained from:  Patient RxQuery data available¹:  YES Comments/Recommendations: Spoke with patient regarding use of PTA medications including prescription/OTC, vitamins/supplements, inhaled, topical, nasal, injectable, otic and ophthalmic medications. Patient was a fair historian. Updated PTA meds/reviewed patient's allergies. Of note, patient unsure is she still takes spironolactone. Per RxQuery, last filled in December 2019 for 30 day supply. Medication changes (since last review): Added - Duloxetine - Atorvastatin - Sucralfate - Bupropion - Flovent HFA Adjusted - Duo-neb (from TID to q4h prn) - Vitamin B12 (from 1000 mcg to 250 mcg) - Furosemide (from 20 mg Tu/Th/Sa/Sun + 40 mg M/W/F to 20 mg daily) - Oxycodone-APAP (to 7.5-325 mg TID) - Pregabalin (to 150 mg BID) Removed - Symbicort inhaler - Ferrous sulfate - Furosemide 40 mg 
- Gabapentin - Ondansetron ¹RxQuery pharmacy benefit data reflects medications filled and processed through the patient's insurance, however  
this data does NOT capture whether the medication was picked up or is currently being taken by the patient. Allergies:  Pcn [penicillins] Significant PMH/Disease States:  
Past Medical History:  
Diagnosis Date Anemia Arthritis Rheumatoid CHF (congestive heart failure) ([facility] [address] Chronic obstructive pulmonary disease (HCC) Cirrhosis of liver ([facility] [address] COPD (chronic obstructive pulmonary disease) ([facility] [address] Gastrointestinal disorder \"lacerations in the large part of my small intestine. \"  
 Gastrointestinal disorder   
 liver cirrhosis Heart failure ([facility] [address] Internal bleeding Psychiatric disorder   
 depression, anxiety Tachycardia 1/27/2016 Chief Complaint for this Admission: Chief Complaint Patient presents with Rectal Bleeding Prior to Admission Medications:  
Prior to Admission Medications Prescriptions Last Dose Informant Taking? ARIPiprazole (ABILIFY) 10 mg tablet 3/16/2020 at AM  Yes Sig: Take 10 mg by mouth daily. DULoxetine (CYMBALTA) 60 mg capsule 3/16/2020 at Unknown time  Yes Sig: Take 60 mg by mouth daily. albuterol (VENTOLIN HFA) 90 mcg/actuation inhaler   Yes Sig: Take 2 Puffs by inhalation four (4) times daily as needed for Wheezing. albuterol-ipratropium (DUO-NEB) 2.5 mg-0.5 mg/3 ml nebu   Yes Sig: 3 mL by Nebulization route every four (4) hours as needed for Wheezing. atorvastatin (LIPITOR) 20 mg tablet 3/15/2020 at Unknown time  Yes Sig: Take 20 mg by mouth nightly. buPROPion XL (WELLBUTRIN XL) 150 mg tablet 3/16/2020 at Unknown time  Yes Sig: Take 150 mg by mouth every morning. carvedilol (COREG) 3.125 mg tablet 3/16/2020 at AM  Yes Sig: Take 3.125 mg by mouth two (2) times daily (with meals). cholecalciferol, vitamin D3, (VITAMIN D3) 2,000 unit tab 3/16/2020 at Unknown time  Yes Sig: Take 4,000 Units by mouth daily. cyanocobalamin (Vitamin B-12) 250 mcg tablet 3/16/2020 at Unknown time  Yes Sig: Take 250 mcg by mouth daily. fluticasone propionate (Flovent HFA) 44 mcg/actuation inhaler 3/16/2020 at Unknown time  Yes Sig: Take 2 Puffs by inhalation two (2) times a day. furosemide (LASIX) 20 mg tablet 3/16/2020 at AM  Yes Sig: Take 20 mg by mouth daily. loratadine (CLARITIN) 10 mg tablet 3/16/2020 at Unknown time  Yes Sig: Take 10 mg by mouth daily. multivitamins-minerals-lutein (CEROVITE SENIOR) tab tablet 3/16/2020 at Unknown time  Yes Sig: Take 1 Tab by mouth daily. oxyCODONE-acetaminophen (Percocet) 7.5-325 mg per tablet 3/16/2020 at Unknown time  Yes Sig: Take 1 Tab by mouth three (3) times daily. pantoprazole (PROTONIX) 40 mg tablet 3/16/2020 at Unknown time  Yes Sig: Take 40 mg by mouth daily. pregabalin (LYRICA) 150 mg capsule 3/16/2020 at Unknown time  Yes Sig: Take 150 mg by mouth two (2) times a day. senna-docusate (SENNA PLUS) 8.6-50 mg per tablet   Yes Sig: Take 1-2 Tabs by mouth nightly as needed for Constipation. spironolactone (ALDACTONE) 50 mg tablet Unknown at Unknown time  No  
Sig: Take 50 mg by mouth daily as needed (excess fluid). sucralfate (CARAFATE) 1 gram tablet 3/16/2020 at Unknown time  Yes Sig: Take 1 g by mouth two (2) times a day. Facility-Administered Medications: None Please contact the main inpatient pharmacy with any questions or concerns at [phone] and we will direct you to the clinical pharmacist covering this patient's care while in-house.   
CHRISTIANA Morales

## 2020-03-16 NOTE — H&P
[address]  HISTORY AND PHYSICAL Name:  Erik Jeffrey 
MR#:  [id number] :  1960 ACCOUNT #:  [de-identified] ADMIT DATE:  2020 CHIEF COMPLAINT:  Black stools. HISTORY OF PRESENT ILLNESS:  The patient is a 40-year-old female with past medical history of cryptogenic cirrhosis, history of COPD with chronic hypoxic respiratory failure requiring oxygen, history of recurrent anemia, history of AV malformation, anxiety and depression, who presents to the hospital with the above-mentioned symptom. The patient reports that in the past few days she has been feeling weaker and has been having black stools. The patient had an endoscopy done in 10/2017, which revealed a 1 cm AVM in the second portion of the duodenum. The patient reports that she recently went to [facility] ER, was transfused and told to follow up with her gastroenterologist.  The patient reports that she sees a hematologist/oncologist and iron infusions are planned in the future. The patient came to the ER and was requested to be admitted under the hospitalist service. The patient denies any other complaints or problems. The patient reports that she does not take any blood thinner. The patient reports that she has not had any denis blood per rectum. The patient denies any headache, blurry vision, sore throat, trouble swallowing, trouble with speech, any chest pain, abdominal pain, constipation, diarrhea, urinary symptoms, focal or generalized neurological weakness, recent travel, sick contacts, falls, injuries, hematemesis, melena, hemoptysis, hematuria or any other concerns or problems. PAST MEDICAL HISTORY:  See above. HOME MEDICATIONS:  Currently, the patient is on 1. Lasix 20 mg Tuesday, Thursday, Saturday, . 2.  Abilify 2 mg daily. 3.  Vitamin B. 
4.  Loratadine. 5.  Cholecalciferol. 6.  Lasix 40 mg every Monday, Wednesday, and Friday. 7.  Pantoprazole 40 mg daily. 8.  Multivitamin. 9.  Aldactone 50 mg daily as needed. 10.  Gabapentin 300 mg t.i.d. 11.  Coreg 3.125 mg b.i.d. 12.  Albuterol p.r.n. ALLERGIES:  TO PENICILLIN. SOCIAL HISTORY:  Current every day smoker, smokes three-fourth of a pack on a daily basis for 45 years. No alcohol. No IV drug abuse. FAMILY HISTORY:  Mother has a history of cancer. Father has a history of alcohol abuse. Sister has a history of cancer. REVIEW OF SYMPTOMS:  All systems were reviewed and found to be essentially negative except for the symptoms mentioned above. PHYSICAL EXAMINATION: 
VITAL SIGNS:  Temperature 97.6, pulse 81, respiratory rate 18, blood pressure 120/74, pulse ox 100% on room air. GENERAL:  Alert and oriented x3, awake, mildly distressed, pleasant female, appears to be stated age. HEENT:  Pupils are equal and reactive to light. Dry mucous membranes. Tympanic membranes clear. NECK:  Supple. CHEST:  Scattered wheezes in bilateral lung bases. HEART:  S1 and S2 heard. ABDOMEN:  Soft, nontender, nondistended. Bowel sounds are physiological. 
EXTREMITIES:  No clubbing, no cyanosis, no edema. NEURO/PSYCH:  Pleasant mood and affect. Cranial nerves II through XII grossly intact. No focal neurological deficits. SKIN:  Warm. LABORATORY DATA:  White count 6.7, hemoglobin 6.2, hematocrit 22.9, platelets 461. INR 1.0. Sodium 140, potassium 4.6, chloride 102, bicarbonate 35, anion gap 3, glucose 105, BUN 17, creatinine 0.99, calcium 9.2, bilirubin total 0.3, ALT 33, AST 19, alkaline phosphatase 92. Stool occult blood is positive. CT of the lung shows left lower lobe lung nodule measuring 2.1 cm and suspicious for malignancy, mild centrilobular emphysema, serial scattered bilateral ground-glass nodules, cirrhotic liver morphology. X-ray of the chest shows presence of focal soft tissue nodule in the perihilar region.  
 
ASSESSMENT AND PLAN: 
 1.  Gastrointestinal bleed, recurrent:  The patient will be admitted on the telemetry bed. We will start the patient on Protonix and octreotide. We will provide H and H every 8 hours, gastroenterology consult, n.p.o., transfuse and further intervention per hospital course. If the bleeding persists, may consider further intervention and diagnostics. Continue to closely monitor. Reassess as needed. 2.  Acute blood loss anemia:  Transfuse one unit packed red blood cells. Monitor H and H. Further transfusion per hospital course. We will treat the underlying cause, and we will get Gastroenterology input. Continue to monitor. 3.  History of cirrhosis:  Strict intakes and outputs, daily weights, monitoring. May consider getting hepatology consult in the morning. Further intervention per hospital course. Reassess as needed. We will hold Lasix for now as the patient is n.p.o. and may resume if any signs of volume overload. 4.  Gastrointestinal/deep venous thrombosis prophylaxis:  The patient will be on sequential compression devices. FUNCTIONAL STATUS:  Ambulates with the help of a walker. Cici Pereira MD 
 
 
[id number] 
D:  03/16/2020 16:42 
T:  03/16/2020 18:34 
JOB #:  [id number]

## 2020-03-16 NOTE — CONSULTS
[address] 
 [address] [place] [address] GASTROENTEROLOGY CONSULTATION NOTE [facility] office [phone] NP in-hospital cell phone M-F until 4:30 After 5pm or on weekends, please call  for physician on call NAME:  Jamila Ledbetter :   1960 MRN:   [id number] Referring Physician: Arvin Cook Consult Date: 3/16/2020 3:45 PM 
 
Chief Complaint: upper GI bleeding History of Present Illness:  Patient is a 60 y.o. who is seen in consultation at the request of Dr. Arvin Cook for upper GI bleeding. Medical history as listed below includes CHF, COPD, and cirrhosis. She presented for weakness and increased melena. Hgb was 7.7 in January and she has been getting out patient transfusions and iron (Dr. Jay Meade), planned for EGD with Dr. Addy Mace. Hgb 6.2. She reports chronic intermittent melena, worse over the past couple days. She has ~6 episodes of melena today. She denies nausea, reflux, abdominal pain, or hematochezia. She is on BID PPI chronically. +tobacco. No NSAID's or alcohol. EGD 10/2019 Dr. Addy Mace: GAVE treated with APC, duodenal polyps Colonoscopy 2019: diverticulosis, hemorrhoids I have reviewed the emergency room note, hospital admission note, notes by all other clinicians who have seen the patient during this hospitalization to date. I have reviewed the problem list and the reason for this hospitalization. I have reviewed the allergies and the medications the patient was taking at home prior to this hospitalization. PMH: 
Past Medical History:  
Diagnosis Date  Anemia  Arthritis Rheumatoid  CHF (congestive heart failure) ([address]  Chronic obstructive pulmonary disease ([facility] [address]  Cirrhosis of liver ([address]  COPD (chronic obstructive pulmonary disease) (HCC)  Gastrointestinal disorder \"lacerations in the large part of my small intestine. \"  Gastrointestinal disorder liver cirrhosis  Heart failure ([facility] [address]  Internal bleeding  Psychiatric disorder   
 depression, anxiety  Tachycardia 2016 PSH: 
Past Surgical History:  
Procedure Laterality Date  HX CHOLECYSTECTOMY  HX GI Cholecystectomoy  HX GYN    
  x 2.  SD ENDOSCOPY UPPER SMALL INTESTINE  2016 Allergies: Allergies Allergen Reactions  Pcn [Penicillins] Angioedema Childhood reaction Home Medications: 
Prior to Admission Medications Prescriptions Last Dose Informant Patient Reported? Taking? ARIPiprazole (ABILIFY) 2 mg tablet   Yes No  
Sig: Take 2 mg by mouth daily. albuterol (VENTOLIN HFA) 90 mcg/actuation inhaler   Yes No  
Sig: Take 2 Puffs by inhalation four (4) times daily as needed for Wheezing. albuterol-ipratropium (DUO-NEB) 2.5 mg-0.5 mg/3 ml nebu   Yes No  
Sig: 3 mL by Nebulization route three (3) times daily. budesonide-formoterol (SYMBICORT) 160-4.5 mcg/actuation HFA inhaler   Yes No  
Sig: Take 2 Puffs by inhalation two (2) times a day. carvedilol (COREG) 3.125 mg tablet   Yes No  
Sig: Take 3.125 mg by mouth two (2) times daily (with meals). cholecalciferol, vitamin D3, (VITAMIN D3) 2,000 unit tab   Yes No  
Sig: Take 4,000 Units by mouth daily. cyanocobalamin (VITAMIN B-12) 1,000 mcg tablet   Yes No  
Sig: Take 1,000 mcg by mouth daily. ferrous sulfate 325 mg (65 mg iron) tablet   Yes No  
Sig: Take 325 mg by mouth three (3) times daily (with meals). furosemide (LASIX) 20 mg tablet   Yes No  
Sig: Take 20 mg by mouth every Tuesday, Thursday, Saturday & . furosemide (LASIX) 40 mg tablet   Yes No  
Sig: Take 40 mg by mouth every Monday, Wednesday, Friday. gabapentin (NEURONTIN) 300 mg capsule   Yes No  
Sig: Take 300 mg by mouth three (3) times daily. loratadine (CLARITIN) 10 mg tablet   Yes No  
Sig: Take 10 mg by mouth daily.   
multivitamins-minerals-lutein (CEROVITE SENIOR) tab tablet   Yes No  
 Sig: Take 1 Tab by mouth daily. ondansetron (ZOFRAN ODT) 4 mg disintegrating tablet   Yes No  
Sig: Take 4 mg by mouth every eight (8) hours as needed for Nausea. oxycodone HCl/acetaminophen (OXYCODONE-ACETAMINOPHEN PO)   Yes No  
Sig: Take  by mouth.  
pantoprazole (PROTONIX) 40 mg tablet   Yes No  
Sig: Take 40 mg by mouth daily. pregabalin (LYRICA PO)   Yes No  
Sig: Take  by mouth. senna-docusate (SENNA PLUS) 8.6-50 mg per tablet   Yes No  
Sig: Take 1-2 Tabs by mouth nightly as needed for Constipation. spironolactone (ALDACTONE) 50 mg tablet   Yes No  
Sig: Take 50 mg by mouth daily as needed (excess fluid). Facility-Administered Medications: None Hospital Medications: 
Current Facility-Administered Medications Medication Dose Route Frequency  0.9% sodium chloride infusion 250 mL  250 mL IntraVENous PRN  
 carvediloL (COREG) tablet 3.125 mg  3.125 mg Oral BID WITH MEALS  gabapentin (NEURONTIN) capsule 300 mg  300 mg Oral TID  sodium chloride (NS) flush 5-40 mL  5-40 mL IntraVENous Q8H  
 sodium chloride (NS) flush 5-40 mL  5-40 mL IntraVENous PRN  
 0.9% sodium chloride infusion  50 mL/hr IntraVENous CONTINUOUS  
 pantoprazole (PROTONIX) 40 mg in 0.9% sodium chloride 10 mL injection  40 mg IntraVENous BID Social History: 
Social History Tobacco Use  Smoking status: Current Every Day Smoker Packs/day: 0.75 Years: 45.00 Pack years: 33.75  Smokeless tobacco: Never Used Substance Use Topics  Alcohol use: No  
  Comment: in the past was an alcoholic. Has been sober x 2 years. Family History: 
Family History Problem Relation Age of Onset  Cancer Mother  Alcohol abuse Father  Cancer Sister Review of Systems: 
Constitutional: negative fever, negative chills, negative weight loss Eyes:   negative visual changes ENT:   negative sore throat, tongue or lip swelling Respiratory:  negative cough, negative dyspnea Cards:  negative for chest pain, palpitations, +lower extremity edema GI:   See HPI 
:  negative for frequency, dysuria Integument:  negative for rash and pruritus Heme:  +for easy bruising and gum/nose bleeding Musculoskeletal:negative for myalgias and muscle weakness; +back pain Neuro:  negative for headaches, dizziness, vertigo Psych:  negative for feelings of anxiety, depression Objective:  
 
Patient Vitals for the past 8 hrs: 
 BP Temp Pulse Resp SpO2 Height Weight 03/16/20 1527 120/74 97.6 °F (36.4 °C) 81 18 100 %    
03/16/20 1400 127/67    100 %    
03/16/20 1300 119/55    98 %    
03/16/20 1225   88  100 %    
03/16/20 1152 106/53 98.7 °F (37.1 °C) 89 15  5' (1.524 m) 64.4 kg (141 lb 15.6 oz) No intake/output data recorded. No intake/output data recorded. EXAM:   
 CONST:  Pleasant lady lying in bed, no acute distress NEURO:  alert and oriented x 3 HEENT: EOMI, no scleral icterus LUNGS: clear to ausculation, (-) wheeze CARD:  S1 S2 
 ABD:  soft, no tenderness, no rebound, bowel sounds (+) all 4 quadrants, no masses, non distended EXT:  +edema, warm PSYCH: full, not anxious Data Review Recent Labs  
  03/16/20 
1158 WBC 6.7 HGB 6.2* HCT 22.9*  
* Recent Labs  
  03/16/20 
1158   
K 4.6  CO2 35* BUN 17 CREA 0.99 * CA 9.2 Recent Labs  
  03/16/20 
1158 SGOT 19 AP 92  
TP 6.3* ALB 3.1*  
GLOB 3.2 Recent Labs  
  03/16/20 
1158 INR 1.0 PTP 10.2 APTT 21.1* IMPRESSION:  
1. A left lower lobe lung nodule measuring 2.1 cm is suspicious for malignancy. 2. Mild centrilobular emphysema. Several scattered bilateral groundglass nodules 
measuring up to 0.8 cm in the right and left upper lobes are nonspecific. Continued follow-up is recommended. 3. Cirrhotic liver morphology. Assessment:  
 
· GI bleed: anemic with hemoccult positive stool.  History of GAVE and duodenal AVM's. hgb 6.2, plt 103, INR 1.0 
· Lung nodule suspicious for malignancy · CHF · COPD · Cirrhosis on CT Patient Active Problem List  
Diagnosis Code  Anemia D64.9  
 GI bleed K92.2  Cirrhosis of liver ([facility] [address] K74.60  Chronic respiratory failure with hypoxia (HCC) J96.11  
 Acute blood loss anemia D62  
 GIB (gastrointestinal bleeding) K92.2  CHF (congestive heart failure) (HCC) I50.9 Plan: · Add on iron panel/ferritin, consider IV iron infusion · Agree with BID PPI 
· NPO at midnight for possible EGD tomorrow · Trend CBC, transfuse as necessary · Patient discussed with and will be seen by Dr. Cornel Diez · Thank you for allowing me to participate in care of Guru Thompson Signed By: Rebeca Patricio NP   
 3/16/2020  3:45 PM 
  
 
GI Attending: Will see patient on 3/17. Plan as above. NPO after midnight for EGD tomorrow. Monitor CBC. Bart Diez MD

## 2020-03-16 NOTE — PROGRESS NOTES
TRANSFER - IN REPORT: 
 
Verbal report received from [place] (name) on Deannie Box  being received from 6E(unit) for routine progression of care Report consisted of patients Situation, Background, Assessment and  
Recommendations(SBAR). Information from the following report(s) SBAR and Kardex was reviewed with the receiving nurse. Opportunity for questions and clarification was provided. Assessment completed upon patients arrival to unit and care assumed. Dr Lewis Pierson made aware that consent for blood transfusion needed, came to bedside to sign and pt signed.

## 2020-03-16 NOTE — PROGRESS NOTES
Report from Charis in ED on pt. Awaiting admit orders prior to pt being transferred up to floor. VSS at this time. Charis stated she will hold pt until MD, Dr. Satnam Romero, sees her and places orders.

## 2020-03-16 NOTE — PROGRESS NOTES
Problem: Patient Education: Go to Patient Education Activity Goal: Patient/Family Education Outcome: Progressing Towards Goal 
  
Problem: Upper and Lower GI Bleed: Day 1 Goal: Off Pathway (Use only if patient is Off Pathway) Outcome: Progressing Towards Goal 
Goal: Activity/Safety Outcome: Progressing Towards Goal 
Goal: Consults, if ordered Outcome: Progressing Towards Goal 
Goal: Diagnostic Test/Procedures Outcome: Progressing Towards Goal 
Goal: Nutrition/Diet Outcome: Progressing Towards Goal 
Goal: Discharge Planning Outcome: Progressing Towards Goal 
Goal: Medications Outcome: Progressing Towards Goal 
Goal: Respiratory Outcome: Progressing Towards Goal 
Goal: Treatments/Interventions/Procedures Outcome: Progressing Towards Goal 
Goal: Psychosocial 
Outcome: Progressing Towards Goal 
Goal: *Optimal pain control at patient's stated goal 
Outcome: Progressing Towards Goal 
Goal: *Hemodynamically stable Outcome: Progressing Towards Goal 
Goal: *Demonstrates progressive activity Outcome: Progressing Towards Goal 
  
Problem: Upper and Lower GI Bleed: Day 2 Goal: Off Pathway (Use only if patient is Off Pathway) Outcome: Progressing Towards Goal 
Goal: Activity/Safety Outcome: Progressing Towards Goal 
Goal: Consults, if ordered Outcome: Progressing Towards Goal 
Goal: Diagnostic Test/Procedures Outcome: Progressing Towards Goal 
Goal: Nutrition/Diet Outcome: Progressing Towards Goal 
Goal: Discharge Planning Outcome: Progressing Towards Goal 
Goal: Medications Outcome: Progressing Towards Goal 
Goal: Respiratory Outcome: Progressing Towards Goal 
Goal: Treatments/Interventions/Procedures Outcome: Progressing Towards Goal 
Goal: Psychosocial 
Outcome: Progressing Towards Goal 
Goal: *Optimal pain control at patient's stated goal 
Outcome: Progressing Towards Goal 
Goal: *Hemodynamically stable Outcome: Progressing Towards Goal 
Goal: *Tolerating diet Outcome: Progressing Towards Goal 
 Goal: *Demonstrates progressive activity Outcome: Progressing Towards Goal 
  
Problem: Upper and Lower GI Bleed: Day 3 Goal: Off Pathway (Use only if patient is Off Pathway) Outcome: Progressing Towards Goal 
Goal: Activity/Safety Outcome: Progressing Towards Goal 
Goal: Diagnostic Test/Procedures Outcome: Progressing Towards Goal 
Goal: Nutrition/Diet Outcome: Progressing Towards Goal 
Goal: Discharge Planning Outcome: Progressing Towards Goal 
Goal: Medications Outcome: Progressing Towards Goal 
Goal: Treatments/Interventions/Procedures Outcome: Progressing Towards Goal 
Goal: Psychosocial 
Outcome: Progressing Towards Goal 
  
Problem: Upper and Lower GI Bleed:  Discharge Outcomes Goal: *Hemodynamically stable Outcome: Progressing Towards Goal 
Goal: *Lungs clear or at baseline Outcome: Progressing Towards Goal 
Goal: *Demonstrates independent activity or return to baseline Outcome: Progressing Towards Goal 
Goal: *Pain is controlled to three or less Outcome: Progressing Towards Goal 
Goal: *Verbalizes understanding and describes prescribed diet Outcome: Progressing Towards Goal 
Goal: *Tolerating diet Outcome: Progressing Towards Goal 
Goal: *Verbalizes name, dosage, time, side effects, and number of days to continue medications Outcome: Progressing Towards Goal 
Goal: *Anxiety reduced or absent Outcome: Progressing Towards Goal 
Goal: *Understands and describes signs and symptoms to report to providers(Stroke Metric) Outcome: Progressing Towards Goal 
Goal: *Describes follow-up/return visits to physicians Outcome: Progressing Towards Goal 
Goal: *Describes available resources and support systems Outcome: Progressing Towards Goal 
  
Problem: Upper and Lower GI Bleed: Day 1 Goal: Off Pathway (Use only if patient is Off Pathway) Outcome: Progressing Towards Goal

## 2020-03-16 NOTE — ED TRIAGE NOTES
Pt arrives via ems from home c/o 6 dark tary stool that started around 11pm last night. Pt denies abdominal pain. +generalized weakness. Pt wear 3L NC at baseline due to COPD. EMS reports temp of 98.9 orally.

## 2020-03-16 NOTE — ED PROVIDER NOTES
HPI.  Patient has a history of COPD, CHF, depression, anxiety, rheumatoid arthritis, cirrhosis, anemia, AV malformations, and both chronic and acute GI bleeds. Patient was feeling weaker and seeing more blood than usual in 2017. Endoscopy revealed a 1 cm AVM in the second portion of the duodenum. APC did not control bleeding and 2 hemoclips were used and bleeding stopped. Several months ago patient was admitted and transfused. This may have been done at another hospital.  She sees a hematology oncologist and iron infusions are planned in the near future. Patient continues to smoke cigarettes. She has wheezes on physical exam but not obviously from across the room. She endorses that her wheezing is a bit worse than baseline. Patient denies vomiting. Chief complaint is 12 hours of passing dark red/tarry stool. Patient feels weaker and more tired and more sleepy than usual.  There has been no syncope. Patient reports no ethanol intake in 8 to 10 years. Past Medical History:  
Diagnosis Date  Anemia  Arthritis Rheumatoid  CHF (congestive heart failure) ([address]  Chronic obstructive pulmonary disease ([address]  Cirrhosis of liver ([address]  COPD (chronic obstructive pulmonary disease) (HCC)  Gastrointestinal disorder \"lacerations in the large part of my small intestine. \"  Gastrointestinal disorder   
 liver cirrhosis  Heart failure ([address]  Internal bleeding  Psychiatric disorder   
 depression, anxiety  Tachycardia 2016 Past Surgical History:  
Procedure Laterality Date  HX CHOLECYSTECTOMY  HX GI Cholecystectomoy  HX GYN    
  x 2.  [place] ENDOSCOPY UPPER SMALL INTESTINE  2016 Family History:  
Problem Relation Age of Onset  Cancer Mother  Alcohol abuse Father  Cancer Sister Social History Socioeconomic History  Marital status: SINGLE Spouse name: Not on file  Number of children: Not on file  Years of education: Not on file  Highest education level: Not on file Occupational History  Not on file Social Needs  Financial resource strain: Not on file  Food insecurity Worry: Not on file Inability: Not on file  Transportation needs Medical: Not on file Non-medical: Not on file Tobacco Use  Smoking status: Current Every Day Smoker Packs/day: 0.75 Years: 45.00 Pack years: 33.75  Smokeless tobacco: Never Used Substance and Sexual Activity  Alcohol use: No  
  Comment: in the past was an alcoholic. Has been sober x 2 years.  Drug use: No  
 Sexual activity: Yes  
  Partners: Male Lifestyle  Physical activity Days per week: Not on file Minutes per session: Not on file  Stress: Not on file Relationships  Social connections Talks on phone: Not on file Gets together: Not on file Attends Spiritism service: Not on file Active member of club or organization: Not on file Attends meetings of clubs or organizations: Not on file Relationship status: Not on file  Intimate partner violence Fear of current or ex partner: Not on file Emotionally abused: Not on file Physically abused: Not on file Forced sexual activity: Not on file Other Topics Concern  Not on file Social History Narrative  Not on file ALLERGIES: Pcn [penicillins] Review of Systems Constitutional: Positive for appetite change. HENT: Negative for congestion and sinus pain. Respiratory: Positive for shortness of breath and wheezing. Cardiovascular: Negative for chest pain. Gastrointestinal: Negative for abdominal distention and abdominal pain. Genitourinary: Negative for difficulty urinating. Skin: Positive for pallor. Neurological: Negative for speech difficulty. Psychiatric/Behavioral: Negative for agitation and behavioral problems. The patient is not nervous/anxious. There were no vitals filed for this visit. Physical Exam 
Vitals signs and nursing note reviewed. Constitutional:   
   Appearance: She is well-developed. HENT:  
   Head: Normocephalic and atraumatic. Eyes:  
   Pupils: Pupils are equal, round, and reactive to light. Neck: Musculoskeletal: Normal range of motion and neck supple. Cardiovascular:  
   Rate and Rhythm: Normal rate and regular rhythm. Heart sounds: Normal heart sounds. No murmur. No friction rub. No gallop. Pulmonary:  
   Effort: Pulmonary effort is normal. No respiratory distress. Breath sounds: No wheezing or rales. Comments: Mild diffuse expiratory wheezes Abdominal:  
   Palpations: Abdomen is soft. Tenderness: There is no abdominal tenderness. There is no rebound. Genitourinary: 
   Comments: Black thin stool Musculoskeletal: Normal range of motion. General: No tenderness. Skin: 
   Coloration: Skin is pale. Findings: No erythema. Neurological:  
   Mental Status: She is alert. Cranial Nerves: No cranial nerve deficit. Comments: Motor; symmetric Psychiatric:     
   Behavior: Behavior normal.  
 
  
 
MDM Procedures Hospitalist [place] Text for Admission 1:43 PM 
 
ED Room Number: ER09/09 Patient Name and age: Prerna Yun 60 y.o.  female Working Diagnosis:  
1. Gastrointestinal hemorrhage, unspecified gastrointestinal hemorrhage type Readmission: no 
Isolation Requirements:  no 
Recommended Level of Care:  telemetry Code Status:  FULL Other: Hemoccult card has been sent to lab; black stool; hemoglobin 6 Department:[facility] Adult ED - [phone]

## 2020-03-17 ENCOUNTER — ANESTHESIA EVENT (OUTPATIENT)
Dept: ENDOSCOPY | Age: 60
End: 2020-03-17
Payer: MEDICARE

## 2020-03-17 ENCOUNTER — ANESTHESIA (OUTPATIENT)
Dept: ENDOSCOPY | Age: 60
End: 2020-03-17
Payer: MEDICARE

## 2020-03-17 LAB
ANION GAP SERPL CALC-SCNC: 3 MMOL/L (ref 5–15)
BUN SERPL-MCNC: 15 MG/DL (ref 6–20)
BUN/CREAT SERPL: 19 (ref 12–20)
CALCIUM SERPL-MCNC: 8.6 MG/DL (ref 8.5–10.1)
CHLORIDE SERPL-SCNC: 106 MMOL/L (ref 97–108)
CO2 SERPL-SCNC: 33 MMOL/L (ref 21–32)
CREAT SERPL-MCNC: 0.8 MG/DL (ref 0.55–1.02)
GLUCOSE SERPL-MCNC: 98 MG/DL (ref 65–100)
HGB BLD-MCNC: 7.7 G/DL (ref 11.5–16)
HGB BLD-MCNC: 7.9 G/DL (ref 11.5–16)
POTASSIUM SERPL-SCNC: 4.6 MMOL/L (ref 3.5–5.1)
SODIUM SERPL-SCNC: 142 MMOL/L (ref 136–145)

## 2020-03-17 PROCEDURE — 36415 COLL VENOUS BLD VENIPUNCTURE: CPT

## 2020-03-17 PROCEDURE — 94640 AIRWAY INHALATION TREATMENT: CPT

## 2020-03-17 PROCEDURE — C9113 INJ PANTOPRAZOLE SODIUM, VIA: HCPCS | Performed by: FAMILY MEDICINE

## 2020-03-17 PROCEDURE — 96375 TX/PRO/DX INJ NEW DRUG ADDON: CPT

## 2020-03-17 PROCEDURE — 96376 TX/PRO/DX INJ SAME DRUG ADON: CPT

## 2020-03-17 PROCEDURE — 88305 TISSUE EXAM BY PATHOLOGIST: CPT

## 2020-03-17 PROCEDURE — 85018 HEMOGLOBIN: CPT

## 2020-03-17 PROCEDURE — 80048 BASIC METABOLIC PNL TOTAL CA: CPT

## 2020-03-17 PROCEDURE — 94761 N-INVAS EAR/PLS OXIMETRY MLT: CPT

## 2020-03-17 PROCEDURE — 74011000250 HC RX REV CODE- 250: Performed by: FAMILY MEDICINE

## 2020-03-17 PROCEDURE — 76040000019: Performed by: INTERNAL MEDICINE

## 2020-03-17 PROCEDURE — 77010033678 HC OXYGEN DAILY

## 2020-03-17 PROCEDURE — 99218 HC RM OBSERVATION: CPT

## 2020-03-17 PROCEDURE — 74011250637 HC RX REV CODE- 250/637: Performed by: FAMILY MEDICINE

## 2020-03-17 PROCEDURE — 76060000031 HC ANESTHESIA FIRST 0.5 HR: Performed by: INTERNAL MEDICINE

## 2020-03-17 PROCEDURE — 74011000258 HC RX REV CODE- 258: Performed by: INTERNAL MEDICINE

## 2020-03-17 PROCEDURE — 74011250636 HC RX REV CODE- 250/636: Performed by: FAMILY MEDICINE

## 2020-03-17 PROCEDURE — 74011250636 HC RX REV CODE- 250/636: Performed by: NURSE ANESTHETIST, CERTIFIED REGISTERED

## 2020-03-17 PROCEDURE — 74011250636 HC RX REV CODE- 250/636: Performed by: INTERNAL MEDICINE

## 2020-03-17 PROCEDURE — 77030021593 HC FCPS BIOP ENDOSC BSC -A: Performed by: INTERNAL MEDICINE

## 2020-03-17 PROCEDURE — 74011250637 HC RX REV CODE- 250/637: Performed by: NURSE PRACTITIONER

## 2020-03-17 PROCEDURE — 74011000250 HC RX REV CODE- 250: Performed by: NURSE ANESTHETIST, CERTIFIED REGISTERED

## 2020-03-17 RX ORDER — SIMETHICONE 80 MG
80 TABLET,CHEWABLE ORAL
Status: DISCONTINUED | OUTPATIENT
Start: 2020-03-17 | End: 2020-03-20 | Stop reason: HOSPADM

## 2020-03-17 RX ORDER — MIDAZOLAM HYDROCHLORIDE 1 MG/ML
.25-5 INJECTION, SOLUTION INTRAMUSCULAR; INTRAVENOUS
Status: DISCONTINUED | OUTPATIENT
Start: 2020-03-17 | End: 2020-03-17 | Stop reason: HOSPADM

## 2020-03-17 RX ORDER — ATROPINE SULFATE 0.1 MG/ML
0.5 INJECTION INTRAVENOUS
Status: DISCONTINUED | OUTPATIENT
Start: 2020-03-17 | End: 2020-03-17 | Stop reason: HOSPADM

## 2020-03-17 RX ORDER — PROPOFOL 10 MG/ML
INJECTION, EMULSION INTRAVENOUS AS NEEDED
Status: DISCONTINUED | OUTPATIENT
Start: 2020-03-17 | End: 2020-03-17 | Stop reason: HOSPADM

## 2020-03-17 RX ORDER — EPINEPHRINE 0.1 MG/ML
1 INJECTION INTRACARDIAC; INTRAVENOUS
Status: DISCONTINUED | OUTPATIENT
Start: 2020-03-17 | End: 2020-03-17 | Stop reason: HOSPADM

## 2020-03-17 RX ORDER — FENTANYL CITRATE 50 UG/ML
25-200 INJECTION, SOLUTION INTRAMUSCULAR; INTRAVENOUS
Status: DISCONTINUED | OUTPATIENT
Start: 2020-03-17 | End: 2020-03-17 | Stop reason: HOSPADM

## 2020-03-17 RX ORDER — MORPHINE SULFATE 2 MG/ML
2 INJECTION, SOLUTION INTRAMUSCULAR; INTRAVENOUS
Status: DISCONTINUED | OUTPATIENT
Start: 2020-03-17 | End: 2020-03-17

## 2020-03-17 RX ORDER — DEXTROMETHORPHAN/PSEUDOEPHED 2.5-7.5/.8
1.2 DROPS ORAL
Status: DISCONTINUED | OUTPATIENT
Start: 2020-03-17 | End: 2020-03-17 | Stop reason: HOSPADM

## 2020-03-17 RX ORDER — SODIUM CHLORIDE 9 MG/ML
50 INJECTION, SOLUTION INTRAVENOUS CONTINUOUS
Status: DISPENSED | OUTPATIENT
Start: 2020-03-17 | End: 2020-03-17

## 2020-03-17 RX ORDER — OXYCODONE AND ACETAMINOPHEN 7.5; 325 MG/1; MG/1
1 TABLET ORAL
Status: DISCONTINUED | OUTPATIENT
Start: 2020-03-17 | End: 2020-03-20 | Stop reason: HOSPADM

## 2020-03-17 RX ORDER — SODIUM CHLORIDE 0.9 % (FLUSH) 0.9 %
5-40 SYRINGE (ML) INJECTION AS NEEDED
Status: DISCONTINUED | OUTPATIENT
Start: 2020-03-17 | End: 2020-03-20 | Stop reason: HOSPADM

## 2020-03-17 RX ORDER — SODIUM CHLORIDE 0.9 % (FLUSH) 0.9 %
5-40 SYRINGE (ML) INJECTION EVERY 8 HOURS
Status: DISCONTINUED | OUTPATIENT
Start: 2020-03-17 | End: 2020-03-20 | Stop reason: HOSPADM

## 2020-03-17 RX ORDER — LIDOCAINE HYDROCHLORIDE 20 MG/ML
INJECTION, SOLUTION EPIDURAL; INFILTRATION; INTRACAUDAL; PERINEURAL AS NEEDED
Status: DISCONTINUED | OUTPATIENT
Start: 2020-03-17 | End: 2020-03-17 | Stop reason: HOSPADM

## 2020-03-17 RX ORDER — HYDROXYZINE 25 MG/1
25 TABLET, FILM COATED ORAL
Status: DISCONTINUED | OUTPATIENT
Start: 2020-03-17 | End: 2020-03-20 | Stop reason: HOSPADM

## 2020-03-17 RX ORDER — NALOXONE HYDROCHLORIDE 0.4 MG/ML
0.4 INJECTION, SOLUTION INTRAMUSCULAR; INTRAVENOUS; SUBCUTANEOUS
Status: DISCONTINUED | OUTPATIENT
Start: 2020-03-17 | End: 2020-03-17 | Stop reason: HOSPADM

## 2020-03-17 RX ORDER — FLUMAZENIL 0.1 MG/ML
0.2 INJECTION INTRAVENOUS
Status: DISCONTINUED | OUTPATIENT
Start: 2020-03-17 | End: 2020-03-17 | Stop reason: HOSPADM

## 2020-03-17 RX ADMIN — HYDROXYZINE HYDROCHLORIDE 25 MG: 25 TABLET, FILM COATED ORAL at 23:20

## 2020-03-17 RX ADMIN — PREGABALIN 150 MG: 75 CAPSULE ORAL at 17:20

## 2020-03-17 RX ADMIN — IRON SUCROSE 200 MG: 20 INJECTION, SOLUTION INTRAVENOUS at 20:52

## 2020-03-17 RX ADMIN — PROPOFOL 50 MG: 10 INJECTION, EMULSION INTRAVENOUS at 11:54

## 2020-03-17 RX ADMIN — ATORVASTATIN CALCIUM 20 MG: 20 TABLET, FILM COATED ORAL at 21:54

## 2020-03-17 RX ADMIN — LIDOCAINE HYDROCHLORIDE 100 MG: 20 INJECTION, SOLUTION EPIDURAL; INFILTRATION; INTRACAUDAL; PERINEURAL at 11:54

## 2020-03-17 RX ADMIN — OXYCODONE HYDROCHLORIDE AND ACETAMINOPHEN 1 TABLET: 7.5; 325 TABLET ORAL at 23:20

## 2020-03-17 RX ADMIN — IPRATROPIUM BROMIDE AND ALBUTEROL SULFATE 3 ML: .5; 3 SOLUTION RESPIRATORY (INHALATION) at 04:41

## 2020-03-17 RX ADMIN — IPRATROPIUM BROMIDE AND ALBUTEROL SULFATE 3 ML: .5; 3 SOLUTION RESPIRATORY (INHALATION) at 13:51

## 2020-03-17 RX ADMIN — SUCRALFATE 1 G: 1 TABLET ORAL at 17:19

## 2020-03-17 RX ADMIN — SIMETHICONE CHEW TAB 80 MG 80 MG: 80 TABLET ORAL at 20:51

## 2020-03-17 RX ADMIN — PROPOFOL 20 MG: 10 INJECTION, EMULSION INTRAVENOUS at 11:50

## 2020-03-17 RX ADMIN — SODIUM CHLORIDE 40 MG: 9 INJECTION INTRAMUSCULAR; INTRAVENOUS; SUBCUTANEOUS at 17:20

## 2020-03-17 RX ADMIN — CARVEDILOL 3.12 MG: 3.12 TABLET, FILM COATED ORAL at 17:20

## 2020-03-17 RX ADMIN — SODIUM CHLORIDE 50 ML/HR: 900 INJECTION, SOLUTION INTRAVENOUS at 13:37

## 2020-03-17 RX ADMIN — BUDESONIDE 250 MCG: 0.25 SUSPENSION RESPIRATORY (INHALATION) at 08:14

## 2020-03-17 RX ADMIN — IPRATROPIUM BROMIDE AND ALBUTEROL SULFATE 3 ML: .5; 3 SOLUTION RESPIRATORY (INHALATION) at 20:04

## 2020-03-17 RX ADMIN — Medication 10 ML: at 21:54

## 2020-03-17 RX ADMIN — SODIUM CHLORIDE 40 MG: 9 INJECTION INTRAMUSCULAR; INTRAVENOUS; SUBCUTANEOUS at 10:11

## 2020-03-17 RX ADMIN — PROPOFOL 130 MG: 10 INJECTION, EMULSION INTRAVENOUS at 11:45

## 2020-03-17 RX ADMIN — Medication 10 ML: at 13:36

## 2020-03-17 RX ADMIN — Medication 10 ML: at 04:41

## 2020-03-17 RX ADMIN — BUDESONIDE 250 MCG: 0.25 SUSPENSION RESPIRATORY (INHALATION) at 20:04

## 2020-03-17 NOTE — ROUTINE PROCESS
Bedside and Verbal shift change report given to [address] [place] [address] (oncoming nurse) by Finesse Isaac RN (offgoing nurse). Report included the following information SBAR, Kardex, ED Summary, Intake/Output, MAR, Recent Results and Cardiac Rhythm NSR.

## 2020-03-17 NOTE — ROUTINE PROCESS
Francisco J Leventhal 1960 
[id number] Situation: 
Verbal report received from:Leanne PATEL Procedure: Procedure(s): ESOPHAGOGASTRODUODENOSCOPY (EGD) ENDOSCOPIC POLYPECTOMY 
ESOPHAGOGASTRODUODENAL (EGD) BIOPSY Background: 
 
Preoperative diagnosis: anemia Postoperative diagnosis: Gastritis Hiatal Hernia Duodenal Ulcers Duodenal Polyp :  Dr. Faraz Retana Assistant(s): Endoscopy Technician-1: Ottoniel Junior Endoscopy RN-1: Melia Clay RN Specimens:  
ID Type Source Tests Collected by Time Destination 1 : Duodenal Polyp Preservative   Jessica Loomis MD 3/17/2020 1148 Pathology H. Pylori  no Assessment: 
Intra-procedure medications Anesthesia gave intra-procedure sedation and medications, see anesthesia flow sheet yes Intravenous fluids: NS@ Merlin Bridges Vital signs stable Abdominal assessment: round and soft Recommendation: 
Return to floor.

## 2020-03-17 NOTE — PROGRESS NOTES
Problem: Falls - Risk of 
Goal: *Absence of Falls Description: Document Redgie Curet Fall Risk and appropriate interventions in the flowsheet. Outcome: Progressing Towards Goal 
Note: Fall Risk Interventions: 
Mobility Interventions: Patient to call before getting OOB Medication Interventions: Assess postural VS orthostatic hypotension, Patient to call before getting OOB, Teach patient to arise slowly Elimination Interventions: Call light in reach

## 2020-03-17 NOTE — PROGRESS NOTES
Spoke with Endo and pt added to list for 1430 if GI team decides to do EGD today. Pt NPO for now, meds held. IVF and gtts running per orders. Pt updated on POC for now. Hemoglobin stable for now, no s/s bleeding.

## 2020-03-17 NOTE — PROGRESS NOTES
[address] [facility] Adult  Hospitalist Group Hospitalist Progress Note Reyna Gamez MD 
Answering service: [phone] or [phone] from in house phone Date of Service:  3/17/2020 NAME:  Cesar Schulz :  1960 MRN:  [id number] Admission Summary:  
The patient is a 58-year-old female with past medical history of cryptogenic cirrhosis, history of COPD with chronic hypoxic respiratory failure requiring oxygen, history of recurrent anemia, history of AV malformation, anxiety and depression, who presents to the hospital with the above-mentioned symptom. The patient reports that in the past few days she has been feeling weaker and has been having black stools. The patient had an endoscopy done in 10/2017, which revealed a 1 cm AVM in the second portion of the duodenum. The patient reports that she recently went to [facility] ER, was transfused and told to follow up with her gastroenterologist.  The patient reports that she sees a hematologist/oncologist and iron infusions are planned in the future. The patient came to the ER and was requested to be admitted under the hospitalist service. The patient denies any other complaints or problems. The patient reports that she does not take any blood thinner. The patient reports that she has not had any denis blood per rectum. The patient denies any headache, blurry vision, sore throat, trouble swallowing, trouble with speech, any chest pain, abdominal pain, constipation, diarrhea, urinary symptoms, focal or generalized neurological weakness, recent travel, sick contacts, falls, injuries, hematemesis, melena, hemoptysis, hematuria or any other concerns or problems. Interval history / Subjective: Follow up melena. Patient seen and examined at the bedside. Labs, images and notes reviewed Discussed with nursing staff, orders reviewed. Plan discussed with patient/Family Patient was seen after EGD. Feeling well. Tolerating GI light diet well. D/W with GI. 
DC octreotide GTT. No esophageal varices. Continue PPI per GI. Discussed with patient about left lower lobe lung nodule/mass, possible malignant and need for further evaluation. Patient tearful but willing to get it further looked into. No cough, chest pain, shortness of breath, congestion. No other concerns or overnight events at present. Assessment & Plan: #Recurrent GI bleed. -GI on board 
-PPI twice daily. Off octreotide gtt. given no varices on EGD 
-Follow further GI recommendations and H&H monitoring daily with CBC. #Acute blood loss anemia with iron deficiency, s/p 1 PRBC 3/16 with low ferritin and percentage iron saturation 
-Daily CBC monitoring 
-IV iron infusion #History of cirrhosis 
-Strict I/os, daily weight 
-GI on board. Hepatology consultation if suggested by GI team is defer further management to GI 
-Lasix, Aldactone #Left lower lobe lung nodule/mass 
-Pulmonology consultation given the risk and concern for malignancy 
-Active smoker. Counseled for's smoking cessation #History of COPD with chronic hypoxic respiratory failure, on home O2 
-PRN DuoNeb, Pulmicort 
-Supportive care with supplemental O2 
-Consider to add Mucinex Code status: Full code DVT prophylaxis: SCDs Care Plan discussed with: Patient/Family and Nurse Anticipated Disposition: Home w/Family Anticipated Discharge: 24 hours to 48 hours may change based on pulmonology or GI recommendations Hospital Problems  Date Reviewed: 8/18/2019 Codes Class Noted POA  
 GI bleed ICD-10-CM: K92.2 ICD-9-CM: 578.9  7/19/2016 Unknown Review of Systems: A comprehensive review of systems was negative except for that written in the HPI. Vital Signs: Last 24hrs VS reviewed since prior progress note. Most recent are: 
Visit Vitals /69 (BP 1 Location: Right arm, BP Patient Position: At rest) Pulse 84 Temp 98.4 °F (36.9 °C) Resp 18 Ht 5' (1.524 m) Wt 61.8 kg (136 lb 3.9 oz) SpO2 95% Breastfeeding No  
BMI 26.61 kg/m² Intake/Output Summary (Last 24 hours) at 3/17/2020 1952 Last data filed at 3/17/2020 1630 Gross per 24 hour Intake 217.5 ml Output 4 ml Net 213.5 ml Physical Examination:  
 
GENERAL:  Alert and oriented x3, awake, no distressed, pleasant female, appears to be stated age. Tearful when discussed about left lung mass/nodule HEENT:  Pupils are equal and reactive to light. Dry mucous membranes. Tympanic membranes clear. NECK:  Supple. CHEST:   Only reduced air entry bilaterally but without significant wheezing or rhonchi or rales HEART:  S1 and S2 heard. ABDOMEN:  Soft, nontender, nondistended. Bowel sounds are physiological. 
EXTREMITIES:  No clubbing, no cyanosis, no edema. NEURO/PSYCH:  Pleasant mood and affect. Cranial nerves II through XII grossly intact. No focal neurological deficits. SKIN:  Warm. Data Review:  
 Review and/or order of clinical lab test 
Review and/or order of tests in the radiology section of CPT Review and/or order of tests in the medicine section of CPT Ct Chest Wo Cont Result Date: 3/16/2020 IMPRESSION: 1. A left lower lobe lung nodule measuring 2.1 cm is suspicious for malignancy. 2. Mild centrilobular emphysema. Several scattered bilateral groundglass nodules measuring up to 0.8 cm in the right and left upper lobes are nonspecific. Continued follow-up is recommended. 3. Cirrhotic liver morphology. Xr Chest [facility] Result Date: 3/16/2020 IMPRESSION: Presence of a focal soft tissue nodule in the left parahilar region as described above. Further studies are recommended. EGD 3/17/2020: 
Findings:  
Esophagus:hiatal hernia 3 cm in size Stomach: gastritis in antrum, biopsies taken Duodenum/jejunum: there was 1 cm polyp in bulb, removed by hot biopsy There were non bleeding ulcers and superficial ulcers in bulb and second portion of DU, largest one around 8 mm in size Recommendations: 
-Continue acid suppression. 
-resume po 
-will follow 
  
 
Labs:  
 
Recent Labs  
  03/17/20 
1013 03/17/20 
0017 03/16/20 
1158 WBC  --   --  6.7 HGB 7.9* 7.7* 6.2* HCT  --   --  22.9*  
PLT  --   --  103* Recent Labs  
  03/17/20 
0017 03/16/20 
1158  140  
K 4.6 4.6  102 CO2 33* 35* BUN 15 17 CREA 0.80 0.99 GLU 98 105* CA 8.6 9.2 MG  --  2.0 Recent Labs  
  03/16/20 
1158 SGOT 19 ALT 23 AP 92 TBILI 0.3 TP 6.3* ALB 3.1*  
GLOB 3.2 Recent Labs  
  03/16/20 
1158 INR 1.0 PTP 10.2 APTT 21.1* Recent Labs  
  03/16/20 
1158 TIBC 353 PSAT 21 FERR 9 Lab Results Component Value Date/Time Folate 35.2 (H) 02/09/2017 03:26 AM  
  
No results for input(s): PH, PCO2, PO2 in the last 72 hours. No results for input(s): CPK, CKNDX, TROIQ in the last 72 hours. No lab exists for component: CPKMB Lab Results Component Value Date/Time Cholesterol, total 191 05/31/2016 04:21 AM  
 HDL Cholesterol 69 05/31/2016 04:21 AM  
 LDL, calculated 108.8 (H) 05/31/2016 04:21 AM  
 Triglyceride 66 05/31/2016 04:21 AM  
 CHOL/HDL Ratio 2.8 05/31/2016 04:21 AM  
 
Lab Results Component Value Date/Time Glucose (POC) 95 05/04/2016 02:48 PM  
 
Lab Results Component Value Date/Time  Color YELLOW 09/01/2018 10:50 AM  
 Appearance CLEAR 09/01/2018 10:50 AM  
 Specific gravity 1.008 09/01/2018 10:50 AM  
 pH (UA) 6.0 09/01/2018 10:50 AM  
 Protein NEGATIVE  09/01/2018 10:50 AM  
 Glucose NEGATIVE  09/01/2018 10:50 AM  
 Ketone NEGATIVE  09/01/2018 10:50 AM  
 Bilirubin NEGATIVE  09/01/2018 10:50 AM  
 Urobilinogen 0.2 09/01/2018 10:50 AM  
 Nitrites NEGATIVE  09/01/2018 10:50 AM  
 Leukocyte Esterase NEGATIVE  09/01/2018 10:50 AM  
 Epithelial cells FEW 09/01/2018 10:50 AM  
 Bacteria NEGATIVE  09/01/2018 10:50 AM  
 WBC 0-4 09/01/2018 10:50 AM  
 RBC 0-5 09/01/2018 10:50 AM  
 
 
 
Medications Reviewed:  
 
Current Facility-Administered Medications Medication Dose Route Frequency  sodium chloride (NS) flush 5-40 mL  5-40 mL IntraVENous Q8H  
 sodium chloride (NS) flush 5-40 mL  5-40 mL IntraVENous PRN  
 0.9% sodium chloride infusion 250 mL  250 mL IntraVENous PRN  
 carvediloL (COREG) tablet 3.125 mg  3.125 mg Oral BID WITH MEALS  sodium chloride (NS) flush 5-40 mL  5-40 mL IntraVENous Q8H  
 sodium chloride (NS) flush 5-40 mL  5-40 mL IntraVENous PRN  pantoprazole (PROTONIX) 40 mg in 0.9% sodium chloride 10 mL injection  40 mg IntraVENous BID  albuterol-ipratropium (DUO-NEB) 2.5 MG-0.5 MG/3 ML  3 mL Nebulization Q4H PRN  pregabalin (LYRICA) capsule 150 mg  150 mg Oral BID  DULoxetine (CYMBALTA) capsule 60 mg  60 mg Oral DAILY  buPROPion SR (WELLBUTRIN SR) tablet 150 mg  150 mg Oral DAILY  budesonide (PULMICORT) 250 mcg/2ml nebulizer susp  250 mcg Nebulization BID RT  
 sucralfate (CARAFATE) tablet 1 g  1 g Oral ACB&D  
 atorvastatin (LIPITOR) tablet 20 mg  20 mg Oral QHS  
 
______________________________________________________________________ EXPECTED LENGTH OF STAY: - - - 
ACTUAL LENGTH OF STAY:          0 Vinh Rutledge MD

## 2020-03-17 NOTE — PROGRESS NOTES
[facility] 
[address] [facility], [address] GI PROGRESS NOTE Lizz Pickett, [address] office [phone] NP in-hospital cell phone M-F until 4:30 After 5pm or on weekends, please call  for physician on call NAME: Nakul Garcia :  1960 MRN:  [id number] Subjective: She denies nausea or abdominal pain. No further bowel movements or melena. Objective: VITALS:  
Last 24hrs VS reviewed since prior progress note. Most recent are: 
Visit Vitals /72 (BP 1 Location: Right arm, BP Patient Position: At rest) Pulse 88 Temp 98.3 °F (36.8 °C) Resp 20 Ht 5' (1.524 m) Wt 61.8 kg (136 lb 3.9 oz) SpO2 93% Breastfeeding No  
BMI 26.61 kg/m² PHYSICAL EXAM: 
General: Cooperative, no acute distress   
Neurologic:  Alert and oriented X 3. HEENT: EOMI, no scleral icterus Lungs:  Diminished bilaterally. +wheezing Heart:  S1 S2 Abdomen: Soft, non-distended, no tenderness. +Bowel sounds Extremities: +edema Psych:   Good insight. Not anxious or agitated. Lab Data Reviewed:  
 
Recent Results (from the past 24 hour(s)) CBC WITH AUTOMATED DIFF Collection Time: 20 11:58 AM  
Result Value Ref Range WBC 6.7 3.6 - 11.0 K/uL  
 RBC 2.30 (L) 3.80 - 5.20 M/uL HGB 6.2 (L) 11.5 - 16.0 g/dL HCT 22.9 (L) 35.0 - 47.0 % MCV 99.6 (H) 80.0 - 99.0 FL  
 MCH 27.0 26.0 - 34.0 PG  
 MCHC 27.1 (L) 30.0 - 36.5 g/dL  
 RDW 18.0 (H) 11.5 - 14.5 % PLATELET 960 (L) 434 - 400 K/uL MPV 12.0 8.9 - 12.9 FL  
 NRBC 0.0 0  WBC ABSOLUTE NRBC 0.00 0.00 - 0.01 K/uL NEUTROPHILS 87 (H) 32 - 75 % LYMPHOCYTES 5 (L) 12 - 49 % MONOCYTES 6 5 - 13 % EOSINOPHILS 1 0 - 7 % BASOPHILS 0 0 - 1 % IMMATURE GRANULOCYTES 1 (H) 0.0 - 0.5 % ABS. NEUTROPHILS 5.8 1.8 - 8.0 K/UL  
 ABS. LYMPHOCYTES 0.3 (L) 0.8 - 3.5 K/UL  
 ABS. MONOCYTES 0.4 0.0 - 1.0 K/UL  
 ABS. EOSINOPHILS 0.1 0.0 - 0.4 K/UL ABS. BASOPHILS 0.0 0.0 - 0.1 K/UL  
 ABS. IMM. GRANS. 0.1 (H) 0.00 - 0.04 K/UL  
 DF SMEAR SCANNED    
 RBC COMMENTS ANISOCYTOSIS 1+ 
    
 RBC COMMENTS POLYCHROMASIA PRESENT 
    
 RBC COMMENTS HYPOCHROMIA 1+ 
    
 RBC COMMENTS MACROCYTOSIS 
1+ METABOLIC PANEL, COMPREHENSIVE Collection Time: 03/16/20 11:58 AM  
Result Value Ref Range Sodium 140 136 - 145 mmol/L Potassium 4.6 3.5 - 5.1 mmol/L Chloride 102 97 - 108 mmol/L  
 CO2 35 (H) 21 - 32 mmol/L Anion gap 3 (L) 5 - 15 mmol/L Glucose 105 (H) 65 - 100 mg/dL BUN 17 6 - 20 MG/DL Creatinine 0.99 0.55 - 1.02 MG/DL  
 BUN/Creatinine ratio 17 12 - 20 GFR est AA >60 >60 ml/min/1.73m2 GFR est non-AA 57 (L) >60 ml/min/1.73m2 Calcium 9.2 8.5 - 10.1 MG/DL Bilirubin, total 0.3 0.2 - 1.0 MG/DL  
 ALT (SGPT) 23 12 - 78 U/L  
 AST (SGOT) 19 15 - 37 U/L Alk. phosphatase 92 45 - 117 U/L Protein, total 6.3 (L) 6.4 - 8.2 g/dL Albumin 3.1 (L) 3.5 - 5.0 g/dL Globulin 3.2 2.0 - 4.0 g/dL A-G Ratio 1.0 (L) 1.1 - 2.2 MAGNESIUM Collection Time: 03/16/20 11:58 AM  
Result Value Ref Range Magnesium 2.0 1.6 - 2.4 mg/dL PROTHROMBIN TIME + INR Collection Time: 03/16/20 11:58 AM  
Result Value Ref Range INR 1.0 0.9 - 1.1 Prothrombin time 10.2 9.0 - 11.1 sec PTT Collection Time: 03/16/20 11:58 AM  
Result Value Ref Range aPTT 21.1 (L) 22.1 - 32.0 sec  
 aPTT, therapeutic range     58.0 - 77.0 SECS  
TYPE & SCREEN Collection Time: 03/16/20 11:58 AM  
Result Value Ref Range Crossmatch Expiration 03/19/2020 ABO/Rh(D) O POSITIVE Antibody screen NEG Comment PREVIOUSLY IDENTIFIED ANTI K AND NONSPECIFIC ANTIBODY Unit number [id number] Blood component type [facility] division 00 Status of unit TRANSFUSED Crossmatch result Compatible ANTIGEN/ANTIBODY INFO JEFF NEGATIVE, Unit number [id number] Blood component type [facility] Unit division 00 Status of unit ALLOCATED Crossmatch result Compatible ANTIGEN/ANTIBODY INFO JEFF NEGATIVE, SAMPLES BEING HELD Collection Time: 03/16/20 11:58 AM  
Result Value Ref Range SAMPLES BEING HELD 1RED COMMENT Add-on orders for these samples will be processed based on acceptable specimen integrity and analyte stability, which may vary by analyte. IRON PROFILE Collection Time: 03/16/20 11:58 AM  
Result Value Ref Range Iron 73 35 - 150 ug/dL TIBC 353 250 - 450 ug/dL Iron % saturation 21 20 - 50 % FERRITIN Collection Time: 03/16/20 11:58 AM  
Result Value Ref Range Ferritin 9 8 - 252 NG/ML  
OCCULT BLOOD, STOOL Collection Time: 03/16/20  1:41 PM  
Result Value Ref Range Occult blood, stool POSITIVE (A) NEG    
METABOLIC PANEL, BASIC Collection Time: 03/17/20 12:17 AM  
Result Value Ref Range Sodium 142 136 - 145 mmol/L Potassium 4.6 3.5 - 5.1 mmol/L Chloride 106 97 - 108 mmol/L  
 CO2 33 (H) 21 - 32 mmol/L Anion gap 3 (L) 5 - 15 mmol/L Glucose 98 65 - 100 mg/dL BUN 15 6 - 20 MG/DL Creatinine 0.80 0.55 - 1.02 MG/DL  
 BUN/Creatinine ratio 19 12 - 20 GFR est AA >60 >60 ml/min/1.73m2 GFR est non-AA >60 >60 ml/min/1.73m2 Calcium 8.6 8.5 - 10.1 MG/DL  
HEMOGLOBIN Collection Time: 03/17/20 12:17 AM  
Result Value Ref Range HGB 7.7 (L) 11.5 - 16.0 g/dL Assessment:  
· GI bleed: anemic with hemoccult positive stool. History of GAVE and duodenal AVM's. hgb 7.7, plt 103, INR 1.0 
· Lung nodule suspicious for malignancy · CHF · COPD · Cirrhosis on CT Patient Active Problem List  
Diagnosis Code  Anemia D64.9  
 GI bleed K92.2  Cirrhosis of liver ([facility] K74.60  Chronic respiratory failure with hypoxia (HCC) J96.11  
 Acute blood loss anemia D62  
 GIB (gastrointestinal bleeding) K92.2  CHF (congestive heart failure) (HCC) I50.9 Plan:  
· BID PPI · Monitor CBC, transfuse as necessary · NPO 
 · Plan for EGD today Signed By: Kojo Anand NP   
 3/17/2020  9:44 AM 
  
 
 
 
EGD today

## 2020-03-17 NOTE — PROCEDURES
[address] 
[address] [facility], [address] [place] [address] Esophago- Gastroduodenoscopy (EGD) Procedure Note Corrine Chung 1960 
[id number] Procedure: Endoscopic Gastroduodenoscopy with biopsy, polypectomy Indication:  Iron deficiency anemia, Melena/hematochezia Pre-operative Diagnosis: see indication above Post-operative Diagnosis: see findings below : Courtney Golden MD 
 
Surgical Assistant: None Implants:  None Referring Provider:  Reji Winkler MD 
 
 
Anesthesia/Sedation:  MAC anesthesia Propofol Procedure Details After infomed consent was obtained for the procedure, with all risks and benefits of procedure explained the patient was taken to the endoscopy suite and placed in the left lateral decubitus position. Following sequential administration of sedation as per above, the endoscope was inserted into the mouth and advanced under direct vision to third portion of the duodenum. A careful inspection was made as the gastroscope was withdrawn, including a retroflexed view of the proximal stomach; findings and interventions are described below. Findings:  
Esophagus:hiatal hernia 3 cm in size Stomach: gastritis in antrum, biopsies taken Duodenum/jejunum: there was 1 cm polyp in bulb, removed by hot biopsy There were non bleeding ulcers and superficial ulcers in bulb and second portion of DU, largest one around 8 mm in size Therapies:  As above Specimens: as above EBL: None Complications:   None; patient tolerated the procedure well. Impression:   
-See post-procedure diagnoses. Recommendations: 
-Continue acid suppression. 
-resume po 
-will follow Signed By: Courtney Golden MD   
 3/17/2020  11:57 AM

## 2020-03-17 NOTE — PROGRESS NOTES
Received report from anesthesia staff on vital signs and status of patient.  
 
Scope precleaned at bedside by Joe Ham

## 2020-03-17 NOTE — ANESTHESIA PREPROCEDURE EVALUATION
Relevant Problems No relevant active problems Anesthetic History Review of Systems / Medical History Patient summary reviewed, nursing notes reviewed and pertinent labs reviewed Pulmonary COPD: moderate Comments: 3 liter O2 Neuro/Psych Psychiatric history Cardiovascular Exercise tolerance: >4 METS 
  
GI/Hepatic/Renal 
  
 
 
 
Liver disease Comments: Hx cirrhosis Lower GI bleed Endo/Other Arthritis Other Findings Physical Exam 
 
Airway Mallampati: I 
TM Distance: 4 - 6 cm Neck ROM: normal range of motion Mouth opening: Normal 
 
 Cardiovascular Rhythm: regular Rate: normal 
 
 
 
 Dental 
No notable dental hx Pulmonary Breath sounds clear to auscultation Abdominal 
 
 
 
 Other Findings Anesthetic Plan ASA: 3 Anesthesia type: MAC Induction: Intravenous Anesthetic plan and risks discussed with: Patient

## 2020-03-17 NOTE — PROGRESS NOTES
Pt NPO at start of shift for EGD. Tolerated procedure well. IVF dc'd per Dr Shad Ramsay this evening and Hemoglobin checks changed from q8H to tomorrow morning checks. Rounded with MD to discuss POC with pt, pulm consult placed and pt aware. Pt stable and ambulating around unit with rollator on her own. No s/s of dizzyness. Blood levels stable, VSS. Hourly rounding done. Call bell and personal items within reach. Will cont to monitor and implement POC.

## 2020-03-17 NOTE — PROGRESS NOTES
TRANSITIONS OF CARE PLAN:  
1. [address]STINATION: Likely [address] 2. TRANSPORT: Daughter or counselor Ernst Pacheco) 3. ADDITIONAL SUPPORT: [place] staff, counselor 4. DME: O2, Rollator, Shower Stool, Nebulizer 5. HOME HEALTH: TBD 6. CODE STATUS/AMD STATUS: Full Code; not on file 7. FOLLOW UP APPOINTMENTS: PCP, GI,  
8. STILL NEEDS: EGD, monitor hemoglobin, I's and O's, daily weights, Possible hepatology consult Reason for Admission:   GI Bleed RUR Score:     N/A: RRAT - 22 PCP: First and Last name: Dr. Eleuterio Parker Name of Practice: 
 Are you a current patient: Yes/No: YES Approximate date of last visit: 2-3 weeks ago Resources/supports as identified by patient/family:   Patient has a counselor (Ernst Pacheco), but patient could not identify phone number or what kind of counselor he is Top Challenges facing patient (as identified by patient/family and CM): Finances/Medication cost?      [employer] HMO and Medicaid; uses [address] Transportation? Counselor or [employer] Support system or lack thereof? [place] team, daughter, counselor Living arrangements? First floor [place] apartment [address] Self-care/ADLs/Cognition? Alert and Oriented x 4; independent Current Advanced Directive/Advance Care Plan:  Full Code; not on file Plan for utilizing home health:    TBD Transition of Care Plan:              Patient is a resident of [address] [place] [address] (P: [phone]) and recently moved into [facility] . Patient identified that she has a counselor (Ernst Pacheco) but was not able to provide #, what agency he works with, or what type of counselor he is. Patient has no hx of HH, IPR, or SNF. Pharmacy preference is [address] O2 is serviced by [facility]. Observation notice provided in writing to patient and/or caregiver as well as verbal explanation of the policy. Patients who are in outpatient status also receive the Observation notice. Care Management Interventions PCP Verified by CM: Yes(followed by Dr. Apolinar Wise; last seen 2-3 weeks ago) Palliative Care Criteria Met (RRAT>21 & CHF Dx)?: No 
Mode of Transport at Discharge: Other (see comment)(daughter or counselor to transport) Transition of Care Consult (CM Consult): Discharge Planning MyChart Signup: No 
Discharge Durable Medical Equipment: No(has dme of: O2, rollator, shower stool, nebulizer) Health Maintenance Reviewed: Yes(cm met with patient, with patient alert and oriented x 4) Physical Therapy Consult: No 
Occupational Therapy Consult: No 
Speech Therapy Consult: No 
Current Support Network: Assisted Living(resident of [address] [place] [address]) Confirm Follow Up Transport: Other (see comment)(independent in adls, counselor or medicaid transports patient) The Plan for Transition of Care is Related to the Following Treatment Goals : return to [facility] The Patient and/or Patient Representative was Provided with a Choice of Provider and Agrees with the Discharge Plan?: Yes Freedom of Choice List was Provided with Basic Dialogue that Supports the Patient's Individualized Plan of Care/Goals, Treatment Preferences and Shares the Quality Data Associated with the Providers?: Yes [employer] Information Provided?: No 
Discharge Location Discharge Placement: Assisted Living(resident of [facility]; first floor apartment, no exterior steps) CRM: Randall Otto, MPH,  Jagdish Davis; Z: [phone]

## 2020-03-17 NOTE — PROGRESS NOTES
Spoke with Dr Jeffrey Batista regarding pt EGD procedure. OK to eat per report from Endo and orders for diet placed. Pt A&OX4. VSS on arrival back to floor. Called to discuss need for octreotide gtt. Per Dr Jeffrey Batista check with GI first. clarified with GI team, Will, PA and OK to dc gtt. Dr Jeffrey Batista updated on this and VORB to dc octreotide gtt.

## 2020-03-18 ENCOUNTER — HOSPITAL ENCOUNTER (OUTPATIENT)
Dept: RADIATION THERAPY | Age: 60
Setting detail: OBSERVATION
Discharge: HOME | End: 2020-03-18
Payer: MEDICARE

## 2020-03-18 LAB
ANION GAP SERPL CALC-SCNC: 3 MMOL/L (ref 5–15)
BUN SERPL-MCNC: 16 MG/DL (ref 6–20)
BUN/CREAT SERPL: 19 (ref 12–20)
CALCIUM SERPL-MCNC: 8.4 MG/DL (ref 8.5–10.1)
CHLORIDE SERPL-SCNC: 107 MMOL/L (ref 97–108)
CO2 SERPL-SCNC: 32 MMOL/L (ref 21–32)
CREAT SERPL-MCNC: 0.86 MG/DL (ref 0.55–1.02)
ERYTHROCYTE [DISTWIDTH] IN BLOOD BY AUTOMATED COUNT: 19.1 % (ref 11.5–14.5)
GLUCOSE SERPL-MCNC: 103 MG/DL (ref 65–100)
HCT VFR BLD AUTO: 27.2 % (ref 35–47)
HGB BLD-MCNC: 7.6 G/DL (ref 11.5–16)
MAGNESIUM SERPL-MCNC: 2.2 MG/DL (ref 1.6–2.4)
MCH RBC QN AUTO: 26.5 PG (ref 26–34)
MCHC RBC AUTO-ENTMCNC: 27.9 G/DL (ref 30–36.5)
MCV RBC AUTO: 94.8 FL (ref 80–99)
NRBC # BLD: 0 K/UL (ref 0–0.01)
NRBC BLD-RTO: 0 PER 100 WBC
PLATELET # BLD AUTO: 90 K/UL (ref 150–400)
POTASSIUM SERPL-SCNC: 3.5 MMOL/L (ref 3.5–5.1)
RBC # BLD AUTO: 2.87 M/UL (ref 3.8–5.2)
SODIUM SERPL-SCNC: 142 MMOL/L (ref 136–145)
WBC # BLD AUTO: 4.9 K/UL (ref 3.6–11)

## 2020-03-18 PROCEDURE — 96375 TX/PRO/DX INJ NEW DRUG ADDON: CPT

## 2020-03-18 PROCEDURE — 74011250636 HC RX REV CODE- 250/636: Performed by: INTERNAL MEDICINE

## 2020-03-18 PROCEDURE — 74011250637 HC RX REV CODE- 250/637: Performed by: NURSE PRACTITIONER

## 2020-03-18 PROCEDURE — 83735 ASSAY OF MAGNESIUM: CPT

## 2020-03-18 PROCEDURE — 77010033678 HC OXYGEN DAILY

## 2020-03-18 PROCEDURE — 85027 COMPLETE CBC AUTOMATED: CPT

## 2020-03-18 PROCEDURE — 74011250636 HC RX REV CODE- 250/636: Performed by: FAMILY MEDICINE

## 2020-03-18 PROCEDURE — 74011000250 HC RX REV CODE- 250: Performed by: FAMILY MEDICINE

## 2020-03-18 PROCEDURE — 74011250637 HC RX REV CODE- 250/637: Performed by: INTERNAL MEDICINE

## 2020-03-18 PROCEDURE — 94640 AIRWAY INHALATION TREATMENT: CPT

## 2020-03-18 PROCEDURE — 74011250637 HC RX REV CODE- 250/637: Performed by: FAMILY MEDICINE

## 2020-03-18 PROCEDURE — C9113 INJ PANTOPRAZOLE SODIUM, VIA: HCPCS | Performed by: FAMILY MEDICINE

## 2020-03-18 PROCEDURE — 36415 COLL VENOUS BLD VENIPUNCTURE: CPT

## 2020-03-18 PROCEDURE — 96376 TX/PRO/DX INJ SAME DRUG ADON: CPT

## 2020-03-18 PROCEDURE — 99218 HC RM OBSERVATION: CPT

## 2020-03-18 PROCEDURE — 74011000258 HC RX REV CODE- 258: Performed by: INTERNAL MEDICINE

## 2020-03-18 PROCEDURE — 80048 BASIC METABOLIC PNL TOTAL CA: CPT

## 2020-03-18 RX ORDER — POTASSIUM CHLORIDE 1.5 G/1.77G
40 POWDER, FOR SOLUTION ORAL ONCE
Status: COMPLETED | OUTPATIENT
Start: 2020-03-18 | End: 2020-03-18

## 2020-03-18 RX ORDER — POTASSIUM CHLORIDE 7.45 MG/ML
10 INJECTION INTRAVENOUS
Status: DISCONTINUED | OUTPATIENT
Start: 2020-03-18 | End: 2020-03-18

## 2020-03-18 RX ADMIN — POTASSIUM CHLORIDE 40 MEQ: 1.5 POWDER, FOR SOLUTION ORAL at 14:01

## 2020-03-18 RX ADMIN — SUCRALFATE 1 G: 1 TABLET ORAL at 07:12

## 2020-03-18 RX ADMIN — ATORVASTATIN CALCIUM 20 MG: 20 TABLET, FILM COATED ORAL at 22:28

## 2020-03-18 RX ADMIN — Medication 10 ML: at 14:01

## 2020-03-18 RX ADMIN — CARVEDILOL 3.12 MG: 3.12 TABLET, FILM COATED ORAL at 17:38

## 2020-03-18 RX ADMIN — POTASSIUM CHLORIDE 10 MEQ: 7.46 INJECTION, SOLUTION INTRAVENOUS at 09:48

## 2020-03-18 RX ADMIN — PREGABALIN 150 MG: 75 CAPSULE ORAL at 08:45

## 2020-03-18 RX ADMIN — SUCRALFATE 1 G: 1 TABLET ORAL at 17:38

## 2020-03-18 RX ADMIN — IRON SUCROSE 200 MG: 20 INJECTION, SOLUTION INTRAVENOUS at 20:22

## 2020-03-18 RX ADMIN — OXYCODONE HYDROCHLORIDE AND ACETAMINOPHEN 1 TABLET: 7.5; 325 TABLET ORAL at 18:51

## 2020-03-18 RX ADMIN — Medication 10 ML: at 22:29

## 2020-03-18 RX ADMIN — SODIUM CHLORIDE 40 MG: 9 INJECTION INTRAMUSCULAR; INTRAVENOUS; SUBCUTANEOUS at 08:45

## 2020-03-18 RX ADMIN — SODIUM CHLORIDE 40 MG: 9 INJECTION INTRAMUSCULAR; INTRAVENOUS; SUBCUTANEOUS at 18:48

## 2020-03-18 RX ADMIN — Medication 10 ML: at 07:13

## 2020-03-18 RX ADMIN — PREGABALIN 150 MG: 75 CAPSULE ORAL at 18:48

## 2020-03-18 RX ADMIN — CARVEDILOL 3.12 MG: 3.12 TABLET, FILM COATED ORAL at 08:45

## 2020-03-18 RX ADMIN — Medication 10 ML: at 22:28

## 2020-03-18 RX ADMIN — BUPROPION HYDROCHLORIDE 150 MG: 150 TABLET, EXTENDED RELEASE ORAL at 08:45

## 2020-03-18 RX ADMIN — OXYCODONE HYDROCHLORIDE AND ACETAMINOPHEN 1 TABLET: 7.5; 325 TABLET ORAL at 09:12

## 2020-03-18 RX ADMIN — IPRATROPIUM BROMIDE AND ALBUTEROL SULFATE 3 ML: .5; 3 SOLUTION RESPIRATORY (INHALATION) at 20:13

## 2020-03-18 RX ADMIN — DULOXETINE HYDROCHLORIDE 60 MG: 60 CAPSULE, DELAYED RELEASE ORAL at 08:45

## 2020-03-18 RX ADMIN — BUDESONIDE 250 MCG: 0.25 SUSPENSION RESPIRATORY (INHALATION) at 08:37

## 2020-03-18 RX ADMIN — IPRATROPIUM BROMIDE AND ALBUTEROL SULFATE 3 ML: .5; 3 SOLUTION RESPIRATORY (INHALATION) at 08:37

## 2020-03-18 RX ADMIN — BUDESONIDE 250 MCG: 0.25 SUSPENSION RESPIRATORY (INHALATION) at 20:13

## 2020-03-18 NOTE — PROGRESS NOTES
Problem: Upper and Lower GI Bleed: Day 2 Goal: Activity/Safety Outcome: Progressing Towards Goal 
Goal: Nutrition/Diet Outcome: Progressing Towards Goal 
Goal: Treatments/Interventions/Procedures Outcome: Progressing Towards Goal 
Goal: *Optimal pain control at patient's stated goal 
Outcome: Progressing Towards Goal 
  
Problem: Falls - Risk of 
Goal: *Absence of Falls Description: Document Tuyet Godinez Fall Risk and appropriate interventions in the flowsheet. Outcome: Progressing Towards Goal 
Note: Fall Risk Interventions: 
Mobility Interventions: Patient to call before getting OOB Medication Interventions: Evaluate medications/consider consulting pharmacy Elimination Interventions: Call light in reach Problem: Chronic Obstructive Pulmonary Disease (COPD) Goal: *Oxygen saturation during activity within specified parameters Outcome: Progressing Towards Goal

## 2020-03-18 NOTE — PROGRESS NOTES
[facility] 
[address] [place], [address] GI PROGRESS NOTE [facility] office [phone] NP in-hospital cell phone M-F until 4:30 After 5pm or on weekends, please call  for physician on call NAME: Jamila Ledbetter :  1960 MRN:  [id number] Subjective: No acute events. She is feeling well, no abdominal pain. No more melena or signs of GI blood loss. Objective: VITALS:  
Last 24hrs VS reviewed since prior progress note. Most recent are: 
Visit Vitals /78 (BP 1 Location: Left arm, BP Patient Position: At rest) Pulse 85 Temp 98.2 °F (36.8 °C) Resp 16 Ht 5' (1.524 m) Wt 62.3 kg (137 lb 5.6 oz) SpO2 95% Breastfeeding No  
BMI 26.82 kg/m² PHYSICAL EXAM: 
General: Cooperative, no acute distress   
Neurologic:  Alert and oriented X 3. HEENT: EOMI, no scleral icterus Lungs:  Diminished bilaterally. Heart:  S1 S2 Abdomen: Soft, non-distended, no tenderness. +Bowel sounds Extremities: +edema Psych:   Good insight. Not anxious or agitated. Lab Data Reviewed:  
 
Recent Results (from the past 24 hour(s)) HEMOGLOBIN Collection Time: 20 10:13 AM  
Result Value Ref Range HGB 7.9 (L) 11.5 - 16.0 g/dL CBC W/O DIFF Collection Time: 20  3:57 AM  
Result Value Ref Range WBC 4.9 3.6 - 11.0 K/uL  
 RBC 2.87 (L) 3.80 - 5.20 M/uL HGB 7.6 (L) 11.5 - 16.0 g/dL HCT 27.2 (L) 35.0 - 47.0 % MCV 94.8 80.0 - 99.0 FL  
 MCH 26.5 26.0 - 34.0 PG  
 MCHC 27.9 (L) 30.0 - 36.5 g/dL  
 RDW 19.1 (H) 11.5 - 14.5 % PLATELET 90 (L) 160 - 400 K/uL NRBC 0.0 0  WBC ABSOLUTE NRBC 0.00 0.00 - 0.01 K/uL METABOLIC PANEL, BASIC Collection Time: 20  3:57 AM  
Result Value Ref Range Sodium 142 136 - 145 mmol/L Potassium 3.5 3.5 - 5.1 mmol/L Chloride 107 97 - 108 mmol/L  
 CO2 32 21 - 32 mmol/L  Anion gap 3 (L) 5 - 15 mmol/L  
 Glucose 103 (H) 65 - 100 mg/dL BUN 16 6 - 20 MG/DL Creatinine 0.86 0.55 - 1.02 MG/DL  
 BUN/Creatinine ratio 19 12 - 20 GFR est AA >60 >60 ml/min/1.73m2 GFR est non-AA >60 >60 ml/min/1.73m2 Calcium 8.4 (L) 8.5 - 10.1 MG/DL MAGNESIUM Collection Time: 03/18/20  3:57 AM  
Result Value Ref Range Magnesium 2.2 1.6 - 2.4 mg/dL Assessment:  
· GI bleed: anemic with hemoccult positive stool. History of GAVE and duodenal AVM's. hgb 7.6, plt 90, INR 1.0; EGD 3/17/2020: 3 cm hiatal hernia, gastritis, 1 cm polyp duodenal bulb, non-bleeding ulcers bulb and second portion of duodenum · Lung nodule suspicious for malignancy · CHF · COPD · Cirrhosis on CT Patient Active Problem List  
Diagnosis Code  Anemia D64.9  
 GI bleed K92.2  Cirrhosis of liver ([facility] K74.60  Chronic respiratory failure with hypoxia (HCC) J96.11  
 Acute blood loss anemia D62  
 GIB (gastrointestinal bleeding) K92.2  CHF (congestive heart failure) (HCC) I50.9 Plan: · PPI and carafate · Monitor CBC, transfuse as necessary · Stable for discharge from GI standpoint, will be available to see again on request   
 
Signed By: Reese Dupont NP   
 3/18/2020  9:44 AM 
  
 
  
Will sign off Please call for questions

## 2020-03-18 NOTE — PROGRESS NOTES
3/18/2020 -  
ROXY: 
- RRAT: 25 
- RUR is not available due to OBS Status - Pulmonary Consult pending - GI Input is pending - Patient may need hepatology and hem-onc consults CRM: Jackie Marsh, MPH, [address] [place] [address]; Z: [phone]

## 2020-03-18 NOTE — PROGRESS NOTES
Bedside and Verbal shift change report given to Jose Hart RN (oncoming nurse) by Bairon Elliott RN (offgoing nurse). Report included the following information SBAR, Kardex, Procedure Summary, Intake/Output, MAR, Recent Results and Cardiac Rhythm NSR.

## 2020-03-18 NOTE — CONSULTS
[facility] Pulmonary, Critical Care, and Sleep Medicine Initial Patient Consult Name: Lisa Snyder MRN: [id number] : 1960 [facility] Date: 3/18/2020 IMPRESSION:  
· Chronic hypoxic resp failure- severe COPD on 3lpm O2 + pulmicort and brovana (  FEV1 0.78 (35%) at baseline no AECOPD. · LLL mass- worrisome for neoplasia. · GIB · Blood loss anemia · RA  
  
RECOMMENDATIONS:  
 
· This is a high risk LLL nodule and although shelia bronch could be considered, she is high risk for resp decline from that given her severe COPD. She is also not a candidate for surgical resection given her PFTs. · Would consult rad onc for empiric SBRT to LLL nodule. · Nebs · SUP Subjective: This patient has been seen and evaluated at the request of Dr. John Roberts for LLL mass. Patient is a 60 y.o. female admitted for black stools and anemia. EGD done showing duodenal hyperplasia. CT chest with LLL 2 cm mass. Pt denies CP or fever or cough. No travel. No n/v/d. Past Medical History:  
Diagnosis Date  Anemia  Arthritis Rheumatoid  CHF (congestive heart failure) ([address]  Chronic obstructive pulmonary disease ([address]  Cirrhosis of liver ([address]  COPD (chronic obstructive pulmonary disease) (HCC)  Gastrointestinal disorder \"lacerations in the large part of my small intestine. \"  Gastrointestinal disorder   
 liver cirrhosis  Heart failure ([facility]  Internal bleeding  Psychiatric disorder   
 depression, anxiety  Tachycardia 2016 Past Surgical History:  
Procedure Laterality Date  HX CHOLECYSTECTOMY  HX GI Cholecystectomoy  HX GYN    
  x 2.  MI ENDOSCOPY UPPER SMALL INTESTINE  2016 Prior to Admission medications Medication Sig Start Date End Date Taking? Authorizing Provider ARIPiprazole (ABILIFY) 10 mg tablet Take 10 mg by mouth daily.    Yes Provider, Historical  
cyanocobalamin (Vitamin B-12) 250 mcg tablet Take 250 mcg by mouth daily. Yes Provider, Historical  
oxyCODONE-acetaminophen (Percocet) 7.5-325 mg per tablet Take 1 Tab by mouth three (3) times daily. Yes Provider, Historical  
pregabalin (LYRICA) 150 mg capsule Take 150 mg by mouth two (2) times a day. Yes Provider, Historical  
DULoxetine (CYMBALTA) 60 mg capsule Take 60 mg by mouth daily. Yes Provider, Historical  
atorvastatin (LIPITOR) 20 mg tablet Take 20 mg by mouth nightly. Yes Provider, Historical  
sucralfate (CARAFATE) 1 gram tablet Take 1 g by mouth two (2) times a day. Yes Provider, Historical  
buPROPion XL (WELLBUTRIN XL) 150 mg tablet Take 150 mg by mouth every morning. Yes Provider, Historical  
fluticasone propionate (Flovent HFA) 44 mcg/actuation inhaler Take 2 Puffs by inhalation two (2) times a day. Yes Provider, Historical  
furosemide (LASIX) 20 mg tablet Take 20 mg by mouth daily. Yes Provider, Historical  
carvedilol (COREG) 3.125 mg tablet Take 3.125 mg by mouth two (2) times daily (with meals). Yes Provider, Historical  
loratadine (CLARITIN) 10 mg tablet Take 10 mg by mouth daily. Yes Provider, Historical  
cholecalciferol, vitamin D3, (VITAMIN D3) 2,000 unit tab Take 4,000 Units by mouth daily. Yes Provider, Historical  
pantoprazole (PROTONIX) 40 mg tablet Take 40 mg by mouth daily. Yes Provider, Historical  
multivitamins-minerals-lutein (CEROVITE SENIOR) tab tablet Take 1 Tab by mouth daily. Yes Provider, Historical  
albuterol-ipratropium (DUO-NEB) 2.5 mg-0.5 mg/3 ml nebu 3 mL by Nebulization route every four (4) hours as needed for Wheezing. Yes Provider, Historical  
senna-docusate (SENNA PLUS) 8.6-50 mg per tablet Take 1-2 Tabs by mouth nightly as needed for Constipation.    Yes Provider, Historical  
albuterol (VENTOLIN HFA) 90 mcg/actuation inhaler Take 2 Puffs by inhalation four (4) times daily as needed for Wheezing. Yes Provider, Historical  
spironolactone (ALDACTONE) 50 mg tablet Take 50 mg by mouth daily as needed (excess fluid). Provider, Historical  
 
Allergies Allergen Reactions  Pcn [Penicillins] Angioedema Childhood reaction Social History Tobacco Use  Smoking status: Current Every Day Smoker Packs/day: 0.75 Years: 45.00 Pack years: 33.75  Smokeless tobacco: Never Used Substance Use Topics  Alcohol use: No  
  Comment: in the past was an alcoholic. Has been sober x 2 years. Family History Problem Relation Age of Onset  Cancer Mother  Alcohol abuse Father  Cancer Sister Current Facility-Administered Medications Medication Dose Route Frequency  potassium chloride 10 mEq in 100 ml IVPB  10 mEq IntraVENous Q1H  
 sodium chloride (NS) flush 5-40 mL  5-40 mL IntraVENous Q8H  
 iron sucrose (VENOFER) 200 mg in 0.9% sodium chloride 100 mL IVPB  200 mg IntraVENous Q24H  carvediloL (COREG) tablet 3.125 mg  3.125 mg Oral BID WITH MEALS  sodium chloride (NS) flush 5-40 mL  5-40 mL IntraVENous Q8H  
 pantoprazole (PROTONIX) 40 mg in 0.9% sodium chloride 10 mL injection  40 mg IntraVENous BID  pregabalin (LYRICA) capsule 150 mg  150 mg Oral BID  DULoxetine (CYMBALTA) capsule 60 mg  60 mg Oral DAILY  buPROPion SR (WELLBUTRIN SR) tablet 150 mg  150 mg Oral DAILY  budesonide (PULMICORT) 250 mcg/2ml nebulizer susp  250 mcg Nebulization BID RT  
 sucralfate (CARAFATE) tablet 1 g  1 g Oral ACB&D  
 atorvastatin (LIPITOR) tablet 20 mg  20 mg Oral QHS Review of Systems: A comprehensive review of systems was negative except for that written in the HPI. Objective:  
Vital Signs:   
Visit Vitals /78 (BP 1 Location: Left arm, BP Patient Position: At rest) Pulse 85 Temp 98.2 °F (36.8 °C) Resp 16 Ht 5' (1.524 m) Wt 62.3 kg (137 lb 5.6 oz) SpO2 95% Breastfeeding No  
BMI 26.82 kg/m² O2 Device: Nasal cannula O2 Flow Rate (L/min): 3 l/min Temp (24hrs), Av.2 °F (36.8 °C), Min:97.4 °F (36.3 °C), Max:98.9 °F (37.2 °C) Intake/Output:  
Last shift:      No intake/output data recorded. Last 3 shifts:  1901 -  0700 In: 317.5 [I.V.:100] Out: 4 Intake/Output Summary (Last 24 hours) at 3/18/2020 1059 Last data filed at 3/17/2020 2258 Gross per 24 hour Intake 100 ml Output 4 ml Net 96 ml Physical Exam:  
General:  Alert, cooperative, no distress, appears stated age. Head:  Normocephalic, without obvious abnormality, atraumatic. Eyes:  Conjunctivae/corneas clear. Nose: Nares normal.   
   
Neck: Supple, symmetrical, trachea midline Lungs:   Clear to auscultation bilaterally. Heart:  Regular rate and rhythm, S1, S2 normal, no murmur, click, rub or gallop. Abdomen:   Soft, non-tender. Bowel sounds normal. No masses,  No organomegaly. Extremities: Extremities normal, atraumatic, no cyanosis or edema. Pulses: 2+ and symmetric all extremities. Skin: Skin color, texture, turgor normal. No rashes or lesions Data review:  
 
Recent Results (from the past 24 hour(s)) CBC W/O DIFF Collection Time: 20  3:57 AM  
Result Value Ref Range WBC 4.9 3.6 - 11.0 K/uL  
 RBC 2.87 (L) 3.80 - 5.20 M/uL HGB 7.6 (L) 11.5 - 16.0 g/dL HCT 27.2 (L) 35.0 - 47.0 % MCV 94.8 80.0 - 99.0 FL  
 MCH 26.5 26.0 - 34.0 PG  
 MCHC 27.9 (L) 30.0 - 36.5 g/dL  
 RDW 19.1 (H) 11.5 - 14.5 % PLATELET 90 (L) 381 - 400 K/uL NRBC 0.0 0  WBC ABSOLUTE NRBC 0.00 0.00 - 0.01 K/uL METABOLIC PANEL, BASIC Collection Time: 20  3:57 AM  
Result Value Ref Range Sodium 142 136 - 145 mmol/L Potassium 3.5 3.5 - 5.1 mmol/L Chloride 107 97 - 108 mmol/L  
 CO2 32 21 - 32 mmol/L Anion gap 3 (L) 5 - 15 mmol/L Glucose 103 (H) 65 - 100 mg/dL  BUN 16 6 - 20 MG/DL  
 Creatinine 0.86 0.55 - 1.02 MG/DL  
 BUN/Creatinine ratio 19 12 - 20 GFR est AA >60 >60 ml/min/1.73m2 GFR est non-AA >60 >60 ml/min/1.73m2 Calcium 8.4 (L) 8.5 - 10.1 MG/DL MAGNESIUM Collection Time: 03/18/20  3:57 AM  
Result Value Ref Range Magnesium 2.2 1.6 - 2.4 mg/dL Imaging: 
I have personally reviewed the patients radiographs and have reviewed the reports: 
CT chest with LLL 2.3 cm rounded mass no GGO noILD changes no LAD. No effusions.    
 
  
Olivier Khalil MD

## 2020-03-18 NOTE — PROGRESS NOTES
NUTRITION Malnutrition Screening Tool (MST) triggered RD referral based on results obtained during nursing admission assessment. The patient's chart was reviewed and nutrition assessment is not indicated at this time. Plan to see patient for nutrition care needs as indicated. Pt with good intake at home and wt overall stable. Thank you. Wt Readings from Last 5 Encounters:  
03/18/20 62.3 kg (137 lb 5.6 oz)  
06/27/19 60.4 kg (133 lb 3.2 oz) 09/01/18 60.8 kg (134 lb 0.6 oz) 01/20/18 55.8 kg (123 lb) 10/03/17 56.8 kg (125 lb 3.5 oz) ] Jermain Green RD

## 2020-03-18 NOTE — PHYSICIAN ADVISORY
Letter of Status Determination: Current Status OBSERVATION is Appropriate Pt Name:  Shana Jordan MR#  [id number] [facility]#   [id number] Room and Hospital  353/01  @ [facility]  
Hospitalization date  3/16/2020 11:18 AM  
Current Attending Physician  Rosa Maria Martinez MD  
Principal diagnosis  GI BLEED Clinicals  The patient is a 70-year-old female with past medical history of cryptogenic cirrhosis, history of COPD with chronic hypoxic respiratory failure requiring oxygen, history of recurrent anemia, history of AV malformation, anxiety and depression, who presents to the hospital with the above-mentioned symptom. The patient reports that in the past few days she has been feeling weaker and has been having black stools. The patient had an endoscopy done in 10/2017, which revealed a 1 cm AVM in the second portion of the duodenum. The patient reports that she recently went to [facility] ER, was transfused and told to follow up with her gastroenterologist.  The patient reports that she sees a hematologist/oncologist and iron infusions are planned in the future. The patient came to the ER and was requested to be admitted under the hospitalist service. The patient denies any other complaints or problems. The patient reports that she does not take any blood thinner. The patient reports that she has not had any denis blood per rectum. EGD with Esophagus:hiatal hernia 3 cm in size Stomach: gastritis in antrum, biopsies taken Duodenum/jejunum: there was 1 cm polyp in bulb, removed by hot biopsy There were non bleeding ulcers and superficial ulcers in bulb and second portion of DU, largest one around 8 mm in size Stable [place] MCG criteria Does  NOT apply STATUS DETERMINATION  On the basis of clinical data, available documentaion, we believe that the current status of this patient as OBSERVATION is Appropriate The final decision of the patient's hospitalization status depends on the attending physician's judgment Additional comments Insurance  Payor: Sharona Lira / Plan: [address] HMO / Product Type: Managed Care Medicare / Insurance Information [address] [place], [place] [address]O Phone:   
 Subscriber: Yassine Hart Subscriber#: [id number] Group#: [id number] Precert#:   
   
 [facility] Phone:   
 Subscriber: Yassine Hart Subscriber#: [id number] Group#: [id number] Precert#:   
  
 
  
 
 
 
 
Leodan Casarez MD 
Cell: [phone] Physician Advisor

## 2020-03-18 NOTE — PROGRESS NOTES
[address] [facility] Adult  Hospitalist Group Hospitalist Progress Note Derek Flanagan MD 
Answering service: [phone] or [phone] from in house phone Date of Service:  3/18/2020 NAME:  Corrine Chung :  1960 MRN:  [id number] Admission Summary:  
The patient is a 25-year-old female with past medical history of cryptogenic cirrhosis, history of COPD with chronic hypoxic respiratory failure requiring oxygen, history of recurrent anemia, history of AV malformation, anxiety and depression, who presents to the hospital with the above-mentioned symptom. The patient reports that in the past few days she has been feeling weaker and has been having black stools. The patient had an endoscopy done in 10/2017, which revealed a 1 cm AVM in the second portion of the duodenum. The patient reports that she recently went to [facility] ER, was transfused and told to follow up with her gastroenterologist.  The patient reports that she sees a hematologist/oncologist and iron infusions are planned in the future. The patient came to the ER and was requested to be admitted under the hospitalist service. The patient denies any other complaints or problems. The patient reports that she does not take any blood thinner. The patient reports that she has not had any denis blood per rectum. The patient denies any headache, blurry vision, sore throat, trouble swallowing, trouble with speech, any chest pain, abdominal pain, constipation, diarrhea, urinary symptoms, focal or generalized neurological weakness, recent travel, sick contacts, falls, injuries, hematemesis, melena, hemoptysis, hematuria or any other concerns or problems. Interval history / Subjective: Follow up melena. Patient seen and examined at the bedside. Labs, images and notes reviewed Discussed with nursing staff, orders reviewed. Plan discussed with patient/Family Pt is feeling better. Hb stable. Again, tearful about LLL lung nodule. Had extensive discussion and counseling. Pt felt much better after that. Awaiting Pulm. Recommendations. D/w Pulm. Recommended Rad-onc consultation. D/w Rad-onc. Wanting to get PET CT done. D/w PET CT dept which is now off site. Checking with Director of the Dept. If this can be done. Otherwise, outpatient arrangement with PET CT and Dr. Nilson Saravia f/up. Assessment & Plan: #Recurrent GI bleed. -GI on board 
-PPI twice daily and Carafate. Off octreotide gtt given no varices on EGD 
-GI lite diet 
-OK to DC from GI standpoint. #Acute blood loss anemia with iron deficiency, s/p 1 PRBC 3/16 with low ferritin and percentage iron saturation 
-Daily CBC monitoring 
-IV iron infusion #History of cirrhosis 
-Strict I/os, daily weight 
-GI on board.   
-Pt follows with Dr. Michelle Hudson' NP - Hepatology office. Further outpatient f/up on DC 
-Lasix, Aldactone #Left lower lobe lung nodule/mass 
-Pulmonology consultation given the risk and concern for malignancy. Appreciate recommendations 
-Rad-oncology consulted by Pulm. -Working on getting PET-CT. If not possible inpatient, would arrange to get outpatient schedule done for PET-CT and f/up with Rad-onc and Pulm. 
-Active smoker. Counseled for's smoking cessation #History of COPD with chronic hypoxic respiratory failure, on home O2 
-PRN DuoNeb, Pulmicort 
-Supportive care with supplemental O2 
-Consider to add Mucinex Code status: Full code DVT prophylaxis: SCDs Care Plan discussed with: Patient/Family and Nurse Anticipated Disposition: Home w/Family Anticipated Discharge: 24 hours to 48 hours may change based on pulmonology or GI recommendations Hospital Problems  Date Reviewed: 8/18/2019 Codes Class Noted POA  
 GI bleed ICD-10-CM: K92.2 ICD-9-CM: 578.9  7/19/2016 Unknown Review of Systems: A comprehensive review of systems was negative except for that written in the HPI. Vital Signs:  
 Last 24hrs VS reviewed since prior progress note. Most recent are: 
Visit Vitals /70 (BP 1 Location: Right arm, BP Patient Position: At rest) Pulse 80 Temp 98 °F (36.7 °C) Resp 16 Ht 5' (1.524 m) Wt 62.3 kg (137 lb 5.6 oz) SpO2 97% Breastfeeding No  
BMI 26.82 kg/m² Intake/Output Summary (Last 24 hours) at 3/18/2020 1546 Last data filed at 3/17/2020 2258 Gross per 24 hour Intake 100 ml Output 4 ml Net 96 ml Physical Examination:  
 
GENERAL:  Alert and oriented x3, awake, some emotional distress with fear of malignancy , pleasant female, appears to be stated age. Tearful when discussed about left lung mass/nodule, again today HEENT:  Pupils are equal and reactive to light. Dry mucous membranes. Tympanic membranes clear. NECK:  Supple. No JVD CHEST:   Only reduced air entry bilaterally but without significant wheezing or rhonchi or rales HEART:  S1 and S2 heard. ABDOMEN:  Soft, nontender, nondistended. Bowel sounds are physiological. 
EXTREMITIES:  No clubbing, no cyanosis, no edema. NEURO/PSYCH:  Pleasant mood and affect. Cranial nerves II through XII grossly intact. No focal neurological deficits. SKIN:  Warm. Data Review:  
 Review and/or order of clinical lab test 
Review and/or order of tests in the radiology section of CPT Review and/or order of tests in the medicine section of CPT No results found. EGD 3/17/2020: 
Findings:  
Esophagus:hiatal hernia 3 cm in size Stomach: gastritis in antrum, biopsies taken Duodenum/jejunum: there was 1 cm polyp in bulb, removed by hot biopsy There were non bleeding ulcers and superficial ulcers in bulb and second portion of DU, largest one around 8 mm in size Recommendations: 
-Continue acid suppression. 
-resume po 
-will follow 
  
 
Labs:  
 
Recent Labs  
  03/18/20 1248 03/17/20 
1013  03/16/20 
1158 WBC 4.9  --   --  6.7 HGB 7.6* 7.9*   < > 6.2* HCT 27.2*  --   --  22.9*  
PLT 90*  --   --  103*  
 < > = values in this interval not displayed. Recent Labs  
  03/18/20 
0357 03/17/20 
0017 03/16/20 
1158  142 140  
K 3.5 4.6 4.6  106 102 CO2 32 33* 35* BUN 16 15 17 CREA 0.86 0.80 0.99 * 98 105* CA 8.4* 8.6 9.2 MG 2.2  --  2.0 Recent Labs  
  03/16/20 
1158 SGOT 19 ALT 23 AP 92 TBILI 0.3 TP 6.3* ALB 3.1*  
GLOB 3.2 Recent Labs  
  03/16/20 
1158 INR 1.0 PTP 10.2 APTT 21.1* Recent Labs  
  03/16/20 
1158 TIBC 353 PSAT 21 FERR 9 Lab Results Component Value Date/Time Folate 35.2 (H) 02/09/2017 03:26 AM  
  
No results for input(s): PH, PCO2, PO2 in the last 72 hours. No results for input(s): CPK, CKNDX, TROIQ in the last 72 hours. No lab exists for component: CPKMB Lab Results Component Value Date/Time Cholesterol, total 191 05/31/2016 04:21 AM  
 HDL Cholesterol 69 05/31/2016 04:21 AM  
 LDL, calculated 108.8 (H) 05/31/2016 04:21 AM  
 Triglyceride 66 05/31/2016 04:21 AM  
 CHOL/HDL Ratio 2.8 05/31/2016 04:21 AM  
 
Lab Results Component Value Date/Time Glucose (POC) 95 05/04/2016 02:48 PM  
 
Lab Results Component Value Date/Time Color YELLOW 09/01/2018 10:50 AM  
 Appearance CLEAR 09/01/2018 10:50 AM  
 Specific gravity 1.008 09/01/2018 10:50 AM  
 pH (UA) 6.0 09/01/2018 10:50 AM  
 Protein NEGATIVE  09/01/2018 10:50 AM  
 Glucose NEGATIVE  09/01/2018 10:50 AM  
 Ketone NEGATIVE  09/01/2018 10:50 AM  
 Bilirubin NEGATIVE  09/01/2018 10:50 AM  
 Urobilinogen 0.2 09/01/2018 10:50 AM  
 Nitrites NEGATIVE  09/01/2018 10:50 AM  
 Leukocyte Esterase NEGATIVE  09/01/2018 10:50 AM  
 Epithelial cells FEW 09/01/2018 10:50 AM  
 Bacteria NEGATIVE  09/01/2018 10:50 AM  
 WBC 0-4 09/01/2018 10:50 AM  
 RBC 0-5 09/01/2018 10:50 AM  
 
 
 
Medications Reviewed: Current Facility-Administered Medications Medication Dose Route Frequency  sodium chloride (NS) flush 5-40 mL  5-40 mL IntraVENous Q8H  
 sodium chloride (NS) flush 5-40 mL  5-40 mL IntraVENous PRN  
 iron sucrose (VENOFER) 200 mg in 0.9% sodium chloride 100 mL IVPB  200 mg IntraVENous Q24H  
 simethicone (MYLICON) tablet 80 mg  80 mg Oral QID PRN  
 hydrOXYzine HCL (ATARAX) tablet 25 mg  25 mg Oral TID PRN  
 oxyCODONE-acetaminophen (PERCOCET 7.5) 7.5-325 mg per tablet 1 Tab  1 Tab Oral Q8H PRN  
 0.9% sodium chloride infusion 250 mL  250 mL IntraVENous PRN  
 carvediloL (COREG) tablet 3.125 mg  3.125 mg Oral BID WITH MEALS  sodium chloride (NS) flush 5-40 mL  5-40 mL IntraVENous Q8H  
 sodium chloride (NS) flush 5-40 mL  5-40 mL IntraVENous PRN  pantoprazole (PROTONIX) 40 mg in 0.9% sodium chloride 10 mL injection  40 mg IntraVENous BID  albuterol-ipratropium (DUO-NEB) 2.5 MG-0.5 MG/3 ML  3 mL Nebulization Q4H PRN  pregabalin (LYRICA) capsule 150 mg  150 mg Oral BID  DULoxetine (CYMBALTA) capsule 60 mg  60 mg Oral DAILY  buPROPion SR (WELLBUTRIN SR) tablet 150 mg  150 mg Oral DAILY  budesonide (PULMICORT) 250 mcg/2ml nebulizer susp  250 mcg Nebulization BID RT  
 sucralfate (CARAFATE) tablet 1 g  1 g Oral ACB&D  
 atorvastatin (LIPITOR) tablet 20 mg  20 mg Oral QHS  
 
______________________________________________________________________ EXPECTED LENGTH OF STAY: - - - 
ACTUAL LENGTH OF STAY:          0 Kulwinder Yo MD

## 2020-03-19 PROCEDURE — 94640 AIRWAY INHALATION TREATMENT: CPT

## 2020-03-19 PROCEDURE — 74011000250 HC RX REV CODE- 250: Performed by: FAMILY MEDICINE

## 2020-03-19 PROCEDURE — 74011250637 HC RX REV CODE- 250/637: Performed by: NURSE PRACTITIONER

## 2020-03-19 PROCEDURE — 96376 TX/PRO/DX INJ SAME DRUG ADON: CPT

## 2020-03-19 PROCEDURE — 74011250636 HC RX REV CODE- 250/636: Performed by: INTERNAL MEDICINE

## 2020-03-19 PROCEDURE — C9113 INJ PANTOPRAZOLE SODIUM, VIA: HCPCS | Performed by: FAMILY MEDICINE

## 2020-03-19 PROCEDURE — 77010033678 HC OXYGEN DAILY

## 2020-03-19 PROCEDURE — 74011000250 HC RX REV CODE- 250: Performed by: PHYSICIAN ASSISTANT

## 2020-03-19 PROCEDURE — 74011000258 HC RX REV CODE- 258: Performed by: INTERNAL MEDICINE

## 2020-03-19 PROCEDURE — 99218 HC RM OBSERVATION: CPT

## 2020-03-19 PROCEDURE — 74011250636 HC RX REV CODE- 250/636: Performed by: FAMILY MEDICINE

## 2020-03-19 PROCEDURE — 94760 N-INVAS EAR/PLS OXIMETRY 1: CPT

## 2020-03-19 PROCEDURE — 74011250637 HC RX REV CODE- 250/637: Performed by: FAMILY MEDICINE

## 2020-03-19 RX ORDER — ARFORMOTEROL TARTRATE 15 UG/2ML
15 SOLUTION RESPIRATORY (INHALATION)
Status: DISCONTINUED | OUTPATIENT
Start: 2020-03-19 | End: 2020-03-20 | Stop reason: HOSPADM

## 2020-03-19 RX ADMIN — DULOXETINE HYDROCHLORIDE 60 MG: 60 CAPSULE, DELAYED RELEASE ORAL at 08:45

## 2020-03-19 RX ADMIN — Medication 10 ML: at 16:25

## 2020-03-19 RX ADMIN — IRON SUCROSE 200 MG: 20 INJECTION, SOLUTION INTRAVENOUS at 22:00

## 2020-03-19 RX ADMIN — Medication 10 ML: at 06:58

## 2020-03-19 RX ADMIN — Medication 10 ML: at 22:02

## 2020-03-19 RX ADMIN — SODIUM CHLORIDE 40 MG: 9 INJECTION INTRAMUSCULAR; INTRAVENOUS; SUBCUTANEOUS at 17:47

## 2020-03-19 RX ADMIN — SODIUM CHLORIDE 40 MG: 9 INJECTION INTRAMUSCULAR; INTRAVENOUS; SUBCUTANEOUS at 08:45

## 2020-03-19 RX ADMIN — ATORVASTATIN CALCIUM 20 MG: 20 TABLET, FILM COATED ORAL at 22:00

## 2020-03-19 RX ADMIN — SUCRALFATE 1 G: 1 TABLET ORAL at 06:57

## 2020-03-19 RX ADMIN — PREGABALIN 150 MG: 75 CAPSULE ORAL at 17:47

## 2020-03-19 RX ADMIN — SUCRALFATE 1 G: 1 TABLET ORAL at 17:47

## 2020-03-19 RX ADMIN — CARVEDILOL 3.12 MG: 3.12 TABLET, FILM COATED ORAL at 17:47

## 2020-03-19 RX ADMIN — ARFORMOTEROL TARTRATE 15 MCG: 15 SOLUTION RESPIRATORY (INHALATION) at 19:37

## 2020-03-19 RX ADMIN — BUPROPION HYDROCHLORIDE 150 MG: 150 TABLET, EXTENDED RELEASE ORAL at 08:45

## 2020-03-19 RX ADMIN — OXYCODONE HYDROCHLORIDE AND ACETAMINOPHEN 1 TABLET: 7.5; 325 TABLET ORAL at 16:48

## 2020-03-19 RX ADMIN — BUDESONIDE 250 MCG: 0.25 SUSPENSION RESPIRATORY (INHALATION) at 08:32

## 2020-03-19 RX ADMIN — HYDROXYZINE HYDROCHLORIDE 25 MG: 25 TABLET, FILM COATED ORAL at 22:05

## 2020-03-19 RX ADMIN — BUDESONIDE 250 MCG: 0.25 SUSPENSION RESPIRATORY (INHALATION) at 19:37

## 2020-03-19 RX ADMIN — PREGABALIN 150 MG: 75 CAPSULE ORAL at 08:44

## 2020-03-19 RX ADMIN — CARVEDILOL 3.12 MG: 3.12 TABLET, FILM COATED ORAL at 08:44

## 2020-03-19 NOTE — PROGRESS NOTES
Bedside and Verbal shift change report given to [address] [place] [address] (oncoming nurse) by Evaristo Cueto RN (offgoing nurse). Report included the following information SBAR, Intake/Output and Cardiac Rhythm NSR.

## 2020-03-19 NOTE — PROGRESS NOTES
[address] [facility] Adult  Hospitalist Group Hospitalist Progress Note Joby England NP Answering service: [phone] OR [phone] from in house phone Date of Service:  3/19/2020 NAME:  Ashlyn Childs :  1960 MRN:  [id number] Admission Summary: Ashlyn Childs  is a 60-year-old female with past medical history of cryptogenic cirrhosis, history of COPD with chronic hypoxic respiratory failure requiring 3L home oxygen, history of recurrent anemia, history of AV malformation, anxiety and depression, who presents to the hospital with c/o feeling weak and having black stools for several days prior. She continues to smoke but denies ETOH use. The patient reports that she recently went to [facility] ER, was transfused and told to follow up with her gastroenterologist.  The patient reports that she sees a hematologist/oncologist and iron infusions are planned in the future. An endoscopy done in 10/2017 revealed a 1 cm AVM in the second portion of the duodenum. EGD done this admission showed Esophagus:hiatal hernia 3 cm in size Stomach: gastritis in antrum, biopsies taken Duodenum/jejunum: there was 1 cm polyp in bulb, removed by hot biopsy There were non bleeding ulcers and superficial ulcers in bulb and second portion of DU, largest one around 8 mm in size. CT of the chest done in the ED on 16 after a L perihlar opacity was noted on routine CXR that showed left lower lobe lung nodule measuring 2.1 cm and suspicious for malignancy, mild centrilobular emphysema, serial scattered bilateral ground-glass nodules, cirrhotic liver morphology. X-ray of the chest shows presence of focal soft tissue nodule in the perihilar region. Interval history / Subjective:  
 
Pulm following. Rad onc consulted by them for eval of lung nodule D/w Rad-onc. Wanting to get PET CT done. D/w PET CT dept which is now off site. Checking with Director of the Dept. If this can be done. Otherwise, outpatient arrangement with PET CT and Dr. Radha Romo f/up. Pt is feeling well; At baseline for breathing (3L O2). DEnies abd pain, v/v. No melena, LMB this morning. Tearful about potential cancer dx. Assessment & Plan: #Recurrent GI bleed. Controlled. OK to DC from GI standpoint. -PPI twice daily and Carafate. Off octreotide gtt given no varices on EGD 
-GI lite diet (tolerated) #Acute blood loss anemia with iron deficiency, s/p 1 PRBC 3/16 with low ferritin and percentage iron saturation 
-Daily CBC monitoring 
-IV iron infusion. #History of cirrhosis 
-Strict I/os, daily weight 
-GI on board.   
-Pt follows with Dr. Anabella Hameed' NP - Hepatology office. Further outpatient f/up on DC 
-Lasix, Aldactone #Left lower lobe lung nodule/mass 
-Pulmonology consultation given the risk and concern for malignancy. Appreciate recommendations 
-Rad-oncology consulted by Pulm. -Working on getting PET-CT. Inpt? Outpt? 
-Active smoker. Counseled for's smoking cessation #History of COPD with chronic hypoxic respiratory failure, on home O2 
-PRN DuoNeb, Pulmicort 
-Supportive care with supplemental O2 
-Consider to add Mucinex Code status: Full code DVT prophylaxis: SCDs Care Plan discussed with: Patient/Family and Nurse Anticipated Disposition: Home. Pt lives in an assisted living facility. Anticipated Discharge: 24 hours to 48 hours may change based on pulmonology or GI recommendations Hospital Problems  Date Reviewed: 8/18/2019 Codes Class Noted POA  
 GI bleed ICD-10-CM: K92.2 ICD-9-CM: 578.9  7/19/2016 Unknown Review of Systems: A comprehensive review of systems was negative except for that written in the HPI. Vital Signs:  
 Last 24hrs VS reviewed since prior progress note. Most recent are: Visit Vitals /67 (BP 1 Location: Right arm, BP Patient Position: Sitting) Pulse 80 Temp 98.1 °F (36.7 °C) Resp 17 Ht 5' (1.524 m) Wt 62.1 kg (136 lb 14.5 oz) SpO2 94% Breastfeeding No  
BMI 26.74 kg/m² Intake/Output Summary (Last 24 hours) at 3/19/2020 1134 Last data filed at 3/19/2020 6070 Gross per 24 hour Intake 580 ml Output  Net 580 ml Physical Examination:  
 
GENERAL:  Alert and oriented x3, No distress, pleasant HEENT:  Pupils are equal and reactive to light. Mucous membranes moist.   
NECK:  Supple. No JVD CHEST:   Diminished sounds bilat lower lobes. No wheezing or rhonchi or rales HEART:  S1 and S2 heard. ABDOMEN:  Soft, nontender, nondistended.  + bowel sounds. EXTREMITIES:  No clubbing, no cyanosis, no edema. NEURO/PSYCH:  Pleasant mood and affect. Cranial nerves II through XII grossly intact. No focal neurological deficits. SKIN:  Warm. Data Review:  
 Review and/or order of clinical lab test 
Review and/or order of tests in the radiology section of CPT Review and/or order of tests in the medicine section of CPT No results found. EGD 3/17/2020: 
Findings:  
Esophagus:hiatal hernia 3 cm in size Stomach: gastritis in antrum, biopsies taken Duodenum/jejunum: there was 1 cm polyp in bulb, removed by hot biopsy There were non bleeding ulcers and superficial ulcers in bulb and second portion of DU, largest one around 8 mm in size Recommendations: 
-Continue acid suppression. 
-resume po 
-will follow 
  
 
Labs:  
 
Recent Labs  
  03/18/20 
0357 03/17/20 
1013  03/16/20 
1158 WBC 4.9  --   --  6.7 HGB 7.6* 7.9*   < > 6.2* HCT 27.2*  --   --  22.9*  
PLT 90*  --   --  103*  
 < > = values in this interval not displayed. Recent Labs  
  03/18/20 
0357 03/17/20 
0017 03/16/20 
1158  142 140  
K 3.5 4.6 4.6  106 102 CO2 32 33* 35* BUN 16 15 17 CREA 0.86 0.80 0.99 * 98 105* CA 8.4* 8.6 9.2 MG 2.2  --  2.0 Recent Labs  
  03/16/20 
1158 SGOT 19 ALT 23 AP 92 TBILI 0.3 TP 6.3* ALB 3.1*  
GLOB 3.2 Recent Labs  
  03/16/20 
1158 INR 1.0 PTP 10.2 APTT 21.1* Recent Labs  
  03/16/20 
1158 TIBC 353 PSAT 21 FERR 9 Lab Results Component Value Date/Time Folate 35.2 (H) 02/09/2017 03:26 AM  
  
No results for input(s): PH, PCO2, PO2 in the last 72 hours. No results for input(s): CPK, CKNDX, TROIQ in the last 72 hours. No lab exists for component: CPKMB Lab Results Component Value Date/Time Cholesterol, total 191 05/31/2016 04:21 AM  
 HDL Cholesterol 69 05/31/2016 04:21 AM  
 LDL, calculated 108.8 (H) 05/31/2016 04:21 AM  
 Triglyceride 66 05/31/2016 04:21 AM  
 CHOL/HDL Ratio 2.8 05/31/2016 04:21 AM  
 
Lab Results Component Value Date/Time Glucose (POC) 95 05/04/2016 02:48 PM  
 
Lab Results Component Value Date/Time Color YELLOW 09/01/2018 10:50 AM  
 Appearance CLEAR 09/01/2018 10:50 AM  
 Specific gravity 1.008 09/01/2018 10:50 AM  
 pH (UA) 6.0 09/01/2018 10:50 AM  
 Protein NEGATIVE  09/01/2018 10:50 AM  
 Glucose NEGATIVE  09/01/2018 10:50 AM  
 Ketone NEGATIVE  09/01/2018 10:50 AM  
 Bilirubin NEGATIVE  09/01/2018 10:50 AM  
 Urobilinogen 0.2 09/01/2018 10:50 AM  
 Nitrites NEGATIVE  09/01/2018 10:50 AM  
 Leukocyte Esterase NEGATIVE  09/01/2018 10:50 AM  
 Epithelial cells FEW 09/01/2018 10:50 AM  
 Bacteria NEGATIVE  09/01/2018 10:50 AM  
 WBC 0-4 09/01/2018 10:50 AM  
 RBC 0-5 09/01/2018 10:50 AM  
 
 
 
Medications Reviewed:  
 
Current Facility-Administered Medications Medication Dose Route Frequency  arformoteroL (BROVANA) neb solution 15 mcg  15 mcg Nebulization BID RT  
 sodium chloride (NS) flush 5-40 mL  5-40 mL IntraVENous Q8H  
 sodium chloride (NS) flush 5-40 mL  5-40 mL IntraVENous PRN  
 iron sucrose (VENOFER) 200 mg in 0.9% sodium chloride 100 mL IVPB  200 mg IntraVENous Q24H  simethicone (MYLICON) tablet 80 mg  80 mg Oral QID PRN  
 hydrOXYzine HCL (ATARAX) tablet 25 mg  25 mg Oral TID PRN  
 oxyCODONE-acetaminophen (PERCOCET 7.5) 7.5-325 mg per tablet 1 Tab  1 Tab Oral Q8H PRN  
 0.9% sodium chloride infusion 250 mL  250 mL IntraVENous PRN  
 carvediloL (COREG) tablet 3.125 mg  3.125 mg Oral BID WITH MEALS  sodium chloride (NS) flush 5-40 mL  5-40 mL IntraVENous Q8H  
 sodium chloride (NS) flush 5-40 mL  5-40 mL IntraVENous PRN  pantoprazole (PROTONIX) 40 mg in 0.9% sodium chloride 10 mL injection  40 mg IntraVENous BID  albuterol-ipratropium (DUO-NEB) 2.5 MG-0.5 MG/3 ML  3 mL Nebulization Q4H PRN  pregabalin (LYRICA) capsule 150 mg  150 mg Oral BID  DULoxetine (CYMBALTA) capsule 60 mg  60 mg Oral DAILY  buPROPion SR (WELLBUTRIN SR) tablet 150 mg  150 mg Oral DAILY  budesonide (PULMICORT) 250 mcg/2ml nebulizer susp  250 mcg Nebulization BID RT  
 sucralfate (CARAFATE) tablet 1 g  1 g Oral ACB&D  
 atorvastatin (LIPITOR) tablet 20 mg  20 mg Oral QHS  
 
______________________________________________________________________ EXPECTED LENGTH OF STAY: - - - 
ACTUAL LENGTH OF STAY:          0 Heidy Fang NP

## 2020-03-19 NOTE — PROGRESS NOTES
3/19/2020 -  
ROXY: 
- Patient has rad-onc consult pending - Patient's PET Scan plan is pending determination of ability to complete with current admission - Patient was cleared by GI 
CRM: Estela Ybarra, MPH, [address] [place] [address]; Z: [phone]

## 2020-03-19 NOTE — PROGRESS NOTES
Bedside and Verbal shift change report given to Shea Turk RN (oncoming nurse) by Khadra Pham RN (offgoing nurse). Report included the following information SBAR, Kardex, Intake/Output, MAR, Recent Results and Cardiac Rhythm NSR.

## 2020-03-19 NOTE — PROGRESS NOTES
Falls - Risk of 
*Absence of Falls Fall Risk Interventions: 
Mobility Interventions: Utilize walker, cane, or other assistive device Medication Interventions: Teach patient to arise slowly Elimination Interventions: Call light in reach Fall Risk Interventions: 
Mobility Interventions: Utilize walker, cane, or other assistive device Medication Interventions: Teach patient to arise slowly Elimination Interventions: Call light in reach Patient Education: Go to Patient Education Activity Patient/Family Education Patient instructed to stand up slowly and use assistive device while walking

## 2020-03-19 NOTE — PROGRESS NOTES
Pulmonary, Critical Care, and Sleep Medicine~Progress Note Name: Te Olmstead MRN: [id number] : 1960 Hospital: . [address] Date: 3/19/2020 8:51 AM Admission: 3/16/2020 Impression Plan 1. Chronic hypoxic resp failure, severe COPD ( FEV1 0.78@ 35%) 2. AE of COPD 3. 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 Active smoker 5. GIB/ blood loss anemia 6. RA 1. She is at high risk for decline if nab bronch were to be conducted, additionally she is not a candidate for surgical resection 2. Rad onc has been consulted for consideration of empiric SBRT 3. On pulmicort; add Leda Yunior 4. O2 titration above 90% 5. Scds/protonix Daily Progression: 
 
Using nebs I have reviewed the labs and previous days notes. Pertinent items are noted in HPI. OBJECTIVE: 
 
 Vital Signs: 
    
Visit Vitals /67 (BP 1 Location: Right arm, BP Patient Position: Sitting) Pulse 80 Temp 98.1 °F (36.7 °C) Resp 17 Ht 5' (1.524 m) Wt 62.1 kg (136 lb 14.5 oz) SpO2 94% Breastfeeding No  
BMI 26.74 kg/m² Temp (24hrs), Av.8 °F (36.6 °C), Min:97.1 °F (36.2 °C), Max:98.1 °F (36.7 °C) Intake/Output:  
  Last shift: No intake/output data recorded. Last 3 shifts:  1901 -  0700 In: 1640 [P.O.:1440; I.V.:200] Out: - Intake/Output Summary (Last 24 hours) at 3/19/2020 4453 Last data filed at 3/19/2020 8052 Gross per 24 hour Intake 1060 ml Output  Net 1060 ml Physical Exam:                                       
Exam Findings Other General: No resp distress noted, appears stated age HEENT:  No ulcers, JVD not elevated, no cervical LAD Chest: No pectus deformity, normal chest rise b/l HEART:  RRR, no murmurs/rubs/gallops Lungs:  CTA b/l, no rhonchi/crackles/wheeze, diminished BS at bases ABD: Soft/NT, non rigid mildly distended EXT: No cyanosis/clubbing/edema, normal peripheral pulses Skin: No rashes or ulcers, no mottling Neuro: A/O x 3 Medications: 
Current Facility-Administered Medications Medication Dose Route Frequency  sodium chloride (NS) flush 5-40 mL  5-40 mL IntraVENous Q8H  
 sodium chloride (NS) flush 5-40 mL  5-40 mL IntraVENous PRN  
 iron sucrose (VENOFER) 200 mg in 0.9% sodium chloride 100 mL IVPB  200 mg IntraVENous Q24H  
 simethicone (MYLICON) tablet 80 mg  80 mg Oral QID PRN  
 hydrOXYzine HCL (ATARAX) tablet 25 mg  25 mg Oral TID PRN  
 oxyCODONE-acetaminophen (PERCOCET 7.5) 7.5-325 mg per tablet 1 Tab  1 Tab Oral Q8H PRN  
 0.9% sodium chloride infusion 250 mL  250 mL IntraVENous PRN  
 carvediloL (COREG) tablet 3.125 mg  3.125 mg Oral BID WITH MEALS  sodium chloride (NS) flush 5-40 mL  5-40 mL IntraVENous Q8H  
 sodium chloride (NS) flush 5-40 mL  5-40 mL IntraVENous PRN  pantoprazole (PROTONIX) 40 mg in 0.9% sodium chloride 10 mL injection  40 mg IntraVENous BID  albuterol-ipratropium (DUO-NEB) 2.5 MG-0.5 MG/3 ML  3 mL Nebulization Q4H PRN  pregabalin (LYRICA) capsule 150 mg  150 mg Oral BID  DULoxetine (CYMBALTA) capsule 60 mg  60 mg Oral DAILY  buPROPion SR (WELLBUTRIN SR) tablet 150 mg  150 mg Oral DAILY  budesonide (PULMICORT) 250 mcg/2ml nebulizer susp  250 mcg Nebulization BID RT  
 sucralfate (CARAFATE) tablet 1 g  1 g Oral ACB&D  
 atorvastatin (LIPITOR) tablet 20 mg  20 mg Oral QHS Labs: ABG No results for input(s): PHI, PCO2I, PO2I, HCO3I, SO2I, FIO2I in the last 72 hours. CBC Recent Labs  
  03/18/20 
0357 03/17/20 
1013 03/17/20 
0017 03/16/20 
1158 WBC 4.9  --   --  6.7 HGB 7.6* 7.9* 7.7* 6.2* HCT 27.2*  --   --  22.9*  
PLT 90*  --   --  103* MCV 94.8  --   --  99.6*  
MCH 26.5  --   --  27.0 Metabolic Panel Recent Labs  
  03/18/20 
0357 03/17/20 
0017 03/16/20 
1158  142 140  
K 3.5 4.6 4.6  106 102 CO2 32 33* 35* * 98 105* BUN 16 15 17 CREA 0.86 0.80 0.99 CA 8.4* 8.6 9.2 MG 2.2  --  2.0 ALB  --   --  3.1*  
SGOT  --   --  19 ALT  --   --  23 INR  --   --  1.0 Pertinent Labs Rolanda Musa PA-C 
3/19/2020

## 2020-03-19 NOTE — PROGRESS NOTES
Problem: Upper and Lower GI Bleed:  Discharge Outcomes Goal: *Hemodynamically stable Outcome: Progressing Towards Goal 
Goal: *Lungs clear or at baseline Outcome: Progressing Towards Goal 
Goal: *Demonstrates independent activity or return to baseline Outcome: Progressing Towards Goal 
Goal: *Pain is controlled to three or less Outcome: Progressing Towards Goal

## 2020-03-20 VITALS
SYSTOLIC BLOOD PRESSURE: 123 MMHG | WEIGHT: 128.97 LBS | HEART RATE: 71 BPM | DIASTOLIC BLOOD PRESSURE: 67 MMHG | RESPIRATION RATE: 17 BRPM | TEMPERATURE: 97.6 F | BODY MASS INDEX: 25.32 KG/M2 | OXYGEN SATURATION: 95 % | HEIGHT: 60 IN

## 2020-03-20 PROCEDURE — 74011250637 HC RX REV CODE- 250/637: Performed by: NURSE PRACTITIONER

## 2020-03-20 PROCEDURE — 99218 HC RM OBSERVATION: CPT

## 2020-03-20 PROCEDURE — 74011000250 HC RX REV CODE- 250: Performed by: FAMILY MEDICINE

## 2020-03-20 PROCEDURE — 94640 AIRWAY INHALATION TREATMENT: CPT

## 2020-03-20 PROCEDURE — 96376 TX/PRO/DX INJ SAME DRUG ADON: CPT

## 2020-03-20 PROCEDURE — 74011250636 HC RX REV CODE- 250/636: Performed by: FAMILY MEDICINE

## 2020-03-20 PROCEDURE — 74011000250 HC RX REV CODE- 250: Performed by: PHYSICIAN ASSISTANT

## 2020-03-20 PROCEDURE — 74011250637 HC RX REV CODE- 250/637: Performed by: FAMILY MEDICINE

## 2020-03-20 PROCEDURE — C9113 INJ PANTOPRAZOLE SODIUM, VIA: HCPCS | Performed by: FAMILY MEDICINE

## 2020-03-20 RX ADMIN — DULOXETINE HYDROCHLORIDE 60 MG: 60 CAPSULE, DELAYED RELEASE ORAL at 08:15

## 2020-03-20 RX ADMIN — OXYCODONE HYDROCHLORIDE AND ACETAMINOPHEN 1 TABLET: 7.5; 325 TABLET ORAL at 08:41

## 2020-03-20 RX ADMIN — SODIUM CHLORIDE 40 MG: 9 INJECTION INTRAMUSCULAR; INTRAVENOUS; SUBCUTANEOUS at 08:15

## 2020-03-20 RX ADMIN — Medication 10 ML: at 07:09

## 2020-03-20 RX ADMIN — ARFORMOTEROL TARTRATE 15 MCG: 15 SOLUTION RESPIRATORY (INHALATION) at 08:50

## 2020-03-20 RX ADMIN — PREGABALIN 150 MG: 75 CAPSULE ORAL at 08:15

## 2020-03-20 RX ADMIN — CARVEDILOL 3.12 MG: 3.12 TABLET, FILM COATED ORAL at 08:14

## 2020-03-20 RX ADMIN — BUDESONIDE 250 MCG: 0.25 SUSPENSION RESPIRATORY (INHALATION) at 08:50

## 2020-03-20 RX ADMIN — SUCRALFATE 1 G: 1 TABLET ORAL at 07:07

## 2020-03-20 RX ADMIN — BUPROPION HYDROCHLORIDE 150 MG: 150 TABLET, EXTENDED RELEASE ORAL at 08:14

## 2020-03-20 RX ADMIN — OXYCODONE HYDROCHLORIDE AND ACETAMINOPHEN 1 TABLET: 7.5; 325 TABLET ORAL at 00:09

## 2020-03-20 NOTE — DISCHARGE SUMMARY
Discharge Summary PATIENT ID: Taylor Montalvo MRN: [id number] YOB: 1960 DATE OF ADMISSION: 3/16/2020 11:18 AM   
DATE OF DISCHARGE: 03/20/20 PRIMARY CARE PROVIDER: Enrique Rasmussen MD  
 
ATTENDING PHYSICIAN: Dr. Nile Allen. Marylene Given DISCHARGING PROVIDER: Julio Cesar Reed NP To contact this individual call [phone] and ask the  to page. If unavailable ask to be transferred the Adult Hospitalist Department. CONSULTATIONS: IP CONSULT TO GASTROENTEROLOGY 
IP CONSULT TO RADIATION ONCOLOGY 
IP CONSULT TO PULMONOLOGY PROCEDURES/SURGERIES: Procedure(s): ESOPHAGOGASTRODUODENOSCOPY (EGD) ENDOSCOPIC POLYPECTOMY 
ESOPHAGOGASTRODUODENAL (EGD) BIOPSY 
 
ADMITTING DIAGNOSES & HOSPITAL COURSE:  
 
Taylor Montalvo  is a 65-year-old female with past medical history of cryptogenic cirrhosis, history of COPD with chronic hypoxic respiratory failure requiring 3L home oxygen, history of recurrent anemia, history of AV malformation, anxiety and depression, who presented to the hospital with c/o feeling weak and having black stools for several days prior. She continues to smoke but denies ETOH use. The patient reported that she recently went to [facility] ER, was transfused and told to follow up with her gastroenterologist. Adamaris George patient reported that she sees a hematologist/oncologist and iron infusions are planned in the future.     
  
An endoscopy done in 10/2017 revealed a 1 cm AVM in the second portion of the duodenum.   EGD done this admission showed Esophagus:hiatal hernia 3 cm in size Stomach: gastritis in antrum, biopsies taken Duodenum/jejunum: there was 1 cm polyp in bulb, removed by hot biopsy There were non bleeding ulcers and superficial ulcers in bulb and second portion of DU, largest one around 8 mm in size. 
  
CT of the chest done in the ED on 03.16 after a L perihlar opacity was noted on routine CXR that showed left lower lobe lung nodule measuring 2.1 cm and suspicious for malignancy, mild centrilobular emphysema, serial scattered bilateral ground-glass nodules, cirrhotic liver morphology. X-ray of the chest shows presence of focal soft tissue nodule in the perihilar region. Pulmonary and rad/onc were consulted and recommendations were made. A PET scan was scheduled for arch 26th and patient was instructed to f/up with Dr. Roberta Deluca / PLAN:   
 
Recurrent GI bleed. Controlled. -PPI twice daily and Carafate. Off octreotide gtt given no varices on EGD 
-GI lite diet (tolerated) 
   
#Acute blood loss anemia with iron deficiency, s/p 1 PRBC 3/16 with low ferritin and percentage iron saturation 
-Daily CBC monitoring 
-IV iron infusion. 
  
#History of cirrhosis 
-Strict I/os, daily weight 
-GI on board.   
-Pt follows with Dr. Lorena Delvalle' NP - Hepatology office. Further outpatient f/up on DC 
-Lasix, Aldactone 
  #Left lower lobe lung nodule/mass 
-Pulmonology consultation given the risk and concern for malignancy. Appreciate recommendations 
-Rad-oncology consulted by Pulm. -PET Scan March 26 scheduled 
-Active smoker. Counseled for's smoking cessation 
  #History of COPD with chronic hypoxic respiratory failure, on home O2 
-PRN DuoNeb, Pulmicort 
-Supportive care with supplemental O2 
  
 
ADDITIONAL CARE RECOMMENDATIONS:  
 
YOU HAVE AN APPOINTMENT FOR A PET SCAN ON Thursday, MARCH 26. CALL:  [phone] or [id number] on Tuesday, March 24 to get details about the time and location of your test. 
 
Observe stool closely for bleeding. Discuss changes with your doctors. Be sure to attend all follow up appointments. Take all medication as prescribed. Stop smoking PENDING TEST RESULTS:  
At the time of discharge the following test results are still pending: None FOLLOW UP APPOINTMENTS:   
Follow-up Information Follow up With Specialties Details Why Contact Info Whit Meléndez MD Gastroenterology In 4 weeks As needed, If symptoms worsen for GI bleed [address] [facility] [address] G[address] [address] [facility] [address] 
[phone] Alex Rodriguez MD Pulmonary Disease In 2 weeks Pulmonology follow up [address] [facility] [address] [facility] 57 
[phone] Nicola English MD Radiation Oncology In 1 week With PET-CT result, for Rad-onc evaluation/ follow up [facility] 100 [facility] 57 
[phone] Ashish Felix MD Gastroenterology Schedule an appointment as soon as possible for a visit post hospitalization follow up with in a week [address] [address] [facility] [address] 
[phone] Alvin Nunez MD Hepatology, Liver Disease, Internal Medicine Schedule an appointment as soon as possible for a visit post hospitalization follow up with in a week [address] [facility] [address] [place] 
[phone] Becca Harley MD Internal Medicine  post hospitalization follow up with in a week [facility] 57 
[phone] DIET: Regular Diet Oral Nutritional Supplements:  
 
ACTIVITY: Activity as tolerated WOUND CARE: N/A 
 
EQUIPMENT needed: N/A 
 
 
DISCHARGE MEDICATIONS: 
Current Discharge Medication List  
  
CONTINUE these medications which have NOT CHANGED Details ARIPiprazole (ABILIFY) 10 mg tablet Take 10 mg by mouth daily. cyanocobalamin (Vitamin B-12) 250 mcg tablet Take 250 mcg by mouth daily. oxyCODONE-acetaminophen (Percocet) 7.5-325 mg per tablet Take 1 Tab by mouth three (3) times daily. pregabalin (LYRICA) 150 mg capsule Take 150 mg by mouth two (2) times a day. DULoxetine (CYMBALTA) 60 mg capsule Take 60 mg by mouth daily. atorvastatin (LIPITOR) 20 mg tablet Take 20 mg by mouth nightly. sucralfate (CARAFATE) 1 gram tablet Take 1 g by mouth two (2) times a day. buPROPion XL (WELLBUTRIN XL) 150 mg tablet Take 150 mg by mouth every morning. fluticasone propionate (Flovent HFA) 44 mcg/actuation inhaler Take 2 Puffs by inhalation two (2) times a day. furosemide (LASIX) 20 mg tablet Take 20 mg by mouth daily. carvedilol (COREG) 3.125 mg tablet Take 3.125 mg by mouth two (2) times daily (with meals). loratadine (CLARITIN) 10 mg tablet Take 10 mg by mouth daily. cholecalciferol, vitamin D3, (VITAMIN D3) 2,000 unit tab Take 4,000 Units by mouth daily. pantoprazole (PROTONIX) 40 mg tablet Take 40 mg by mouth daily. multivitamins-minerals-lutein (CEROVITE SENIOR) tab tablet Take 1 Tab by mouth daily. albuterol-ipratropium (DUO-NEB) 2.5 mg-0.5 mg/3 ml nebu 3 mL by Nebulization route every four (4) hours as needed for Wheezing. senna-docusate (SENNA PLUS) 8.6-50 mg per tablet Take 1-2 Tabs by mouth nightly as needed for Constipation. albuterol (VENTOLIN HFA) 90 mcg/actuation inhaler Take 2 Puffs by inhalation four (4) times daily as needed for Wheezing. spironolactone (ALDACTONE) 50 mg tablet Take 50 mg by mouth daily as needed (excess fluid). NOTIFY YOUR PHYSICIAN FOR ANY OF THE FOLLOWING:  
Fever over 101 degrees for 24 hours. Chest pain, shortness of breath, fever, chills, nausea, vomiting, diarrhea, change in mentation, falling, weakness, bleeding. Severe pain or pain not relieved by medications. Or, any other signs or symptoms that you may have questions about. DISPOSITION: 
xx  Home With: 
 OT  PT x HH  RN  
  
 Long term SNF/Inpatient Rehab Independent/assisted living Hospice Other:  
 
 
PATIENT CONDITION AT DISCHARGE:  
 
Functional status Poor   
x Deconditioned Independent Cognition  
 x Lucid Forgetful Dementia Catheters/lines (plus indication) Angel PICC   
 PEG   
x None Code status Full code  DNR   
 
PHYSICAL EXAMINATION AT DISCHARGE: 
 
 GENERAL:  Alert and oriented x3, No distress, pleasant HEENT:  Pupils are equal and reactive to light.  Mucous membranes moist.   
NECK:  Supple. No JVD CHEST:   Diminished sounds bilat lower lobes. No wheezing or rhonchi or rales HEART:  S1 and S2 heard. ABDOMEN:  Soft, nontender, nondistended.  + bowel sounds. EXTREMITIES:  No clubbing, no cyanosis, no edema. NEURO/PSYCH:  Pleasant mood and affect.  Cranial nerves II through XII grossly intact.  No focal neurological deficits. SKIN:  Warm. 
   
 
Patient Vitals for the past 12 hrs: 
 Temp Pulse Resp BP SpO2  
03/20/20 0759 97.6 °F (36.4 °C) 71 17 123/67 95 % 03/20/20 0406 97.8 °F (36.6 °C) 66 18 105/66   
03/19/20 2351 98.3 °F (36.8 °C) 75 17 123/73 99 % CHRONIC MEDICAL DIAGNOSES: 
Problem List as of 3/20/2020 Date Reviewed: 8/18/2019 Codes Class Noted - Resolved CHF (congestive heart failure) (HCC) ICD-10-CM: I50.9 ICD-9-CM: 428.0  6/27/2019 - Present GIB (gastrointestinal bleeding) ICD-10-CM: K92.2 ICD-9-CM: 578.9  2/8/2017 - Present Acute blood loss anemia ICD-10-CM: D62 
ICD-9-CM: 285.1  10/25/2016 - Present Cirrhosis of liver (HCC) (Chronic) ICD-10-CM: K74.60 ICD-9-CM: 571.5  8/30/2016 - Present Chronic respiratory failure with hypoxia (HCC) (Chronic) ICD-10-CM: J96.11 
ICD-9-CM: 518.83, 799.02  8/30/2016 - Present Anemia ICD-10-CM: D64.9 ICD-9-CM: 285.9  7/19/2016 - Present GI bleed ICD-10-CM: K92.2 ICD-9-CM: 578.9  7/19/2016 - Present RESOLVED: Hypotension ICD-10-CM: I95.9 ICD-9-CM: 458.9  8/30/2016 - 10/18/2016 RESOLVED: Acute blood loss anemia ICD-10-CM: D62 
ICD-9-CM: 285.1  8/30/2016 - 10/18/2016 RESOLVED: Nicotine dependence (Chronic) ICD-10-CM: P64.476 ICD-9-CM: 305.1  8/30/2016 - 11/17/2016 RESOLVED: Acute angina (HCC) ICD-10-CM: I20.9 ICD-9-CM: 413.9  8/27/2016 - 8/30/2016 RESOLVED: GIB (gastrointestinal bleeding) ICD-10-CM: K92.2 ICD-9-CM: 578.9  7/8/2016 - 7/9/2016 RESOLVED: Tachycardia ICD-10-CM: R00.0 ICD-9-CM: 785.0  1/27/2016 - 6/11/2016 RESOLVED: GI bleed ICD-10-CM: K92.2 ICD-9-CM: 578.9  1/25/2016 - 6/11/2016 Greater than 32 minutes were spent with the patient on counseling and coordination of care Signed:  
Alyssia Capps NP 
3/20/2020 
10:36 AM

## 2020-03-20 NOTE — PROGRESS NOTES
Bedside and Verbal shift change report given to Sienna Ch (oncoming nurse) by Caitlyn Romero (offgoing nurse). Report included the following information SBAR, Kardex, Procedure Summary, Intake/Output, MAR, Accordion, Recent Results and Med Rec Status.

## 2020-03-20 NOTE — PROGRESS NOTES
Pulmonary, Critical Care, and Sleep Medicine~Progress Note Name: Nakul Garcia MRN: [id number] : 1960 [facility] Date: 3/20/2020 8:51 AM Admission: 3/16/2020 Impression Plan 1. Chronic hypoxic resp failure, severe COPD ( FEV1 0.78@ 35%) 2. AE of COPD 3. 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 Active smoker 5. GIB/ blood loss anemia 6. RA 1. She is at high risk for decline if nab bronch were to be conducted, additionally she is not a candidate for surgical resection 2. Rad onc has been consulted for consideration of empiric SBRT 3. On pulmicort; add Isabel Pineda 4. O2 titration above 90% 5. Scds/protonix 6. Follow with Dr Victor Hugo Baltazar in one month 7. Noted discharge Daily Progression: 
 
Using nebs I have reviewed the labs and previous days notes. Pertinent items are noted in HPI. OBJECTIVE: 
 
 Vital Signs: 
    
Visit Vitals /67 (BP 1 Location: Right arm) Pulse 71 Temp 97.6 °F (36.4 °C) Resp 17 Ht 5' (1.524 m) Wt 58.5 kg (128 lb 15.5 oz) SpO2 95% Breastfeeding No  
BMI 25.19 kg/m² Temp (24hrs), Av.3 °F (36.8 °C), Min:97.6 °F (36.4 °C), Max:98.8 °F (37.1 °C) Intake/Output:  
  Last shift:  07 - 1900 In: 240 [P.O.:240] Out: 200 [Urine:200] Last 3 shifts: 1901 -  0700 In: 0 [P.O.:600; I.V.:100] Out: - Intake/Output Summary (Last 24 hours) at 3/20/2020 1104 Last data filed at 3/20/2020 6890 Gross per 24 hour Intake 360 ml Output 200 ml Net 160 ml Physical Exam:                                       
Exam Findings Other General: No resp distress noted, appears stated age HEENT:  No ulcers, JVD not elevated, no cervical LAD Chest: No pectus deformity, normal chest rise b/l HEART:  RRR, no murmurs/rubs/gallops Lungs:  CTA b/l, no rhonchi/crackles/wheeze, diminished BS at bases ABD: Soft/NT, non rigid mildly distended EXT: No cyanosis/clubbing/edema, normal peripheral pulses Skin: No rashes or ulcers, no mottling Neuro: A/O x 3 Medications: 
Current Facility-Administered Medications Medication Dose Route Frequency  arformoteroL (BROVANA) neb solution 15 mcg  15 mcg Nebulization BID RT  
 sodium chloride (NS) flush 5-40 mL  5-40 mL IntraVENous Q8H  
 sodium chloride (NS) flush 5-40 mL  5-40 mL IntraVENous PRN  
 iron sucrose (VENOFER) 200 mg in 0.9% sodium chloride 100 mL IVPB  200 mg IntraVENous Q24H  
 simethicone (MYLICON) tablet 80 mg  80 mg Oral QID PRN  
 hydrOXYzine HCL (ATARAX) tablet 25 mg  25 mg Oral TID PRN  
 oxyCODONE-acetaminophen (PERCOCET 7.5) 7.5-325 mg per tablet 1 Tab  1 Tab Oral Q8H PRN  
 0.9% sodium chloride infusion 250 mL  250 mL IntraVENous PRN  
 carvediloL (COREG) tablet 3.125 mg  3.125 mg Oral BID WITH MEALS  sodium chloride (NS) flush 5-40 mL  5-40 mL IntraVENous Q8H  
 sodium chloride (NS) flush 5-40 mL  5-40 mL IntraVENous PRN  pantoprazole (PROTONIX) 40 mg in 0.9% sodium chloride 10 mL injection  40 mg IntraVENous BID  albuterol-ipratropium (DUO-NEB) 2.5 MG-0.5 MG/3 ML  3 mL Nebulization Q4H PRN  pregabalin (LYRICA) capsule 150 mg  150 mg Oral BID  DULoxetine (CYMBALTA) capsule 60 mg  60 mg Oral DAILY  buPROPion SR (WELLBUTRIN SR) tablet 150 mg  150 mg Oral DAILY  budesonide (PULMICORT) 250 mcg/2ml nebulizer susp  250 mcg Nebulization BID RT  
 sucralfate (CARAFATE) tablet 1 g  1 g Oral ACB&D  
 atorvastatin (LIPITOR) tablet 20 mg  20 mg Oral QHS Labs: ABG No results for input(s): PHI, PCO2I, PO2I, HCO3I, SO2I, FIO2I in the last 72 hours. CBC Recent Labs  
  03/18/20 
0357 WBC 4.9 HGB 7.6* HCT 27.2*  
PLT 90* MCV 94.8 MCH 26.5 Metabolic Panel Recent Labs  
  03/18/20 
0357   
K 3.5  CO2 32 * BUN 16  
CREA 0.86 CA 8.4* MG 2.2 Pertinent Labs Janeth Coe PA-C 
3/20/2020

## 2020-03-20 NOTE — DISCHARGE INSTRUCTIONS
DISCHARGE SUMMARY from Nurse    PATIENT INSTRUCTIONS:    After general anesthesia or intravenous sedation, for 24 hours or while taking prescription Narcotics:  · Limit your activities  · Do not drive and operate hazardous machinery  · Do not make important personal or business decisions  · Do  not drink alcoholic beverages  · If you have not urinated within 8 hours after discharge, please contact your surgeon on call. Report the following to your surgeon:  · Excessive pain, swelling, redness or odor of or around the surgical area  · Temperature over 100.5  · Nausea and vomiting lasting longer than 4 hours or if unable to take medications  · Any signs of decreased circulation or nerve impairment to extremity: change in color, persistent  numbness, tingling, coldness or increase pain  · Any questions    What to do at Home:  Recommended activity: Activity as tolerated, Activity as tolerated and no driving for today and Ambulate in house,     If you experience any of the following symptoms , please follow up with . *  Please give a list of your current medications to your Primary Care Provider. *  Please update this list whenever your medications are discontinued, doses are      changed, or new medications (including over-the-counter products) are added. *  Please carry medication information at all times in case of emergency situations. These are general instructions for a healthy lifestyle:    No smoking/ No tobacco products/ Avoid exposure to second hand smoke  Surgeon General's Warning:  Quitting smoking now greatly reduces serious risk to your health.     Obesity, smoking, and sedentary lifestyle greatly increases your risk for illness    A healthy diet, regular physical exercise & weight monitoring are important for maintaining a healthy lifestyle    You may be retaining fluid if you have a history of heart failure or if you experience any of the following symptoms:  Weight gain of 3 pounds or more overnight or 5 pounds in a week, increased swelling in our hands or feet or shortness of breath while lying flat in bed. Please call your doctor as soon as you notice any of these symptoms; do not wait until your next office visit. The discharge information has been reviewed with the patient. The patient verbalized understanding. Discharge medications reviewed with the patient and appropriate educational materials and side effects teaching were provided. ___________________________________________________________________________________________________________________________________   Discharge Instructions       PATIENT ID: Taiwo Leger  MRN: [id number]   YOB: 1960    DATE OF ADMISSION: 3/16/2020 11:18 AM    DATE OF DISCHARGE: 3/20/2020    PRIMARY CARE PROVIDER: Paty Gomez MD     ATTENDING PHYSICIAN: Lachelle Ricketts MD  DISCHARGING PROVIDER: Nicole Rosas NP    To contact this individual call [phone] and ask the  to page. If unavailable ask to be transferred the Adult Hospitalist Department. DISCHARGE DIAGNOSES     #Recurrent GI bleed.   #Acute blood loss anemia with iron deficiency, s/p 1 PRBC 3/16 with low ferritin and percentage iron saturation  #History of cirrhosis  #Left lower lobe lung nodule/mass  #History of COPD with chronic hypoxic respiratory failure, on home O2    CONSULTATIONS: IP CONSULT TO GASTROENTEROLOGY  IP CONSULT TO RADIATION ONCOLOGY  IP CONSULT TO PULMONOLOGY    PROCEDURES/SURGERIES: Procedure(s):  ESOPHAGOGASTRODUODENOSCOPY (EGD)  ENDOSCOPIC POLYPECTOMY  ESOPHAGOGASTRODUODENAL (EGD) BIOPSY    PENDING TEST RESULTS:   At the time of discharge the following test results are still pending: None    FOLLOW UP APPOINTMENTS:   Follow-up Information     Follow up With Specialties Details Why Contact Sukhdev Randall MD Gastroenterology In 4 weeks As needed, If symptoms worsen for GI bleed [address] [facility] [address] [place] [address]  [phone]      Celestine Goldberg MD Pulmonary Disease In 2 weeks Pulmonology follow up [address]  [address] [facility]      Mary Carmen Mcgregor MD Radiation Oncology In 1 week With PET-CT result, for Rad-onc evaluation/ follow up [address] [facility]      Hanh Hanks MD Gastroenterology Schedule an appointment as soon as possible for a visit post hospitalization follow up with in a week [address] [facility], [facility] [address] [place]      Lucien Bowers MD Hepatology, Liver Disease, Internal Medicine Schedule an appointment as soon as possible for a visit post hospitalization follow up with in a week [address] [facility]  [address] [facility] 9      Kip Rivera MD Internal Medicine  post hospitalization follow up with in a week [address]  [phone]             [facility] Thursday, MARCH 26. CALL:  [phone] or [id number] on Tuesday, March 24 to get details about the time and location of your test.    Observe stool closely for bleeding. Discuss changes with your doctors. Be sure to attend all follow up appointments. Take all medication as prescribed. Stop smoking    DIET: Regular Diet  Oral Nutritional Supplements:      ACTIVITY: Activity as tolerated    WOUND CARE: N/A    EQUIPMENT needed: None      DISCHARGE MEDICATIONS:   See Medication Reconciliation Form    · It is important that you take the medication exactly as they are prescribed. · Keep your medication in the bottles provided by the pharmacist and keep a list of the medication names, dosages, and times to be taken in your wallet. · Do not take other medications without consulting your doctor.        NOTIFY YOUR PHYSICIAN FOR ANY OF THE FOLLOWING:   Fever over 101 degrees for 24 hours. Chest pain, shortness of breath, fever, chills, nausea, vomiting, diarrhea, change in mentation, falling, weakness, bleeding. Severe pain or pain not relieved by medications. Or, any other signs or symptoms that you may have questions about.       DISPOSITION:   x Home With:   OT  PT x HH  RN       SNF/Inpatient Rehab/LTAC    Independent/assisted living    Hospice    Other:     CDMP Checked:   Yes x     PROBLEM LIST Updated:  Yes x       Signed:   Art Melendez NP  3/20/2020  9:36 AM

## 2020-03-20 NOTE — PROGRESS NOTES
3/20/2020 -  
ROXY: 
- RRAT: 22 
- Patient's PET Scan plan is pending input from pulmonary and rad-onc - Likely disposition includes discharge to [facility]  
CRM: Randall Otto, MPH, [facility]; Z: [phone] 
 
12:25 - Patient is ready for discharge with return to [facility]. CM to complete: face sheet, H&P, SBAR, Kardex. Packet on hard chart. Nursing to complete: scripts, MAR, discharge CM contacted [facility] [employer], John Leroy: [phone]). Facility is aware of patient's anticipated return and will anticipate receipt of packet. CRM: Randall Otto, MPH, [address] [place] [address]; Z: [phone]

## 2020-03-21 ENCOUNTER — PATIENT OUTREACH (OUTPATIENT)
Dept: CARDIOLOGY CLINIC | Age: 60
End: 2020-03-21

## 2020-03-21 NOTE — PROGRESS NOTES
No outreach was done post discharge from [facility] [place] 3/16-3/20/20 admission- discharged back to [address] [facility].

## 2020-03-26 ENCOUNTER — HOSPITAL ENCOUNTER (OUTPATIENT)
Dept: PET IMAGING | Age: 60
Discharge: HOME | End: 2020-03-26
Attending: RADIOLOGY
Payer: MEDICARE

## 2020-03-26 VITALS — HEIGHT: 60 IN | BODY MASS INDEX: 25.52 KG/M2 | WEIGHT: 130 LBS

## 2020-03-26 DIAGNOSIS — R91.8 LUNG MASS: ICD-10-CM

## 2020-03-26 PROCEDURE — A9552 F18 FDG: HCPCS

## 2020-03-26 RX ORDER — SODIUM CHLORIDE 0.9 % (FLUSH) 0.9 %
10 SYRINGE (ML) INJECTION
Status: COMPLETED | OUTPATIENT
Start: 2020-03-26 | End: 2020-03-26

## 2020-03-26 RX ADMIN — Medication 10 ML: at 09:59

## 2020-03-31 NOTE — PROGRESS NOTES
Patients case was discussed at tumor board. Consensus decision was to proceed with radiation therapy without pathology. Possibly accessible via bronchoscopy but new Thoracic national guidelines during [place] outbreak recommend against any bronchoscopic procedures to avoid aerosolizing virus. Given PET avidity and pulmonary disease at baseline, everyone agreed to move forward with SBRT. We will have her come back in to clinic to discuss and move forward.

## 2020-04-01 PROBLEM — E78.5 HYPERLIPIDEMIA: Status: ACTIVE | Noted: 2020-04-01

## 2020-04-01 PROBLEM — C34.90 LUNG CANCER (HCC): Status: ACTIVE | Noted: 2020-04-01

## 2020-04-01 NOTE — ANESTHESIA POSTPROCEDURE EVALUATION
Post-Anesthesia Evaluation and Assessment Patient: Chantel Rodriguez MRN: [id number]  SSN: [id number] YOB: 1960  Age: 60 y.o. Sex: female I have evaluated the patient and they are stable and ready for discharge from the PACU. Cardiovascular Function/Vital Signs Visit Vitals /67 (BP 1 Location: Right arm) Pulse 71 Temp 36.4 °C (97.6 °F) Resp 17 Ht 5' (1.524 m) Wt 58.5 kg (128 lb 15.5 oz) SpO2 95% Breastfeeding No  
BMI 25.19 kg/m² Patient is status post MAC anesthesia for Procedure(s): ESOPHAGOGASTRODUODENOSCOPY (EGD) ENDOSCOPIC POLYPECTOMY 
ESOPHAGOGASTRODUODENAL (EGD) BIOPSY. Nausea/Vomiting: None Postoperative hydration reviewed and adequate. Pain: 
Pain Scale 1: Numeric (0 - 10) (03/20/20 0759) Pain Intensity 1: 4 (03/20/20 0759) Managed Neurological Status:  
Neuro Neurologic State: Alert (03/20/20 0759) Orientation Level: Oriented X4 (03/20/20 0759) Cognition: Follows commands (03/20/20 0759) Speech: Clear (03/20/20 0759) At baseline Mental Status, Level of Consciousness: Alert and  oriented to person, place, and time Pulmonary Status:  
O2 Device: Nasal cannula (03/20/20 0759) Adequate oxygenation and airway patent Complications related to anesthesia: None Post-anesthesia assessment completed. No concerns Signed By: Gwyn Weaver MD   
 April 1, 2020 Procedure(s): ESOPHAGOGASTRODUODENOSCOPY (EGD) ENDOSCOPIC POLYPECTOMY 
ESOPHAGOGASTRODUODENAL (EGD) BIOPSY. MAC 
 
<BSHSIANPOST> Vitals Value Taken Time /67 3/20/2020  7:59 AM  
Temp 36.4 °C (97.6 °F) 3/20/2020  7:59 AM  
Pulse 71 3/20/2020  7:59 AM  
Resp 17 3/20/2020  7:59 AM  
SpO2 95 % 3/20/2020  7:59 AM

## 2020-04-03 ENCOUNTER — VIRTUAL VISIT (OUTPATIENT)
Dept: HEMATOLOGY | Age: 60
End: 2020-04-03

## 2020-04-03 DIAGNOSIS — K70.30 ALCOHOLIC CIRRHOSIS OF LIVER WITHOUT ASCITES (HCC): Primary | ICD-10-CM

## 2020-04-03 NOTE — PROGRESS NOTES
[address] [place] [address] Rita Newton MD, Cira Harada, FAASMD Violet Salgado, PA-AMANDA Corcoran, [facility]-BC April S Keya, [facility]   Kyliejudy Ge, FNP-C Debbie Tiwari, [facility] Bebe Deputado Tad [address] 
  at [address], [address] [place] [address] [address] [facility] [address]. [phone] FAX: [address] 
  [facility] 
  [address], [address] [facility], [address] 
  [phone] FAX: [phone] Patient Care Team: 
Odessa Pedersen MD as PCP - General (Internal Medicine) Chapin Terrazas MD (Gastroenterology) Jackie Steven MD (Hematology and Oncology) Problem List  Date Reviewed: 4/1/2020 Codes Class Noted Lung cancer [facility]) ICD-10-CM: C34.90 ICD-9-CM: 162.9  4/1/2020 Hyperlipidemia ICD-10-CM: E78.5 ICD-9-CM: 272.4  4/1/2020 Chronic obstructive pulmonary disease (HCC) ICD-10-CM: J44.9 ICD-9-CM: 496  Unknown CHF (congestive heart failure) (HCC) ICD-10-CM: I50.9 ICD-9-CM: 428.0  6/27/2019 GIB (gastrointestinal bleeding) ICD-10-CM: K92.2 ICD-9-CM: 578.9  2/8/2017 Acute blood loss anemia ICD-10-CM: D62 
ICD-9-CM: 285.1  10/25/2016 Cirrhosis of liver (HCC) (Chronic) ICD-10-CM: K74.60 ICD-9-CM: 571.5  8/30/2016 Chronic respiratory failure with hypoxia (HCC) (Chronic) ICD-10-CM: J96.11 
ICD-9-CM: 518.83, 799.02  8/30/2016 Anemia ICD-10-CM: D64.9 ICD-9-CM: 285.9  7/19/2016 GI bleed ICD-10-CM: K92.2 ICD-9-CM: 578.9  7/19/2016 VIRTUAL TELEHEALTH VISIT PERFORMED DUE TO COVID-19 EPIDEMIC 
 
CONSENT: 
Chantel Rodriguez, who was seen by synchronous, real-time, audio-video technology, and/or her healthcare decision maker, is aware that this patient-initiated, Telehealth encounter on 4/3/2020 is a billable service, with coverage as determined by her insurance carrier. She is aware that she may receive a bill and has provided verbal consent to proceed. This patient was evaluated during a Virtual Telehealth visit. A caregiver was present if appropriate. Due to this being a TeleHealth encounter performed during the XYHEV-99 public health emergency, the physical examination was limited to that listed in the [address] [facility]. 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 The active problem list, all pertinent past medical history, medications,  
endoscopic studies, radiologic findings and laboratory findings related to the liver disorder were reviewed with the patient. The patient is a 60 y.o.  female who was found to have cirrhosis in 2014 when she developed iron deficiency anemia. 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 This is the first appointment at [place] [address] since 6/2019. Serologic studies for causes of chronic liver disease were positive for ASMA. THe patient became abstinent from alcohol in 2014 when she first found out she had cirrhosis. The patient has not developed any major complications of cirrhosis to date. The patient notes fatigue, shortness of breath, The patient has limitations in functional activities secondary to these symptoms. The patient has not experienced problems concentrating, swelling of the abdomen, swelling of the lower extremities, hematemesis, hematochezia. ASSESSMENT AND PLAN: 
Cirrhosis Cryptogenic cirrhosis. This may be due to previous alcohol use or AIH The diagnosis of cirrhosis is based upon imaging, laboratory studies The patient has normal liver function. The patient has never developed any complications of cirrhosis to date. The CTP is 5. Child class A. The MELD score is 8. Liver transaminases are elevated. ALP is elevated. Liver function is normal.  The platelet count is depressed. The ASMA is positive suggestinbg she may have autoimmune liver disease. Hypoxia This has been attributed to COPD but chest XR have been repeatedly normal.   
She may have hepatopulmonary syndrome with shunting. A previous ECHO in 2016 did not evalaute for shunt with bubble study. We should repeat this. On home o2 for 15 tears Anemia This is due to chronic gI blood loss from AVMs and possibly portal HTN. HPS cn also be associated with intestinal AVMs. She has been undergoing EGD with APC treatment of AVMs. This is rarely on only partially effective Will obtain FE panel to assess for iron stores. She has been on PO Fe but the HB is only 10.4 gm. She will likely need IV Fe. Treatment of other medical problems in patients with chronic liver disease There are no contraindications for the patient to take most medications that are necessary for treatment of other medical issues. The patient has cirrhrosis and should avoid taking NSAIDs which are associated with a higher rate of developing XIN. Counseling for alcohol in patients with chronic liver disease The patient has cirrhosis and was advised to be abstinent from all alcohol including non-alcoholic beer which does contain some alcohol. The patient has not consumed alcohol since 2014. Vaccinations Vaccination for viral hepatitis A and B is recommended since the patient has no serologic evidence of previous exposure or vaccination with immunity. Routine vaccinations against other bacterial and viral agents can be performed as indicated. Annual flu vaccination should be administered if indicated. ALLERGIES Allergies Allergen Reactions  Pcn [Penicillins] Angioedema Childhood reaction MEDICATIONS Current Outpatient Medications Medication Sig  Ferrous Fumarate 325 mg (106 mg iron) tab Take 325 mg by mouth daily.  tiotropium Br/olodaterol HCl (STIOLTO RESPIMAT IN) Take  by inhalation.  oxyCODONE-acetaminophen (PERCOCET 10)  mg per tablet Take 1 Tab by mouth four (4) times daily as needed for Pain for up to 30 days. Max Daily Amount: 4 Tabs.  ARIPiprazole (ABILIFY) 10 mg tablet Take 10 mg by mouth daily.  cyanocobalamin (Vitamin B-12) 250 mcg tablet Take 250 mcg by mouth daily.  pregabalin (LYRICA) 150 mg capsule Take 150 mg by mouth two (2) times a day.  DULoxetine (CYMBALTA) 60 mg capsule Take 60 mg by mouth daily.  atorvastatin (LIPITOR) 20 mg tablet Take 20 mg by mouth nightly.  sucralfate (CARAFATE) 1 gram tablet Take 1 g by mouth two (2) times a day.  buPROPion XL (WELLBUTRIN XL) 150 mg tablet Take 150 mg by mouth every morning.  fluticasone propionate (Flovent HFA) 44 mcg/actuation inhaler Take 2 Puffs by inhalation two (2) times a day.  furosemide (LASIX) 20 mg tablet Take 20 mg by mouth daily.  carvedilol (COREG) 3.125 mg tablet Take 3.125 mg by mouth two (2) times daily (with meals).  loratadine (CLARITIN) 10 mg tablet Take 10 mg by mouth daily.  cholecalciferol, vitamin D3, (VITAMIN D3) 2,000 unit tab Take 4,000 Units by mouth daily.  pantoprazole (PROTONIX) 40 mg tablet Take 40 mg by mouth daily.  multivitamins-minerals-lutein (CEROVITE SENIOR) tab tablet Take 1 Tab by mouth daily.  albuterol-ipratropium (DUO-NEB) 2.5 mg-0.5 mg/3 ml nebu 3 mL by Nebulization route every four (4) hours as needed for Wheezing.  senna-docusate (SENNA PLUS) 8.6-50 mg per tablet Take 1-2 Tabs by mouth nightly as needed for Constipation.  albuterol (VENTOLIN HFA) 90 mcg/actuation inhaler Take 2 Puffs by inhalation four (4) times daily as needed for Wheezing.  spironolactone (ALDACTONE) 50 mg tablet Take 50 mg by mouth daily as needed (excess fluid). No current facility-administered medications for this visit. SYSTEM REVIEW NOT RELATED TO LIVER DISEASE OR REVIEWED ABOVE: 
Constitution systems: Negative for fever, chills, weight gain, weight loss. Eyes: Negative for visual changes. ENT: Negative for sore throat, painful swallowing. Respiratory: Negative for cough, hemoptysis, SOB. Cardiology: Negative for chest pain, palpitations. GI:  Negative for constipation or diarrhea. : Negative for urinary frequency, dysuria, hematuria, nocturia. Skin: Negative for rash. Hematology: Negative for easy bruising, blood clots. Musculo-skelatal: Negative for back pain, muscle pain, weakness. Neurologic: Negative for headaches, dizziness, vertigo, memory problems not related to HE. Psychology: Negative for anxiety, depression. FAMILY HISTORY: 
The father  of CVA. He was an alcoholic The mother  of cancer.   
There is no family history of liver disease.  
  
SOCIAL HISTORY: 
The patient is .   
The patient has 2 children, The patient currently smokes 1 pack of tobacco daily. The patient has previously consumed alcohol in excess. The patient has been abstinent from alcohol since . The patient used to work as a  at [employer]. The patient is currently receiving disability.   
  
 
PHYSICAL EXAMINATION PERFORMED BY VIRTUAL TELEHEALTH: 
VS: Not performed General: No acute distress. Eyes: Sclera anicteric. ENT: No oral lesions. Skin: No rashes. spider angiomata. No jaundice. Abdomen: No obvious distention suggesting ascites. Extremities: No edema. No muscle wasting. Neurologic: Alert and oriented. Cranial nerves grossly intact. LABORATORY STUDIES: 
Liver [place] of [address] Units 2019 WBC 3.4 - 10.8 x10E3/uL 5.5 6.3 ANC 1.4 - 7.0 x10E3/uL 3.9 4.6 HGB 11.1 - 15.9 g/dL 10.4 (L) 8.1 (L)  - 450 x10E3/uL 145 (L) 152 INR 0.8 - 1.2 1.0 AST 0 - 40 IU/L 48 (H) 62 (H) ALT 0 - 32 IU/L 34 (H) 24 Alk Phos 39 - 117 IU/L 159 (H) 120 (H) Bili, Total 0.0 - 1.2 mg/dL 0.3 0.3 Bili, Direct 0.00 - 0.40 mg/dL 0.09 Albumin 3.6 - 4.8 g/dL 4.1 3.1 (L) BUN 8 - 27 mg/dL 13 12 Creat 0.57 - 1.00 mg/dL 0.96 1.09 (H) Na 134 - 144 mmol/L 141 139  
K 3.5 - 5.2 mmol/L 5.1 4.5 Cl 96 - 106 mmol/L 100 105 CO2 20 - 29 mmol/L 30 (H) 30 Glucose 65 - 99 mg/dL 89 152 (H) SEROLOGIES: 
Serologies Latest Ref Rn 2016 Hep A Ab, Total NEGATIVE   NEGATIVE Hep B Surface Ag Index <0.10 Hep B Surface Ag Interp NEG   NEGATIVE Hep B Core Ab, Total NEGATIVE   NEGATIVE Hep B Surface Ab mIU/mL <3.10 Hep B Surface Ab Interp  NONREACTIVE Ferritin 8 - 252 NG/ML 16 Iron % Saturation 20 - 50 % 6 (L) C-ANCA Neg:<1:20 titer <1:20  
P-ANCA Neg:<1:20 titer <1:20  
ANCA Neg:<1:20 titer <1:20  
ASMCA 0 - 19 Units 66 (H)  
M2 Ab 0.0 - 20.0 Units 4.3 Alpha-1 antitrypsin level 90 - 200 mg/dL 143 LIVER HISTOLOGY: 
Not available or performed ENDOSCOPIC PROCEDURES: 
2016. Enteroscopy by Dr Emeli Barrera. Several AVMs were cauterized. 2016. M2 capsule. AVMS in jejunum. RADIOLOGY: 
2016. Ultrasound of liver. Echogenic consistent with cirrhosis. No liver mass lesions. No dilated bile ducts. No ascites. 2016. CT scan abdomen with IV contrast.  Changes consistent with cirrhosis. No liver mass lesions. No dilated bile ducts. No ascites. 2016. CXR. Clear lung fields. Normal heart size. 2017. Ultrasound of liver. Echogenic consistent with cirrhosis. No liver mass lesions. No dilated bile ducts. No ascites. OTHER TESTIN2016. Cardiac catheterization. Normal coronary arteries. 10/2016. ECHO heart. LVEF 60%, PAP 37 mm HG suggesting mild pulmonary HTN, Mild TR.  
 
FOLLOW-UP: 
 Pursuant to the emergency declaration under the [facility] waiver authority and the [employer] Act, this Virtual  Visit was conducted, with the patient's (and/or their legal guardian's) consent, to reduce the patient's risk of exposure to COVID-19 and provide necessary medical care. Services were provided through a video synchronous discussion virtually to substitute for an in-person clinic visit. The patient was located in their home. The provider was located in the [place] [address] office. Because of the COVID-19 epidemic all non-emergent diagnostic testing and the in-office visit will be delayed for 3-4 months to reduce the risk of patient exposure to and potential infection from the novel corona virus. This follow-up appointment may be delayed further if warranted by the status of the epidemic at that time. MD Bebe Pereira [facility] [place] [address], [address] [place]  [address]. 
[phone] [address] [place] [address]

## 2020-04-07 ENCOUNTER — HOSPITAL ENCOUNTER (INPATIENT)
Age: 60
LOS: 2 days | Discharge: HOME | DRG: 378 | End: 2020-04-09
Attending: EMERGENCY MEDICINE | Admitting: INTERNAL MEDICINE
Payer: MEDICARE

## 2020-04-07 ENCOUNTER — APPOINTMENT (OUTPATIENT)
Dept: GENERAL RADIOLOGY | Age: 60
DRG: 378 | End: 2020-04-07
Attending: EMERGENCY MEDICINE
Payer: MEDICARE

## 2020-04-07 DIAGNOSIS — K92.1 GASTROINTESTINAL HEMORRHAGE WITH MELENA: Primary | ICD-10-CM

## 2020-04-07 PROBLEM — K76.82 HEPATIC ENCEPHALOPATHY: Status: ACTIVE | Noted: 2020-04-07

## 2020-04-07 LAB
ALBUMIN SERPL-MCNC: 3 G/DL (ref 3.5–5)
ALBUMIN/GLOB SERPL: 0.9 {RATIO} (ref 1.1–2.2)
ALP SERPL-CCNC: 108 U/L (ref 45–117)
ALT SERPL-CCNC: 26 U/L (ref 12–78)
AMMONIA PLAS-SCNC: 24 UMOL/L
ANION GAP SERPL CALC-SCNC: 0 MMOL/L (ref 5–15)
APTT PPP: 25.4 SEC (ref 22.1–32)
AST SERPL-CCNC: 35 U/L (ref 15–37)
ATRIAL RATE: 81 BPM
BASOPHILS # BLD: 0 K/UL (ref 0–0.1)
BASOPHILS NFR BLD: 0 % (ref 0–1)
BILIRUB SERPL-MCNC: 0.2 MG/DL (ref 0.2–1)
BUN SERPL-MCNC: 19 MG/DL (ref 6–20)
BUN/CREAT SERPL: 21 (ref 12–20)
CALCIUM SERPL-MCNC: 9.4 MG/DL (ref 8.5–10.1)
CALCULATED P AXIS, ECG09: 77 DEGREES
CALCULATED R AXIS, ECG10: 73 DEGREES
CALCULATED T AXIS, ECG11: 67 DEGREES
CHLORIDE SERPL-SCNC: 108 MMOL/L (ref 97–108)
CO2 SERPL-SCNC: 34 MMOL/L (ref 21–32)
COMMENT, HOLDF: NORMAL
CREAT SERPL-MCNC: 0.92 MG/DL (ref 0.55–1.02)
DIAGNOSIS, 93000: NORMAL
DIFFERENTIAL METHOD BLD: ABNORMAL
EOSINOPHIL # BLD: 0.2 K/UL (ref 0–0.4)
EOSINOPHIL NFR BLD: 5 % (ref 0–7)
ERYTHROCYTE [DISTWIDTH] IN BLOOD BY AUTOMATED COUNT: 18.9 % (ref 11.5–14.5)
GLOBULIN SER CALC-MCNC: 3.5 G/DL (ref 2–4)
GLUCOSE SERPL-MCNC: 96 MG/DL (ref 65–100)
HCT VFR BLD AUTO: 21.8 % (ref 35–47)
HCT VFR BLD AUTO: 26.1 % (ref 35–47)
HEMOCCULT STL QL: POSITIVE
HGB BLD-MCNC: 5.8 G/DL (ref 11.5–16)
HGB BLD-MCNC: 7.3 G/DL (ref 11.5–16)
IMM GRANULOCYTES # BLD AUTO: 0 K/UL (ref 0–0.04)
IMM GRANULOCYTES NFR BLD AUTO: 0 % (ref 0–0.5)
INR PPP: 1 (ref 0.9–1.1)
LYMPHOCYTES # BLD: 0.4 K/UL (ref 0.8–3.5)
LYMPHOCYTES NFR BLD: 11 % (ref 12–49)
MCH RBC QN AUTO: 28.9 PG (ref 26–34)
MCHC RBC AUTO-ENTMCNC: 26.6 G/DL (ref 30–36.5)
MCV RBC AUTO: 108.5 FL (ref 80–99)
MONOCYTES # BLD: 0.4 K/UL (ref 0–1)
MONOCYTES NFR BLD: 10 % (ref 5–13)
NEUTS SEG # BLD: 2.9 K/UL (ref 1.8–8)
NEUTS SEG NFR BLD: 74 % (ref 32–75)
NRBC # BLD: 0.02 K/UL (ref 0–0.01)
NRBC BLD-RTO: 0.5 PER 100 WBC
P-R INTERVAL, ECG05: 152 MS
PATH REV BLD -IMP: ABNORMAL
PLATELET # BLD AUTO: 140 K/UL (ref 150–400)
PMV BLD AUTO: 12.1 FL (ref 8.9–12.9)
POTASSIUM SERPL-SCNC: 4.3 MMOL/L (ref 3.5–5.1)
PROT SERPL-MCNC: 6.5 G/DL (ref 6.4–8.2)
PROTHROMBIN TIME: 10.3 SEC (ref 9–11.1)
Q-T INTERVAL, ECG07: 348 MS
QRS DURATION, ECG06: 98 MS
QTC CALCULATION (BEZET), ECG08: 404 MS
RBC # BLD AUTO: 2.01 M/UL (ref 3.8–5.2)
RBC MORPH BLD: ABNORMAL
SAMPLES BEING HELD,HOLD: NORMAL
SODIUM SERPL-SCNC: 142 MMOL/L (ref 136–145)
THERAPEUTIC RANGE,PTTT: NORMAL SECS (ref 58–77)
VENTRICULAR RATE, ECG03: 81 BPM
WBC # BLD AUTO: 3.9 K/UL (ref 3.6–11)

## 2020-04-07 PROCEDURE — 82140 ASSAY OF AMMONIA: CPT

## 2020-04-07 PROCEDURE — 30233N1 TRANSFUSION OF NONAUTOLOGOUS RED BLOOD CELLS INTO PERIPHERAL VEIN, PERCUTANEOUS APPROACH: ICD-10-PCS | Performed by: INTERNAL MEDICINE

## 2020-04-07 PROCEDURE — 77010033678 HC OXYGEN DAILY

## 2020-04-07 PROCEDURE — 86921 COMPATIBILITY TEST INCUBATE: CPT

## 2020-04-07 PROCEDURE — 77030029684 HC NEB SM VOL KT MONA -A

## 2020-04-07 PROCEDURE — 99285 EMERGENCY DEPT VISIT HI MDM: CPT

## 2020-04-07 PROCEDURE — 74011250637 HC RX REV CODE- 250/637: Performed by: INTERNAL MEDICINE

## 2020-04-07 PROCEDURE — 85610 PROTHROMBIN TIME: CPT

## 2020-04-07 PROCEDURE — 94640 AIRWAY INHALATION TREATMENT: CPT

## 2020-04-07 PROCEDURE — 65270000029 HC RM PRIVATE

## 2020-04-07 PROCEDURE — 36415 COLL VENOUS BLD VENIPUNCTURE: CPT

## 2020-04-07 PROCEDURE — 85025 COMPLETE CBC W/AUTO DIFF WBC: CPT

## 2020-04-07 PROCEDURE — 74011000250 HC RX REV CODE- 250: Performed by: INTERNAL MEDICINE

## 2020-04-07 PROCEDURE — 93005 ELECTROCARDIOGRAM TRACING: CPT

## 2020-04-07 PROCEDURE — 85730 THROMBOPLASTIN TIME PARTIAL: CPT

## 2020-04-07 PROCEDURE — 71045 X-RAY EXAM CHEST 1 VIEW: CPT

## 2020-04-07 PROCEDURE — C9113 INJ PANTOPRAZOLE SODIUM, VIA: HCPCS | Performed by: EMERGENCY MEDICINE

## 2020-04-07 PROCEDURE — 85018 HEMOGLOBIN: CPT

## 2020-04-07 PROCEDURE — 74011250636 HC RX REV CODE- 250/636: Performed by: EMERGENCY MEDICINE

## 2020-04-07 PROCEDURE — C9113 INJ PANTOPRAZOLE SODIUM, VIA: HCPCS | Performed by: INTERNAL MEDICINE

## 2020-04-07 PROCEDURE — 86920 COMPATIBILITY TEST SPIN: CPT

## 2020-04-07 PROCEDURE — 74011250636 HC RX REV CODE- 250/636: Performed by: INTERNAL MEDICINE

## 2020-04-07 PROCEDURE — P9016 RBC LEUKOCYTES REDUCED: HCPCS

## 2020-04-07 PROCEDURE — 86900 BLOOD TYPING SEROLOGIC ABO: CPT

## 2020-04-07 PROCEDURE — 80053 COMPREHEN METABOLIC PANEL: CPT

## 2020-04-07 PROCEDURE — 36430 TRANSFUSION BLD/BLD COMPNT: CPT

## 2020-04-07 PROCEDURE — 82272 OCCULT BLD FECES 1-3 TESTS: CPT

## 2020-04-07 PROCEDURE — 86922 COMPATIBILITY TEST ANTIGLOB: CPT

## 2020-04-07 PROCEDURE — 94664 DEMO&/EVAL PT USE INHALER: CPT

## 2020-04-07 RX ORDER — CALCIUM CARBONATE 750 MG/1
1-2 TABLET, CHEWABLE ORAL
COMMUNITY

## 2020-04-07 RX ORDER — ONDANSETRON 4 MG/1
4 TABLET, FILM COATED ORAL
COMMUNITY

## 2020-04-07 RX ORDER — ARIPIPRAZOLE 10 MG/1
10 TABLET ORAL DAILY
Status: DISCONTINUED | OUTPATIENT
Start: 2020-04-08 | End: 2020-04-09 | Stop reason: HOSPADM

## 2020-04-07 RX ORDER — OXYCODONE AND ACETAMINOPHEN 7.5; 325 MG/1; MG/1
1 TABLET ORAL
COMMUNITY
End: 2020-04-24 | Stop reason: SDUPTHER

## 2020-04-07 RX ORDER — GUAIFENESIN 100 MG/5ML
200 SOLUTION ORAL
COMMUNITY

## 2020-04-07 RX ORDER — DULOXETIN HYDROCHLORIDE 60 MG/1
60 CAPSULE, DELAYED RELEASE ORAL DAILY
Status: DISCONTINUED | OUTPATIENT
Start: 2020-04-08 | End: 2020-04-09 | Stop reason: HOSPADM

## 2020-04-07 RX ORDER — BUPROPION HYDROCHLORIDE 150 MG/1
150 TABLET, EXTENDED RELEASE ORAL DAILY
Status: DISCONTINUED | OUTPATIENT
Start: 2020-04-08 | End: 2020-04-09 | Stop reason: HOSPADM

## 2020-04-07 RX ORDER — BUDESONIDE 0.25 MG/2ML
250 INHALANT ORAL
Status: DISCONTINUED | OUTPATIENT
Start: 2020-04-07 | End: 2020-04-09 | Stop reason: HOSPADM

## 2020-04-07 RX ORDER — TRAMADOL HYDROCHLORIDE 50 MG/1
50 TABLET ORAL
Qty: 10 TAB | Refills: 0 | Status: SHIPPED | OUTPATIENT
Start: 2020-04-07 | End: 2020-04-07

## 2020-04-07 RX ORDER — NON-ADHERENT BANDAGE 3"X8"
1 BANDAGE TOPICAL DAILY
Qty: 10 EACH | Refills: 2 | Status: SHIPPED | OUTPATIENT
Start: 2020-04-07 | End: 2020-04-07

## 2020-04-07 RX ORDER — LORATADINE 10 MG/1
10 TABLET ORAL DAILY
Status: DISCONTINUED | OUTPATIENT
Start: 2020-04-08 | End: 2020-04-09 | Stop reason: HOSPADM

## 2020-04-07 RX ORDER — ALBUTEROL SULFATE 0.83 MG/ML
2.5 SOLUTION RESPIRATORY (INHALATION)
Status: DISCONTINUED | OUTPATIENT
Start: 2020-04-07 | End: 2020-04-09 | Stop reason: HOSPADM

## 2020-04-07 RX ORDER — ACETAMINOPHEN 500 MG
500 TABLET ORAL
COMMUNITY

## 2020-04-07 RX ORDER — PANTOPRAZOLE SODIUM 40 MG/10ML
40 INJECTION, POWDER, LYOPHILIZED, FOR SOLUTION INTRAVENOUS
Status: COMPLETED | OUTPATIENT
Start: 2020-04-07 | End: 2020-04-07

## 2020-04-07 RX ORDER — SPIRONOLACTONE 25 MG/1
25 TABLET ORAL DAILY
Status: DISCONTINUED | OUTPATIENT
Start: 2020-04-08 | End: 2020-04-08

## 2020-04-07 RX ORDER — IPRATROPIUM BROMIDE AND ALBUTEROL SULFATE 2.5; .5 MG/3ML; MG/3ML
3 SOLUTION RESPIRATORY (INHALATION)
Status: DISCONTINUED | OUTPATIENT
Start: 2020-04-07 | End: 2020-04-09 | Stop reason: HOSPADM

## 2020-04-07 RX ORDER — ONDANSETRON 2 MG/ML
4 INJECTION INTRAMUSCULAR; INTRAVENOUS
Status: DISCONTINUED | OUTPATIENT
Start: 2020-04-07 | End: 2020-04-09 | Stop reason: HOSPADM

## 2020-04-07 RX ORDER — IPRATROPIUM BROMIDE AND ALBUTEROL SULFATE 2.5; .5 MG/3ML; MG/3ML
3 SOLUTION RESPIRATORY (INHALATION) 4 TIMES DAILY
COMMUNITY

## 2020-04-07 RX ORDER — SODIUM CHLORIDE 9 MG/ML
250 INJECTION, SOLUTION INTRAVENOUS AS NEEDED
Status: DISCONTINUED | OUTPATIENT
Start: 2020-04-07 | End: 2020-04-09 | Stop reason: HOSPADM

## 2020-04-07 RX ORDER — PREGABALIN 75 MG/1
150 CAPSULE ORAL 2 TIMES DAILY
Status: DISCONTINUED | OUTPATIENT
Start: 2020-04-07 | End: 2020-04-09 | Stop reason: HOSPADM

## 2020-04-07 RX ORDER — ACETAMINOPHEN 325 MG/1
650 TABLET ORAL
Status: DISCONTINUED | OUTPATIENT
Start: 2020-04-07 | End: 2020-04-09 | Stop reason: HOSPADM

## 2020-04-07 RX ORDER — SUCRALFATE 1 G/1
1 TABLET ORAL 2 TIMES DAILY
Status: DISCONTINUED | OUTPATIENT
Start: 2020-04-07 | End: 2020-04-07

## 2020-04-07 RX ORDER — AMOXICILLIN 250 MG
1-2 CAPSULE ORAL
Status: DISCONTINUED | OUTPATIENT
Start: 2020-04-07 | End: 2020-04-09 | Stop reason: HOSPADM

## 2020-04-07 RX ORDER — LANOLIN ALCOHOL/MO/W.PET/CERES
250 CREAM (GRAM) TOPICAL DAILY
Status: DISCONTINUED | OUTPATIENT
Start: 2020-04-08 | End: 2020-04-09 | Stop reason: HOSPADM

## 2020-04-07 RX ORDER — ATORVASTATIN CALCIUM 20 MG/1
20 TABLET, FILM COATED ORAL
Status: DISCONTINUED | OUTPATIENT
Start: 2020-04-07 | End: 2020-04-09 | Stop reason: HOSPADM

## 2020-04-07 RX ORDER — MELATONIN
4000 DAILY
Status: DISCONTINUED | OUTPATIENT
Start: 2020-04-08 | End: 2020-04-09 | Stop reason: HOSPADM

## 2020-04-07 RX ORDER — PROPYLENE GLYCOL 0.06 MG/ML
1 SOLUTION/ DROPS OPHTHALMIC 2 TIMES DAILY
COMMUNITY

## 2020-04-07 RX ORDER — SUCRALFATE 1 G/1
1 TABLET ORAL 4 TIMES DAILY
Status: DISCONTINUED | OUTPATIENT
Start: 2020-04-07 | End: 2020-04-09 | Stop reason: HOSPADM

## 2020-04-07 RX ORDER — THERA TABS 400 MCG
1 TAB ORAL DAILY
Status: ON HOLD | COMMUNITY
End: 2020-05-26

## 2020-04-07 RX ORDER — PROMETHAZINE HYDROCHLORIDE 12.5 MG/1
12.5 TABLET ORAL
COMMUNITY

## 2020-04-07 RX ORDER — LANOLIN ALCOHOL/MO/W.PET/CERES
325 CREAM (GRAM) TOPICAL DAILY
COMMUNITY

## 2020-04-07 RX ORDER — SODIUM CHLORIDE 9 MG/ML
60 INJECTION, SOLUTION INTRAVENOUS CONTINUOUS
Status: DISPENSED | OUTPATIENT
Start: 2020-04-07 | End: 2020-04-08

## 2020-04-07 RX ORDER — POLYETHYLENE GLYCOL 400 AND PROPYLENE GLYCOL 4; 3 MG/ML; MG/ML
2 SOLUTION/ DROPS OPHTHALMIC 2 TIMES DAILY
Status: ON HOLD | COMMUNITY
End: 2020-05-26

## 2020-04-07 RX ORDER — FUROSEMIDE 20 MG/1
20 TABLET ORAL DAILY
Status: DISCONTINUED | OUTPATIENT
Start: 2020-04-08 | End: 2020-04-09 | Stop reason: HOSPADM

## 2020-04-07 RX ORDER — OXYCODONE AND ACETAMINOPHEN 10; 325 MG/1; MG/1
1 TABLET ORAL
Status: DISCONTINUED | OUTPATIENT
Start: 2020-04-07 | End: 2020-04-09 | Stop reason: HOSPADM

## 2020-04-07 RX ORDER — CARVEDILOL 3.12 MG/1
3.12 TABLET ORAL 2 TIMES DAILY WITH MEALS
Status: DISCONTINUED | OUTPATIENT
Start: 2020-04-07 | End: 2020-04-09 | Stop reason: HOSPADM

## 2020-04-07 RX ADMIN — BUDESONIDE 250 MCG: 0.25 SUSPENSION RESPIRATORY (INHALATION) at 21:20

## 2020-04-07 RX ADMIN — SODIUM CHLORIDE 60 ML/HR: 900 INJECTION, SOLUTION INTRAVENOUS at 16:00

## 2020-04-07 RX ADMIN — PREGABALIN 150 MG: 75 CAPSULE ORAL at 18:14

## 2020-04-07 RX ADMIN — IPRATROPIUM BROMIDE AND ALBUTEROL SULFATE 3 ML: .5; 3 SOLUTION RESPIRATORY (INHALATION) at 16:20

## 2020-04-07 RX ADMIN — SUCRALFATE 1 G: 1 TABLET ORAL at 18:14

## 2020-04-07 RX ADMIN — RIFAXIMIN 550 MG: 550 TABLET ORAL at 18:14

## 2020-04-07 RX ADMIN — ATORVASTATIN CALCIUM 20 MG: 20 TABLET, FILM COATED ORAL at 21:46

## 2020-04-07 RX ADMIN — PANTOPRAZOLE SODIUM 40 MG: 40 INJECTION, POWDER, FOR SOLUTION INTRAVENOUS at 12:56

## 2020-04-07 RX ADMIN — SODIUM CHLORIDE 500 ML: 900 INJECTION, SOLUTION INTRAVENOUS at 08:58

## 2020-04-07 RX ADMIN — IPRATROPIUM BROMIDE AND ALBUTEROL SULFATE 3 ML: .5; 3 SOLUTION RESPIRATORY (INHALATION) at 21:20

## 2020-04-07 RX ADMIN — SUCRALFATE 1 G: 1 TABLET ORAL at 21:46

## 2020-04-07 RX ADMIN — SODIUM CHLORIDE 40 MG: 9 INJECTION INTRAMUSCULAR; INTRAVENOUS; SUBCUTANEOUS at 21:47

## 2020-04-07 NOTE — ED TRIAGE NOTES
Pt arrives via EMS from [facility] with c/o dark colored stool x 3 days. Hx of GI bleed, anemia, liver failure, COPD, CHF. Last [facility] 4/7/2020 Denies abdominal pain or discomfort. -N/V No cough, fever, SOB or contact with COVID +

## 2020-04-07 NOTE — PROGRESS NOTES
Admission Medication Reconciliation: 
 
Information obtained from:  [facility] medication list 
RxQuery data available¹:  YES Comments/Recommendations: Patient presenting from [facility] where she currently resides. Utilized medication list and RxQuery review to update PTA meds/review patient's allergies. 1)  Of note, medication list from [facility] has duo-nebs scheduled QID, however patient is also on Stiolto and Flovent HFA inhaler, so unsure if patient is really still getting duo-nebs scheduled QID. 2)  Medication changes (since last review): Added - Calcium carbonate (Tums) - Systane complete eye drops - Preservision Areds - Duo-neb (scheduled) - Systane eye drops - Acetaminophen 
- Guaifenesin soln - Ondansetron - Promethazine - Ferrous sulfate Adjusted - Albuterol inhaler (from QID prn to q6h prn) - Oxycodone-APAP (from  mg QID prn to 7.5-325 mg TID prn) - Senna-docusate (from 1-2 tabs QHS prn to 1 tab daily prn) - Sucralfate (from BID to QID) - Stiolto Respimat (to 2 puffs daily) Removed - Ferrous fumarate (takes ferrous sulfate) ¹RxQuery pharmacy benefit data reflects medications filled and processed through the patient's insurance, however  
this data does NOT capture whether the medication was picked up or is currently being taken by the patient. Allergies:  Pcn [penicillins] Significant PMH/Disease States:  
Past Medical History:  
Diagnosis Date Anemia Arthritis Rheumatoid CHF (congestive heart failure) ([address] Chronic obstructive pulmonary disease (HCC) Cirrhosis of liver ([facility] ETOH  
 COPD (chronic obstructive pulmonary disease) ([facility] Gastrointestinal disorder \"lacerations in the large part of my small intestine. \"  
 Gastrointestinal disorder   
 liver cirrhosis Heart failure ([address] Hyperlipidemia 4/1/2020 Internal bleeding Lung cancer ([facility] 4/1/2020 Psychiatric disorder depression, anxiety Tachycardia 1/27/2016 Chief Complaint for this Admission: Chief Complaint Patient presents with Melena Prior to Admission Medications:  
Prior to Admission Medications Prescriptions Last Dose Informant Taking? ARIPiprazole (ABILIFY) 10 mg tablet   Yes Sig: Take 10 mg by mouth daily. DULoxetine (CYMBALTA) 60 mg capsule   Yes Sig: Take 60 mg by mouth daily. acetaminophen (TYLENOL) 500 mg tablet   Yes Sig: Take 500 mg by mouth every eight (8) hours as needed for Pain. albuterol (VENTOLIN HFA) 90 mcg/actuation inhaler   Yes Sig: Take 2 Puffs by inhalation every six (6) hours as needed for Wheezing. albuterol-ipratropium (DUO-NEB) 2.5 mg-0.5 mg/3 ml nebu   Yes Sig: 3 mL by Nebulization route every four (4) hours as needed for Wheezing. albuterol-ipratropium (DUO-NEB) 2.5 mg-0.5 mg/3 ml nebu Unknown at Unknown time  No  
Sig: 3 mL by Nebulization route four (4) times daily. atorvastatin (LIPITOR) 20 mg tablet   Yes Sig: Take 20 mg by mouth nightly. buPROPion XL (WELLBUTRIN XL) 150 mg tablet   Yes Sig: Take 150 mg by mouth every morning. calcium carbonate (Tums E-X) 300 mg (750 mg) chewable tablet   Yes Sig: Take 1-2 Tabs by mouth daily as needed for Indigestion (Gas). carvedilol (COREG) 3.125 mg tablet   Yes Sig: Take 3.125 mg by mouth two (2) times daily (with meals). cholecalciferol, vitamin D3, (VITAMIN D3) 2,000 unit tab   Yes Sig: Take 4,000 Units by mouth daily. cyanocobalamin (Vitamin B-12) 250 mcg tablet   Yes Sig: Take 250 mcg by mouth daily. ferrous sulfate 325 mg (65 mg iron) tablet   Yes Sig: Take 325 mg by mouth daily. fluticasone propionate (Flovent HFA) 44 mcg/actuation inhaler   Yes Sig: Take 2 Puffs by inhalation two (2) times a day. furosemide (LASIX) 20 mg tablet   Yes Sig: Take 20 mg by mouth daily. guaiFENesin (ROBITUSSIN) 100 mg/5 mL liquid   Yes Sig: Take 200 mg by mouth every four (4) hours as needed for Cough. loratadine (CLARITIN) 10 mg tablet   Yes Sig: Take 10 mg by mouth daily. ondansetron hcl (ZOFRAN) 4 mg tablet   Yes Sig: Take 4 mg by mouth two (2) times daily as needed for Nausea. oxyCODONE-acetaminophen (Percocet) 7.5-325 mg per tablet   Yes Sig: Take 1 Tab by mouth three (3) times daily as needed for Pain.  
pantoprazole (PROTONIX) 40 mg tablet   Yes Sig: Take 40 mg by mouth daily. peg 400-propylene glycol (Systane, propylene glycol,) 0.4-0.3 % drop   Yes Sig: Administer 2 Drops to both eyes every six (6) hours as needed for Other (Ocular dryness/irritation). pregabalin (LYRICA) 150 mg capsule   Yes Sig: Take 150 mg by mouth two (2) times a day. promethazine (PHENERGAN) 12.5 mg tablet   Yes Sig: Take 12.5 mg by mouth every six (6) hours as needed (Vomiting). propylene glycol (Systane Complete) 0.6 % drop   Yes Sig: Administer 1 Drop to both eyes two (2) times a day. senna-docusate (SENNA PLUS) 8.6-50 mg per tablet   Yes Sig: Take 1 Tab by mouth daily as needed for Constipation. spironolactone (ALDACTONE) 50 mg tablet   Yes Sig: Take 50 mg by mouth daily as needed (excess fluid). sucralfate (CARAFATE) 1 gram tablet   Yes Sig: Take 1 g by mouth four (4) times daily. therapeutic multivitamin [facility]) tablet   Yes Sig: Take 1 Tab by mouth daily. tiotropium Br/olodaterol HCl (STIOLTO RESPIMAT IN)   Yes Sig: Take 2 Puffs by inhalation daily. vit A/vit C/vit E/zinc/copper (PRESERVISION AREDS PO)   Yes Sig: Take 1 Tab by mouth two (2) times a day. Facility-Administered Medications: None Please contact the main inpatient pharmacy with any questions or concerns at [phone] and we will direct you to the clinical pharmacist covering this patient's care while in-house.   
CHRISTIANA Lutz

## 2020-04-07 NOTE — CONSULTS
[address] 
 [address] [place] [address] GASTROENTEROLOGY CONSULTATION NOTE Will Isabel Cortes, [facility] office [phone] NP/PA in-hospital cell phone M-F until 4:30PM 
After 5PM or on weekends, please call  for physician on call NAME:  Jacob Childs :   1960 MRN:   [id number] Referring Physician: Dr. Gavino Tao Consult Date: 2020 4:35 PM 
 
Chief Complaint: dark stools History of Present Illness:  Patient is a 60 y.o. who is seen in consultation at the request of Dr. Linda Wong for GI bleed. Patient has a past medical history significant for COPD, lung cancer, cirrhosis, anemia, and GI bleed. She presented to the ED for dark stools. In the ED, hemoglobin was 5.8. Patient was admitted to the hospital on 20 for GI bleed. Patient reports black stools for the last approximately 3 days with a few episodes each day. No hematochezia. No nausea, reflux, dysphagia, or odynophagia. She reports vomiting once daily for the last few days. No hematemesis. No abdominal pain. No fevers. Patient has been on PPI. No NSAID use. No anticoagulation. No alcohol use.  + Tobacco use. Patient was seen during recent hospital admission in 3/2020. During admission, EGD (3/17/20) by Dr. Usman Fried for iron deficiency anemia/melena/hematochezia showed a 3 cm hiatal hernia; gastritis; 1 cm polyp in the duodenal bulb, non-bleeding ulcers and superficial ulcers in the duodenal bulb and second portion of the duoden, largest around 8 mm. EGD (10/2019) by Dr. Kim Andino showed reported GAVE treated with APC, duodenal polyps. Colonoscopy (2019) with reported diverticulosis and hemorrhoids. I have reviewed the emergency room note, hospital admission note, notes by all other clinicians who have seen the patient during this hospitalization to date.  I have reviewed the problem list and the reason for this hospitalization. I have reviewed the allergies and the medications the patient was taking at home prior to this hospitalization. PMH: 
Past Medical History:  
Diagnosis Date  Anemia  Arthritis Rheumatoid  CHF [facility]congestive heart failure) [facility]  Chronic obstructive pulmonary disease ([facility]  Cirrhosis of liver ([facility] ETOH  
 COPD (chronic obstructive pulmonary disease) ([facility]  Gastrointestinal disorder \"lacerations in the large part of my small intestine. \"  Gastrointestinal disorder   
 liver cirrhosis  Heart failure ([facility]  Hyperlipidemia 2020  Internal bleeding  Lung cancer ([facility] 2020  Psychiatric disorder   
 depression, anxiety  Tachycardia 2016 PSH: 
Past Surgical History:  
Procedure Laterality Date  HX CHOLECYSTECTOMY  HX GI Cholecystectomoy  HX GYN    
  x 2.  GA ENDOSCOPY UPPER SMALL INTESTINE  2016 Allergies: Allergies Allergen Reactions  Pcn [Penicillins] Angioedema Childhood reaction Home Medications: 
Prior to Admission Medications Prescriptions Last Dose Informant Patient Reported? Taking? ARIPiprazole (ABILIFY) 10 mg tablet   Yes Yes Sig: Take 10 mg by mouth daily. DULoxetine (CYMBALTA) 60 mg capsule   Yes Yes Sig: Take 60 mg by mouth daily. acetaminophen (TYLENOL) 500 mg tablet   Yes Yes Sig: Take 500 mg by mouth every eight (8) hours as needed for Pain. albuterol (VENTOLIN HFA) 90 mcg/actuation inhaler   Yes Yes Sig: Take 2 Puffs by inhalation every six (6) hours as needed for Wheezing. albuterol-ipratropium (DUO-NEB) 2.5 mg-0.5 mg/3 ml nebu   Yes Yes Sig: 3 mL by Nebulization route every four (4) hours as needed for Wheezing. albuterol-ipratropium (DUO-NEB) 2.5 mg-0.5 mg/3 ml nebu Unknown at Unknown time  Yes No  
Sig: 3 mL by Nebulization route four (4) times daily. atorvastatin (LIPITOR) 20 mg tablet   Yes Yes Sig: Take 20 mg by mouth nightly. buPROPion XL (WELLBUTRIN XL) 150 mg tablet   Yes Yes Sig: Take 150 mg by mouth every morning. calcium carbonate (Tums E-X) 300 mg (750 mg) chewable tablet   Yes Yes Sig: Take 1-2 Tabs by mouth daily as needed for Indigestion (Gas). carvedilol (COREG) 3.125 mg tablet   Yes Yes Sig: Take 3.125 mg by mouth two (2) times daily (with meals). cholecalciferol, vitamin D3, (VITAMIN D3) 2,000 unit tab   Yes Yes Sig: Take 4,000 Units by mouth daily. cyanocobalamin (Vitamin B-12) 250 mcg tablet   Yes Yes Sig: Take 250 mcg by mouth daily. ferrous sulfate 325 mg (65 mg iron) tablet   Yes Yes Sig: Take 325 mg by mouth daily. fluticasone propionate (Flovent HFA) 44 mcg/actuation inhaler   Yes Yes Sig: Take 2 Puffs by inhalation two (2) times a day. furosemide (LASIX) 20 mg tablet   Yes Yes Sig: Take 20 mg by mouth daily. guaiFENesin (ROBITUSSIN) 100 mg/5 mL liquid   Yes Yes Sig: Take 200 mg by mouth every four (4) hours as needed for Cough. loratadine (CLARITIN) 10 mg tablet   Yes Yes Sig: Take 10 mg by mouth daily. ondansetron hcl (ZOFRAN) 4 mg tablet   Yes Yes Sig: Take 4 mg by mouth two (2) times daily as needed for Nausea. oxyCODONE-acetaminophen (Percocet) 7.5-325 mg per tablet   Yes Yes Sig: Take 1 Tab by mouth three (3) times daily as needed for Pain.  
pantoprazole (PROTONIX) 40 mg tablet   Yes Yes Sig: Take 40 mg by mouth daily. peg 400-propylene glycol (Systane, propylene glycol,) 0.4-0.3 % drop   Yes Yes Sig: Administer 2 Drops to both eyes every six (6) hours as needed for Other (Ocular dryness/irritation). pregabalin (LYRICA) 150 mg capsule   Yes Yes Sig: Take 150 mg by mouth two (2) times a day. promethazine (PHENERGAN) 12.5 mg tablet   Yes Yes Sig: Take 12.5 mg by mouth every six (6) hours as needed (Vomiting). propylene glycol (Systane Complete) 0.6 % drop   Yes Yes Sig: Administer 1 Drop to both eyes two (2) times a day. senna-docusate (SENNA PLUS) 8.6-50 mg per tablet   Yes Yes Sig: Take 1 Tab by mouth daily as needed for Constipation. spironolactone (ALDACTONE) 50 mg tablet   Yes Yes Sig: Take 50 mg by mouth daily as needed (excess fluid). sucralfate (CARAFATE) 1 gram tablet   Yes Yes Sig: Take 1 g by mouth four (4) times daily. therapeutic multivitamin [facility]) tablet   Yes Yes Sig: Take 1 Tab by mouth daily. tiotropium Br/olodaterol HCl (STIOLTO RESPIMAT IN)   Yes Yes Sig: Take 2 Puffs by inhalation daily. vit A/vit C/vit E/zinc/copper (PRESERVISION AREDS PO)   Yes Yes Sig: Take 1 Tab by mouth two (2) times a day. Facility-Administered Medications: None Hospital Medications: 
Current Facility-Administered Medications Medication Dose Route Frequency  0.9% sodium chloride infusion 250 mL  250 mL IntraVENous PRN  
 albuterol-ipratropium (DUO-NEB) 2.5 MG-0.5 MG/3 ML  3 mL Nebulization QID RT  
 [START ON 4/8/2020] ARIPiprazole (ABILIFY) tablet 10 mg  10 mg Oral DAILY  atorvastatin (LIPITOR) tablet 20 mg  20 mg Oral QHS  [START ON 4/8/2020] buPROPion SR (WELLBUTRIN SR) tablet 150 mg  150 mg Oral DAILY  carvediloL (COREG) tablet 3.125 mg  3.125 mg Oral BID WITH MEALS  
 [START ON 4/8/2020] cholecalciferol (VITAMIN D3) (1000 Units /25 mcg) tablet 4 Tab  4,000 Units Oral DAILY  [START ON 4/8/2020] cyanocobalamin (VITAMIN B12) tablet 250 mcg  250 mcg Oral DAILY  [START ON 4/8/2020] DULoxetine (CYMBALTA) capsule 60 mg  60 mg Oral DAILY  budesonide (PULMICORT) 250 mcg/2ml nebulizer susp  250 mcg Nebulization BID RT  
 [START ON 4/8/2020] furosemide (LASIX) tablet 20 mg  20 mg Oral DAILY  [START ON 4/8/2020] loratadine (CLARITIN) tablet 10 mg  10 mg Oral DAILY  [START ON 4/8/2020] multivitamin, tx-iron-ca-min (THERA-M w/ IRON) tablet 1 Tab  1 Tab Oral DAILY  oxyCODONE-acetaminophen (PERCOCET 10)  mg per tablet 1 Tab  1 Tab Oral QID PRN  pantoprazole (PROTONIX) 40 mg in 0.9% sodium chloride 10 mL injection  40 mg IntraVENous Q12H  pregabalin (LYRICA) capsule 150 mg  150 mg Oral BID  senna-docusate (PERICOLACE) 8.6-50 mg per tablet 1-2 Tab  1-2 Tab Oral QHS PRN  
 [START ON 4/8/2020] spironolactone (ALDACTONE) tablet 25 mg  25 mg Oral DAILY  albuterol (PROVENTIL VENTOLIN) nebulizer solution 2.5 mg  2.5 mg Nebulization Q4H PRN  
 0.9% sodium chloride infusion  60 mL/hr IntraVENous CONTINUOUS  
 ondansetron (ZOFRAN) injection 4 mg  4 mg IntraVENous Q6H PRN  
 acetaminophen (TYLENOL) tablet 650 mg  650 mg Oral Q4H PRN  
 rifAXIMin (XIFAXAN) tablet 550 mg  550 mg Oral BID  sucralfate (CARAFATE) tablet 1 g  1 g Oral QID Social History: 
Social History Tobacco Use  Smoking status: Current Every Day Smoker Packs/day: 0.75 Years: 45.00 Pack years: 33.75  Smokeless tobacco: Never Used Substance Use Topics  Alcohol use: No  
  Comment: in the past was an alcoholic. Has been sober  since about 2010 Family History: 
Family History Problem Relation Age of Onset  Cancer Mother   
     pancreatic  Alcohol abuse Father  Cancer Sister Lung cancer (smoker). Review of Systems: 
Constitutional: negative fever, negative chills, negative weight loss Eyes:   negative visual changes ENT:   negative sore throat, tongue or lip swelling Respiratory:  negative cough, negative dyspnea Cards:  negative for chest pain, palpitations, lower extremity edema GI:   See HPI 
:  negative for frequency, dysuria Integument:  negative for rash and pruritus Heme:  negative for easy bruising and gum/nose bleeding Musculoskeletal:negative for myalgias, back pain and muscle weakness Neuro:  negative for headaches, dizziness Psych: negative for feelings of anxiety, depression Objective: Patient Vitals for the past 8 hrs: 
 BP Temp Pulse Resp SpO2  
04/07/20 1621     90 % 04/07/20 1452 100/56 98.1 °F (36.7 °C) 89 14 100 % 04/07/20 1400 115/46 98.5 °F (36.9 °C) 82 14 100 % 04/07/20 1330 108/52 98.7 °F (37.1 °C) 82 14 100 % 04/07/20 1302 106/51 98.2 °F (36.8 °C) 82 13 100 % 04/07/20 1248 108/45 98.2 °F (36.8 °C) 83 13 100 % 04/07/20 1226 106/53 98.2 °F (36.8 °C) 85 14 100 % 04/07/20 1200 129/45  78 14 99 % 04/07/20 1100 108/51  84 14 100 % 04/07/20 1000 103/41  81 14 100 % 04/07/20 0900 101/43  77 21 99 % 04/07 0701 - 04/07 1900 In: 897.9 [I.V.:500] Out: - No intake/output data recorded. EXAM:   
 CONST:  Pleasant female lying in bed, no acute distress, RN is at the bedside NEURO:  Alert and oriented x 3 HEENT: No scleral icterus LUNGS: On nasal cannula, no respiratory distress ABD:  Deferred PSYCH: Not anxious or agitated Exam otherwise deferred Data Review Recent Labs 04/07/20 1988 WBC 3.9 HGB 5.8* HCT 21.8*  
* Recent Labs 04/07/20 
3823   
K 4.3  CO2 34* BUN 19  
CREA 0.92  
GLU 96  
CA 9.4 Recent Labs 04/07/20 
0338 SGOT 35  TP 6.5 ALB 3.0*  
GLOB 3.5 Recent Labs 04/07/20 
3368 INR 1.0 PTP 10.3 APTT 25.4 Assessment:  
· GI bleed: melena and anemia with hemoccult positive stool. Hgb 5.8 (6.6 on 4/1, 7.6 on 3/18), platelets 508, INR 1.0. Received 1 unit PRBCs. No anticoagulation. History of GAVE and duodenal AVMs. EGD (3/17/20): 3 cm hiatal hernia, gastritis, 1 cm polyp in the duodenal bulb, non-bleeding ulcers in the duodenal bulb and second portion of the duodenum. · COPD · Lung cancer · Cirrhosis with possible hepatic encephalopathy: on Xifaxan. Seen previously by hepatology, Dr. Rubio Hernández. Patient Active Problem List  
Diagnosis Code  Anemia D64.9  
 GI bleed K92.2  [facility] K74.60  Chronic respiratory failure with hypoxia (HCC) J96.11  
 Acute blood loss anemia D62  
 GIB (gastrointestinal bleeding) K92.2  CHF (congestive heart failure) (HCC) I50.9  [facility] C34.90  Hyperlipidemia E78.5  Chronic obstructive pulmonary disease ([facility] J44.9  Hepatic encephalopathy (HCC) K72.90 Plan: · Clear liquids. NPO after midnight. · PPI BID · Received 1 unit PRBCs. Repeat Hgb pending. Trend CBC and transfuse as necessary. · Plan for EGD tomorrow with Dr. Etienne Villalba. Details and risks of the procedure to include (but not limited to) anesthesia, bleeding, infection, and perforation were discussed. Patient understands and is in agreement. Please verify consent has been obtained. Discussed plan with RN. · Patient was discussed with Dr. Etienne Villalba · Thank you for allowing me to participate in care of Gio Collier Signed By: Kareem Landaverde   
 4/7/2020  4:35 PM 
  
 
 
Gastroenterology Attending Physician attestation statement and comments. This patient was seen and examined by me in a face-to-face visit today. I reviewed the medical record including lab work, imaging and other provider notes. I confirmed the history as described above. I spoke to the patient, reviewed the medical record including lab work, imaging and other provider notes. I discussed this case in detail with Kareem Gutierrez. I formulated an  assessment of this patient and developed a treatment plan. I agree with the above consultation note. I agree with the history, exam and assessment and plan as outlined in the note. I would like to add the following: History of multiple upper GI findings on previous EGD as described above. History of cirrhosis with hepatic encephalopathy. Will proceed with diagnostic EGD as above. Discussed risks and she has agreed to proceed. Bart Villalba MD

## 2020-04-07 NOTE — H&P
History and Physical 
 
Subjective: Deepthi Bee is a 60 y.o. white female with cirrhosis, COPD, recent UGI bleed due to apparent antral gastritis. She was just dx'ed with a lung cancer. The plan was to do empiric XRT without obtaining tissue bx. I met pt for the first time in the office a few days ago. Labs done then revealed a Hgb of 6.6 which was down a little from her prior levels. I called her and left a VM. Pt is a poor historian, but it sounds like she was brought to the ER b/c of dark stools. She appears to be somewhat encephalopathic. Blood transfusion was ordered, and she was started on IV Protonix. Pt is being admitted for inpatient management. No f/c. No other new c/o's. Past Medical History:  
Diagnosis Date  Anemia  Arthritis Rheumatoid  CHF (congestive heart failure) ([address]  Chronic obstructive pulmonary disease ([facility]  Cirrhosis of liver ([facility] ETOH  
 COPD (chronic obstructive pulmonary disease) ([address]  Gastrointestinal disorder \"lacerations in the large part of my small intestine. \"  Gastrointestinal disorder   
 liver cirrhosis  Heart failure ([address]  Hyperlipidemia 4/1/2020  Internal bleeding  Lung cancer ([facility] 4/1/2020  Psychiatric disorder   
 depression, anxiety  Tachycardia 1/27/2016 Allergies Allergen Reactions  Pcn [Penicillins] Angioedema Childhood reaction Prior to Admission medications Medication Sig Start Date End Date Taking? Authorizing Provider Ferrous Fumarate 325 mg (106 mg iron) tab Take 325 mg by mouth daily. Provider, Historical  
tiotropium Br/olodaterol HCl (STIOLTO RESPIMAT IN) Take  by inhalation. Provider, Historical  
oxyCODONE-acetaminophen (PERCOCET 10)  mg per tablet Take 1 Tab by mouth four (4) times daily as needed for Pain for up to 30 days. Max Daily Amount: 4 Tabs.  4/1/20 5/1/20  Mukund Dill MD  
 ARIPiprazole (ABILIFY) 10 mg tablet Take 10 mg by mouth daily. Provider, Historical  
cyanocobalamin (Vitamin B-12) 250 mcg tablet Take 250 mcg by mouth daily. Provider, Historical  
pregabalin (LYRICA) 150 mg capsule Take 150 mg by mouth two (2) times a day. Provider, Historical  
DULoxetine (CYMBALTA) 60 mg capsule Take 60 mg by mouth daily. Provider, Historical  
atorvastatin (LIPITOR) 20 mg tablet Take 20 mg by mouth nightly. Provider, Historical  
sucralfate (CARAFATE) 1 gram tablet Take 1 g by mouth two (2) times a day. Provider, Historical  
buPROPion XL (WELLBUTRIN XL) 150 mg tablet Take 150 mg by mouth every morning. Provider, Historical  
fluticasone propionate (Flovent HFA) 44 mcg/actuation inhaler Take 2 Puffs by inhalation two (2) times a day. Provider, Historical  
furosemide (LASIX) 20 mg tablet Take 20 mg by mouth daily. Provider, Historical  
carvedilol (COREG) 3.125 mg tablet Take 3.125 mg by mouth two (2) times daily (with meals). Provider, Historical  
loratadine (CLARITIN) 10 mg tablet Take 10 mg by mouth daily. Provider, Historical  
cholecalciferol, vitamin D3, (VITAMIN D3) 2,000 unit tab Take 4,000 Units by mouth daily. Provider, Historical  
pantoprazole (PROTONIX) 40 mg tablet Take 40 mg by mouth daily. Provider, Historical  
multivitamins-minerals-lutein (CEROVITE SENIOR) tab tablet Take 1 Tab by mouth daily. Provider, Historical  
albuterol-ipratropium (DUO-NEB) 2.5 mg-0.5 mg/3 ml nebu 3 mL by Nebulization route every four (4) hours as needed for Wheezing. Provider, Historical  
senna-docusate (SENNA PLUS) 8.6-50 mg per tablet Take 1-2 Tabs by mouth nightly as needed for Constipation. Provider, Historical  
albuterol (VENTOLIN HFA) 90 mcg/actuation inhaler Take 2 Puffs by inhalation four (4) times daily as needed for Wheezing.     Provider, Historical  
spironolactone (ALDACTONE) 50 mg tablet Take 50 mg by mouth daily as needed (excess fluid). Provider, Historical  
 
Social History Tobacco Use  Smoking status: Current Every Day Smoker Packs/day: 0.75 Years: 45.00 Pack years: 33.75  Smokeless tobacco: Never Used Substance Use Topics  Alcohol use: No  
  Comment: in the past was an alcoholic. Has been sober  since about 2010 Family History Problem Relation Age of Onset  Cancer Mother   
     pancreatic  Alcohol abuse Father  Cancer Sister Lung cancer (smoker). Review of Systems: As above. Objective:  
 
 
Physical Exam: In NAD. Alert, somewhat somnolent, but interacts. HEENT -- Unremarkable. Neck -- Supple. No JVD. Heart -- RRR. No R/M/G. Lungs -- Few scattered exp wheezes. Abdomen -- Soft. Non-tender. Non-distended. No masses. Bowel sounds present. Extremeties -- 1+ LE edema b/l. Asterixis present. Data Review:  
Recent Results (from the past 24 hour(s)) EKG, 12 LEAD, INITIAL Collection Time: 04/07/20  8:11 AM  
Result Value Ref Range Ventricular Rate 81 BPM  
 Atrial Rate 81 BPM  
 P-R Interval 152 ms QRS Duration 98 ms Q-T Interval 348 ms QTC Calculation (Bezet) 404 ms Calculated P Axis 77 degrees Calculated R Axis 73 degrees Calculated T Axis 67 degrees Diagnosis Normal sinus rhythm When compared with ECG of 01-SEP-2018 10:20, No significant change was found Confirmed by Trang Velasquez M.D., Lebron Callahan ([id number]) on 4/7/2020 9:27:03 AM 
  
SAMPLES BEING HELD Collection Time: 04/07/20  8:14 AM  
Result Value Ref Range SAMPLES BEING HELD 1RED,1BLU,1LAV,1PST COMMENT Add-on orders for these samples will be processed based on acceptable specimen integrity and analyte stability, which may vary by analyte. CBC WITH AUTOMATED DIFF Collection Time: 04/07/20  8:14 AM  
Result Value Ref Range WBC 3.9 3.6 - 11.0 K/uL  
 RBC 2.01 (L) 3.80 - 5.20 M/uL HGB 5.8 (LL) 11.5 - 16.0 g/dL HCT 21.8 (L) 35.0 - 47.0 % .5 (H) 80.0 - 99.0 FL  
 MCH 28.9 26.0 - 34.0 PG  
 MCHC 26.6 (L) 30.0 - 36.5 g/dL  
 RDW 18.9 (H) 11.5 - 14.5 % PLATELET 299 (L) 483 - 400 K/uL MPV 12.1 8.9 - 12.9 FL  
 NRBC 0.5 (H) 0  WBC ABSOLUTE NRBC 0.02 (H) 0.00 - 0.01 K/uL NEUTROPHILS 74 32 - 75 % LYMPHOCYTES 11 (L) 12 - 49 % MONOCYTES 10 5 - 13 % EOSINOPHILS 5 0 - 7 % BASOPHILS 0 0 - 1 % IMMATURE GRANULOCYTES 0 0.0 - 0.5 % ABS. NEUTROPHILS 2.9 1.8 - 8.0 K/UL  
 ABS. LYMPHOCYTES 0.4 (L) 0.8 - 3.5 K/UL  
 ABS. MONOCYTES 0.4 0.0 - 1.0 K/UL  
 ABS. EOSINOPHILS 0.2 0.0 - 0.4 K/UL  
 ABS. BASOPHILS 0.0 0.0 - 0.1 K/UL  
 ABS. IMM. GRANS. 0.0 0.00 - 0.04 K/UL  
 DF SMEAR SCANNED    
 RBC COMMENTS ANISOCYTOSIS 1+ 
    
 RBC COMMENTS MACROCYTOSIS 1+ 
    
 RBC COMMENTS Pathology Review Requested POLYCHROMASIA 1+ METABOLIC PANEL, COMPREHENSIVE Collection Time: 04/07/20  8:14 AM  
Result Value Ref Range Sodium 142 136 - 145 mmol/L Potassium 4.3 3.5 - 5.1 mmol/L Chloride 108 97 - 108 mmol/L  
 CO2 34 (H) 21 - 32 mmol/L Anion gap 0 (L) 5 - 15 mmol/L Glucose 96 65 - 100 mg/dL BUN 19 6 - 20 MG/DL Creatinine 0.92 0.55 - 1.02 MG/DL  
 BUN/Creatinine ratio 21 (H) 12 - 20 GFR est AA >60 >60 ml/min/1.73m2 GFR est non-AA >60 >60 ml/min/1.73m2 Calcium 9.4 8.5 - 10.1 MG/DL Bilirubin, total 0.2 0.2 - 1.0 MG/DL  
 ALT (SGPT) 26 12 - 78 U/L  
 AST (SGOT) 35 15 - 37 U/L Alk. phosphatase 108 45 - 117 U/L Protein, total 6.5 6.4 - 8.2 g/dL Albumin 3.0 (L) 3.5 - 5.0 g/dL Globulin 3.5 2.0 - 4.0 g/dL A-G Ratio 0.9 (L) 1.1 - 2.2 TYPE + CROSSMATCH Collection Time: 04/07/20  8:14 AM  
Result Value Ref Range Crossmatch Expiration 04/10/2020 ABO/Rh(D) O POSITIVE Antibody screen NEG Comment    
  previously identified Anti Anamika and Nonspecific Antibody Unit number [id number] Blood component type  LR Unit division 00 Status of unit ISSUED ANTIGEN/ANTIBODY INFO JEFF NEGATIVE, Crossmatch result Compatible Unit number [id number] Blood component type RC LR Unit division 00 Status of unit ALLOCATED ANTIGEN/ANTIBODY INFO JEFF NEGATIVE, Crossmatch result Compatible OCCULT BLOOD, STOOL Collection Time: 04/07/20  8:48 AM  
Result Value Ref Range Occult blood, stool Positive (A) NEG    
AMMONIA Collection Time: 04/07/20  9:02 AM  
Result Value Ref Range Ammonia 24 <32 UMOL/L  
PTT Collection Time: 04/07/20  9:55 AM  
Result Value Ref Range aPTT 25.4 22.1 - 32.0 sec  
 aPTT, therapeutic range     58.0 - 77.0 SECS  
PROTHROMBIN TIME + INR Collection Time: 04/07/20  9:55 AM  
Result Value Ref Range INR 1.0 0.9 - 1.1 Prothrombin time 10.3 9.0 - 11.1 sec Assessment:  
 
Principal Problem: 
  GI bleed, recurrent. Active Problems: 
  Anemia, acute blood loss. [facility] (8/30/2016) Lung cancer ([facility] (4/1/2020) Chronic obstructive pulmonary disease (HCC) () Probable Hepatic encephalopathy despite nl ammonia level. Plan: 1. IV Protonix BID. 2.  Transfusion already been started. 3.  Follow Hgb. 4.  GI to see -- ?EGD. 5.  Gentle IVF. 6.  I am starting Rifaximin for the possible hepatic encephalopathy. Can consider adding lactulose. 7.  I discussed code status with pt, and she is noncommittal.  Will need to discuss with her again. Signed By: Tatiana Gunter MD   
 April 7, 2020

## 2020-04-07 NOTE — ED PROVIDER NOTES
HPI .  Patient lives at assisted living. She brought her Rollator and oxygen tank with her. She has asterixis and is a fair to poor historian today. She has a history of COPD, lung cancer, rheumatoid arthritis, GI bleeds, anemia, cirrhosis, cholecystectomy, and remote alcohol abuse. She also has depression and anxiety. Echocardiogram  showed a very dilated left atrium and a mildly dilated inferior vena cava. Ejection fraction was 60% and there was no mention of diastolic dysfunction. Patient says she has had bleeding similar to today in the past.  She is unclear what was etiology of her bleeding in the past.  She has dark stools today but is unable to give any details. She is not sure why she came to the hospital today except that she is bleeding. She is not having any abdominal pain. Past Medical History:  
Diagnosis Date  Anemia  Arthritis Rheumatoid  CHF (congestive heart failure) ([address]  Chronic obstructive pulmonary disease ([address]  Cirrhosis of liver ([address] ETOH  
 COPD (chronic obstructive pulmonary disease) ([address]  Gastrointestinal disorder \"lacerations in the large part of my small intestine. \"  Gastrointestinal disorder   
 liver cirrhosis  Heart failure ([address]  Hyperlipidemia 2020  Internal bleeding  Lung cancer ([address] 2020  Psychiatric disorder   
 depression, anxiety  Tachycardia 2016 Past Surgical History:  
Procedure Laterality Date  HX CHOLECYSTECTOMY  HX GI Cholecystectomoy  HX GYN    
  x 2.  HI ENDOSCOPY UPPER SMALL INTESTINE  2016 Family History:  
Problem Relation Age of Onset  Cancer Mother   
     pancreatic  Alcohol abuse Father  Cancer Sister Lung cancer (smoker). Social History Socioeconomic History  Marital status: SINGLE Spouse name: Not on file  Number of children: Not on file  Years of education: Not on file  Highest education level: Not on file Occupational History  Not on file Social Needs  Financial resource strain: Not on file  Food insecurity Worry: Not on file Inability: Not on file  Transportation needs Medical: Not on file Non-medical: Not on file Tobacco Use  Smoking status: Current Every Day Smoker Packs/day: 0.75 Years: 45.00 Pack years: 33.75  Smokeless tobacco: Never Used Substance and Sexual Activity  Alcohol use: No  
  Comment: in the past was an alcoholic. Has been sober  since about 2010  Drug use: No  
 Sexual activity: Yes  
  Partners: Male Lifestyle  Physical activity Days per week: Not on file Minutes per session: Not on file  Stress: Not on file Relationships  Social connections Talks on phone: Not on file Gets together: Not on file Attends [employer]ism service: Not on file Active member of club or organization: Not on file Attends meetings of clubs or organizations: Not on file Relationship status: Not on file  Intimate partner violence Fear of current or ex partner: Not on file Emotionally abused: Not on file Physically abused: Not on file Forced sexual activity: Not on file Other Topics Concern  Not on file Social History Narrative  Not on file ALLERGIES: Pcn [penicillins] Review of Systems Reason unable to perform ROS: Hepatic encephalopathy. Vitals:  
 04/07/20 5137 04/07/20 0759 BP:  110/51 Pulse:  85 Resp:  12 Temp:  98.2 °F (36.8 °C) Height: 5' (1.524 m) Physical Exam 
Vitals signs and nursing note reviewed. Constitutional:   
   Appearance: She is well-developed. HENT:  
   Head: Normocephalic and atraumatic. Eyes:  
   Pupils: Pupils are equal, round, and reactive to light. Neck: Musculoskeletal: Normal range of motion and neck supple. Cardiovascular: Rate and Rhythm: Normal rate and regular rhythm. Heart sounds: Normal heart sounds. No murmur. No friction rub. No gallop. Pulmonary:  
   Effort: Pulmonary effort is normal. No respiratory distress. Breath sounds: No wheezing or rales. Comments: Nasal oxygen infusing Abdominal:  
   Palpations: Abdomen is soft. Tenderness: There is no abdominal tenderness. There is no rebound. Comments: Abdomen is soft without obvious ascites Genitourinary: 
   Comments: Black stool Musculoskeletal: Normal range of motion. General: No tenderness. Skin: 
   Findings: No erythema. Comments: Sallow complexion Neurological:  
   Mental Status: She is alert. Cranial Nerves: No cranial nerve deficit. Comments: Motor; symmetric Psychiatric:     
   Behavior: Behavior normal.  
   Comments: Oriented to name, place, president, year, and month. Asterixis MDM Procedures ED EKG interpretation: 
Rhythm: normal sinus rhythm; and regular . Rate (approx.): 80; Axis: normal; P wave: normal; QRS interval: normal ; ST/T wave: normal; in  Lead: ; Other findings: . This EKG was interpreted by Meagan Livingston MD,ED Provider. 8:59 AM 
 
 CONSULT NOTE: 
Spoke to Dr Olmstead Child concerning the patient. The patient's history, presentation, physical findings, and results were all discussed.   
10:22 AM

## 2020-04-07 NOTE — ED NOTES
Reassessment performed. No changes to previous assessment. Pt resting comfortably. Call bell within reach. Monitor x3. Pt denies needs at this time.

## 2020-04-07 NOTE — PROGRESS NOTES
MD called and states patient is a full code for now. NPO until GI can assess the patient and patient will need H&H assessed after her blood transfusion is complete

## 2020-04-07 NOTE — ED NOTES
TRANSFER - OUT REPORT: 
 
Verbal report given to Deborah Madrigal RN (name) on Randy Piper  being transferred to [address] (unit) for routine progression of care Report consisted of patients Situation, Background, Assessment and  
Recommendations(SBAR). Information from the following report(s) SBAR, ED Summary, Intake/Output, MAR, Recent Results and Cardiac Rhythm NSR was reviewed with the receiving nurse. Lines:  
Peripheral IV 04/07/20 Right Forearm (Active) Site Assessment Clean, dry, & intact 4/7/2020  8:19 AM  
Phlebitis Assessment 0 4/7/2020  8:19 AM  
Infiltration Assessment 0 4/7/2020  8:19 AM  
Dressing Status Clean, dry, & intact 4/7/2020  8:19 AM  
Dressing Type Transparent 4/7/2020  8:19 AM  
Hub Color/Line Status Flushed;Pink;Patent 4/7/2020  8:19 AM  
   
Peripheral IV 04/07/20 Left Hand (Active) Site Assessment Clean, dry, & intact 4/7/2020 12:57 PM  
Phlebitis Assessment 0 4/7/2020 12:57 PM  
Infiltration Assessment 0 4/7/2020 12:57 PM  
Dressing Status Clean, dry, & intact 4/7/2020 12:57 PM  
Dressing Type Transparent 4/7/2020 12:57 PM  
Hub Color/Line Status Patent; Flushed;Blue 4/7/2020 12:57 PM  
  
 
Opportunity for questions and clarification was provided. Patient transported with: 
 Jersey Dorsey

## 2020-04-08 ENCOUNTER — ANESTHESIA (OUTPATIENT)
Dept: ENDOSCOPY | Age: 60
DRG: 378 | End: 2020-04-08
Payer: MEDICARE

## 2020-04-08 ENCOUNTER — ANESTHESIA EVENT (OUTPATIENT)
Dept: ENDOSCOPY | Age: 60
DRG: 378 | End: 2020-04-08
Payer: MEDICARE

## 2020-04-08 LAB
ALBUMIN SERPL-MCNC: 2.9 G/DL (ref 3.5–5)
ALBUMIN/GLOB SERPL: 0.9 {RATIO} (ref 1.1–2.2)
ALP SERPL-CCNC: 109 U/L (ref 45–117)
ALT SERPL-CCNC: 22 U/L (ref 12–78)
ANION GAP SERPL CALC-SCNC: 4 MMOL/L (ref 5–15)
AST SERPL-CCNC: 23 U/L (ref 15–37)
BASOPHILS # BLD: 0 K/UL (ref 0–0.1)
BASOPHILS NFR BLD: 1 % (ref 0–1)
BILIRUB SERPL-MCNC: 0.4 MG/DL (ref 0.2–1)
BUN SERPL-MCNC: 13 MG/DL (ref 6–20)
BUN/CREAT SERPL: 17 (ref 12–20)
CALCIUM SERPL-MCNC: 8.9 MG/DL (ref 8.5–10.1)
CHLORIDE SERPL-SCNC: 106 MMOL/L (ref 97–108)
CO2 SERPL-SCNC: 33 MMOL/L (ref 21–32)
CREAT SERPL-MCNC: 0.75 MG/DL (ref 0.55–1.02)
DIFFERENTIAL METHOD BLD: ABNORMAL
EOSINOPHIL # BLD: 0.1 K/UL (ref 0–0.4)
EOSINOPHIL NFR BLD: 4 % (ref 0–7)
ERYTHROCYTE [DISTWIDTH] IN BLOOD BY AUTOMATED COUNT: 23.4 % (ref 11.5–14.5)
GLOBULIN SER CALC-MCNC: 3.3 G/DL (ref 2–4)
GLUCOSE SERPL-MCNC: 100 MG/DL (ref 65–100)
HCT VFR BLD AUTO: 26 % (ref 35–47)
HGB BLD-MCNC: 7.3 G/DL (ref 11.5–16)
IMM GRANULOCYTES # BLD AUTO: 0 K/UL (ref 0–0.04)
IMM GRANULOCYTES NFR BLD AUTO: 1 % (ref 0–0.5)
LYMPHOCYTES # BLD: 0.5 K/UL (ref 0.8–3.5)
LYMPHOCYTES NFR BLD: 15 % (ref 12–49)
MCH RBC QN AUTO: 27.7 PG (ref 26–34)
MCHC RBC AUTO-ENTMCNC: 28.1 G/DL (ref 30–36.5)
MCV RBC AUTO: 98.5 FL (ref 80–99)
MONOCYTES # BLD: 0.4 K/UL (ref 0–1)
MONOCYTES NFR BLD: 13 % (ref 5–13)
NEUTS SEG # BLD: 2.3 K/UL (ref 1.8–8)
NEUTS SEG NFR BLD: 66 % (ref 32–75)
NRBC # BLD: 0 K/UL (ref 0–0.01)
NRBC BLD-RTO: 0 PER 100 WBC
PLATELET # BLD AUTO: 109 K/UL (ref 150–400)
PLATELET COMMENTS,PCOM: ABNORMAL
PMV BLD AUTO: 11.6 FL (ref 8.9–12.9)
POTASSIUM SERPL-SCNC: 3.6 MMOL/L (ref 3.5–5.1)
PROT SERPL-MCNC: 6.2 G/DL (ref 6.4–8.2)
RBC # BLD AUTO: 2.64 M/UL (ref 3.8–5.2)
RBC MORPH BLD: ABNORMAL
SODIUM SERPL-SCNC: 143 MMOL/L (ref 136–145)
WBC # BLD AUTO: 3.3 K/UL (ref 3.6–11)

## 2020-04-08 PROCEDURE — 0DJ08ZZ INSPECTION OF UPPER INTESTINAL TRACT, VIA NATURAL OR ARTIFICIAL OPENING ENDOSCOPIC: ICD-10-PCS | Performed by: INTERNAL MEDICINE

## 2020-04-08 PROCEDURE — 74011250637 HC RX REV CODE- 250/637: Performed by: INTERNAL MEDICINE

## 2020-04-08 PROCEDURE — 97116 GAIT TRAINING THERAPY: CPT

## 2020-04-08 PROCEDURE — 94760 N-INVAS EAR/PLS OXIMETRY 1: CPT

## 2020-04-08 PROCEDURE — 94640 AIRWAY INHALATION TREATMENT: CPT

## 2020-04-08 PROCEDURE — 76040000019: Performed by: INTERNAL MEDICINE

## 2020-04-08 PROCEDURE — 36415 COLL VENOUS BLD VENIPUNCTURE: CPT

## 2020-04-08 PROCEDURE — 74011250636 HC RX REV CODE- 250/636: Performed by: INTERNAL MEDICINE

## 2020-04-08 PROCEDURE — 74011000250 HC RX REV CODE- 250: Performed by: INTERNAL MEDICINE

## 2020-04-08 PROCEDURE — 97161 PT EVAL LOW COMPLEX 20 MIN: CPT

## 2020-04-08 PROCEDURE — C9113 INJ PANTOPRAZOLE SODIUM, VIA: HCPCS | Performed by: INTERNAL MEDICINE

## 2020-04-08 PROCEDURE — 65270000029 HC RM PRIVATE

## 2020-04-08 PROCEDURE — 74011250636 HC RX REV CODE- 250/636: Performed by: NURSE ANESTHETIST, CERTIFIED REGISTERED

## 2020-04-08 PROCEDURE — 85025 COMPLETE CBC W/AUTO DIFF WBC: CPT

## 2020-04-08 PROCEDURE — 76060000031 HC ANESTHESIA FIRST 0.5 HR: Performed by: INTERNAL MEDICINE

## 2020-04-08 PROCEDURE — 74011000250 HC RX REV CODE- 250: Performed by: NURSE ANESTHETIST, CERTIFIED REGISTERED

## 2020-04-08 PROCEDURE — 77010033678 HC OXYGEN DAILY

## 2020-04-08 PROCEDURE — 80053 COMPREHEN METABOLIC PANEL: CPT

## 2020-04-08 RX ORDER — MIDAZOLAM HYDROCHLORIDE 1 MG/ML
.25-5 INJECTION, SOLUTION INTRAMUSCULAR; INTRAVENOUS
Status: DISCONTINUED | OUTPATIENT
Start: 2020-04-08 | End: 2020-04-08 | Stop reason: HOSPADM

## 2020-04-08 RX ORDER — SODIUM CHLORIDE 9 MG/ML
INJECTION, SOLUTION INTRAVENOUS
Status: DISCONTINUED | OUTPATIENT
Start: 2020-04-08 | End: 2020-04-08 | Stop reason: HOSPADM

## 2020-04-08 RX ORDER — SODIUM CHLORIDE 0.9 % (FLUSH) 0.9 %
5-40 SYRINGE (ML) INJECTION EVERY 8 HOURS
Status: DISCONTINUED | OUTPATIENT
Start: 2020-04-08 | End: 2020-04-09 | Stop reason: HOSPADM

## 2020-04-08 RX ORDER — SODIUM CHLORIDE 9 MG/ML
50 INJECTION, SOLUTION INTRAVENOUS CONTINUOUS
Status: DISPENSED | OUTPATIENT
Start: 2020-04-08 | End: 2020-04-08

## 2020-04-08 RX ORDER — DEXTROMETHORPHAN/PSEUDOEPHED 2.5-7.5/.8
1.2 DROPS ORAL
Status: DISCONTINUED | OUTPATIENT
Start: 2020-04-08 | End: 2020-04-08 | Stop reason: HOSPADM

## 2020-04-08 RX ORDER — ATROPINE SULFATE 0.1 MG/ML
0.5 INJECTION INTRAVENOUS
Status: DISCONTINUED | OUTPATIENT
Start: 2020-04-08 | End: 2020-04-08 | Stop reason: HOSPADM

## 2020-04-08 RX ORDER — NALOXONE HYDROCHLORIDE 0.4 MG/ML
0.4 INJECTION, SOLUTION INTRAMUSCULAR; INTRAVENOUS; SUBCUTANEOUS
Status: DISCONTINUED | OUTPATIENT
Start: 2020-04-08 | End: 2020-04-08 | Stop reason: HOSPADM

## 2020-04-08 RX ORDER — SPIRONOLACTONE 25 MG/1
50 TABLET ORAL DAILY
Status: DISCONTINUED | OUTPATIENT
Start: 2020-04-08 | End: 2020-04-09 | Stop reason: HOSPADM

## 2020-04-08 RX ORDER — LIDOCAINE HYDROCHLORIDE 20 MG/ML
INJECTION, SOLUTION EPIDURAL; INFILTRATION; INTRACAUDAL; PERINEURAL AS NEEDED
Status: DISCONTINUED | OUTPATIENT
Start: 2020-04-08 | End: 2020-04-08 | Stop reason: HOSPADM

## 2020-04-08 RX ORDER — FLUMAZENIL 0.1 MG/ML
0.2 INJECTION INTRAVENOUS
Status: DISCONTINUED | OUTPATIENT
Start: 2020-04-08 | End: 2020-04-08 | Stop reason: HOSPADM

## 2020-04-08 RX ORDER — PROPOFOL 10 MG/ML
INJECTION, EMULSION INTRAVENOUS AS NEEDED
Status: DISCONTINUED | OUTPATIENT
Start: 2020-04-08 | End: 2020-04-08 | Stop reason: HOSPADM

## 2020-04-08 RX ORDER — SODIUM CHLORIDE 0.9 % (FLUSH) 0.9 %
5-40 SYRINGE (ML) INJECTION AS NEEDED
Status: DISCONTINUED | OUTPATIENT
Start: 2020-04-08 | End: 2020-04-09 | Stop reason: HOSPADM

## 2020-04-08 RX ORDER — EPINEPHRINE 0.1 MG/ML
1 INJECTION INTRACARDIAC; INTRAVENOUS
Status: DISCONTINUED | OUTPATIENT
Start: 2020-04-08 | End: 2020-04-08 | Stop reason: HOSPADM

## 2020-04-08 RX ORDER — FENTANYL CITRATE 50 UG/ML
25-200 INJECTION, SOLUTION INTRAMUSCULAR; INTRAVENOUS
Status: DISCONTINUED | OUTPATIENT
Start: 2020-04-08 | End: 2020-04-08 | Stop reason: HOSPADM

## 2020-04-08 RX ADMIN — Medication 10 ML: at 11:42

## 2020-04-08 RX ADMIN — Medication 10 ML: at 15:01

## 2020-04-08 RX ADMIN — IPRATROPIUM BROMIDE AND ALBUTEROL SULFATE 3 ML: .5; 3 SOLUTION RESPIRATORY (INHALATION) at 11:54

## 2020-04-08 RX ADMIN — PREGABALIN 150 MG: 75 CAPSULE ORAL at 17:20

## 2020-04-08 RX ADMIN — ATORVASTATIN CALCIUM 20 MG: 20 TABLET, FILM COATED ORAL at 21:16

## 2020-04-08 RX ADMIN — SUCRALFATE 1 G: 1 TABLET ORAL at 21:16

## 2020-04-08 RX ADMIN — IPRATROPIUM BROMIDE AND ALBUTEROL SULFATE 3 ML: .5; 3 SOLUTION RESPIRATORY (INHALATION) at 19:35

## 2020-04-08 RX ADMIN — PREGABALIN 150 MG: 75 CAPSULE ORAL at 08:45

## 2020-04-08 RX ADMIN — FUROSEMIDE 20 MG: 40 TABLET ORAL at 08:39

## 2020-04-08 RX ADMIN — SPIRONOLACTONE 50 MG: 25 TABLET ORAL at 08:45

## 2020-04-08 RX ADMIN — BUDESONIDE 250 MCG: 0.25 SUSPENSION RESPIRATORY (INHALATION) at 19:35

## 2020-04-08 RX ADMIN — SUCRALFATE 1 G: 1 TABLET ORAL at 08:45

## 2020-04-08 RX ADMIN — ALBUTEROL SULFATE 2.5 MG: 2.5 SOLUTION RESPIRATORY (INHALATION) at 04:30

## 2020-04-08 RX ADMIN — PROPOFOL 50 MG: 10 INJECTION, EMULSION INTRAVENOUS at 10:24

## 2020-04-08 RX ADMIN — RIFAXIMIN 550 MG: 550 TABLET ORAL at 08:46

## 2020-04-08 RX ADMIN — MULTIPLE VITAMINS W/ MINERALS TAB 1 TABLET: TAB at 08:46

## 2020-04-08 RX ADMIN — PROPOFOL 50 MG: 10 INJECTION, EMULSION INTRAVENOUS at 10:26

## 2020-04-08 RX ADMIN — LORATADINE 10 MG: 10 TABLET ORAL at 08:43

## 2020-04-08 RX ADMIN — DULOXETINE HYDROCHLORIDE 60 MG: 60 CAPSULE, DELAYED RELEASE ORAL at 08:46

## 2020-04-08 RX ADMIN — MELATONIN 4 TABLET: at 08:43

## 2020-04-08 RX ADMIN — Medication 10 ML: at 21:16

## 2020-04-08 RX ADMIN — IPRATROPIUM BROMIDE AND ALBUTEROL SULFATE 3 ML: .5; 3 SOLUTION RESPIRATORY (INHALATION) at 07:54

## 2020-04-08 RX ADMIN — CARVEDILOL 3.12 MG: 3.12 TABLET, FILM COATED ORAL at 17:24

## 2020-04-08 RX ADMIN — SUCRALFATE 1 G: 1 TABLET ORAL at 17:21

## 2020-04-08 RX ADMIN — SODIUM CHLORIDE: 900 INJECTION, SOLUTION INTRAVENOUS at 10:20

## 2020-04-08 RX ADMIN — IPRATROPIUM BROMIDE AND ALBUTEROL SULFATE 3 ML: .5; 3 SOLUTION RESPIRATORY (INHALATION) at 16:00

## 2020-04-08 RX ADMIN — SUCRALFATE 1 G: 1 TABLET ORAL at 15:01

## 2020-04-08 RX ADMIN — RIFAXIMIN 550 MG: 550 TABLET ORAL at 17:20

## 2020-04-08 RX ADMIN — CYANOCOBALAMIN TAB 500 MCG 250 MCG: 500 TAB at 08:46

## 2020-04-08 RX ADMIN — ARIPIPRAZOLE 10 MG: 10 TABLET ORAL at 11:36

## 2020-04-08 RX ADMIN — SODIUM CHLORIDE 60 ML/HR: 900 INJECTION, SOLUTION INTRAVENOUS at 08:31

## 2020-04-08 RX ADMIN — SODIUM CHLORIDE 40 MG: 9 INJECTION INTRAMUSCULAR; INTRAVENOUS; SUBCUTANEOUS at 21:16

## 2020-04-08 RX ADMIN — CARVEDILOL 3.12 MG: 3.12 TABLET, FILM COATED ORAL at 08:44

## 2020-04-08 RX ADMIN — BUPROPION HYDROCHLORIDE 150 MG: 150 TABLET, EXTENDED RELEASE ORAL at 08:46

## 2020-04-08 RX ADMIN — PROPOFOL 50 MG: 10 INJECTION, EMULSION INTRAVENOUS at 10:29

## 2020-04-08 RX ADMIN — SODIUM CHLORIDE 40 MG: 9 INJECTION INTRAMUSCULAR; INTRAVENOUS; SUBCUTANEOUS at 11:36

## 2020-04-08 RX ADMIN — PROPOFOL 50 MG: 10 INJECTION, EMULSION INTRAVENOUS at 10:32

## 2020-04-08 RX ADMIN — LIDOCAINE HYDROCHLORIDE 60 MG: 20 INJECTION, SOLUTION EPIDURAL; INFILTRATION; INTRACAUDAL; PERINEURAL at 10:24

## 2020-04-08 RX ADMIN — BUDESONIDE 250 MCG: 0.25 SUSPENSION RESPIRATORY (INHALATION) at 07:54

## 2020-04-08 NOTE — PROGRESS NOTES

## 2020-04-08 NOTE — PROGRESS NOTES
Marquez Jones Sjogren Admit Date: 4/7/2020 Subjective: No new complaints. She seems more alert this am. 
Hgb appears to have stabilized at 7.3 after 1 unit PRBC. Current Facility-Administered Medications Medication Dose Route Frequency  spironolactone (ALDACTONE) tablet 50 mg  50 mg Oral DAILY  0.9% sodium chloride infusion 250 mL  250 mL IntraVENous PRN  
 albuterol-ipratropium (DUO-NEB) 2.5 MG-0.5 MG/3 ML  3 mL Nebulization QID RT  
 ARIPiprazole (ABILIFY) tablet 10 mg  10 mg Oral DAILY  atorvastatin (LIPITOR) tablet 20 mg  20 mg Oral QHS  buPROPion SR (WELLBUTRIN SR) tablet 150 mg  150 mg Oral DAILY  carvediloL (COREG) tablet 3.125 mg  3.125 mg Oral BID WITH MEALS  cholecalciferol (VITAMIN D3) (1000 Units /25 mcg) tablet 4 Tab  4,000 Units Oral DAILY  cyanocobalamin (VITAMIN B12) tablet 250 mcg  250 mcg Oral DAILY  DULoxetine (CYMBALTA) capsule 60 mg  60 mg Oral DAILY  budesonide (PULMICORT) 250 mcg/2ml nebulizer susp  250 mcg Nebulization BID RT  
 furosemide (LASIX) tablet 20 mg  20 mg Oral DAILY  loratadine (CLARITIN) tablet 10 mg  10 mg Oral DAILY  multivitamin, tx-iron-ca-min (THERA-M w/ IRON) tablet 1 Tab  1 Tab Oral DAILY  oxyCODONE-acetaminophen (PERCOCET 10)  mg per tablet 1 Tab  1 Tab Oral QID PRN  pantoprazole (PROTONIX) 40 mg in 0.9% sodium chloride 10 mL injection  40 mg IntraVENous Q12H  pregabalin (LYRICA) capsule 150 mg  150 mg Oral BID  senna-docusate (PERICOLACE) 8.6-50 mg per tablet 1-2 Tab  1-2 Tab Oral QHS PRN  
 albuterol (PROVENTIL VENTOLIN) nebulizer solution 2.5 mg  2.5 mg Nebulization Q4H PRN  
 0.9% sodium chloride infusion  60 mL/hr IntraVENous CONTINUOUS  
 ondansetron (ZOFRAN) injection 4 mg  4 mg IntraVENous Q6H PRN  
 acetaminophen (TYLENOL) tablet 650 mg  650 mg Oral Q4H PRN  
 rifAXIMin (XIFAXAN) tablet 550 mg  550 mg Oral BID  
  sucralfate (CARAFATE) tablet 1 g  1 g Oral QID Objective:  
 
Patient Vitals for the past 8 hrs: 
 BP Temp Pulse Resp SpO2  
04/08/20 0431     95 % 04/08/20 0228 112/65 97.5 °F (36.4 °C) 79 16 96 % No intake/output data recorded. 04/06 1901 - 04/08 0700 In: 1563.5 [P.O.:120; I.V.:1045.6] Out: 120 [Urine:120] Physical Exam: NAD. A&O. Neck -- Supple. No JVD. Heart -- RRR. Lungs -- CTA. Abd -- Benign. Ext -- Trace LE edema, b/l. Data Review Recent Results (from the past 24 hour(s)) EKG, 12 LEAD, INITIAL Collection Time: 04/07/20  8:11 AM  
Result Value Ref Range Ventricular Rate 81 BPM  
 Atrial Rate 81 BPM  
 P-R Interval 152 ms QRS Duration 98 ms Q-T Interval 348 ms QTC Calculation (Bezet) 404 ms Calculated P Axis 77 degrees Calculated R Axis 73 degrees Calculated T Axis 67 degrees Diagnosis Normal sinus rhythm When compared with ECG of 01-SEP-2018 10:20, No significant change was found Confirmed by Kaykay Delgado M.D., [facility] ([id number]) on 4/7/2020 9:27:03 AM 
  
SAMPLES BEING HELD Collection Time: 04/07/20  8:14 AM  
Result Value Ref Range SAMPLES BEING HELD 1RED,1BLU,1LAV,1PST COMMENT Add-on orders for these samples will be processed based on acceptable specimen integrity and analyte stability, which may vary by analyte. CBC WITH AUTOMATED DIFF Collection Time: 04/07/20  8:14 AM  
Result Value Ref Range WBC 3.9 3.6 - 11.0 K/uL  
 RBC 2.01 (L) 3.80 - 5.20 M/uL HGB 5.8 (LL) 11.5 - 16.0 g/dL HCT 21.8 (L) 35.0 - 47.0 % .5 (H) 80.0 - 99.0 FL  
 MCH 28.9 26.0 - 34.0 PG  
 MCHC 26.6 (L) 30.0 - 36.5 g/dL  
 RDW 18.9 (H) 11.5 - 14.5 % PLATELET 420 (L) 121 - 400 K/uL MPV 12.1 8.9 - 12.9 FL  
 NRBC 0.5 (H) 0  WBC ABSOLUTE NRBC 0.02 (H) 0.00 - 0.01 K/uL NEUTROPHILS 74 32 - 75 % LYMPHOCYTES 11 (L) 12 - 49 % MONOCYTES 10 5 - 13 % EOSINOPHILS 5 0 - 7 % BASOPHILS 0 0 - 1 % IMMATURE GRANULOCYTES 0 0.0 - 0.5 % ABS. NEUTROPHILS 2.9 1.8 - 8.0 K/UL  
 ABS. LYMPHOCYTES 0.4 (L) 0.8 - 3.5 K/UL  
 ABS. MONOCYTES 0.4 0.0 - 1.0 K/UL  
 ABS. EOSINOPHILS 0.2 0.0 - 0.4 K/UL  
 ABS. BASOPHILS 0.0 0.0 - 0.1 K/UL  
 ABS. IMM. GRANS. 0.0 0.00 - 0.04 K/UL  
 DF SMEAR SCANNED    
 RBC COMMENTS ANISOCYTOSIS 1+ 
    
 RBC COMMENTS MACROCYTOSIS 1+ 
    
 RBC COMMENTS Pathology Review Requested POLYCHROMASIA 1+ Pathologist review Pathologic examination results can be viewed in [facility] Chart Review under the Pathology tab. METABOLIC PANEL, COMPREHENSIVE Collection Time: 04/07/20  8:14 AM  
Result Value Ref Range Sodium 142 136 - 145 mmol/L Potassium 4.3 3.5 - 5.1 mmol/L Chloride 108 97 - 108 mmol/L  
 CO2 34 (H) 21 - 32 mmol/L Anion gap 0 (L) 5 - 15 mmol/L Glucose 96 65 - 100 mg/dL BUN 19 6 - 20 MG/DL Creatinine 0.92 0.55 - 1.02 MG/DL  
 BUN/Creatinine ratio 21 (H) 12 - 20 GFR est AA >60 >60 ml/min/1.73m2 GFR est non-AA >60 >60 ml/min/1.73m2 Calcium 9.4 8.5 - 10.1 MG/DL Bilirubin, total 0.2 0.2 - 1.0 MG/DL  
 ALT (SGPT) 26 12 - 78 U/L  
 AST (SGOT) 35 15 - 37 U/L Alk. phosphatase 108 45 - 117 U/L Protein, total 6.5 6.4 - 8.2 g/dL Albumin 3.0 (L) 3.5 - 5.0 g/dL Globulin 3.5 2.0 - 4.0 g/dL A-G Ratio 0.9 (L) 1.1 - 2.2 TYPE + CROSSMATCH Collection Time: 04/07/20  8:14 AM  
Result Value Ref Range Crossmatch Expiration 04/10/2020 ABO/Rh(D) O POSITIVE Antibody screen NEG Comment    
  previously identified Anti Amado and Nonspecific Antibody Unit number [id number] Blood component type  LR Unit division 00 Status of unit TRANSFUSED ANTIGEN/ANTIBODY INFO JEFF NEGATIVE, Crossmatch result Compatible Unit number [id number] Blood component type [facility] Unit division 00 Status of unit ALLOCATED ANTIGEN/ANTIBODY INFO JEFF NEGATIVE, Crossmatch result Compatible OCCULT BLOOD, STOOL Collection Time: 04/07/20  8:48 AM  
Result Value Ref Range Occult blood, stool Positive (A) NEG    
AMMONIA Collection Time: 04/07/20  9:02 AM  
Result Value Ref Range Ammonia 24 <32 UMOL/L  
PTT Collection Time: 04/07/20  9:55 AM  
Result Value Ref Range aPTT 25.4 22.1 - 32.0 sec  
 aPTT, therapeutic range     58.0 - 77.0 SECS  
PROTHROMBIN TIME + INR Collection Time: 04/07/20  9:55 AM  
Result Value Ref Range INR 1.0 0.9 - 1.1 Prothrombin time 10.3 9.0 - 11.1 sec HGB & HCT Collection Time: 04/07/20  4:52 PM  
Result Value Ref Range HGB 7.3 (L) 11.5 - 16.0 g/dL HCT 26.1 (L) 35.0 - 47.0 % CBC WITH AUTOMATED DIFF Collection Time: 04/08/20  4:01 AM  
Result Value Ref Range WBC 3.3 (L) 3.6 - 11.0 K/uL  
 RBC 2.64 (L) 3.80 - 5.20 M/uL HGB 7.3 (L) 11.5 - 16.0 g/dL HCT 26.0 (L) 35.0 - 47.0 % MCV 98.5 80.0 - 99.0 FL  
 MCH 27.7 26.0 - 34.0 PG  
 MCHC 28.1 (L) 30.0 - 36.5 g/dL RDW 23.4 (H) 11.5 - 14.5 % PLATELET 068 (L) 057 - 400 K/uL MPV 11.6 8.9 - 12.9 FL  
 NRBC 0.0 0  WBC ABSOLUTE NRBC 0.00 0.00 - 0.01 K/uL NEUTROPHILS 66 32 - 75 % LYMPHOCYTES 15 12 - 49 % MONOCYTES 13 5 - 13 % EOSINOPHILS 4 0 - 7 % BASOPHILS 1 0 - 1 % IMMATURE GRANULOCYTES 1 (H) 0.0 - 0.5 % ABS. NEUTROPHILS 2.3 1.8 - 8.0 K/UL  
 ABS. LYMPHOCYTES 0.5 (L) 0.8 - 3.5 K/UL  
 ABS. MONOCYTES 0.4 0.0 - 1.0 K/UL  
 ABS. EOSINOPHILS 0.1 0.0 - 0.4 K/UL  
 ABS. BASOPHILS 0.0 0.0 - 0.1 K/UL  
 ABS. IMM. GRANS. 0.0 0.00 - 0.04 K/UL  
 DF SMEAR SCANNED    
 PLATELET COMMENTS Large Platelets RBC COMMENTS POLYCHROMASIA 1+ 
    
 RBC COMMENTS ANISOCYTOSIS 2+ 
    
 RBC COMMENTS HYPOCHROMIA 1+ METABOLIC PANEL, COMPREHENSIVE Collection Time: 04/08/20  4:01 AM  
Result Value Ref Range Sodium 143 136 - 145 mmol/L Potassium 3.6 3.5 - 5.1 mmol/L Chloride 106 97 - 108 mmol/L  
 CO2 33 (H) 21 - 32 mmol/L Anion gap 4 (L) 5 - 15 mmol/L Glucose 100 65 - 100 mg/dL BUN 13 6 - 20 MG/DL Creatinine 0.75 0.55 - 1.02 MG/DL  
 BUN/Creatinine ratio 17 12 - 20 GFR est AA >60 >60 ml/min/1.73m2 GFR est non-AA >60 >60 ml/min/1.73m2 Calcium 8.9 8.5 - 10.1 MG/DL Bilirubin, total 0.4 0.2 - 1.0 MG/DL  
 ALT (SGPT) 22 12 - 78 U/L  
 AST (SGOT) 23 15 - 37 U/L Alk. phosphatase 109 45 - 117 U/L Protein, total 6.2 (L) 6.4 - 8.2 g/dL Albumin 2.9 (L) 3.5 - 5.0 g/dL Globulin 3.3 2.0 - 4.0 g/dL A-G Ratio 0.9 (L) 1.1 - 2.2 Assessment:  
 
Principal Problem: 
  GI bleed, likely upper. Active Problems: 
  Anemia, acute blood loss Cirrhosis of liver ([facility] (8/30/2016) Lung cancer ([facility] (4/1/2020) Chronic obstructive pulmonary disease (HCC) () Possible hepatic encephalopathy -- Normal ammonia. Clinically seems to be better. Plan: 1. For EGD today. 2. Cont PPI. 3. Recheck Hgb in am. 
4. D/c IVF later today. 5. PT for ambulation. 6. She requests FULL CODE. ...  Will need to have further discussions as time goes on, considering her lung cancer, etc. 
 
 
 
 
Angel Roque MD

## 2020-04-08 NOTE — ANESTHESIA PREPROCEDURE EVALUATION
Relevant Problems No relevant active problems Anesthetic History No history of anesthetic complications Review of Systems / Medical History Patient summary reviewed, nursing notes reviewed and pertinent labs reviewed Pulmonary COPD Smoker Neuro/Psych Within defined limits Cardiovascular CHF Dysrhythmias : sinus tachycardia GI/Hepatic/Renal 
  
 
 
 
Liver disease Endo/Other Arthritis and cancer Other Findings Physical Exam 
 
Airway Mallampati: II 
TM Distance: > 6 cm Neck ROM: normal range of motion Mouth opening: Normal 
 
 Cardiovascular Regular rate and rhythm,  S1 and S2 normal,  no murmur, click, rub, or gallop Dental 
No notable dental hx Pulmonary Breath sounds clear to auscultation Abdominal 
GI exam deferred Other Findings Anesthetic Plan ASA: 3, emergent Anesthesia type: MAC Anesthetic plan and risks discussed with: Patient

## 2020-04-08 NOTE — PROGRESS NOTES
Problem: Falls - Risk of 
Goal: *Absence of Falls Description: Document Shraddha Jimenez Fall Risk and appropriate interventions in the flowsheet. Outcome: Progressing Towards Goal 
Note: Fall Risk Interventions: 
Mobility Interventions: Communicate number of staff needed for ambulation/transfer, Patient to call before getting OOB, Strengthening exercises (ROM-active/passive) Medication Interventions: Evaluate medications/consider consulting pharmacy Problem: Patient Education: Go to Patient Education Activity Goal: Patient/Family Education Outcome: Progressing Towards Goal

## 2020-04-08 NOTE — PROGRESS NOTES
Gio Amira 1960 
[id number] Situation: 
 
Scheduled Procedure: Procedure(s): ESOPHAGOGASTRODUODENOSCOPY (EGD) Verbal report received from: MIKE Chaparro 
Preoperative diagnosis: Melena Background: 
 
Procedure: Procedure(s): ESOPHAGOGASTRODUODENOSCOPY (EGD) Physician performing procedure; Dr. Etienne Villalba SBAR QUESTIONS FLOOR TO ENDO RN 
 
NPO Status/Last PO Intake: 8 hours except for meds Pregnancy Test:Not applicable Is the patient taking Blood Thinners: NO Is the patient diabetic:no Does the patient have a Pacemaker/Defibrillator in place?: no  
Does the patient need antibiotics before/during/after procedure: no  
 
Does the patient have SCD in place:no Assessment: 
Are the vital signs stable prior to patient coming to ENDO?  yes Is the patient alert/oriented and able to sign consent for the procedures:yes How does the patient's abdomen feel prior to coming to ENDO? round and soft yes Does the patient have a patient IV in place? yes

## 2020-04-08 NOTE — PROGRESS NOTES
Problem: Falls - Risk of 
Goal: *Absence of Falls Description: Document Molina Reason Fall Risk and appropriate interventions in the flowsheet. 4/8/2020 1244 by Wing Felix RN Outcome: Progressing Towards Goal 
Note: Fall Risk Interventions: 
Mobility Interventions: Communicate number of staff needed for ambulation/transfer Medication Interventions: Evaluate medications/consider consulting pharmacy 4/8/2020 1242 by Wing Felix RN Outcome: Progressing Towards Goal 
Note: Fall Risk Interventions: 
Mobility Interventions: Communicate number of staff needed for ambulation/transfer Medication Interventions: Evaluate medications/consider consulting pharmacy Problem: Patient Education: Go to Patient Education Activity Goal: Patient/Family Education 4/8/2020 1244 by Wing Felix RN Outcome: Progressing Towards Goal 
4/8/2020 1242 by Wing Felix RN Outcome: Progressing Towards Goal 
  
Problem: Risk for infection Goal: *Absence of infection signs and symptoms Outcome: Progressing Towards Goal 
  
Problem: Pressure Injury - Risk of 
Goal: Absence of Pressure Injury Outcome: Progressing Towards Goal

## 2020-04-08 NOTE — ANESTHESIA POSTPROCEDURE EVALUATION
Post-Anesthesia Evaluation and Assessment Patient: Ileana Caal MRN: [id number]  SSN: [id number] YOB: 1960  Age: 60 y.o. Sex: female I have evaluated the patient and they are stable and ready for discharge from the PACU. Cardiovascular Function/Vital Signs Visit Vitals /70 Pulse 80 Temp 37.1 °C (98.7 °F) Resp 18 Ht 5' (1.524 m) Wt 62.6 kg (138 lb) SpO2 98% BMI 26.95 kg/m² Patient is status post MAC anesthesia for Procedure(s): ESOPHAGOGASTRODUODENOSCOPY (EGD). Nausea/Vomiting: None Postoperative hydration reviewed and adequate. Pain: 
Pain Scale 1: Numeric (0 - 10) (04/08/20 1056) Pain Intensity 1: 0 (04/08/20 1056) Managed Neurological Status:  
Neuro Neurologic State: Alert;Drowsy (04/07/20 2147) Orientation Level: Oriented X4 (04/07/20 2147) Cognition: Follows commands (04/07/20 2147) Speech: Clear (04/07/20 2147) LUE Motor Response: Tremorous (04/07/20 2147) LLE Motor Response: Weak (04/07/20 2147) RUE Motor Response: Tremorous (04/07/20 2147) RLE Motor Response: Weak (04/07/20 2147) At baseline Mental Status, Level of Consciousness: Alert and  oriented to person, place, and time Pulmonary Status:  
O2 Device: Nasal cannula (04/08/20 1056) Adequate oxygenation and airway patent Complications related to anesthesia: None Post-anesthesia assessment completed. No concerns Signed By: Miriam Savage MD   
 April 8, 2020 Procedure(s): ESOPHAGOGASTRODUODENOSCOPY (EGD). MAC 
 
<BSHSIANPOST> Vitals Value Taken Time /64 4/8/2020 11:06 AM  
Temp 37.1 °C (98.7 °F) 4/8/2020 10:56 AM  
Pulse 80 4/8/2020 11:08 AM  
Resp 16 4/8/2020 11:08 AM  
SpO2 97 % 4/8/2020 11:08 AM  
Vitals shown include unvalidated device data.

## 2020-04-08 NOTE — PROGRESS NOTES
Deepthi Slipper 1960 
[id number] Situation: 
Verbal report received from: SHAHLA CARPIO RN 
Procedure: Procedure(s): ESOPHAGOGASTRODUODENOSCOPY (EGD) Background: 
 
Preoperative diagnosis: Melena Postoperative diagnosis: 1. Gastric Erosion 2. Hiatal Hernia 3.jourdan erosions :  Dr. Roxy Villegas Assistant(s): Endoscopy RN-1: Madelaine Major RN Endoscopy RN-2: Raman Rodas RN Specimens: * No specimens in log * H. Pylori  no Assessment: 
Intra-procedure medications Anesthesia gave intra-procedure sedation and medications, see anesthesia flow sheet yes Intravenous fluids: NS@ Jeralyn Banco Vital signs stable YES Abdominal assessment: round and soft YES Recommendation: 
Discharge patient per MD order N/A. Return to floor YES Family or Friend N/A Permission to share finding with family or friend yes and n/a

## 2020-04-08 NOTE — PROCEDURES
[address] 
[address] [facility], [address] [place] [address] Esophago- Gastroduodenoscopy (EGD) Procedure Note Custer Phalen 1960 
[id number] Procedure: Endoscopic Gastroduodenoscopy --diagnostic Indication: anemia, melena Pre-operative Diagnosis: see indication above Post-operative Diagnosis: see findings below : Edie Owen. Yesica Singh MD 
 
Referring Provider:  Kayla Castleman, MD 
 
 
Anesthesia/Sedation:  MAC anesthesia Procedure Details After informed consent was obtained for the procedure, with all risks and benefits of procedure explained the patient was taken to the endoscopy suite and placed in the left lateral decubitus position. Following sequential administration of sedation as per above, the endoscope was inserted into the mouth and advanced under direct vision to second portion of the duodenum. A careful inspection was made as the gastroscope was withdrawn, including a retroflexed view of the proximal stomach; findings and interventions are described below. Findings:  
Esophagus: normal, no esophageal varices Stomach: 2-3 cm hiatal hernia with single 2 mm Sami lesion. No gastric varices. Few 2-3 mm antral erosions Duodenum:  Normal to second portion Therapies:  As above Specimens: none EBL: None Complications:   None; patient tolerated the procedure well. Impression: As above Recommendations: 1. Monitor CBC 2. Continue PPI 3. If anemia/melena recur, would plan for M2A video capsule endoscopy 4. Clear liquid diet 5. Will follow Signed By: Edie Singh MD   
 4/8/2020  10:42 AM

## 2020-04-08 NOTE — PROGRESS NOTES
Chart reviewed. Patient admitted here with complaints of dark colored stool x 3 days of from [facility] (Assisted Living). The patient has a pmHx of GI Bleed, Liver failure, COPD and CHF. Care Management Interventions PCP Verified by CM: Yes Mode of Transport at Discharge: (to be determined) Transition of Care Consult (CM Consult): Discharge Planning Discharge Durable Medical Equipment: No(rollator recommended) Physical Therapy Consult: Yes Occupational Therapy Consult: Yes Speech Therapy Consult: No 
Current Support Network: Assisted Living Confirm Follow Up Transport: (facility via insurance plan) CM spoke with Sanchez OMALLEY) today and he is recommending a rollator walker. CM will continue to follow.  
 
SOO Sena, CRM

## 2020-04-08 NOTE — PROGRESS NOTES
PHYSICAL THERAPY EVALUATION/DISCHARGE Patient: Chantel Rodriguez (4 y.o. female) Date: 4/8/2020 Primary Diagnosis: GI bleed [K92.2] Procedure(s) (LRB): ESOPHAGOGASTRODUODENOSCOPY (EGD) (N/A) Day of Surgery Precautions:     
 
 
ASSESSMENT Based on the objective data described below, the patient presents with good strength, balance, and overall endurance following admission for a GI bleed. She on 2L continuous O2 at baseline and uses a rollator for gait. Gait training completed x250' with a slow overall bandar but modified independently x250'. Spo2 very briefly indicated 88 with extended exertion but recovered to 92% within 10 seconds. No c/o dizziness and vitals stable. This patient is moving well and reports no history of falls. No follow up therapy needs identified and will sign off. Recommend that she ambulate as needed with nursing assist x1 using her rollator from home. This patient needs a replacement rollator when cleared by insurance. The brakes do not function where one side is permanently locked and the other side is completely non-functional. Recommend an order for a replacement rollator from Dr. Ivon Terrazas. Further skilled acute physical therapy is not indicated at this time. PLAN : 
Recommendation for discharge: (in order for the patient to meet his/her long term goals) No skilled physical therapy/ follow up rehabilitation needs identified at this time. IF patient discharges home will need the following DME: Replacement rollator SUBJECTIVE:  
Patient stated I can walk. It feels good to get up.  OBJECTIVE DATA SUMMARY:  
HISTORY:   
Past Medical History:  
Diagnosis Date  Anemia  Arthritis Rheumatoid  [facility]estive [facility] failure) [facility]  Chronic obstructive pulmonary disease ([facility]  Cirrhosis of liver ([facility] ETOH  
 COPD (chronic obstructive pulmonary disease) ([facility]  Gastrointestinal disorder \"lacerations in the large part of my small intestine. \"  Gastrointestinal disorder   
 liver cirrhosis  Heart failure ([facility]  Hyperlipidemia 2020  Internal bleeding  Lung cancer ([facility] 2020  Psychiatric disorder   
 depression, anxiety  Tachycardia 2016 Past Surgical History:  
Procedure Laterality Date  HX CHOLECYSTECTOMY  HX GI Cholecystectomoy  HX GYN    
  x 2.  TX ENDOSCOPY UPPER SMALL INTESTINE  2016 Prior level of function: modified indepndent with a rollator on 2L O2 in [place] Personal factors and/or comorbidities impacting plan of care:  
 
Home Situation Home Environment: Assisted living Care Facility Name: [facility] # Steps to Enter: 0 Current DME Used/Available at Home: Shower chair, Walker, rollator, Oxygen, portable EXAMINATION/PRESENTATION/DECISION MAKING:  
Critical Behavior: 
Neurologic State: Alert Orientation Level: Oriented X4 Cognition: Follows commands Hearing: 
  
Skin:   
Edema:  
Range Of Motion: 
AROM: Generally decreased, functional 
  
  
  
  
  
  
  
Strength:   
Strength: Generally decreased, functional 
  
  
  
  
  
  
Tone & Sensation:  
Tone: Normal 
  
  
  
  
  
  
  
  
   
Coordination: 
Coordination: Generally decreased, functional 
Vision:  
  
Functional Mobility: 
Bed Mobility: 
Rolling: Supervision Supine to Sit: Modified independent Scooting: Independent Transfers: 
Sit to Stand: Modified independent Stand to Sit: Modified independent Balance:  
Sitting: Intact Standing: Intact; With support Ambulation/Gait Training: 
Distance (ft): 250 Feet (ft) Assistive Device: Gait belt;Walker, rollator Ambulation - Level of Assistance: Modified independent Gait Description (WDL): Exceptions to [facility] Gait Abnormalities: Shuffling gait Base of Support: Widened Speed/[place]: Slow;Shuffled Step Length: Right shortened;Left shortened Stairs: Therapeutic Exercises:  
 
 
Functional Measure: 
 
 
  
Physical Therapy Evaluation Charge Determination History Examination Presentation Decision-Making MEDIUM  Complexity : 1-2 comorbidities / personal factors will impact the outcome/ POC  LOW Complexity : 1-2 Standardized tests and measures addressing body structure, function, activity limitation and / or participation in recreation  LOW Complexity : Stable, uncomplicated  LOW Complexity : FOTO score of  Based on the above components, the patient evaluation is determined to be of the following complexity level: LOW Pain Rating: 
 
 
Activity Tolerance:  
Good Please refer to the flowsheet for vital signs taken during this treatment. After treatment patient left in no apparent distress:  
Supine in bed and Call bell within reach COMMUNICATION/EDUCATION:  
The patients plan of care was discussed with: Registered nurse. Fall prevention education was provided and the patient/caregiver indicated understanding., Patient/family have participated as able in goal setting and plan of care. and Patient/family agree to work toward stated goals and plan of care. Thank you for this referral. 
Zoey Yu, PT, DPT Time Calculation: 26 mins

## 2020-04-08 NOTE — PROGRESS NOTES
Problem: Falls - Risk of 
Goal: *Absence of Falls Description: Document Clydene Bone Fall Risk and appropriate interventions in the flowsheet. Outcome: Progressing Towards Goal 
Note: Fall Risk Interventions: 
Mobility Interventions: Communicate number of staff needed for ambulation/transfer Medication Interventions: Evaluate medications/consider consulting pharmacy Problem: Patient Education: Go to Patient Education Activity Goal: Patient/Family Education Outcome: Progressing Towards Goal

## 2020-04-08 NOTE — PROGRESS NOTES
TRANSFER - OUT REPORT: 
 
Verbal report given to MIKE Chaparro(name) on Naina Schulz  being transferred to 6 (unit) for routine progression of care Report consisted of patients Situation, Background, Assessment and  
Recommendations(SBAR). Information from the following report(s) Procedure Summary was reviewed with the receiving nurse. Lines:  
Peripheral IV 04/07/20 Right Forearm (Active) Site Assessment Clean, dry, & intact 4/7/2020  9:47 PM  
Phlebitis Assessment 0 4/7/2020  4:56 PM  
Infiltration Assessment 0 4/7/2020  4:56 PM  
Dressing Status Clean, dry, & intact 4/7/2020  4:56 PM  
Dressing Type Transparent 4/7/2020  4:56 PM  
Hub Color/Line Status Infusing;Patent;Pink 4/7/2020  4:56 PM  
Action Taken Open ports on tubing capped 4/7/2020  4:56 PM  
Alcohol Cap Used Yes 4/7/2020  4:56 PM  
   
Peripheral IV 04/07/20 Left Hand (Active) Site Assessment Clean, dry, & intact 4/7/2020  9:47 PM  
Phlebitis Assessment 0 4/7/2020  4:56 PM  
Infiltration Assessment 0 4/7/2020  4:56 PM  
Dressing Status Clean, dry, & intact 4/7/2020  4:56 PM  
Dressing Type Transparent;Tape 4/7/2020  4:56 PM  
Hub Color/Line Status Patent; Flushed;Blue 4/7/2020  4:56 PM  
Action Taken Open ports on tubing capped 4/7/2020  4:56 PM  
Alcohol Cap Used Yes 4/7/2020  4:56 PM  
  
 
Opportunity for questions and clarification was provided.

## 2020-04-09 VITALS
HEART RATE: 88 BPM | DIASTOLIC BLOOD PRESSURE: 68 MMHG | WEIGHT: 135 LBS | OXYGEN SATURATION: 96 % | TEMPERATURE: 98.1 F | SYSTOLIC BLOOD PRESSURE: 124 MMHG | BODY MASS INDEX: 26.5 KG/M2 | HEIGHT: 60 IN | RESPIRATION RATE: 19 BRPM

## 2020-04-09 LAB
ANION GAP SERPL CALC-SCNC: 1 MMOL/L (ref 5–15)
BASOPHILS # BLD: 0 K/UL (ref 0–0.1)
BASOPHILS NFR BLD: 0 % (ref 0–1)
BUN SERPL-MCNC: 10 MG/DL (ref 6–20)
BUN/CREAT SERPL: 13 (ref 12–20)
CALCIUM SERPL-MCNC: 8.8 MG/DL (ref 8.5–10.1)
CHLORIDE SERPL-SCNC: 108 MMOL/L (ref 97–108)
CO2 SERPL-SCNC: 34 MMOL/L (ref 21–32)
CREAT SERPL-MCNC: 0.76 MG/DL (ref 0.55–1.02)
DIFFERENTIAL METHOD BLD: ABNORMAL
EOSINOPHIL # BLD: 0.1 K/UL (ref 0–0.4)
EOSINOPHIL NFR BLD: 5 % (ref 0–7)
ERYTHROCYTE [DISTWIDTH] IN BLOOD BY AUTOMATED COUNT: 22.4 % (ref 11.5–14.5)
GLUCOSE SERPL-MCNC: 117 MG/DL (ref 65–100)
HCT VFR BLD AUTO: 25.3 % (ref 35–47)
HGB BLD-MCNC: 7.3 G/DL (ref 11.5–16)
IMM GRANULOCYTES # BLD AUTO: 0 K/UL (ref 0–0.04)
IMM GRANULOCYTES NFR BLD AUTO: 0 % (ref 0–0.5)
LYMPHOCYTES # BLD: 0.4 K/UL (ref 0.8–3.5)
LYMPHOCYTES NFR BLD: 14 % (ref 12–49)
MCH RBC QN AUTO: 28.1 PG (ref 26–34)
MCHC RBC AUTO-ENTMCNC: 28.9 G/DL (ref 30–36.5)
MCV RBC AUTO: 97.3 FL (ref 80–99)
MONOCYTES # BLD: 0.3 K/UL (ref 0–1)
MONOCYTES NFR BLD: 12 % (ref 5–13)
NEUTS SEG # BLD: 1.9 K/UL (ref 1.8–8)
NEUTS SEG NFR BLD: 69 % (ref 32–75)
NRBC # BLD: 0 K/UL (ref 0–0.01)
NRBC BLD-RTO: 0 PER 100 WBC
PLATELET # BLD AUTO: 101 K/UL (ref 150–400)
PMV BLD AUTO: 11.1 FL (ref 8.9–12.9)
POTASSIUM SERPL-SCNC: 3.1 MMOL/L (ref 3.5–5.1)
RBC # BLD AUTO: 2.6 M/UL (ref 3.8–5.2)
RBC MORPH BLD: ABNORMAL
SODIUM SERPL-SCNC: 143 MMOL/L (ref 136–145)
WBC # BLD AUTO: 2.7 K/UL (ref 3.6–11)

## 2020-04-09 PROCEDURE — 85025 COMPLETE CBC W/AUTO DIFF WBC: CPT

## 2020-04-09 PROCEDURE — 94640 AIRWAY INHALATION TREATMENT: CPT

## 2020-04-09 PROCEDURE — 74011000250 HC RX REV CODE- 250: Performed by: INTERNAL MEDICINE

## 2020-04-09 PROCEDURE — 80048 BASIC METABOLIC PNL TOTAL CA: CPT

## 2020-04-09 PROCEDURE — 74011250636 HC RX REV CODE- 250/636: Performed by: INTERNAL MEDICINE

## 2020-04-09 PROCEDURE — 74011250637 HC RX REV CODE- 250/637: Performed by: INTERNAL MEDICINE

## 2020-04-09 PROCEDURE — 36415 COLL VENOUS BLD VENIPUNCTURE: CPT

## 2020-04-09 PROCEDURE — C9113 INJ PANTOPRAZOLE SODIUM, VIA: HCPCS | Performed by: INTERNAL MEDICINE

## 2020-04-09 RX ORDER — POTASSIUM CHLORIDE 750 MG/1
40 TABLET, FILM COATED, EXTENDED RELEASE ORAL
Status: COMPLETED | OUTPATIENT
Start: 2020-04-09 | End: 2020-04-09

## 2020-04-09 RX ORDER — PANTOPRAZOLE SODIUM 40 MG/1
40 TABLET, DELAYED RELEASE ORAL DAILY
Qty: 30 TAB | Refills: 11 | Status: ON HOLD | OUTPATIENT
Start: 2020-05-09 | End: 2020-06-01 | Stop reason: SDUPTHER

## 2020-04-09 RX ORDER — PANTOPRAZOLE SODIUM 40 MG/1
40 TABLET, DELAYED RELEASE ORAL 2 TIMES DAILY
Qty: 60 TAB | Refills: 0 | Status: SHIPPED | OUTPATIENT
Start: 2020-04-09 | End: 2020-05-09

## 2020-04-09 RX ADMIN — Medication 10 ML: at 06:04

## 2020-04-09 RX ADMIN — LORATADINE 10 MG: 10 TABLET ORAL at 08:56

## 2020-04-09 RX ADMIN — FUROSEMIDE 20 MG: 40 TABLET ORAL at 08:57

## 2020-04-09 RX ADMIN — CYANOCOBALAMIN TAB 500 MCG 250 MCG: 500 TAB at 08:56

## 2020-04-09 RX ADMIN — IPRATROPIUM BROMIDE AND ALBUTEROL SULFATE 3 ML: .5; 3 SOLUTION RESPIRATORY (INHALATION) at 08:03

## 2020-04-09 RX ADMIN — BUPROPION HYDROCHLORIDE 150 MG: 150 TABLET, EXTENDED RELEASE ORAL at 08:55

## 2020-04-09 RX ADMIN — ALBUTEROL SULFATE 2.5 MG: 2.5 SOLUTION RESPIRATORY (INHALATION) at 05:39

## 2020-04-09 RX ADMIN — SUCRALFATE 1 G: 1 TABLET ORAL at 08:57

## 2020-04-09 RX ADMIN — MELATONIN 4 TABLET: at 08:56

## 2020-04-09 RX ADMIN — ARIPIPRAZOLE 10 MG: 10 TABLET ORAL at 08:57

## 2020-04-09 RX ADMIN — SODIUM CHLORIDE 40 MG: 9 INJECTION INTRAMUSCULAR; INTRAVENOUS; SUBCUTANEOUS at 08:58

## 2020-04-09 RX ADMIN — POTASSIUM CHLORIDE 40 MEQ: 750 TABLET, FILM COATED, EXTENDED RELEASE ORAL at 08:55

## 2020-04-09 RX ADMIN — RIFAXIMIN 550 MG: 550 TABLET ORAL at 08:55

## 2020-04-09 RX ADMIN — BUDESONIDE 250 MCG: 0.25 SUSPENSION RESPIRATORY (INHALATION) at 08:03

## 2020-04-09 RX ADMIN — CARVEDILOL 3.12 MG: 3.12 TABLET, FILM COATED ORAL at 08:56

## 2020-04-09 RX ADMIN — ALBUTEROL SULFATE 2.5 MG: 2.5 SOLUTION RESPIRATORY (INHALATION) at 01:36

## 2020-04-09 RX ADMIN — IPRATROPIUM BROMIDE AND ALBUTEROL SULFATE 3 ML: .5; 3 SOLUTION RESPIRATORY (INHALATION) at 12:02

## 2020-04-09 RX ADMIN — SPIRONOLACTONE 50 MG: 25 TABLET ORAL at 08:57

## 2020-04-09 RX ADMIN — MULTIPLE VITAMINS W/ MINERALS TAB 1 TABLET: TAB at 08:57

## 2020-04-09 RX ADMIN — Medication 10 ML: at 09:04

## 2020-04-09 RX ADMIN — DULOXETINE HYDROCHLORIDE 60 MG: 60 CAPSULE, DELAYED RELEASE ORAL at 08:57

## 2020-04-09 RX ADMIN — PREGABALIN 150 MG: 75 CAPSULE ORAL at 08:56

## 2020-04-09 NOTE — DISCHARGE SUMMARY
Physician Discharge Summary Patient ID: Navneet Cai 
[id number] 
60 y.o. 
1960 Admit date: 4/7/2020 Discharge date and time: 4/9/2020 Briefly, pt was admitted with GI bleed [K92.2]. For details of admission, see H&P. Hospital Course:  Pt was admitted with worsening anemia & dark stools. She was transfused, and her Hgb stabilized at 7.3 She was treated with IV Protonix. GI did an EGD -- Showed a Sami ulcer, but no active bleeding. If pt's Hgb drops again, etc, then the plan is to do a M2 A capsule study. Pt is being discharged home on Protonix 40 mg BID for a month, then decrease to every day. Discharge Dx: UGI bleed, source unclear. Condition at discharge: Improved. Disposition: home Patient Instructions:  
Current Discharge Medication List  
  
CONTINUE these medications which have CHANGED Details  
! ! pantoprazole (Protonix) 40 mg tablet Take 1 Tab by mouth two (2) times a day for 30 days. Qty: 60 Tab, Refills: 0  
  
!! pantoprazole (PROTONIX) 40 mg tablet Take 1 Tab by mouth daily. Qty: 30 Tab, Refills: 11  
  
 !! - Potential duplicate medications found. Please discuss with provider. CONTINUE these medications which have NOT CHANGED Details  
ferrous sulfate 325 mg (65 mg iron) tablet Take 325 mg by mouth daily. therapeutic multivitamin (THERAGRAN) tablet Take 1 Tab by mouth daily. oxyCODONE-acetaminophen (Percocet) 7.5-325 mg per tablet Take 1 Tab by mouth three (3) times daily as needed for Pain. calcium carbonate (Tums E-X) 300 mg (750 mg) chewable tablet Take 1-2 Tabs by mouth daily as needed for Indigestion (Gas). propylene glycol (Systane Complete) 0.6 % drop Administer 1 Drop to both eyes two (2) times a day. vit A/vit C/vit E/zinc/copper (PRESERVISION AREDS PO) Take 1 Tab by mouth two (2) times a day.   
  
peg 400-propylene glycol (Systane, propylene glycol,) 0.4-0.3 % drop Administer 2 Drops to both eyes every six (6) hours as needed for Other (Ocular dryness/irritation). acetaminophen (TYLENOL) 500 mg tablet Take 500 mg by mouth every eight (8) hours as needed for Pain.  
  
guaiFENesin (ROBITUSSIN) 100 mg/5 mL liquid Take 200 mg by mouth every four (4) hours as needed for Cough. ondansetron hcl (ZOFRAN) 4 mg tablet Take 4 mg by mouth two (2) times daily as needed for Nausea. promethazine (PHENERGAN) 12.5 mg tablet Take 12.5 mg by mouth every six (6) hours as needed (Vomiting). tiotropium Br/olodaterol HCl (STIOLTO RESPIMAT IN) Take 2 Puffs by inhalation daily. ARIPiprazole (ABILIFY) 10 mg tablet Take 10 mg by mouth daily. cyanocobalamin (Vitamin B-12) 250 mcg tablet Take 250 mcg by mouth daily. pregabalin (LYRICA) 150 mg capsule Take 150 mg by mouth two (2) times a day. DULoxetine (CYMBALTA) 60 mg capsule Take 60 mg by mouth daily. atorvastatin (LIPITOR) 20 mg tablet Take 20 mg by mouth nightly. sucralfate (CARAFATE) 1 gram tablet Take 1 g by mouth four (4) times daily. buPROPion XL (WELLBUTRIN XL) 150 mg tablet Take 150 mg by mouth every morning. fluticasone propionate (Flovent HFA) 44 mcg/actuation inhaler Take 2 Puffs by inhalation two (2) times a day. furosemide (LASIX) 20 mg tablet Take 20 mg by mouth daily. carvedilol (COREG) 3.125 mg tablet Take 3.125 mg by mouth two (2) times daily (with meals). loratadine (CLARITIN) 10 mg tablet Take 10 mg by mouth daily. cholecalciferol, vitamin D3, (VITAMIN D3) 2,000 unit tab Take 4,000 Units by mouth daily. !! albuterol-ipratropium (DUO-NEB) 2.5 mg-0.5 mg/3 ml nebu 3 mL by Nebulization route every four (4) hours as needed for Wheezing. senna-docusate (SENNA PLUS) 8.6-50 mg per tablet Take 1 Tab by mouth daily as needed for Constipation.   
  
albuterol (VENTOLIN HFA) 90 mcg/actuation inhaler Take 2 Puffs by inhalation every six (6) hours as needed for Wheezing. spironolactone (ALDACTONE) 50 mg tablet Take 50 mg by mouth daily as needed (excess fluid). !! albuterol-ipratropium (DUO-NEB) 2.5 mg-0.5 mg/3 ml nebu 3 mL by Nebulization route four (4) times daily. !! - Potential duplicate medications found. Please discuss with provider. STOP taking these medications  
  
 oxyCODONE-acetaminophen (PERCOCET 10)  mg per tablet Comments:  
Reason for Stopping:   
   
 multivitamins-minerals-lutein (CEROVITE SENIOR) tab tablet Comments:  
Reason for Stopping: Follow-up with Dr. Adelina Egan in 1 week. F/u with other specialists as previously scheduled. Signed: Kwame Zabala MD 
4/9/2020 
7:45 AM

## 2020-04-09 NOTE — PROGRESS NOTES
Physical Therapy Orders placed for rollator walker per PT evaluation yesterday.  
Taj Bentley, PT

## 2020-04-09 NOTE — PROGRESS NOTES
CM noted patient has been discharged home and would need a rollator, called and spoke with patient about transportation and DME, she said she has transportation scheduled for 12 noon and the therapist said he had ordered her rollator but not sure if the rollator will be delivered to [facility] or the [facility] where she lives. Awaiting a call from PT regarding place of DME delivery. Blanca Thompson MSA, RN, CRM. 11:00 am. PAT spoke with PAT León with [facility] regarding the rollator, she will send an Jace Stade to [facility]. PAT sent a referral to [facility] and also spoke with Myra Roldan regarding the referral. Myra Roldan said they could deliver to patient's residence. CM informed patient and the unit that she could leave at 12 noon and her rollator will be delivered to her apartment.  
Blanca Thompson MSA, RN, CRM

## 2020-04-09 NOTE — DISCHARGE INSTRUCTIONS
Patient Discharge Instructions    Abdoulaye Daugherty / [id number] : 1960    Admitted 2020 Discharged: 2020     Take Home Medications       · It is important that you take the medication exactly as they are prescribed. · Keep your medication in the bottles provided by the pharmacist and keep a list of the medication names, dosages, and times to be taken in your wallet. · Do not take other medications without consulting your doctor. What to do at Home    Recommended diet: Low-protein/low sodium diet. Recommended activity: Activity as tolerated. Follow-up with Dr. Libra Lyon in 1 day. Follow-up with other specialists as previously planned/per their recommendations. Information obtained by :  I understand that if any problems occur once I am at home I am to contact my physician. I understand and acknowledge receipt of the instructions indicated above.                                                                                                                                            Physician's or R.N.'s Signature                                                                  Date/Time                                                                                                                                              Patient or Representative Signature                                                          Date/Time

## 2020-04-09 NOTE — PROGRESS NOTES
Pt feeling fine. Hgb stable at 7.3 K+ low, and oral repletion ordered. Discharge home on continued PPI. If rebleeding, plans for M2A capsule noted. F/u with me 1 week.

## 2020-04-10 ENCOUNTER — TELEPHONE (OUTPATIENT)
Dept: FAMILY MEDICINE CLINIC | Age: 60
End: 2020-04-10

## 2020-04-10 ENCOUNTER — TELEPHONE (OUTPATIENT)
Dept: CASE MANAGEMENT | Age: 60
End: 2020-04-10

## 2020-04-10 NOTE — TELEPHONE ENCOUNTER
----- Message from Jc Duarte NP sent at 4/10/2020 12:01 PM EDT -----  Regarding: D/C med rec, please  She will be expecting your call. Thank you!

## 2020-04-10 NOTE — TELEPHONE ENCOUNTER
4/10/20 11:31 AM    COVID-19 Screening Initial Follow-up Note    Patient contacted regarding COVID-19  risk. Care Transition Nurse/ Ambulatory Care Manager contacted the patient by telephone to perform post discharge assessment. Verified name and  with patient as identifiers. Provided introduction to self, and explanation of the CTN/ACM role, and reason for call due to risk factors for infection and/or exposure to COVID-19. Symptoms reviewed with patient who verbalized the following symptoms: cough--chronic. States she has COPD and is on oxygen at home. Due to no new or worsening symptoms encounter was not routed to provider for escalation. Patient has following risk factors of: heart failure, COPD, acute respiratory failure, immunocompromised. CTN/ACM reviewed discharge instructions, medical action plan and red flags such as increased shortness of breath, increasing fever and signs of decompensation with patient who verbalized understanding. Discussed exposure protocols and quarantine with CDC Guidelines What to do if you are sick with coronavirus disease 2019 Patient who was given an opportunity for questions and concerns. The patient agrees to contact the [facility] exposure line [phone], [facility] department [address]  ([phone] and PCP office for questions related to their healthcare. CTN/ACM provided contact information for future reference. Offered to help her activate [employer] account but she declines at this time. States she will ask her daughter to assist.  Advised of where to find the information with her activation code on D/C AVS.    Did not review and educate patient on any new and changed medications related to discharge diagnosis, but sent CC message to pharmacy pool requesting D/C med rec.     Patient/family/caregiver given information for [employer] and agrees to enroll: no  Patient's preferred e-mail: [place] she does not have email  Patient's preferred phone number: N/A  Based on Loop alert triggers, patient will be contacted by nurse care manager for worsening symptoms.     Plan for follow-up call in 14 days based on severity of symptoms and risk factors

## 2020-04-10 NOTE — TELEPHONE ENCOUNTER
Patient referred by Armen Traylor NP to review and reconcile discharge medications. Called patient. She is feeling better. She lives in assisted living and her medications are packed by the pharmacy and sent over. Reviewed new medication Protonix and how it is taken, what it's for, importance of taking it. Discussed taking it for one month twice daily, then once daily afterward. Reviewed briefly other meds, however, patient not able to confirm all of them with exact doses. Should follow up to see if she is supposed to continue on carafate now that she is on PPI. Patient verbalized understanding of information presented. Answered all of the patient's questions.     Elaine Watson, PHARMD, CDE

## 2020-04-22 ENCOUNTER — HOSPITAL ENCOUNTER (OUTPATIENT)
Dept: RADIATION THERAPY | Age: 60
Discharge: HOME | End: 2020-04-22
Payer: MEDICARE

## 2020-04-22 PROCEDURE — 77470 SPECIAL RADIATION TREATMENT: CPT

## 2020-04-24 ENCOUNTER — PATIENT OUTREACH (OUTPATIENT)
Dept: FAMILY MEDICINE CLINIC | Age: 60
End: 2020-04-24

## 2020-04-24 NOTE — PROGRESS NOTES
Patient resolved from Transition of Care episode on 4/24/2020    Patient/family has been provided the following resources and education related to COVID-19:                         Signs, symptoms and red flags related to COVID-19            CDC exposure and quarantine guidelines            [facility] exposure contact - [phone]            Contact for their local Department of Health               Patient reports \"feeling better\". Patient currently reports that the following symptoms have improved:  no new symptoms. No further outreach scheduled with this CTN/ACM. Episode of Care resolved. Patient has this CTN/ACM contact information if future needs arise.

## 2020-04-30 RX ORDER — DIPHENHYDRAMINE HCL 25 MG
25 CAPSULE ORAL ONCE
Status: DISCONTINUED | OUTPATIENT
Start: 2020-05-04 | End: 2020-05-04 | Stop reason: SDUPTHER

## 2020-04-30 RX ORDER — ACETAMINOPHEN 325 MG/1
650 TABLET ORAL ONCE
Status: DISCONTINUED | OUTPATIENT
Start: 2020-05-04 | End: 2020-05-04 | Stop reason: SDUPTHER

## 2020-05-04 ENCOUNTER — HOSPITAL ENCOUNTER (OUTPATIENT)
Dept: INFUSION THERAPY | Age: 60
Discharge: HOME | End: 2020-05-04
Payer: MEDICARE

## 2020-05-04 VITALS
SYSTOLIC BLOOD PRESSURE: 108 MMHG | HEART RATE: 87 BPM | DIASTOLIC BLOOD PRESSURE: 70 MMHG | TEMPERATURE: 98.3 F | RESPIRATION RATE: 18 BRPM

## 2020-05-04 VITALS
TEMPERATURE: 97.5 F | HEART RATE: 80 BPM | DIASTOLIC BLOOD PRESSURE: 51 MMHG | SYSTOLIC BLOOD PRESSURE: 101 MMHG | OXYGEN SATURATION: 96 % | RESPIRATION RATE: 22 BRPM

## 2020-05-04 LAB
HCT VFR BLD AUTO: 23.5 % (ref 35–47)
HCT VFR BLD AUTO: 25.4 % (ref 35–47)
HGB BLD-MCNC: 6.2 G/DL (ref 11.5–16)
HGB BLD-MCNC: 7.5 G/DL (ref 11.5–16)

## 2020-05-04 PROCEDURE — 77010026065 HC OXYGEN MINIMUM MEDICAL AIR

## 2020-05-04 PROCEDURE — 74011250637 HC RX REV CODE- 250/637: Performed by: NURSE PRACTITIONER

## 2020-05-04 PROCEDURE — 86921 COMPATIBILITY TEST INCUBATE: CPT

## 2020-05-04 PROCEDURE — 36415 COLL VENOUS BLD VENIPUNCTURE: CPT

## 2020-05-04 PROCEDURE — 86922 COMPATIBILITY TEST ANTIGLOB: CPT

## 2020-05-04 PROCEDURE — P9016 RBC LEUKOCYTES REDUCED: HCPCS

## 2020-05-04 PROCEDURE — 86900 BLOOD TYPING SEROLOGIC ABO: CPT

## 2020-05-04 PROCEDURE — 85018 HEMOGLOBIN: CPT

## 2020-05-04 PROCEDURE — 36430 TRANSFUSION BLD/BLD COMPNT: CPT

## 2020-05-04 PROCEDURE — 77030013169 SET IV BLD ICUM -A

## 2020-05-04 PROCEDURE — 86920 COMPATIBILITY TEST SPIN: CPT

## 2020-05-04 PROCEDURE — 86902 BLOOD TYPE ANTIGEN DONOR EA: CPT

## 2020-05-04 RX ORDER — DIPHENHYDRAMINE HCL 25 MG
25 CAPSULE ORAL ONCE
Status: COMPLETED | OUTPATIENT
Start: 2020-05-04 | End: 2020-05-04

## 2020-05-04 RX ORDER — ACETAMINOPHEN 325 MG/1
650 TABLET ORAL ONCE
Status: COMPLETED | OUTPATIENT
Start: 2020-05-04 | End: 2020-05-04

## 2020-05-04 RX ADMIN — ACETAMINOPHEN 650 MG: 325 TABLET ORAL at 10:19

## 2020-05-04 RX ADMIN — DIPHENHYDRAMINE HYDROCHLORIDE 25 MG: 25 CAPSULE ORAL at 10:20

## 2020-05-04 NOTE — PROGRESS NOTES
OPIC Short Note 0830 Pt admit to [facility] for labs ambulatory in stable condition. Assessment completed. No new concerns voiced. Type and cross, hgb + hct sent for processing. Peripheral IV placed in right hand with brisk blood return. IV capped, flushed and secured. Patient uses a rollator and is on 2L NC at baseline. Patient Vitals for the past 12 hrs: 
 Temp Pulse Resp BP SpO2  
05/04/20 0833 97.5 °F (36.4 °C) 80 22 101/51 96 % Lab results were obtained and reviewed. Recent Results (from the past 12 hour(s)) TYPE & SCREEN Collection Time: 05/04/20  8:46 AM  
Result Value Ref Range Crossmatch Expiration 05/07/2020 ABO/Rh(D) O POSITIVE Antibody screen PENDING   
HGB & HCT Collection Time: 05/04/20  8:46 AM  
Result Value Ref Range HGB 6.2 (L) 11.5 - 16.0 g/dL HCT 23.5 (L) 35.0 - 47.0 % Ms. Tatum Encinas was discharged from [place] [address] in stable condition at 0900. Assisted patient to pediatric infusion center via wheelchair. Future Appointments Date Time Provider Risa Alegria 6/4/2020  2:30 PM [address] [place] [address] [address] [facility] [facility]  
6/29/2020  9:30 AM [address] [place] [address] OP [address] [facility] [facility]  
7/6/2020 10:40 AM Keya April L, NP [facility] SCHED  
7/9/2020  3:00 PM Siri Thomas MD [address] [place] [address] Jose Carlos Londono RN May 4, 2020 
9:48 AM

## 2020-05-04 NOTE — PROGRESS NOTES
[address] @ [facility] VISIT NOTE 
 
0900 Patient arrives for 2 units PRBC without acute problems. Please see connect care for complete assessment and education provided. Pt received from [facility] with IV site in place to Rt Hand. Flushes well. Vital signs stable throughout and prior to discharge, Pt. Tolerated treatment well and discharged without incident. Medications Verified by Sera Parson RN via [employer]: 1. Tylenol 650mg PO premeds 2. Benadryl 25mg PO premeds H&H drawn after 1st unit of PRBC. Results obtained and approval for 2nd unit obtained as ordered. Pt declined 1hr of monitoring post transfusion. VITAL SIGNS Patient Vitals for the past 12 hrs: 
 Temp Pulse Resp BP  
05/04/20 1704 98.3 °F (36.8 °C) 87 18 108/70  
05/04/20 1600 98.4 °F (36.9 °C) 80 18 116/64  
05/04/20 1530 98.3 °F (36.8 °C) 87 20 118/74  
05/04/20 1515 98.2 °F (36.8 °C) 88 20 115/64  
05/04/20 1500 98.5 °F (36.9 °C) 97 18 124/85  
05/04/20 1250 97.6 °F (36.4 °C) 80 18 110/70  
05/04/20 1150 97.6 °F (36.4 °C) 76 18 97/67  
05/04/20 1120 97.8 °F (36.6 °C) 76 20 94/48  
05/04/20 1105 97.4 °F (36.3 °C) 82 20 92/48  
05/04/20 1041 97.3 °F (36.3 °C) 76 20 98/51 LAB WORK Lab results pending, please see Connect Care for results. Recent Results (from the past 12 hour(s)) TYPE & SCREEN Collection Time: 05/04/20  8:46 AM  
Result Value Ref Range Crossmatch Expiration 05/07/2020 ABO/Rh(D) O POSITIVE Antibody screen NEG Comment Previously identified Anti K and nonspecific antibody Unit number [id number] Blood component type [facility] Unit division 00 Status of unit ISSUED ANTIGEN/ANTIBODY INFO JEFF NEGATIVE, Crossmatch result Compatible Unit number [id number] Blood component type [facility] Unit division 00 Status of unit ISSUED ANTIGEN/ANTIBODY INFO JEFF NEGATIVE, Crossmatch result Compatible HGB & HCT  
 Collection Time: 05/04/20  8:46 AM  
Result Value Ref Range HGB 6.2 (L) 11.5 - 16.0 g/dL HCT 23.5 (L) 35.0 - 47.0 % HGB & HCT Collection Time: 05/04/20  1:26 PM  
Result Value Ref Range HGB 7.5 (L) 11.5 - 16.0 g/dL HCT 25.4 (L) 35.0 - 47.0 %

## 2020-05-07 ENCOUNTER — HOSPITAL ENCOUNTER (OUTPATIENT)
Dept: INFUSION THERAPY | Age: 60
Discharge: HOME | End: 2020-05-07
Payer: MEDICARE

## 2020-05-07 ENCOUNTER — HOSPITAL ENCOUNTER (OUTPATIENT)
Dept: RADIATION THERAPY | Age: 60
Discharge: HOME | End: 2020-05-07
Payer: MEDICARE

## 2020-05-07 ENCOUNTER — TELEPHONE (OUTPATIENT)
Dept: ONCOLOGY | Age: 60
End: 2020-05-07

## 2020-05-07 PROCEDURE — 77300 RADIATION THERAPY DOSE PLAN: CPT

## 2020-05-07 PROCEDURE — 77301 RADIOTHERAPY DOSE PLAN IMRT: CPT

## 2020-05-07 NOTE — TELEPHONE ENCOUNTER
Oncology Nurse [address] [facility]   Phone: [phone] referral from [facility] RENEE at [address] [facility] -- seeking help to arrange transportation for Ms. Nayan Rizzo    She is patient of Dr. mEre Bowers needing SBRT for LLL mass. Treatment is not scheduled yet but would like to start next week. Planned 4 SBRT fractions, every other day. 1132am  Phoned Ms. Nayan Rizzo and did not connect -- no means to leave . 1135am  She is a resident at [facility] -- phoned C-Note --- message will be relayed to Ms. Nayan Rizzo who will return my call when possible. 1155am  Incoming call from MsDouglas Reba Hailey role and the intent of my call  She is eager to commence RT -- hopes to feel better. Ami Felix \"my lungs will thank you. \"  On home oxygen  Feels weak, \"listless\" and tired -- states she notes loose tarry stools have returned -- \"I recognize that familiar smell in my stools, too\"  Rec'ed  2 units PRBC on 5/4 -- felt initially better following transfusion  States hgb is checked weekly and reported to her PCP, Dr. Tiburcio Elizalde. I've encouraged her to call PCP with her current symptoms and she agrees. Discussed RT -- she has [employer] transportation and would appreciate help to schedule rides  We agreed I will call her back after I speak with Dr. Remedios Montaño team about her treatment dates and time. 1215pm  Called Jenny Mcmanus -- left  requesting return call    157pm   Returned call to Jenny Mcmanus  She accepts appts for   5/11 @ 130pm  (90 min appt)  5/13 @ 130pm (45 min appt)  5/15 @ 1pm  (45 min appt)  5/18 @ 1pm (45 min appt)  Barbara Esquivel will call to schedule [employer] transportation. 216pm  Trips scheduled for the following:   at home address to arrive at [facility], [address].  5/11 --  at home at 1230pm to arrive at [facility] for 130pm appt. Will call for return home ([phone]). Confirmation # [id number].  5/13 --  at home at 1230pm to arrive at [facility] for 130pm appt. Will call for return home ([phone]). Confirmation # [id number].  5/15 --  at home at 1230pm to arrive at [facility] for 1pm appt. Return ride  at 2pm.  Confirmation # [id number]. Call [phone] if problems.  5/18 --  at home at 1230pm to arrive at [facility] for 1pm appt. Return ride  at 2pm.  Confirmation # [id number]. Call [phone] if problems. 351pm   Phoned Allison Dupree.   Will fax the above info to her at [facility] (Fax: [phone])    Zina NUNNS

## 2020-05-08 ENCOUNTER — HOSPITAL ENCOUNTER (OUTPATIENT)
Dept: RADIATION THERAPY | Age: 60
Discharge: HOME | End: 2020-05-08
Payer: MEDICARE

## 2020-05-11 ENCOUNTER — HOSPITAL ENCOUNTER (OUTPATIENT)
Dept: RADIATION THERAPY | Age: 60
Discharge: HOME | End: 2020-05-11
Payer: MEDICARE

## 2020-05-11 PROCEDURE — 77338 DESIGN MLC DEVICE FOR IMRT: CPT

## 2020-05-11 PROCEDURE — 77373 STRTCTC BDY RAD THER TX DLVR: CPT

## 2020-05-13 ENCOUNTER — HOSPITAL ENCOUNTER (OUTPATIENT)
Dept: RADIATION THERAPY | Age: 60
Discharge: HOME | End: 2020-05-13
Payer: MEDICARE

## 2020-05-15 ENCOUNTER — HOSPITAL ENCOUNTER (OUTPATIENT)
Dept: RADIATION THERAPY | Age: 60
Discharge: HOME | End: 2020-05-15
Payer: MEDICARE

## 2020-05-15 PROCEDURE — 77373 STRTCTC BDY RAD THER TX DLVR: CPT

## 2020-05-18 ENCOUNTER — HOSPITAL ENCOUNTER (OUTPATIENT)
Dept: RADIATION THERAPY | Age: 60
Discharge: HOME | End: 2020-05-18
Payer: MEDICARE

## 2020-05-18 PROCEDURE — 77373 STRTCTC BDY RAD THER TX DLVR: CPT

## 2020-05-20 ENCOUNTER — HOSPITAL ENCOUNTER (OUTPATIENT)
Dept: RADIATION THERAPY | Age: 60
Discharge: HOME | End: 2020-05-20
Payer: MEDICARE

## 2020-05-20 PROCEDURE — 77373 STRTCTC BDY RAD THER TX DLVR: CPT

## 2020-05-25 ENCOUNTER — TELEPHONE (OUTPATIENT)
Dept: ONCOLOGY | Age: 60
End: 2020-05-25

## 2020-05-25 NOTE — TELEPHONE ENCOUNTER
Oncology Nurse [address] [facility]   Phone: [phone]    Rec'ed call from Yuri Coombs wanting info about her upcoming appts    She is feeling anemic -- very dizzy, lightheaded, fatigued -- almost fell recently. States last black tarry stool was about a week ago. Denies BRBPR or other sources of bleeding she can identify. Confirmed appts in [facility] peds hospitals on 6/1 an 6/2 for T&C and TFN, respectively. She feels she can't wait until then for TFN and is considering going to ED tomorrow. Recently completed SBRT and believes Dr. Alber Noe feels she greatly benefited from treatment \"but I can't tell because of my anemia symptoms. \"    Rec she call PCP to discuss ASAP - - perhaps can move TFN sooner. Agreed with her plan to go to ED if symptoms worsen. Otherwise, she was able to confirm all other scheduled appts. She has appt with PCP Dr. Kareen Gr in 2 weeks.      John Rushing MS RN AOCNS

## 2020-05-26 ENCOUNTER — APPOINTMENT (OUTPATIENT)
Dept: GENERAL RADIOLOGY | Age: 60
DRG: 378 | End: 2020-05-26
Attending: EMERGENCY MEDICINE
Payer: MEDICARE

## 2020-05-26 ENCOUNTER — HOSPITAL ENCOUNTER (INPATIENT)
Age: 60
LOS: 6 days | Discharge: HOME | DRG: 378 | End: 2020-06-01
Attending: EMERGENCY MEDICINE | Admitting: INTERNAL MEDICINE
Payer: MEDICARE

## 2020-05-26 DIAGNOSIS — R55 SYNCOPE, UNSPECIFIED SYNCOPE TYPE: Primary | ICD-10-CM

## 2020-05-26 DIAGNOSIS — C34.90 MALIGNANT NEOPLASM OF LUNG, UNSPECIFIED LATERALITY, UNSPECIFIED PART OF LUNG (HCC): ICD-10-CM

## 2020-05-26 DIAGNOSIS — K92.2 UPPER GI BLEED: ICD-10-CM

## 2020-05-26 PROBLEM — E87.6 HYPOKALEMIA: Status: ACTIVE | Noted: 2020-05-26

## 2020-05-26 LAB
ALBUMIN SERPL-MCNC: 3.3 G/DL (ref 3.5–5)
ALBUMIN/GLOB SERPL: 0.9 {RATIO} (ref 1.1–2.2)
ALP SERPL-CCNC: 110 U/L (ref 45–117)
ALT SERPL-CCNC: 28 U/L (ref 12–78)
ANION GAP SERPL CALC-SCNC: 3 MMOL/L (ref 5–15)
APPEARANCE UR: CLEAR
APTT PPP: 22 SEC (ref 22.1–32)
AST SERPL-CCNC: 36 U/L (ref 15–37)
ATRIAL RATE: 93 BPM
BACTERIA URNS QL MICRO: NEGATIVE /HPF
BASOPHILS # BLD: 0 K/UL (ref 0–0.1)
BASOPHILS NFR BLD: 1 % (ref 0–1)
BILIRUB SERPL-MCNC: 0.2 MG/DL (ref 0.2–1)
BILIRUB UR QL: NEGATIVE
BUN SERPL-MCNC: 19 MG/DL (ref 6–20)
BUN/CREAT SERPL: 21 (ref 12–20)
CALCIUM SERPL-MCNC: 9.2 MG/DL (ref 8.5–10.1)
CALCULATED R AXIS, ECG10: 74 DEGREES
CALCULATED T AXIS, ECG11: 51 DEGREES
CHLORIDE SERPL-SCNC: 105 MMOL/L (ref 97–108)
CO2 SERPL-SCNC: 30 MMOL/L (ref 21–32)
COLOR UR: NORMAL
COMMENT, HOLDF: NORMAL
CREAT SERPL-MCNC: 0.91 MG/DL (ref 0.55–1.02)
DIAGNOSIS, 93000: NORMAL
DIFFERENTIAL METHOD BLD: ABNORMAL
EOSINOPHIL # BLD: 0.1 K/UL (ref 0–0.4)
EOSINOPHIL NFR BLD: 4 % (ref 0–7)
EPITH CASTS URNS QL MICRO: NORMAL /LPF
ERYTHROCYTE [DISTWIDTH] IN BLOOD BY AUTOMATED COUNT: 17.8 % (ref 11.5–14.5)
GLOBULIN SER CALC-MCNC: 3.7 G/DL (ref 2–4)
GLUCOSE SERPL-MCNC: 64 MG/DL (ref 65–100)
GLUCOSE UR STRIP.AUTO-MCNC: NEGATIVE MG/DL
HCT VFR BLD AUTO: 25.2 % (ref 35–47)
HEMOCCULT STL QL: POSITIVE
HGB BLD-MCNC: 6.8 G/DL (ref 11.5–16)
HGB UR QL STRIP: NEGATIVE
HYALINE CASTS URNS QL MICRO: NORMAL /LPF (ref 0–5)
IMM GRANULOCYTES # BLD AUTO: 0 K/UL (ref 0–0.04)
IMM GRANULOCYTES NFR BLD AUTO: 1 % (ref 0–0.5)
INR PPP: 1 (ref 0.9–1.1)
KETONES UR QL STRIP.AUTO: NEGATIVE MG/DL
LEUKOCYTE ESTERASE UR QL STRIP.AUTO: NEGATIVE
LYMPHOCYTES # BLD: 0.5 K/UL (ref 0.8–3.5)
LYMPHOCYTES NFR BLD: 14 % (ref 12–49)
MCH RBC QN AUTO: 27.3 PG (ref 26–34)
MCHC RBC AUTO-ENTMCNC: 27 G/DL (ref 30–36.5)
MCV RBC AUTO: 101.2 FL (ref 80–99)
MONOCYTES # BLD: 0.5 K/UL (ref 0–1)
MONOCYTES NFR BLD: 14 % (ref 5–13)
NEUTS SEG # BLD: 2.3 K/UL (ref 1.8–8)
NEUTS SEG NFR BLD: 66 % (ref 32–75)
NITRITE UR QL STRIP.AUTO: NEGATIVE
NRBC # BLD: 0 K/UL (ref 0–0.01)
NRBC BLD-RTO: 0 PER 100 WBC
PH UR STRIP: 7 [PH] (ref 5–8)
PLATELET # BLD AUTO: 75 K/UL (ref 150–400)
POTASSIUM SERPL-SCNC: 3.2 MMOL/L (ref 3.5–5.1)
PROT SERPL-MCNC: 7 G/DL (ref 6.4–8.2)
PROT UR STRIP-MCNC: NEGATIVE MG/DL
PROTHROMBIN TIME: 10.7 SEC (ref 9–11.1)
Q-T INTERVAL, ECG07: 334 MS
QRS DURATION, ECG06: 110 MS
QTC CALCULATION (BEZET), ECG08: 430 MS
RBC # BLD AUTO: 2.49 M/UL (ref 3.8–5.2)
RBC #/AREA URNS HPF: NORMAL /HPF (ref 0–5)
RBC MORPH BLD: ABNORMAL
SAMPLES BEING HELD,HOLD: NORMAL
SODIUM SERPL-SCNC: 138 MMOL/L (ref 136–145)
SP GR UR REFRACTOMETRY: 1.01 (ref 1–1.03)
THERAPEUTIC RANGE,PTTT: ABNORMAL SECS (ref 58–77)
UR CULT HOLD, URHOLD: NORMAL
UROBILINOGEN UR QL STRIP.AUTO: 0.2 EU/DL (ref 0.2–1)
VENTRICULAR RATE, ECG03: 100 BPM
WBC # BLD AUTO: 3.4 K/UL (ref 3.6–11)
WBC URNS QL MICRO: NORMAL /HPF (ref 0–4)

## 2020-05-26 PROCEDURE — 86922 COMPATIBILITY TEST ANTIGLOB: CPT

## 2020-05-26 PROCEDURE — 94640 AIRWAY INHALATION TREATMENT: CPT

## 2020-05-26 PROCEDURE — 93005 ELECTROCARDIOGRAM TRACING: CPT

## 2020-05-26 PROCEDURE — 82272 OCCULT BLD FECES 1-3 TESTS: CPT

## 2020-05-26 PROCEDURE — 36430 TRANSFUSION BLD/BLD COMPNT: CPT

## 2020-05-26 PROCEDURE — 81001 URINALYSIS AUTO W/SCOPE: CPT

## 2020-05-26 PROCEDURE — 86902 BLOOD TYPE ANTIGEN DONOR EA: CPT

## 2020-05-26 PROCEDURE — 74011250637 HC RX REV CODE- 250/637: Performed by: INTERNAL MEDICINE

## 2020-05-26 PROCEDURE — 80053 COMPREHEN METABOLIC PANEL: CPT

## 2020-05-26 PROCEDURE — 85025 COMPLETE CBC W/AUTO DIFF WBC: CPT

## 2020-05-26 PROCEDURE — P9016 RBC LEUKOCYTES REDUCED: HCPCS

## 2020-05-26 PROCEDURE — 99285 EMERGENCY DEPT VISIT HI MDM: CPT

## 2020-05-26 PROCEDURE — 36415 COLL VENOUS BLD VENIPUNCTURE: CPT

## 2020-05-26 PROCEDURE — C9113 INJ PANTOPRAZOLE SODIUM, VIA: HCPCS | Performed by: EMERGENCY MEDICINE

## 2020-05-26 PROCEDURE — 86921 COMPATIBILITY TEST INCUBATE: CPT

## 2020-05-26 PROCEDURE — 74011250636 HC RX REV CODE- 250/636: Performed by: EMERGENCY MEDICINE

## 2020-05-26 PROCEDURE — 30233N1 TRANSFUSION OF NONAUTOLOGOUS RED BLOOD CELLS INTO PERIPHERAL VEIN, PERCUTANEOUS APPROACH: ICD-10-PCS | Performed by: INTERNAL MEDICINE

## 2020-05-26 PROCEDURE — 77030029684 HC NEB SM VOL KT MONA -A

## 2020-05-26 PROCEDURE — 96360 HYDRATION IV INFUSION INIT: CPT

## 2020-05-26 PROCEDURE — 86900 BLOOD TYPING SEROLOGIC ABO: CPT

## 2020-05-26 PROCEDURE — 94664 DEMO&/EVAL PT USE INHALER: CPT

## 2020-05-26 PROCEDURE — 74011000250 HC RX REV CODE- 250: Performed by: INTERNAL MEDICINE

## 2020-05-26 PROCEDURE — 85610 PROTHROMBIN TIME: CPT

## 2020-05-26 PROCEDURE — 86920 COMPATIBILITY TEST SPIN: CPT

## 2020-05-26 PROCEDURE — 65270000029 HC RM PRIVATE

## 2020-05-26 PROCEDURE — 71045 X-RAY EXAM CHEST 1 VIEW: CPT

## 2020-05-26 PROCEDURE — 85730 THROMBOPLASTIN TIME PARTIAL: CPT

## 2020-05-26 RX ORDER — LANOLIN ALCOHOL/MO/W.PET/CERES
325 CREAM (GRAM) TOPICAL DAILY
Status: DISCONTINUED | OUTPATIENT
Start: 2020-05-27 | End: 2020-06-01 | Stop reason: HOSPADM

## 2020-05-26 RX ORDER — GUAIFENESIN 100 MG/5ML
200 SOLUTION ORAL
Status: DISCONTINUED | OUTPATIENT
Start: 2020-05-26 | End: 2020-06-01 | Stop reason: HOSPADM

## 2020-05-26 RX ORDER — PREGABALIN 75 MG/1
150 CAPSULE ORAL 2 TIMES DAILY
Status: DISCONTINUED | OUTPATIENT
Start: 2020-05-26 | End: 2020-06-01 | Stop reason: HOSPADM

## 2020-05-26 RX ORDER — OXYCODONE AND ACETAMINOPHEN 7.5; 325 MG/1; MG/1
1 TABLET ORAL
Status: DISCONTINUED | OUTPATIENT
Start: 2020-05-26 | End: 2020-06-01 | Stop reason: HOSPADM

## 2020-05-26 RX ORDER — FORMOTEROL FUMARATE 20 UG/2ML
20 SOLUTION RESPIRATORY (INHALATION) 2 TIMES DAILY
COMMUNITY

## 2020-05-26 RX ORDER — ALBUTEROL SULFATE 90 UG/1
2 AEROSOL, METERED RESPIRATORY (INHALATION)
Status: DISCONTINUED | OUTPATIENT
Start: 2020-05-26 | End: 2020-05-26 | Stop reason: SDUPTHER

## 2020-05-26 RX ORDER — OXYCODONE AND ACETAMINOPHEN 10; 325 MG/1; MG/1
1 TABLET ORAL
Status: ON HOLD | COMMUNITY
End: 2020-06-01 | Stop reason: SDUPTHER

## 2020-05-26 RX ORDER — PANTOPRAZOLE SODIUM 40 MG/10ML
40 INJECTION, POWDER, LYOPHILIZED, FOR SOLUTION INTRAVENOUS
Status: COMPLETED | OUTPATIENT
Start: 2020-05-26 | End: 2020-05-26

## 2020-05-26 RX ORDER — BUDESONIDE 0.5 MG/2ML
500 INHALANT ORAL 2 TIMES DAILY
COMMUNITY

## 2020-05-26 RX ORDER — SUCRALFATE 1 G/1
1 TABLET ORAL 4 TIMES DAILY
Status: DISCONTINUED | OUTPATIENT
Start: 2020-05-26 | End: 2020-06-01 | Stop reason: HOSPADM

## 2020-05-26 RX ORDER — ASPIRIN 81 MG
1 TABLET, DELAYED RELEASE (ENTERIC COATED) ORAL DAILY
COMMUNITY
End: 2020-06-12

## 2020-05-26 RX ORDER — ACETAMINOPHEN 325 MG/1
650 TABLET ORAL
Status: DISCONTINUED | OUTPATIENT
Start: 2020-05-26 | End: 2020-06-01 | Stop reason: HOSPADM

## 2020-05-26 RX ORDER — BENZONATATE 100 MG/1
100 CAPSULE ORAL
COMMUNITY

## 2020-05-26 RX ORDER — LANOLIN ALCOHOL/MO/W.PET/CERES
250 CREAM (GRAM) TOPICAL DAILY
Status: DISCONTINUED | OUTPATIENT
Start: 2020-05-27 | End: 2020-06-01 | Stop reason: HOSPADM

## 2020-05-26 RX ORDER — CALCIUM CARBONATE 200(500)MG
200-400 TABLET,CHEWABLE ORAL DAILY PRN
Status: DISCONTINUED | OUTPATIENT
Start: 2020-05-26 | End: 2020-06-01 | Stop reason: HOSPADM

## 2020-05-26 RX ORDER — ALBUTEROL SULFATE 0.83 MG/ML
2.5 SOLUTION RESPIRATORY (INHALATION)
Status: DISCONTINUED | OUTPATIENT
Start: 2020-05-26 | End: 2020-06-01 | Stop reason: HOSPADM

## 2020-05-26 RX ORDER — POTASSIUM CHLORIDE 750 MG/1
40 TABLET, FILM COATED, EXTENDED RELEASE ORAL
Status: COMPLETED | OUTPATIENT
Start: 2020-05-26 | End: 2020-05-26

## 2020-05-26 RX ORDER — DULOXETIN HYDROCHLORIDE 60 MG/1
60 CAPSULE, DELAYED RELEASE ORAL DAILY
Status: DISCONTINUED | OUTPATIENT
Start: 2020-05-27 | End: 2020-06-01 | Stop reason: HOSPADM

## 2020-05-26 RX ORDER — SENNOSIDES 8.6 MG/1
1 TABLET ORAL
COMMUNITY

## 2020-05-26 RX ORDER — CARVEDILOL 3.12 MG/1
3.12 TABLET ORAL 2 TIMES DAILY WITH MEALS
Status: DISCONTINUED | OUTPATIENT
Start: 2020-05-26 | End: 2020-06-01 | Stop reason: HOSPADM

## 2020-05-26 RX ORDER — ARIPIPRAZOLE 10 MG/1
10 TABLET ORAL DAILY
Status: DISCONTINUED | OUTPATIENT
Start: 2020-05-27 | End: 2020-06-01 | Stop reason: HOSPADM

## 2020-05-26 RX ORDER — AMOXICILLIN 250 MG
1 CAPSULE ORAL
Status: DISCONTINUED | OUTPATIENT
Start: 2020-05-26 | End: 2020-06-01 | Stop reason: HOSPADM

## 2020-05-26 RX ORDER — THERA TABS 400 MCG
1 TAB ORAL DAILY
Status: DISCONTINUED | OUTPATIENT
Start: 2020-05-27 | End: 2020-06-01 | Stop reason: HOSPADM

## 2020-05-26 RX ORDER — POLYETHYLENE GLYCOL 400 AND PROPYLENE GLYCOL 4; 3 MG/ML; MG/ML
2 SOLUTION/ DROPS OPHTHALMIC
COMMUNITY

## 2020-05-26 RX ORDER — FUROSEMIDE 20 MG/1
20 TABLET ORAL DAILY
Status: DISCONTINUED | OUTPATIENT
Start: 2020-05-27 | End: 2020-06-01 | Stop reason: HOSPADM

## 2020-05-26 RX ORDER — LORATADINE 10 MG/1
10 TABLET ORAL DAILY
Status: DISCONTINUED | OUTPATIENT
Start: 2020-05-27 | End: 2020-06-01 | Stop reason: HOSPADM

## 2020-05-26 RX ORDER — ONDANSETRON 2 MG/ML
4 INJECTION INTRAMUSCULAR; INTRAVENOUS
Status: DISCONTINUED | OUTPATIENT
Start: 2020-05-26 | End: 2020-06-01 | Stop reason: HOSPADM

## 2020-05-26 RX ORDER — BUDESONIDE 0.25 MG/2ML
250 INHALANT ORAL
Status: DISCONTINUED | OUTPATIENT
Start: 2020-05-26 | End: 2020-06-01 | Stop reason: HOSPADM

## 2020-05-26 RX ORDER — SODIUM CHLORIDE 9 MG/ML
250 INJECTION, SOLUTION INTRAVENOUS AS NEEDED
Status: DISCONTINUED | OUTPATIENT
Start: 2020-05-26 | End: 2020-05-28 | Stop reason: SDUPTHER

## 2020-05-26 RX ORDER — MELATONIN
4000 DAILY
Status: DISCONTINUED | OUTPATIENT
Start: 2020-05-27 | End: 2020-06-01 | Stop reason: HOSPADM

## 2020-05-26 RX ORDER — IPRATROPIUM BROMIDE AND ALBUTEROL SULFATE 2.5; .5 MG/3ML; MG/3ML
3 SOLUTION RESPIRATORY (INHALATION)
Status: DISCONTINUED | OUTPATIENT
Start: 2020-05-26 | End: 2020-05-30

## 2020-05-26 RX ORDER — ATORVASTATIN CALCIUM 20 MG/1
20 TABLET, FILM COATED ORAL
Status: DISCONTINUED | OUTPATIENT
Start: 2020-05-26 | End: 2020-06-01 | Stop reason: HOSPADM

## 2020-05-26 RX ORDER — BUPROPION HYDROCHLORIDE 150 MG/1
150 TABLET, EXTENDED RELEASE ORAL DAILY
Status: DISCONTINUED | OUTPATIENT
Start: 2020-05-27 | End: 2020-06-01 | Stop reason: HOSPADM

## 2020-05-26 RX ADMIN — CARVEDILOL 3.12 MG: 3.12 TABLET, FILM COATED ORAL at 18:29

## 2020-05-26 RX ADMIN — IPRATROPIUM BROMIDE AND ALBUTEROL SULFATE 3 ML: .5; 3 SOLUTION RESPIRATORY (INHALATION) at 20:28

## 2020-05-26 RX ADMIN — POTASSIUM CHLORIDE 40 MEQ: 750 TABLET, FILM COATED, EXTENDED RELEASE ORAL at 21:44

## 2020-05-26 RX ADMIN — PANTOPRAZOLE SODIUM 40 MG: 40 INJECTION, POWDER, FOR SOLUTION INTRAVENOUS at 18:32

## 2020-05-26 RX ADMIN — SUCRALFATE 1 G: 1 TABLET ORAL at 21:44

## 2020-05-26 RX ADMIN — GLYCOPYRROLATE AND FORMOTEROL FUMARATE 2 PUFF: 9; 4.8 AEROSOL, METERED RESPIRATORY (INHALATION) at 20:29

## 2020-05-26 RX ADMIN — SUCRALFATE 1 G: 1 TABLET ORAL at 18:29

## 2020-05-26 RX ADMIN — PREGABALIN 150 MG: 75 CAPSULE ORAL at 18:29

## 2020-05-26 RX ADMIN — ATORVASTATIN CALCIUM 20 MG: 20 TABLET, FILM COATED ORAL at 21:45

## 2020-05-26 RX ADMIN — BUDESONIDE 250 MCG: 0.25 SUSPENSION RESPIRATORY (INHALATION) at 20:29

## 2020-05-26 RX ADMIN — SODIUM CHLORIDE 500 ML: 900 INJECTION, SOLUTION INTRAVENOUS at 11:28

## 2020-05-26 RX ADMIN — IPRATROPIUM BROMIDE AND ALBUTEROL SULFATE 3 ML: .5; 3 SOLUTION RESPIRATORY (INHALATION) at 17:11

## 2020-05-26 NOTE — PROGRESS NOTES
Pharmacist Admission Medication Reconciliation: 
 
Information obtained from: This medication history was obtained from transfer papers from [facility] dated May 2020. RxQuery data available¹:  YES Summary:  
 
Minimal changes were made to the PTA medication record. Of note, there are several therapeutic duplications with her respiratory medications. It is unclear if they are all being administered, but they are all active orders according to her paperwork. LAMA (tiotropium component - Stiolto) + scheduled albuterol/ipratropium LABA x 2 - olodaterol (component of Stiolto) + formoterol nebules LABA x 2 + scheduled albuterol/ipratropium ICS x 2 - budesonide nebules + fluticasone HFA Active and held inpatient orders were reviewed and no changes are needed. ¹RxQuery pharmacy benefit data reflects medications processed through the patient's insurance, however this data does NOT capture whether the medication is currently being taken by the patient. Allergies:  Pcn [penicillins] Significant PMH/Disease States:  
Past Medical History:  
Diagnosis Date Anemia Arthritis Rheumatoid CHF (congestive heart failure) ([facility] Chronic obstructive pulmonary disease (HCC) Cirrhosis of liver ([facility] ETOH  
 COPD (chronic obstructive pulmonary disease) ([facility] Gastrointestinal disorder \"lacerations in the large part of my small intestine. \"  
 Gastrointestinal disorder   
 liver cirrhosis Heart failure ([facility] Hyperlipidemia 4/1/2020 Internal bleeding Lung cancer ([facility] 4/1/2020 Psychiatric disorder   
 depression, anxiety Tachycardia 1/27/2016 Chief Complaint for this Admission: Chief Complaint Patient presents with Rectal Bleeding Prior to Admission Medications:  
Prior to Admission Medications Prescriptions Last Dose Informant Patient Reported? Taking? ARIPiprazole (ABILIFY) 10 mg tablet   Yes Yes Sig: Take 10 mg by mouth daily. DULoxetine (CYMBALTA) 60 mg capsule   Yes Yes Sig: Take 60 mg by mouth daily. acetaminophen (TYLENOL) 500 mg tablet   Yes Yes Sig: Take 500 mg by mouth every eight (8) hours as needed for Pain. albuterol-ipratropium (DUO-NEB) 2.5 mg-0.5 mg/3 ml nebu   Yes Yes Sig: 3 mL by Nebulization route four (4) times daily. atorvastatin (LIPITOR) 20 mg tablet   Yes Yes Sig: Take 20 mg by mouth nightly. benzonatate (TESSALON) 100 mg capsule   Yes Yes Sig: Take 100 mg by mouth three (3) times daily as needed for Cough. buPROPion XL (WELLBUTRIN XL) 150 mg tablet   Yes Yes Sig: Take 150 mg by mouth every morning. budesonide (PULMICORT) 0.5 mg/2 mL nbsp   Yes Yes Si mcg by Nebulization route two (2) times a day. (with formoterol)  
calcium carbonate (Tums E-X) 300 mg (750 mg) chewable tablet   Yes Yes Sig: Take 1-2 Tabs by mouth daily as needed for Indigestion (Gas). carvedilol (COREG) 3.125 mg tablet   Yes Yes Sig: Take 3.125 mg by mouth two (2) times daily (with meals). cholecalciferol, vitamin D3, (VITAMIN D3) 2,000 unit tab   Yes Yes Sig: Take 4,000 Units by mouth daily. cyanocobalamin (Vitamin B-12) 250 mcg tablet   Yes Yes Sig: Take 250 mcg by mouth daily. ferrous sulfate 325 mg (65 mg iron) tablet   Yes Yes Sig: Take 325 mg by mouth daily. fluticasone propionate (Flovent HFA) 44 mcg/actuation inhaler   Yes Yes Sig: Take 2 Puffs by inhalation two (2) times a day. formoterol (PERFOROMIST) 20 mcg/2 mL nebu neb solution   Yes Yes Si mcg by Nebulization route two (2) times a day. (with budesonide)  
furosemide (LASIX) 20 mg tablet   Yes Yes Sig: Take 20 mg by mouth daily. guaiFENesin (ROBITUSSIN) 100 mg/5 mL liquid   Yes Yes Sig: Take 200 mg by mouth every four (4) hours as needed for Cough. loratadine (CLARITIN) 10 mg tablet   Yes Yes Sig: Take 10 mg by mouth daily. multivitamin,tx-iron-ca-min (Thera-M) 27-0.4 mg tab   Yes Yes Sig: Take 1 Tab by mouth daily. ondansetron hcl (ZOFRAN) 4 mg tablet   Yes Yes Sig: Take 4 mg by mouth two (2) times daily as needed for Nausea. oxyCODONE-acetaminophen (PERCOCET 10)  mg per tablet   Yes Yes Sig: Take 1 Tab by mouth every six (6) hours as needed for Pain.  
pantoprazole (PROTONIX) 40 mg tablet   No Yes Sig: Take 1 Tab by mouth daily. peg 400-propylene glycol (Systane, propylene glycol,) 0.4-0.3 % drop   Yes Yes Sig: Administer 2 Drops to both eyes every six (6) hours as needed. pregabalin (LYRICA) 150 mg capsule   Yes Yes Sig: Take 150 mg by mouth two (2) times a day. promethazine (PHENERGAN) 12.5 mg tablet   Yes Yes Sig: Take 12.5 mg by mouth every six (6) hours as needed (Vomiting). propylene glycol (Systane Complete) 0.6 % drop   Yes Yes Sig: Administer 1 Drop to both eyes two (2) times a day. senna (Senna) 8.6 mg tablet   Yes Yes Sig: Take 1 Tab by mouth daily as needed for Constipation. sucralfate (CARAFATE) 1 gram tablet   Yes Yes Sig: Take 1 g by mouth Before breakfast, lunch, dinner and at bedtime. tiotropium-olodateroL (Stiolto Respimat) 2.5-2.5 mcg/actuation inhaler   Yes Yes Sig: Take 2 Puffs by inhalation daily. vit A,C,E-zinc-copper (PreserVision AREDS) cap capsule   Yes Yes Sig: Take 1 Tab by mouth two (2) times a day. Facility-Administered Medications: None Thank you for allowing me to participate in this patient's care. Please call  or  with any questions. Na Barnes, Pharm. D., BCPS, BCPPS

## 2020-05-26 NOTE — ED TRIAGE NOTES
Patient presents from [facility] via EMS with complaints of rectal bleeding for the last week. Patient reports it is darker red. Patient has had this same issue a few months ago and had a blood transfusion. Patient reports feeling weak. Per EMS patient is in A-Fib without a history of this issue. Patient is on 2 L NC at baseline

## 2020-05-26 NOTE — PROGRESS NOTES
Hospitalist consult was requested on this patient Patient's PCP is Dr. Mar Tapia Informed Dr. David Taylor to notify Dr. Mar Tapia for admission Hospitalist consult will be canceled Plan of care per Dr. David Taylor

## 2020-05-26 NOTE — PROGRESS NOTES
Transition of Care Plan: 1. TBD/subject to change pending recommendations. 
 -Anticipate back to [facility] when medically stable CM will continue to follow and assist with ROXY needs as they arise. Reason for Admission:   Syncope, unspecified syncope type RUR Score:    29 PCP: YES First and Last name: Faiza Vargas .[phone] Name of Practice: [facility] and [place] Are you a current patient: Yes/No: YES Approximate date of last visit: 4/14/20 Resources/supports as identified by patient/family:   Family/ SHANNON/ CAREMORE Patient Top Challenges facing patient (as identified by patient/family and CM):  Health Challenges Finances/Medication cost?     Receives SSI as source of income with no significant financial stressors or concerns. Insurance verified: [facility] Medicare/ Medicaid of [place]. [facility] is utilized for prescriptions. Transportation? Medicaid Transport [employer] [phone] Support system or lack thereof? Daughter: Kim Fraire [phone]/ Friend: Lisa Marie [phone] Living arrangements? [facility] [phone] Self-care/ADLs/Cognition? AOx4, independent with ADL's and IADL'sHome O2 ([employer]), Rollator, and Shower Chair. Current Advanced Directive/Advance Care Plan:  No advance directive on file. Patient does report that she has one completed at home Plan for utilizing home health:    Not at this time. TBD/subject to change pending recommendations. Care Management Interventions PCP Verified by CM: Yes Last Visit to PCP: 04/14/20 Palliative Care Criteria Met (RRAT>21 & CHF Dx)?: No 
Mode of Transport at Discharge: Other (see comment)(Medicaid Transport) Transition of Care Consult (CM Consult): (No current CM consult or need) Discharge Durable Medical Equipment: No 
Physical Therapy Consult: No 
Occupational Therapy Consult: No 
Speech Therapy Consult: No 
Current Support Network: Assisted Living([place]) Confirm Follow Up Transport: Other (see comment)(Medicaid Transport) Discharge Location Discharge Placement: Assisted Living([place]/ TBD or subject to change pending recommendations) Care Management Interventions PCP Verified by CM: Yes Last Visit to PCP: 04/14/20 Palliative Care Criteria Met (RRAT>21 & CHF Dx)?: No 
Mode of Transport at Discharge: Other (see comment)(Medicaid Transport) Transition of Care Consult (CM Consult): (No current CM consult or need) Discharge Durable Medical Equipment: No 
Physical Therapy Consult: No 
Occupational Therapy Consult: No 
Speech Therapy Consult: No 
Current Support Network: Assisted Living([place]) Confirm Follow Up Transport: Other (see comment)(Medicaid Transport) Discharge Location Discharge Placement: Assisted Living([place]/ TBD or subject to change pending recommendations) KAUR Wyatt/WOLF Care Management 3:09 PM

## 2020-05-26 NOTE — ED PROVIDER NOTES
HPI .  Patient has COPD, lung cancer and cirrhosis. Patient quit drinking several years ago. She still smokes some. 1/3 packs/day. She finished radiation therapy for her lung cancer recently and the oncologist or radiation oncologist told her that the tumor was smaller and looked better. Patient lives in assisted living. Her memory is good and she remembers 3 out of 3 words after several minutes. She goes to the dining jones with a Rollator and does not need assistance or a wheelchair very often. She said she had a similar bleed in the past and she says she had small intestine AVMs. She is not aware of having esophageal varices in the past.  Several days ago she started having black stool. She fainted. She says she is not on anticoagulants or aspirin. She seems to be bleeding less now and her ACP at the assisted living told her to try to wait it out due to her myriad of significant health issues. Patient was still feeling weak and fainted today so she comes to the ER. She is not complaining of abdominal pain. Past Medical History:  
Diagnosis Date  Anemia  Arthritis Rheumatoid  CHF (congestive heart failure) ([address]  Chronic obstructive pulmonary disease ([address]  Cirrhosis of liver ([address] ETOH  
 COPD (chronic obstructive pulmonary disease) ([address]  Gastrointestinal disorder \"lacerations in the large part of my small intestine. \"  Gastrointestinal disorder   
 liver cirrhosis  Heart failure ([address]  Hyperlipidemia 2020  Internal bleeding  Lung cancer ([address] 2020  Psychiatric disorder   
 depression, anxiety  Tachycardia 2016 Past Surgical History:  
Procedure Laterality Date  HX CHOLECYSTECTOMY  HX GI Cholecystectomoy  HX GYN    
  x 2.  VA ENDOSCOPY UPPER SMALL INTESTINE  2016 Family History:  
Problem Relation Age of Onset  Cancer Mother   
     pancreatic  Alcohol abuse Father  Cancer Sister Lung cancer (smoker). Social History Socioeconomic History  Marital status:  Spouse name: Not on file  Number of children: Not on file  Years of education: Not on file  Highest education level: Not on file Occupational History  Not on file Social Needs  Financial resource strain: Not on file  Food insecurity Worry: Not on file Inability: Not on file  Transportation needs Medical: Not on file Non-medical: Not on file Tobacco Use  Smoking status: Current Every Day Smoker Packs/day: 0.75 Years: 45.00 Pack years: 33.75  Smokeless tobacco: Never Used Substance and Sexual Activity  Alcohol use: No  
  Comment: in the past was an alcoholic. Has been sober  since about 2010  Drug use: No  
 Sexual activity: Yes  
  Partners: Male Lifestyle  Physical activity Days per week: Not on file Minutes per session: Not on file  Stress: Not on file Relationships  Social connections Talks on phone: Not on file Gets together: Not on file Attends Faith service: Not on file Active member of club or organization: Not on file Attends meetings of clubs or organizations: Not on file Relationship status: Not on file  Intimate partner violence Fear of current or ex partner: Not on file Emotionally abused: Not on file Physically abused: Not on file Forced sexual activity: Not on file Other Topics Concern  Not on file Social History Narrative  Not on file ALLERGIES: Pcn [penicillins] Review of Systems All other systems reviewed and are negative. Vitals:  
 05/26/20 1039 05/26/20 1100 BP: 108/77 Pulse: (!) 102 Resp: 15 Temp: 98.5 °F (36.9 °C) SpO2: 100% 100% Physical Exam 
Vitals signs and nursing note reviewed. Constitutional:   
   Appearance: She is well-developed. Comments: Thin; appears older than stated age HENT:  
   Head: Normocephalic and atraumatic. Eyes:  
   Pupils: Pupils are equal, round, and reactive to light. Neck: Musculoskeletal: Normal range of motion and neck supple. Cardiovascular:  
   Rate and Rhythm: Normal rate and regular rhythm. Heart sounds: Normal heart sounds. No murmur. No friction rub. No gallop. Pulmonary:  
   Effort: Pulmonary effort is normal. No respiratory distress. Breath sounds: No wheezing or rales. Abdominal:  
   Palpations: Abdomen is soft. Tenderness: There is no abdominal tenderness. There is no rebound. Genitourinary: 
   Comments: Scant dark stool/rectal exam normal otherwise Musculoskeletal: Normal range of motion. General: No tenderness. Skin: 
   Findings: No erythema. Neurological:  
   Mental Status: She is alert. Cranial Nerves: No cranial nerve deficit. Comments: Motor; symmetric Psychiatric:     
   Behavior: Behavior normal.  
 
  
 
MDM Procedures ED EKG interpretation: 
Rhythm: atrial fib; and irregular. Rate (approx.): 100; Axis: normal; P wave: ; QRS interval: prolonged; ST/T wave: non-specific changes; in  Lead: ; Other findings: . This EKG was interpreted by Shasha Dior MD,ED Provider. 11:15 AM 
 
 
  
Hospitalist Otto Text for Admission 1:39 PM 
 
ED Room Number: ER05/05 Patient Name and age: Vaughn Welch 63 y.o.  female Working Diagnosis: 1. Syncope, unspecified syncope type 2. Upper GI bleed Readmission: no 
Isolation Requirements:  no 
Recommended Level of Care:  telemetry Code Status:  FULL Other:   
Department:[facility] Adult ED - [phone]

## 2020-05-26 NOTE — PROGRESS NOTES
TRANSFER - IN REPORT: 
 
Verbal report received from Maddison(name) on [address]  being received from ED(unit) for routine progression of care Report consisted of patients Situation, Background, Assessment and  
Recommendations(SBAR). Information from the following report(s) SBAR, Kardex, ED Summary, [facility] and Recent Results was reviewed with the receiving nurse. Opportunity for questions and clarification was provided. Assessment completed upon patients arrival to unit and care assumed.

## 2020-05-26 NOTE — ROUTINE PROCESS
TRANSFER - OUT REPORT: 
 
Verbal report given to Karly Pichardo RN(name) on Javad Cruz  being transferred to (unit) for routine progression of care Report consisted of patients Situation, Background, Assessment and  
Recommendations(SBAR). Information from the following report(s) SBAR, ED Summary, [facility] and Recent Results was reviewed with the receiving nurse. Lines:  
Peripheral IV 05/26/20 Left Antecubital (Active) Site Assessment Clean, dry, & intact 5/26/2020 10:38 AM  
Phlebitis Assessment 0 5/26/2020 10:38 AM  
Infiltration Assessment 0 5/26/2020 10:38 AM  
Dressing Status Clean, dry, & intact 5/26/2020 10:38 AM  
   
Peripheral IV 05/26/20 Right Hand (Active) Site Assessment Clean, dry, & intact 5/26/2020 10:59 AM  
Phlebitis Assessment 0 5/26/2020 10:59 AM  
Infiltration Assessment 0 5/26/2020 10:59 AM  
Dressing Status Clean, dry, & intact 5/26/2020 10:59 AM  
  
 
Opportunity for questions and clarification was provided. Patient transported with: 
 Lily Zendejas

## 2020-05-26 NOTE — H&P
History and Physical 
 
Subjective: Trey Easley is a 63 y.o. white female with a h/o recent admission for slow GI bleed requiring transfusion. An EGD was done during that admission, and there was a jourdan ulcer without obvious bleeding. Hgb had stabilized in the 7's with PPI. The plan was to consider a M2A capsule study if this recurred. Pt presented to the ER today with melena & generally feeling poorly. In the ER, she was hemoccult positive, and her Hgb was 6.8 A transfusion was started, and she is being admitted for inpatient mgmt. Of note, she also has lung cancer for which she has been getting XRT by Dr. Sujatha Arauz. Past Medical History:  
Diagnosis Date  Anemia  Arthritis Rheumatoid  CHF (congestive heart failure) [facility]  Chronic obstructive pulmonary disease ([facility]  Cirrhosis of liver ([facility] [facility] ETOH  
 COPD (chronic obstructive pulmonary disease) ([facility] [facility]  Gastrointestinal disorder \"lacerations in the large part of my small intestine. \"  Gastrointestinal disorder   
 liver cirrhosis  Heart failure ([facility] [address] [facility]  Hyperlipidemia 4/1/2020  Internal bleeding  Lung cancer ([facility] [address] [facility] 4/1/2020  Psychiatric disorder   
 depression, anxiety  Tachycardia 1/27/2016 Allergies Allergen Reactions  Pcn [Penicillins] Angioedema Childhood reaction Prior to Admission medications Medication Sig Start Date End Date Taking? Authorizing Provider  
multivitamin,tx-iron-ca-min (Thera-M) 27-0.4 mg tab Take 1 Tab by mouth daily. Yes Provider, Historical  
formoterol (PERFOROMIST) 20 mcg/2 mL nebu neb solution 20 mcg by Nebulization route two (2) times a day. (with budesonide)   Yes Provider, Historical  
budesonide (PULMICORT) 0.5 mg/2 mL nbsp 500 mcg by Nebulization route two (2) times a day.  (with formoterol)   Yes Provider, Historical  
oxyCODONE-acetaminophen (PERCOCET 10)  mg per tablet Take 1 Tab by mouth every six (6) hours as needed for Pain. Yes Provider, Historical  
benzonatate (TESSALON) 100 mg capsule Take 100 mg by mouth three (3) times daily as needed for Cough. Yes Provider, Historical  
senna (Senna) 8.6 mg tablet Take 1 Tab by mouth daily as needed for Constipation. Yes Provider, Historical  
peg 400-propylene glycol (Systane, propylene glycol,) 0.4-0.3 % drop Administer 2 Drops to both eyes every six (6) hours as needed. Yes Provider, Historical  
pantoprazole (PROTONIX) 40 mg tablet Take 1 Tab by mouth daily. 5/9/20  Yes Radha Shelton MD  
ferrous sulfate 325 mg (65 mg iron) tablet Take 325 mg by mouth daily. Yes Provider, Historical  
calcium carbonate (Tums E-X) 300 mg (750 mg) chewable tablet Take 1-2 Tabs by mouth daily as needed for Indigestion (Gas). Yes Provider, Historical  
propylene glycol (Systane Complete) 0.6 % drop Administer 1 Drop to both eyes two (2) times a day. Yes Provider, Historical  
vit A,C,E-zinc-copper (PreserVision AREDS) cap capsule Take 1 Tab by mouth two (2) times a day. Yes Provider, Historical  
acetaminophen (TYLENOL) 500 mg tablet Take 500 mg by mouth every eight (8) hours as needed for Pain. Yes Provider, Historical  
guaiFENesin (ROBITUSSIN) 100 mg/5 mL liquid Take 200 mg by mouth every four (4) hours as needed for Cough. Yes Provider, Historical  
ondansetron hcl (ZOFRAN) 4 mg tablet Take 4 mg by mouth two (2) times daily as needed for Nausea. Yes Provider, Historical  
promethazine (PHENERGAN) 12.5 mg tablet Take 12.5 mg by mouth every six (6) hours as needed (Vomiting). Yes Provider, Historical  
albuterol-ipratropium (DUO-NEB) 2.5 mg-0.5 mg/3 ml nebu 3 mL by Nebulization route four (4) times daily. Yes Provider, Historical  
tiotropium-olodateroL (Stiolto Respimat) 2.5-2.5 mcg/actuation inhaler Take 2 Puffs by inhalation daily.    Yes Provider, Historical  
 ARIPiprazole (ABILIFY) 10 mg tablet Take 10 mg by mouth daily. Yes Provider, Historical  
cyanocobalamin (Vitamin B-12) 250 mcg tablet Take 250 mcg by mouth daily. Yes Provider, Historical  
pregabalin (LYRICA) 150 mg capsule Take 150 mg by mouth two (2) times a day. Yes Provider, Historical  
DULoxetine (CYMBALTA) 60 mg capsule Take 60 mg by mouth daily. Yes Provider, Historical  
atorvastatin (LIPITOR) 20 mg tablet Take 20 mg by mouth nightly. Yes Provider, Historical  
sucralfate (CARAFATE) 1 gram tablet Take 1 g by mouth Before breakfast, lunch, dinner and at bedtime. Yes Provider, Historical  
buPROPion XL (WELLBUTRIN XL) 150 mg tablet Take 150 mg by mouth every morning. Yes Provider, Historical  
fluticasone propionate (Flovent HFA) 44 mcg/actuation inhaler Take 2 Puffs by inhalation two (2) times a day. Yes Provider, Historical  
furosemide (LASIX) 20 mg tablet Take 20 mg by mouth daily. Yes Provider, Historical  
carvedilol (COREG) 3.125 mg tablet Take 3.125 mg by mouth two (2) times daily (with meals). Yes Provider, Historical  
loratadine (CLARITIN) 10 mg tablet Take 10 mg by mouth daily. Yes Provider, Historical  
cholecalciferol, vitamin D3, (VITAMIN D3) 2,000 unit tab Take 4,000 Units by mouth daily. Yes Provider, Historical  
 
Social History Tobacco Use  Smoking status: Current Every Day Smoker Packs/day: 0.75 Years: 45.00 Pack years: 33.75  Smokeless tobacco: Never Used Substance Use Topics  Alcohol use: No  
  Comment: in the past was an alcoholic. Has been sober  since about 2010 Family History Problem Relation Age of Onset  Cancer Mother   
     pancreatic  Alcohol abuse Father  Cancer Sister Lung cancer (smoker). Review of Systems: As above. Objective:  
 
 
Physical Exam: In NAD. A&O. HEENT -- Pupils round. Neck -- Supple. No JVD. Heart -- RRR. No R/M/G. Lungs -- CTA. Abdomen -- Soft. Non-tender. Non-distended. No masses. Benign. Extremeties -- No significant LE edema b/l. Data Review:  
Recent Results (from the past 24 hour(s)) EKG, 12 LEAD, INITIAL Collection Time: 05/26/20 10:48 AM  
Result Value Ref Range Ventricular Rate 100 BPM  
 Atrial Rate 93 BPM  
 QRS Duration 110 ms  
 Q-T Interval 334 ms QTC Calculation (Bezet) 430 ms Calculated R Axis 74 degrees Calculated T Axis 51 degrees Diagnosis Atrial fibrillation with premature ventricular or aberrantly conducted  
complexes When compared with ECG of 07-APR-2020 08:11, 
Atrial fibrillation has replaced Sinus rhythm CBC WITH AUTOMATED DIFF Collection Time: 05/26/20 10:49 AM  
Result Value Ref Range WBC 3.4 (L) 3.6 - 11.0 K/uL  
 RBC 2.49 (L) 3.80 - 5.20 M/uL HGB 6.8 (L) 11.5 - 16.0 g/dL HCT 25.2 (L) 35.0 - 47.0 % .2 (H) 80.0 - 99.0 FL  
 MCH 27.3 26.0 - 34.0 PG  
 MCHC 27.0 (L) 30.0 - 36.5 g/dL  
 RDW 17.8 (H) 11.5 - 14.5 % PLATELET 75 (L) 930 - 400 K/uL NRBC 0.0 0  WBC ABSOLUTE NRBC 0.00 0.00 - 0.01 K/uL NEUTROPHILS 66 32 - 75 % LYMPHOCYTES 14 12 - 49 % MONOCYTES 14 (H) 5 - 13 % EOSINOPHILS 4 0 - 7 % BASOPHILS 1 0 - 1 % IMMATURE GRANULOCYTES 1 (H) 0.0 - 0.5 % ABS. NEUTROPHILS 2.3 1.8 - 8.0 K/UL  
 ABS. LYMPHOCYTES 0.5 (L) 0.8 - 3.5 K/UL  
 ABS. MONOCYTES 0.5 0.0 - 1.0 K/UL  
 ABS. EOSINOPHILS 0.1 0.0 - 0.4 K/UL  
 ABS. BASOPHILS 0.0 0.0 - 0.1 K/UL  
 ABS. IMM. GRANS. 0.0 0.00 - 0.04 K/UL  
 DF SMEAR SCANNED    
 RBC COMMENTS ANISOCYTOSIS 1+ 
    
 RBC COMMENTS MACROCYTOSIS 1+ 
    
 RBC COMMENTS HYPOCHROMIA 1+ 
    
 RBC COMMENTS POLYCHROMASIA PRESENT 
    
METABOLIC PANEL, COMPREHENSIVE Collection Time: 05/26/20 10:49 AM  
Result Value Ref Range Sodium 138 136 - 145 mmol/L Potassium 3.2 (L) 3.5 - 5.1 mmol/L Chloride 105 97 - 108 mmol/L  
 CO2 30 21 - 32 mmol/L  Anion gap 3 (L) 5 - 15 mmol/L  
 Glucose 64 (L) 65 - 100 mg/dL BUN 19 6 - 20 MG/DL Creatinine 0.91 0.55 - 1.02 MG/DL  
 BUN/Creatinine ratio 21 (H) 12 - 20 GFR est AA >60 >60 ml/min/1.73m2 GFR est non-AA >60 >60 ml/min/1.73m2 Calcium 9.2 8.5 - 10.1 MG/DL Bilirubin, total 0.2 0.2 - 1.0 MG/DL  
 ALT (SGPT) 28 12 - 78 U/L  
 AST (SGOT) 36 15 - 37 U/L Alk. phosphatase 110 45 - 117 U/L Protein, total 7.0 6.4 - 8.2 g/dL Albumin 3.3 (L) 3.5 - 5.0 g/dL Globulin 3.7 2.0 - 4.0 g/dL A-G Ratio 0.9 (L) 1.1 - 2.2 SAMPLES BEING HELD Collection Time: 05/26/20 10:49 AM  
Result Value Ref Range SAMPLES BEING HELD 1RED,1BLU,1UC   
 COMMENT Add-on orders for these samples will be processed based on acceptable specimen integrity and analyte stability, which may vary by analyte. URINALYSIS W/MICROSCOPIC Collection Time: 05/26/20 10:49 AM  
Result Value Ref Range Color YELLOW/STRAW Appearance CLEAR CLEAR Specific gravity 1.006 1.003 - 1.030    
 pH (UA) 7.0 5.0 - 8.0 Protein Negative NEG mg/dL Glucose Negative NEG mg/dL Ketone Negative NEG mg/dL Bilirubin Negative NEG Blood Negative NEG Urobilinogen 0.2 0.2 - 1.0 EU/dL Nitrites Negative NEG Leukocyte Esterase Negative NEG    
 WBC 0-4 0 - 4 /hpf  
 RBC 0-5 0 - 5 /hpf Epithelial cells FEW FEW /lpf Bacteria Negative NEG /hpf Hyaline cast 0-2 0 - 5 /lpf URINE CULTURE HOLD SAMPLE Collection Time: 05/26/20 10:49 AM  
Result Value Ref Range Urine culture hold Urine on hold in Microbiology dept for 2 days. If unpreserved urine is submitted, it cannot be used for addtional testing after 24 hours, recollection will be required. PROTHROMBIN TIME + INR Collection Time: 05/26/20 10:49 AM  
Result Value Ref Range INR 1.0 0.9 - 1.1 Prothrombin time 10.7 9.0 - 11.1 sec PTT Collection Time: 05/26/20 10:49 AM  
Result Value Ref Range aPTT 22.0 (L) 22.1 - 32.0 sec aPTT, therapeutic range     58.0 - 77.0 SECS  
TYPE & SCREEN Collection Time: 05/26/20 10:49 AM  
Result Value Ref Range Crossmatch Expiration 05/29/2020 ABO/Rh(D) O POSITIVE Antibody screen NEG Comment Previously identified anti K and nonspecific antibody Unit number [id number] Blood component type [facility] Unit division 00 Status of unit ALLOCATED ANTIGEN/ANTIBODY INFO JEFF NEGATIVE, Crossmatch result Compatible Unit number [id number] Blood component type [facility] Unit division 00 Status of unit ISSUED ANTIGEN/ANTIBODY INFO JEFF NEGATIVE, Crossmatch result Compatible OCCULT BLOOD, STOOL Collection Time: 05/26/20 11:12 AM  
Result Value Ref Range Occult blood, stool Positive (A) NEG Assessment:  
 
Principal Problem: 
  GI bleed, probably upper, slow. Active Problems: 
  Cirrhosis of liver ([facility] [address] (8/30/2016) Chronic respiratory failure with hypoxia ([facility] [address] (8/30/2016) Acute blood loss anemia (10/25/2016) Lung cancer ([facility] [address] (4/1/2020) Hypokalemia (5/26/2020) Plan: 1. Transfusion already ordered. 2.  GI to see in am -- ? M2A capsule vs repeat EGD? I have made pt NPO after MN in case EGD necessary. 3.  Replete K+. 4.  Protonix 40 mg IV BID. 5.  Continue other home medications as reviewed. 6.  She requests FULL CODE at this point. Signed By: Porfirio Stafford MD   
 May 26, 2020

## 2020-05-27 PROBLEM — D69.6 THROMBOCYTOPENIA (HCC): Status: ACTIVE | Noted: 2020-05-27

## 2020-05-27 LAB
ANION GAP SERPL CALC-SCNC: 4 MMOL/L (ref 5–15)
BASOPHILS # BLD: 0 K/UL (ref 0–0.1)
BASOPHILS NFR BLD: 0 % (ref 0–1)
BUN SERPL-MCNC: 19 MG/DL (ref 6–20)
BUN/CREAT SERPL: 26 (ref 12–20)
CALCIUM SERPL-MCNC: 8.1 MG/DL (ref 8.5–10.1)
CHLORIDE SERPL-SCNC: 109 MMOL/L (ref 97–108)
CO2 SERPL-SCNC: 30 MMOL/L (ref 21–32)
CREAT SERPL-MCNC: 0.72 MG/DL (ref 0.55–1.02)
DIFFERENTIAL METHOD BLD: ABNORMAL
EOSINOPHIL # BLD: 0.1 K/UL (ref 0–0.4)
EOSINOPHIL NFR BLD: 4 % (ref 0–7)
ERYTHROCYTE [DISTWIDTH] IN BLOOD BY AUTOMATED COUNT: 17.4 % (ref 11.5–14.5)
GLUCOSE SERPL-MCNC: 86 MG/DL (ref 65–100)
HCT VFR BLD AUTO: 24.2 % (ref 35–47)
HCT VFR BLD AUTO: 30.1 % (ref 35–47)
HGB BLD-MCNC: 6.8 G/DL (ref 11.5–16)
HGB BLD-MCNC: 8.9 G/DL (ref 11.5–16)
IMM GRANULOCYTES # BLD AUTO: 0 K/UL (ref 0–0.04)
IMM GRANULOCYTES NFR BLD AUTO: 0 % (ref 0–0.5)
LYMPHOCYTES # BLD: 0.4 K/UL (ref 0.8–3.5)
LYMPHOCYTES NFR BLD: 14 % (ref 12–49)
MCH RBC QN AUTO: 27.9 PG (ref 26–34)
MCHC RBC AUTO-ENTMCNC: 28.1 G/DL (ref 30–36.5)
MCV RBC AUTO: 99.2 FL (ref 80–99)
MONOCYTES # BLD: 0.3 K/UL (ref 0–1)
MONOCYTES NFR BLD: 13 % (ref 5–13)
NEUTS SEG # BLD: 1.8 K/UL (ref 1.8–8)
NEUTS SEG NFR BLD: 69 % (ref 32–75)
NRBC # BLD: 0 K/UL (ref 0–0.01)
NRBC BLD-RTO: 0 PER 100 WBC
PLATELET # BLD AUTO: 62 K/UL (ref 150–400)
PMV BLD AUTO: 12.3 FL (ref 8.9–12.9)
POTASSIUM SERPL-SCNC: 4.3 MMOL/L (ref 3.5–5.1)
RBC # BLD AUTO: 2.44 M/UL (ref 3.8–5.2)
RBC MORPH BLD: ABNORMAL
SODIUM SERPL-SCNC: 143 MMOL/L (ref 136–145)
WBC # BLD AUTO: 2.6 K/UL (ref 3.6–11)

## 2020-05-27 PROCEDURE — 77010033678 HC OXYGEN DAILY

## 2020-05-27 PROCEDURE — 74011250637 HC RX REV CODE- 250/637: Performed by: INTERNAL MEDICINE

## 2020-05-27 PROCEDURE — 74011250636 HC RX REV CODE- 250/636: Performed by: INTERNAL MEDICINE

## 2020-05-27 PROCEDURE — 77030018766 HC KT ENDOSC IMAG GVIM -F: Performed by: INTERNAL MEDICINE

## 2020-05-27 PROCEDURE — P9016 RBC LEUKOCYTES REDUCED: HCPCS

## 2020-05-27 PROCEDURE — 80048 BASIC METABOLIC PNL TOTAL CA: CPT

## 2020-05-27 PROCEDURE — 36430 TRANSFUSION BLD/BLD COMPNT: CPT

## 2020-05-27 PROCEDURE — 94640 AIRWAY INHALATION TREATMENT: CPT

## 2020-05-27 PROCEDURE — 65270000029 HC RM PRIVATE

## 2020-05-27 PROCEDURE — 94760 N-INVAS EAR/PLS OXIMETRY 1: CPT

## 2020-05-27 PROCEDURE — 85025 COMPLETE CBC W/AUTO DIFF WBC: CPT

## 2020-05-27 PROCEDURE — 97161 PT EVAL LOW COMPLEX 20 MIN: CPT

## 2020-05-27 PROCEDURE — 36415 COLL VENOUS BLD VENIPUNCTURE: CPT

## 2020-05-27 PROCEDURE — C9113 INJ PANTOPRAZOLE SODIUM, VIA: HCPCS | Performed by: INTERNAL MEDICINE

## 2020-05-27 PROCEDURE — 85018 HEMOGLOBIN: CPT

## 2020-05-27 PROCEDURE — 74011000250 HC RX REV CODE- 250: Performed by: PHYSICIAN ASSISTANT

## 2020-05-27 PROCEDURE — 74011000250 HC RX REV CODE- 250: Performed by: INTERNAL MEDICINE

## 2020-05-27 PROCEDURE — 76040000019: Performed by: INTERNAL MEDICINE

## 2020-05-27 RX ORDER — DEXTROMETHORPHAN/PSEUDOEPHED 2.5-7.5/.8
20 DROPS ORAL
Status: COMPLETED | OUTPATIENT
Start: 2020-05-27 | End: 2020-05-27

## 2020-05-27 RX ORDER — SODIUM CHLORIDE 9 MG/ML
250 INJECTION, SOLUTION INTRAVENOUS AS NEEDED
Status: DISCONTINUED | OUTPATIENT
Start: 2020-05-27 | End: 2020-05-28 | Stop reason: SDUPTHER

## 2020-05-27 RX ORDER — SODIUM CHLORIDE 9 MG/ML
250 INJECTION, SOLUTION INTRAVENOUS AS NEEDED
Status: DISCONTINUED | OUTPATIENT
Start: 2020-05-27 | End: 2020-06-01 | Stop reason: HOSPADM

## 2020-05-27 RX ADMIN — SIMETHICONE 20 MG: 20 SUSPENSION/ DROPS ORAL at 16:35

## 2020-05-27 RX ADMIN — SODIUM CHLORIDE 40 MG: 9 INJECTION INTRAMUSCULAR; INTRAVENOUS; SUBCUTANEOUS at 18:54

## 2020-05-27 RX ADMIN — GLYCOPYRROLATE AND FORMOTEROL FUMARATE 2 PUFF: 9; 4.8 AEROSOL, METERED RESPIRATORY (INHALATION) at 11:53

## 2020-05-27 RX ADMIN — ATORVASTATIN CALCIUM 20 MG: 20 TABLET, FILM COATED ORAL at 21:18

## 2020-05-27 RX ADMIN — PREGABALIN 150 MG: 75 CAPSULE ORAL at 21:17

## 2020-05-27 RX ADMIN — SUCRALFATE 1 G: 1 TABLET ORAL at 21:16

## 2020-05-27 RX ADMIN — SODIUM CHLORIDE 40 MG: 9 INJECTION INTRAMUSCULAR; INTRAVENOUS; SUBCUTANEOUS at 08:37

## 2020-05-27 RX ADMIN — POLYETHYLENE GLYCOL-3350 AND ELECTROLYTES 1000 ML: 236; 6.74; 5.86; 2.97; 22.74 POWDER, FOR SOLUTION ORAL at 11:33

## 2020-05-27 RX ADMIN — ALBUTEROL SULFATE 2.5 MG: 2.5 SOLUTION RESPIRATORY (INHALATION) at 04:00

## 2020-05-27 RX ADMIN — GLYCOPYRROLATE AND FORMOTEROL FUMARATE 2 PUFF: 9; 4.8 AEROSOL, METERED RESPIRATORY (INHALATION) at 21:09

## 2020-05-27 RX ADMIN — IPRATROPIUM BROMIDE AND ALBUTEROL SULFATE 3 ML: .5; 3 SOLUTION RESPIRATORY (INHALATION) at 14:47

## 2020-05-27 RX ADMIN — OXYCODONE HYDROCHLORIDE AND ACETAMINOPHEN 1 TABLET: 7.5; 325 TABLET ORAL at 21:26

## 2020-05-27 RX ADMIN — IPRATROPIUM BROMIDE AND ALBUTEROL SULFATE 3 ML: .5; 3 SOLUTION RESPIRATORY (INHALATION) at 08:37

## 2020-05-27 RX ADMIN — Medication 2 DROP: at 10:43

## 2020-05-27 RX ADMIN — IPRATROPIUM BROMIDE AND ALBUTEROL SULFATE 3 ML: .5; 3 SOLUTION RESPIRATORY (INHALATION) at 21:10

## 2020-05-27 RX ADMIN — CARVEDILOL 3.12 MG: 3.12 TABLET, FILM COATED ORAL at 21:16

## 2020-05-27 NOTE — PROGRESS NOTES
PHYSICAL THERAPY EVALUATION/DISCHARGE Patient: José Miguel Gutierrez (51 y.o. female) Date: 2020 Primary Diagnosis: GI bleed [K92.2] Precautions:     
 
 
ASSESSMENT Based on the objective data described below, the patient presents with baseline mobility demonstrating independent bed mobility, transfers, and gait with rollator. She does not have any skilled PT needs. Safe to walk in halls but needs oxygen tank therefore assist of  One for tank manaagement. Functional Outcome Measure: The patient scored 90/100 on the Barthel outcome measure which is indicative of 10% limitation in mobility/self care. Other factors to consider for discharge: Further skilled acute physical therapy is not indicated at this time. PLAN : 
Recommendation for discharge: (in order for the patient to meet his/her long term goals) No skilled physical therapy/ follow up rehabilitation needs identified at this time. This discharge recommendation: 
Has not yet been discussed the attending provider and/or case management IF patient discharges home will need the following DME: none SUBJECTIVE:  
Patient stated I would love to walk.  OBJECTIVE DATA SUMMARY:  
HISTORY:   
Past Medical History:  
Diagnosis Date  Anemia  Arthritis Rheumatoid  CHF (congestive heart failure) ([address]  Chronic obstructive pulmonary disease ([address]  Cirrhosis of liver ([address] ETOH  
 COPD (chronic obstructive pulmonary disease) ([address]  Gastrointestinal disorder \"lacerations in the large part of my small intestine. \"  Gastrointestinal disorder   
 liver cirrhosis  Heart failure ([address]  Hyperlipidemia 2020  Internal bleeding  Lung cancer ([address] 2020  Psychiatric disorder   
 depression, anxiety  Tachycardia 2016 Past Surgical History:  
Procedure Laterality Date  HX CHOLECYSTECTOMY  HX GI Cholecystectomoy  HX GYN    
  x 2.  [place] ENDOSCOPY UPPER SMALL INTESTINE  2016 Prior level of function: modified independent Personal factors and/or comorbidities impacting plan of care:  
 
Home Situation Home Environment: Assisted living One/Two Story Residence: One story Support Systems: Assisted living Patient Expects to be Discharged to[de-identified] Assisted living Current DME Used/Available at Home: Charna Carrie, rollbrad EXAMINATION/PRESENTATION/DECISION MAKING:  
Critical Behavior: 
 alert, oriented Range Of Motion: 
AROM: Within functional limits Strength:   
Strength: Within functional limits Coordination: 
Coordination: Within functional limits Functional Mobility: 
Bed Mobility: 
  
Supine to Sit: Independent Transfers: 
Sit to Stand: Independent Stand to Sit: Independent Stand Pivot Transfers: Independent Balance:  
Sitting: Intact Standing: Intact Ambulation/Gait Training: 
Distance (ft): 250 Feet (ft) Assistive Device: Walker, rollator Ambulation - Level of Assistance: Modified independent Gait Description (WDL): Exceptions to [facility] Functional Measure: 
Barthel Index: 
 
Bathin Bladder: 10 Bowels: 10 
Groomin Dressing: 10 Feeding: 10 Mobility: 15 
Stairs: 0 Toilet Use: 10 Transfer (Bed to Chair and Back): 15 Total: 90/100 The Barthel ADL Index: Guidelines 1. The index should be used as a record of what a patient does, not as a record of what a patient could do. 2. The main aim is to establish degree of independence from any help, physical or verbal, however minor and for whatever reason. 3. The need for supervision renders the patient not independent. 4. A patient's performance should be established using the best available evidence. Asking the patient, friends/relatives and nurses are the usual sources, but direct observation and common sense are also important. However direct testing is not needed. 5. Usually the patient's performance over the preceding 24-48 hours is important, but occasionally longer periods will be relevant. 6. Middle categories imply that the patient supplies over 50 per cent of the effort. 7. Use of aids to be independent is allowed. Ivett Kaye., Barthel, D.W. ([id number]). Functional evaluation: the Barthel Index. [address] [facility] (14)2. KALLI Philip, Colleen Manley, Wally Dougherty., Jayshree, [address] Measuring the change indisability after inpatient rehabilitation; comparison of the responsiveness of the Barthel Index and Functional [place] Measure. Journal of Neurology, Neurosurgery, and Psychiatry, 66(4), 487-233. Elvis Rios, [place].TIM, EH Poole, & Iesha Acuna M.A. (2004.) Assessment of post-stroke quality of life in cost-effectiveness studies: The usefulness of the Barthel Index and the EuroQoL-5D. [facility], 13, [phone] Physical Therapy Evaluation Charge Determination History Examination Presentation Decision-Making MEDIUM  Complexity : 1-2 comorbidities / personal factors will impact the outcome/ POC  LOW Complexity : 1-2 Standardized tests and measures addressing body structure, function, activity limitation and / or participation in recreation  LOW Complexity : Stable, uncomplicated Based on the above components, the patient evaluation is determined to be of the following complexity level: LOW Pain Ratin Activity Tolerance:  
Good Please refer to the flowsheet for vital signs taken during this treatment. After treatment patient left in no apparent distress:  
Sitting in chair and Call bell within reach COMMUNICATION/EDUCATION:  
The patients plan of care was discussed with: Registered nurse. Thank you for this referral. 
Alexx Barahona, PT Time Calculation: 10 mins

## 2020-05-27 NOTE — PROGRESS NOTES
Brittany Watts Admit Date: 5/26/2020 Subjective:  
 
Hgb unchanged at 6.8 after 1 unit PRBC, and a second unit been ordered. Plts 62K. No new complaints. Current Facility-Administered Medications Medication Dose Route Frequency  0.9% sodium chloride infusion 250 mL  250 mL IntraVENous PRN  
 0.9% sodium chloride infusion 250 mL  250 mL IntraVENous PRN  
 0.9% sodium chloride infusion 250 mL  250 mL IntraVENous PRN  
 albuterol-ipratropium (DUO-NEB) 2.5 MG-0.5 MG/3 ML  3 mL Nebulization QID RT  
 albuterol (PROVENTIL VENTOLIN) nebulizer solution 2.5 mg  2.5 mg Nebulization Q4H PRN  
 ARIPiprazole (ABILIFY) tablet 10 mg  10 mg Oral DAILY  atorvastatin (LIPITOR) tablet 20 mg  20 mg Oral QHS  buPROPion SR (WELLBUTRIN SR) tablet 150 mg  150 mg Oral DAILY  calcium carbonate (TUMS) chewable tablet 200-400 mg [elemental]  200-400 mg Oral DAILY PRN  
 carvediloL (COREG) tablet 3.125 mg  3.125 mg Oral BID WITH MEALS  cholecalciferol (VITAMIN D3) (1000 Units /25 mcg) tablet 4 Tab  4,000 Units Oral DAILY  cyanocobalamin (VITAMIN B12) tablet 250 mcg  250 mcg Oral DAILY  DULoxetine (CYMBALTA) capsule 60 mg  60 mg Oral DAILY  ferrous sulfate tablet 325 mg  325 mg Oral DAILY  budesonide (PULMICORT) 250 mcg/2ml nebulizer susp  250 mcg Nebulization BID RT  
 furosemide (LASIX) tablet 20 mg  20 mg Oral DAILY  guaiFENesin (ROBITUSSIN) 100 mg/5 mL oral liquid 200 mg  200 mg Oral Q4H PRN  
 loratadine (CLARITIN) tablet 10 mg  10 mg Oral DAILY  oxyCODONE-acetaminophen (PERCOCET 7.5) 7.5-325 mg per tablet 1 Tab  1 Tab Oral TID PRN  pantoprazole (PROTONIX) 40 mg in 0.9% sodium chloride 10 mL injection  40 mg IntraVENous BID  
 . PHARMACY TO SUBSTITUTE PER PROTOCOL (Reordered from: peg 400-propylene glycol (Systane, propylene glycol,) 0.4-0.3 % drop)    Per Protocol  pregabalin (LYRICA) capsule 150 mg  150 mg Oral BID  
  senna-docusate (PERICOLACE) 8.6-50 mg per tablet 1 Tab  1 Tab Oral DAILY PRN  
 sucralfate (CARAFATE) tablet 1 g  1 g Oral QID  therapeutic multivitamin (THERAGRAN) tablet 1 Tab  1 Tab Oral DAILY  glycopyrrolate-formoterol (BEVESPI AEROSPHERE) 9 mcg-4.8 mcg inhaler  2 Puff Inhalation BID RT  
 acetaminophen (TYLENOL) tablet 650 mg  650 mg Oral Q4H PRN  
 ondansetron (ZOFRAN) injection 4 mg  4 mg IntraVENous Q6H PRN Objective:  
 
Patient Vitals for the past 8 hrs: 
 BP Temp Pulse Resp SpO2  
05/27/20 0637 109/54 98.1 °F (36.7 °C) 85 18 95 % 05/27/20 0541 116/56 98.2 °F (36.8 °C) 87 18 100 % 05/27/20 0442 131/62 97.9 °F (36.6 °C) 81 18 96 % 05/27/20 0412 106/57 97.2 °F (36.2 °C) 94 18 96 % 05/27/20 0354 128/76 98.2 °F (36.8 °C) 94 18 96 % 05/27/20 0206 110/72 98.4 °F (36.9 °C) 84 18 96 % No intake/output data recorded. No intake/output data recorded. Physical Exam: NAD. A&O. Neck -- Supple. No JVD. Heart -- RRR. Lungs -- Occ mild wheeze with good air movement. No crackles. Abd -- Benign. Ext -- No LE edema, b/l. Data Review Recent Results (from the past 24 hour(s)) EKG, 12 LEAD, INITIAL Collection Time: 05/26/20 10:48 AM  
Result Value Ref Range Ventricular Rate 100 BPM  
 Atrial Rate 93 BPM  
 QRS Duration 110 ms  
 Q-T Interval 334 ms QTC Calculation (Bezet) 430 ms Calculated R Axis 74 degrees Calculated T Axis 51 degrees Diagnosis Atrial fibrillation with premature ventricular or aberrantly conducted  
complexes When compared with ECG of 07-APR-2020 08:11, 
Atrial fibrillation has replaced Sinus rhythm Confirmed by Naty Bethea M.D., Nicolle Lena ([id number]) on 5/26/2020 8:20:24 PM 
  
CBC WITH AUTOMATED DIFF Collection Time: 05/26/20 10:49 AM  
Result Value Ref Range WBC 3.4 (L) 3.6 - 11.0 K/uL RBC 2.49 (L) 3.80 - 5.20 M/uL HGB 6.8 (L) 11.5 - 16.0 g/dL HCT 25.2 (L) 35.0 - 47.0 % .2 (H) 80.0 - 99.0 FL  
 MCH 27.3 26.0 - 34.0 PG  
 MCHC 27.0 (L) 30.0 - 36.5 g/dL  
 RDW 17.8 (H) 11.5 - 14.5 % PLATELET 75 (L) 611 - 400 K/uL NRBC 0.0 0  WBC ABSOLUTE NRBC 0.00 0.00 - 0.01 K/uL NEUTROPHILS 66 32 - 75 % LYMPHOCYTES 14 12 - 49 % MONOCYTES 14 (H) 5 - 13 % EOSINOPHILS 4 0 - 7 % BASOPHILS 1 0 - 1 % IMMATURE GRANULOCYTES 1 (H) 0.0 - 0.5 % ABS. NEUTROPHILS 2.3 1.8 - 8.0 K/UL  
 ABS. LYMPHOCYTES 0.5 (L) 0.8 - 3.5 K/UL  
 ABS. MONOCYTES 0.5 0.0 - 1.0 K/UL  
 ABS. EOSINOPHILS 0.1 0.0 - 0.4 K/UL  
 ABS. BASOPHILS 0.0 0.0 - 0.1 K/UL  
 ABS. IMM. GRANS. 0.0 0.00 - 0.04 K/UL  
 DF SMEAR SCANNED    
 RBC COMMENTS ANISOCYTOSIS 1+ 
    
 RBC COMMENTS MACROCYTOSIS 1+ 
    
 RBC COMMENTS HYPOCHROMIA 1+ 
    
 RBC COMMENTS POLYCHROMASIA PRESENT 
    
METABOLIC PANEL, COMPREHENSIVE Collection Time: 05/26/20 10:49 AM  
Result Value Ref Range Sodium 138 136 - 145 mmol/L Potassium 3.2 (L) 3.5 - 5.1 mmol/L Chloride 105 97 - 108 mmol/L  
 CO2 30 21 - 32 mmol/L Anion gap 3 (L) 5 - 15 mmol/L Glucose 64 (L) 65 - 100 mg/dL BUN 19 6 - 20 MG/DL Creatinine 0.91 0.55 - 1.02 MG/DL  
 BUN/Creatinine ratio 21 (H) 12 - 20 GFR est AA >60 >60 ml/min/1.73m2 GFR est non-AA >60 >60 ml/min/1.73m2 Calcium 9.2 8.5 - 10.1 MG/DL Bilirubin, total 0.2 0.2 - 1.0 MG/DL  
 ALT (SGPT) 28 12 - 78 U/L  
 AST (SGOT) 36 15 - 37 U/L Alk. phosphatase 110 45 - 117 U/L Protein, total 7.0 6.4 - 8.2 g/dL Albumin 3.3 (L) 3.5 - 5.0 g/dL Globulin 3.7 2.0 - 4.0 g/dL A-G Ratio 0.9 (L) 1.1 - 2.2 SAMPLES BEING HELD Collection Time: 05/26/20 10:49 AM  
Result Value Ref Range SAMPLES BEING HELD 1RED,1BLU,1UC   
 COMMENT Add-on orders for these samples will be processed based on acceptable specimen integrity and analyte stability, which may vary by analyte. URINALYSIS W/MICROSCOPIC Collection Time: 05/26/20 10:49 AM  
Result Value Ref Range Color YELLOW/STRAW Appearance CLEAR CLEAR Specific gravity 1.006 1.003 - 1.030    
 pH (UA) 7.0 5.0 - 8.0 Protein Negative NEG mg/dL Glucose Negative NEG mg/dL Ketone Negative NEG mg/dL Bilirubin Negative NEG Blood Negative NEG Urobilinogen 0.2 0.2 - 1.0 EU/dL Nitrites Negative NEG Leukocyte Esterase Negative NEG    
 WBC 0-4 0 - 4 /hpf  
 RBC 0-5 0 - 5 /hpf Epithelial cells FEW FEW /lpf Bacteria Negative NEG /hpf Hyaline cast 0-2 0 - 5 /lpf URINE CULTURE HOLD SAMPLE Collection Time: 05/26/20 10:49 AM  
Result Value Ref Range Urine culture hold Urine on hold in Microbiology dept for 2 days. If unpreserved urine is submitted, it cannot be used for addtional testing after 24 hours, recollection will be required. PROTHROMBIN TIME + INR Collection Time: 05/26/20 10:49 AM  
Result Value Ref Range INR 1.0 0.9 - 1.1 Prothrombin time 10.7 9.0 - 11.1 sec PTT Collection Time: 05/26/20 10:49 AM  
Result Value Ref Range aPTT 22.0 (L) 22.1 - 32.0 sec  
 aPTT, therapeutic range     58.0 - 77.0 SECS  
TYPE & SCREEN Collection Time: 05/26/20 10:49 AM  
Result Value Ref Range Crossmatch Expiration 05/29/2020 ABO/Rh(D) O POSITIVE Antibody screen NEG Comment Previously identified anti K and nonspecific antibody Unit number [id number] Blood component type [facility] Unit division 00 Status of unit ISSUED ANTIGEN/ANTIBODY INFO JEFF NEGATIVE, Crossmatch result Compatible Unit number [id number] Blood component type [facility] Unit division 00 Status of unit TRANSFUSED ANTIGEN/ANTIBODY INFO JEFF NEGATIVE, Crossmatch result Compatible OCCULT BLOOD, STOOL Collection Time: 05/26/20 11:12 AM  
Result Value Ref Range  Occult blood, stool Positive (A) NEG    
METABOLIC PANEL, BASIC  
 Collection Time: 05/27/20  2:15 AM  
Result Value Ref Range Sodium 143 136 - 145 mmol/L Potassium 4.3 3.5 - 5.1 mmol/L Chloride 109 (H) 97 - 108 mmol/L  
 CO2 30 21 - 32 mmol/L Anion gap 4 (L) 5 - 15 mmol/L Glucose 86 65 - 100 mg/dL BUN 19 6 - 20 MG/DL Creatinine 0.72 0.55 - 1.02 MG/DL  
 BUN/Creatinine ratio 26 (H) 12 - 20 GFR est AA >60 >60 ml/min/1.73m2 GFR est non-AA >60 >60 ml/min/1.73m2 Calcium 8.1 (L) 8.5 - 10.1 MG/DL  
CBC WITH AUTOMATED DIFF Collection Time: 05/27/20  2:15 AM  
Result Value Ref Range WBC 2.6 (L) 3.6 - 11.0 K/uL  
 RBC 2.44 (L) 3.80 - 5.20 M/uL HGB 6.8 (L) 11.5 - 16.0 g/dL HCT 24.2 (L) 35.0 - 47.0 % MCV 99.2 (H) 80.0 - 99.0 FL  
 MCH 27.9 26.0 - 34.0 PG  
 MCHC 28.1 (L) 30.0 - 36.5 g/dL  
 RDW 17.4 (H) 11.5 - 14.5 % PLATELET 62 (L) 041 - 400 K/uL MPV 12.3 8.9 - 12.9 FL  
 NRBC 0.0 0  WBC ABSOLUTE NRBC 0.00 0.00 - 0.01 K/uL NEUTROPHILS 69 32 - 75 % LYMPHOCYTES 14 12 - 49 % MONOCYTES 13 5 - 13 % EOSINOPHILS 4 0 - 7 % BASOPHILS 0 0 - 1 % IMMATURE GRANULOCYTES 0 0.0 - 0.5 % ABS. NEUTROPHILS 1.8 1.8 - 8.0 K/UL  
 ABS. LYMPHOCYTES 0.4 (L) 0.8 - 3.5 K/UL  
 ABS. MONOCYTES 0.3 0.0 - 1.0 K/UL  
 ABS. EOSINOPHILS 0.1 0.0 - 0.4 K/UL  
 ABS. BASOPHILS 0.0 0.0 - 0.1 K/UL  
 ABS. IMM. GRANS. 0.0 0.00 - 0.04 K/UL  
 DF SMEAR SCANNED    
 RBC COMMENTS ANISOCYTOSIS 1+ 
    
 RBC COMMENTS MACROCYTOSIS 1+ 
    
 RBC COMMENTS HYPOCHROMIA 1+ 
    
 RBC COMMENTS POLYCHROMASIA 1+ 
    
 RBC COMMENTS STOMATOCYTES 1+ Assessment:  
 
Principal Problem: 
  GI bleed, prob upper. Active Problems: 
  Cirrhosis of liver ([facility] [facility] (8/30/2016) Chronic respiratory failure with hypoxia ([facility] [facility] (8/30/2016) Acute blood loss anemia (10/25/2016) Lung cancer ([facility] [facility] (4/1/2020) Hypokalemia -- Better after repletion. Thrombocytopenia -- Likely due to cirrhosis. Plan: 1. Second unit PRBC ordered. Check Hgb later today -- If Hgb less than 7, would transfuse again. 2. GI to see -- ?EGD &/or M2A capsule study? (Pt NPO this am). 3. Cont BID PPI. 4. Follow Plts. 5. PT for ambulation.  
 
 
 
 
Porfirio Stafford MD

## 2020-05-27 NOTE — PROGRESS NOTES
Patient states that she is seeing bright red blood when she has a bowel movement. She has just finished the bowel prep and has been going a lot. Will call Dr. George Colon with this information.

## 2020-05-27 NOTE — CONSULTS
[address] [facility]. 
 [address] [place] [address] GASTROENTEROLOGY CONSULTATION NOTE Will Latosha Vanegas, [address] [facility] office [phone] NP/PA in-hospital cell phone M-F until 4:30PM 
After 5PM or on weekends, please call  for physician on call NAME:  Karen Copeland :   1960 MRN:   [id number] Referring Physician: Dr. Cydney Messina Consult Date: 2020 9:37 AM 
 
Chief Complaint: melena History of Present Illness:  Patient is a 63 y.o. who is seen in consultation at the request of Dr. Lefty Earl for GI bleed. Patient has a past medical history significant for COPD, lung cancer, cirrhosis, anemia, and GI bleed. She presented to the ED with complaints of melena and fatigue. In the ED, hemoglobin was 6.8 with hemoccult positive stool. Patient was admitted to the hospital on 20. Patient complains of melena and fatigue for the last approximately 1 week. No hematochezia. There has been associated nausea with a few episodes of vomiting which she had attributed to medications. No reflux, dysphagia, or odynophagia. No abdominal pain. No fevers. No NSAID use. No anticoagulation. No alcohol use.  + Tobacco use. Patient was seen during admission in 2020. EGD (20) by Dr. Jerzy Washington for [facility] showed a normal esophagus, no esophageal varices; 2-3 cm hiatal hernia with single 2 mm Sami lesion, no gastric varices, few 2-3 mm antral erosions; normal duodenum. If recurrent anemia/melena, M2A capsule endoscopy was recommended.   
 
EGD (3/17/20) by Dr. Ismael Villalobos for iron deficiency anemia/melena/hematochezia showed a 3 cm hiatal hernia; gastritis; 1 cm polyp in the duodenal bulb, non-bleeding ulcers and superficial ulcers in the duodenal bulb and second portion of the duoden, largest around 8 mm. 
  
EGD (10/2019) by Dr. Bernarda Valladares showed reported GAVE treated with APC, duodenal polyps. Colonoscopy (2019) with reported diverticulosis and hemorrhoids. I have reviewed the emergency room note, hospital admission note, notes by all other clinicians who have seen the patient during this hospitalization to date. I have reviewed the problem list and the reason for this hospitalization. I have reviewed the allergies and the medications the patient was taking at home prior to this hospitalization. PMH: 
Past Medical History:  
Diagnosis Date  Anemia  Arthritis Rheumatoid  CHF (congestive heart failure) ([address]  Chronic obstructive pulmonary disease ([address]  Cirrhosis of liver ([address] ETOH  
 COPD (chronic obstructive pulmonary disease) ([address]  Gastrointestinal disorder \"lacerations in the large part of my small intestine. \"  Gastrointestinal disorder   
 liver cirrhosis  Heart failure ([facility]  Hyperlipidemia 2020  Internal bleeding  Lung cancer ([facility] [address] 2020  Psychiatric disorder   
 depression, anxiety  Tachycardia 2016 PSH: 
Past Surgical History:  
Procedure Laterality Date  HX CHOLECYSTECTOMY  HX GI Cholecystectomoy  HX GYN    
  x 2.  [place] ENDOSCOPY UPPER SMALL INTESTINE  2016 Allergies: Allergies Allergen Reactions  Pcn [Penicillins] Angioedema Childhood reaction Home Medications: 
Prior to Admission Medications Prescriptions Last Dose Informant Patient Reported? Taking? ARIPiprazole (ABILIFY) 10 mg tablet   Yes Yes Sig: Take 10 mg by mouth daily. DULoxetine (CYMBALTA) 60 mg capsule   Yes Yes Sig: Take 60 mg by mouth daily. acetaminophen (TYLENOL) 500 mg tablet   Yes Yes Sig: Take 500 mg by mouth every eight (8) hours as needed for Pain. albuterol-ipratropium (DUO-NEB) 2.5 mg-0.5 mg/3 ml nebu   Yes Yes Sig: 3 mL by Nebulization route four (4) times daily. atorvastatin (LIPITOR) 20 mg tablet   Yes Yes Sig: Take 20 mg by mouth nightly. benzonatate (TESSALON) 100 mg capsule   Yes Yes Sig: Take 100 mg by mouth three (3) times daily as needed for Cough. buPROPion XL (WELLBUTRIN XL) 150 mg tablet   Yes Yes Sig: Take 150 mg by mouth every morning. budesonide (PULMICORT) 0.5 mg/2 mL nbsp   Yes Yes Si mcg by Nebulization route two (2) times a day. (with formoterol)  
calcium carbonate (Tums E-X) 300 mg (750 mg) chewable tablet   Yes Yes Sig: Take 1-2 Tabs by mouth daily as needed for Indigestion (Gas). carvedilol (COREG) 3.125 mg tablet   Yes Yes Sig: Take 3.125 mg by mouth two (2) times daily (with meals). cholecalciferol, vitamin D3, (VITAMIN D3) 2,000 unit tab   Yes Yes Sig: Take 4,000 Units by mouth daily. cyanocobalamin (Vitamin B-12) 250 mcg tablet   Yes Yes Sig: Take 250 mcg by mouth daily. ferrous sulfate 325 mg (65 mg iron) tablet   Yes Yes Sig: Take 325 mg by mouth daily. fluticasone propionate (Flovent HFA) 44 mcg/actuation inhaler   Yes Yes Sig: Take 2 Puffs by inhalation two (2) times a day. formoterol (PERFOROMIST) 20 mcg/2 mL nebu neb solution   Yes Yes Si mcg by Nebulization route two (2) times a day. (with budesonide)  
furosemide (LASIX) 20 mg tablet   Yes Yes Sig: Take 20 mg by mouth daily. guaiFENesin (ROBITUSSIN) 100 mg/5 mL liquid   Yes Yes Sig: Take 200 mg by mouth every four (4) hours as needed for Cough. loratadine (CLARITIN) 10 mg tablet   Yes Yes Sig: Take 10 mg by mouth daily. multivitamin,tx-iron-ca-min (Thera-M) 27-0.4 mg tab   Yes Yes Sig: Take 1 Tab by mouth daily. ondansetron hcl (ZOFRAN) 4 mg tablet   Yes Yes Sig: Take 4 mg by mouth two (2) times daily as needed for Nausea. oxyCODONE-acetaminophen (PERCOCET 10)  mg per tablet   Yes Yes Sig: Take 1 Tab by mouth every six (6) hours as needed for Pain.  
pantoprazole (PROTONIX) 40 mg tablet   No Yes Sig: Take 1 Tab by mouth daily. peg 400-propylene glycol (Systane, propylene glycol,) 0.4-0.3 % drop   Yes Yes Sig: Administer 2 Drops to both eyes every six (6) hours as needed. pregabalin (LYRICA) 150 mg capsule   Yes Yes Sig: Take 150 mg by mouth two (2) times a day. promethazine (PHENERGAN) 12.5 mg tablet   Yes Yes Sig: Take 12.5 mg by mouth every six (6) hours as needed (Vomiting). propylene glycol (Systane Complete) 0.6 % drop   Yes Yes Sig: Administer 1 Drop to both eyes two (2) times a day. senna (Senna) 8.6 mg tablet   Yes Yes Sig: Take 1 Tab by mouth daily as needed for Constipation. sucralfate (CARAFATE) 1 gram tablet   Yes Yes Sig: Take 1 g by mouth Before breakfast, lunch, dinner and at bedtime. tiotropium-olodateroL (Stiolto Respimat) 2.5-2.5 mcg/actuation inhaler   Yes Yes Sig: Take 2 Puffs by inhalation daily. vit A,C,E-zinc-copper (PreserVision AREDS) cap capsule   Yes Yes Sig: Take 1 Tab by mouth two (2) times a day. Facility-Administered Medications: None Hospital Medications: 
Current Facility-Administered Medications Medication Dose Route Frequency  0.9% sodium chloride infusion 250 mL  250 mL IntraVENous PRN  
 0.9% sodium chloride infusion 250 mL  250 mL IntraVENous PRN  
 0.9% sodium chloride infusion 250 mL  250 mL IntraVENous PRN  
 albuterol-ipratropium (DUO-NEB) 2.5 MG-0.5 MG/3 ML  3 mL Nebulization QID RT  
 albuterol (PROVENTIL VENTOLIN) nebulizer solution 2.5 mg  2.5 mg Nebulization Q4H PRN  
 ARIPiprazole (ABILIFY) tablet 10 mg  10 mg Oral DAILY  atorvastatin (LIPITOR) tablet 20 mg  20 mg Oral QHS  buPROPion SR (WELLBUTRIN SR) tablet 150 mg  150 mg Oral DAILY  calcium carbonate (TUMS) chewable tablet 200-400 mg [elemental]  200-400 mg Oral DAILY PRN  
 carvediloL (COREG) tablet 3.125 mg  3.125 mg Oral BID WITH MEALS  cholecalciferol (VITAMIN D3) (1000 Units /25 mcg) tablet 4 Tab  4,000 Units Oral DAILY  cyanocobalamin (VITAMIN B12) tablet 250 mcg  250 mcg Oral DAILY  DULoxetine (CYMBALTA) capsule 60 mg  60 mg Oral DAILY  ferrous sulfate tablet 325 mg  325 mg Oral DAILY  budesonide (PULMICORT) 250 mcg/2ml nebulizer susp  250 mcg Nebulization BID RT  
 furosemide (LASIX) tablet 20 mg  20 mg Oral DAILY  guaiFENesin (ROBITUSSIN) 100 mg/5 mL oral liquid 200 mg  200 mg Oral Q4H PRN  
 loratadine (CLARITIN) tablet 10 mg  10 mg Oral DAILY  oxyCODONE-acetaminophen (PERCOCET 7.5) 7.5-325 mg per tablet 1 Tab  1 Tab Oral TID PRN  pantoprazole (PROTONIX) 40 mg in 0.9% sodium chloride 10 mL injection  40 mg IntraVENous BID  
 . PHARMACY TO SUBSTITUTE PER PROTOCOL (Reordered from: peg 400-propylene glycol (Systane, propylene glycol,) 0.4-0.3 % drop)    Per Protocol  pregabalin (LYRICA) capsule 150 mg  150 mg Oral BID  senna-docusate (PERICOLACE) 8.6-50 mg per tablet 1 Tab  1 Tab Oral DAILY PRN  
 sucralfate (CARAFATE) tablet 1 g  1 g Oral QID  therapeutic multivitamin (THERAGRAN) tablet 1 Tab  1 Tab Oral DAILY  glycopyrrolate-formoterol (BEVESPI AEROSPHERE) 9 mcg-4.8 mcg inhaler  2 Puff Inhalation BID RT  
 acetaminophen (TYLENOL) tablet 650 mg  650 mg Oral Q4H PRN  
 ondansetron (ZOFRAN) injection 4 mg  4 mg IntraVENous Q6H PRN Social History: 
Social History Tobacco Use  Smoking status: Current Every Day Smoker Packs/day: 0.75 Years: 45.00 Pack years: 33.75  Smokeless tobacco: Never Used Substance Use Topics  Alcohol use: No  
  Comment: in the past was an alcoholic. Has been sober  since about 2010 Family History: 
Family History Problem Relation Age of Onset  Cancer Mother   
     pancreatic  Alcohol abuse Father  Cancer Sister Lung cancer (smoker). Review of Systems: 
Constitutional: negative fever, negative chills, negative weight loss Eyes:   negative visual changes ENT:   negative sore throat, tongue or lip swelling Respiratory:  negative cough, negative dyspnea Cards:  negative for chest pain, palpitations, lower extremity edema GI:   See HPI 
:  negative for frequency, dysuria Integument:  negative for rash and pruritus Heme:  + for easy bruising, negative gum/nose bleeding Musculoskeletal:negative for myalgias, back pain and muscle weakness Neuro:  negative for headaches, + dizziness Psych: negative for feelings of anxiety, depression Objective:  
 
Patient Vitals for the past 8 hrs: 
 BP Temp Pulse Resp SpO2 Weight 05/27/20 0856 119/78 97.4 °F (36.3 °C) 88 18 94 % 62.8 kg (138 lb 7.2 oz) 05/27/20 0711      63.2 kg (139 lb 5.3 oz) 05/27/20 0637 109/54 98.1 °F (36.7 °C) 85 18 95 %   
05/27/20 0541 116/56 98.2 °F (36.8 °C) 87 18 100 %   
05/27/20 0442 131/62 97.9 °F (36.6 °C) 81 18 96 %   
05/27/20 0412 106/57 97.2 °F (36.2 °C) 94 18 96 %   
05/27/20 0354 128/76 98.2 °F (36.8 °C) 94 18 96 %   
05/27/20 0206 110/72 98.4 °F (36.9 °C) 84 18 96 %  No intake/output data recorded. No intake/output data recorded. EXAM:   
 CONST:  Pleasant female sitting in the chair, no acute distress NEURO:  Alert and oriented x 3 HEENT: EOMI, no scleral icterus LUNGS: No respiratory distress, on nasal cannula CARD:  S1 S2 
 ABD:  Soft, non distended, no tenderness, no rebound, no guarding. + Bowel sounds. EXT:  Warm PSYCH: Full, not anxious or agitated Data Review Recent Labs  
  05/27/20 
0215 05/26/20 
1049 WBC 2.6* 3.4* HGB 6.8* 6.8* HCT 24.2* 25.2*  
PLT 62* 75* Recent Labs  
  05/27/20 
0215 05/26/20 
1049  138  
K 4.3 3.2*  
* 105 CO2 30 30 BUN 19 19 CREA 0.72 0.91  
GLU 86 64* CA 8.1* 9.2 Recent Labs  
  05/26/20 
1049   
TP 7.0 ALB 3.3*  
GLOB 3.7 Recent Labs  
  05/26/20 
1049 INR 1.0 PTP 10.7 APTT 22.0*  
 
 
EGD (4/8/20) by Dr. Yaw Weeks for anemia/melena showed a normal esophagus, no esophageal varices; 2-3 cm hiatal hernia with single 2 mm Sami lesion, no gastric varices, few 2-3 mm antral erosions; normal duodenum. If recurrent anemia/melena, M2A capsule endoscopy was recommended. Assessment:  
· GI bleed: melena and anemia with hemoccult positive stool. Hgb 6.8 (6.2 on 5/4, 7's in 4/2020), platelets 62, INR 1.0. No anticoagulation. Received 2 unit PRBCs. EGD in 4/2020 with above findings. On PPI and Carafate. · COPD · Lung cancer · Thrombocytopenia · Cirrhosis: seen previously by hepatology, Dr. Kaila Herbert Patient Active Problem List  
Diagnosis Code  Anemia D64.9  
 GI bleed K92.2  Cirrhosis of liver ([address] K74.60  Chronic respiratory failure with hypoxia (HCC) J96.11  
 Acute blood loss anemia D62  
 GIB (gastrointestinal bleeding) K92.2  CHF (congestive heart failure) (HCC) I50.9  Lung cancer ([facility] [address] C34.90  Hyperlipidemia E78.5  Chronic obstructive pulmonary disease ([address] J44.9  Hepatic encephalopathy (HCC) K72.90  Hypokalemia E87.6  Thrombocytopenia ([address] D69.6 Plan:  
· NPO · PPI BID · Trend CBC and transfuse as necessary · 1 L [id number] now. Plan for M2A capsule this afternoon. · Patient was discussed with and will be seen by Dr. Jj Thayer · Thank you for allowing me to participate in care of Rosa Tucker Signed By: Hang Corona, [address]   
 5/27/2020  9:37 AM 
  
 
Gastroenterology Attending Physician attestation statement and comments. This patient was seen and examined by me in a face-to-face visit today. I reviewed the medical record including lab work, imaging and other provider notes. I confirmed the history as described above. I spoke to the patient, reviewed the medical record including lab work, imaging and other provider notes. I discussed this case in detail with Letitia BOYD. I formulated an  assessment of this patient and developed a treatment plan. I agree with the above consultation note. I agree with the history, exam and assessment and plan as outlined in the note. I would like to add the following:  
 
Abd: normoactive BS, nt, nd, no rebound/guarding. Anemia with melena and heme positive stool. Differential includes ulcers, erosions, and AVMs. M2A capsule endoscopy for further evaluation. Dr. Yuliana Manley

## 2020-05-28 LAB
ABO + RH BLD: NORMAL
ANTIGENS PRESENT RBC DONR: NORMAL
BASOPHILS # BLD: 0 K/UL (ref 0–0.1)
BASOPHILS NFR BLD: 0 % (ref 0–1)
BLD PROD TYP BPU: NORMAL
BLOOD BANK CMNT PATIENT-IMP: NORMAL
BLOOD GROUP ANTIBODIES SERPL: NORMAL
BPU ID: NORMAL
CROSSMATCH RESULT,%XM: NORMAL
DIFFERENTIAL METHOD BLD: ABNORMAL
EOSINOPHIL # BLD: 0.1 K/UL (ref 0–0.4)
EOSINOPHIL NFR BLD: 5 % (ref 0–7)
ERYTHROCYTE [DISTWIDTH] IN BLOOD BY AUTOMATED COUNT: 16.2 % (ref 11.5–14.5)
HCT VFR BLD AUTO: 29.7 % (ref 35–47)
HGB BLD-MCNC: 8.7 G/DL (ref 11.5–16)
IMM GRANULOCYTES # BLD AUTO: 0 K/UL (ref 0–0.04)
IMM GRANULOCYTES NFR BLD AUTO: 0 % (ref 0–0.5)
LYMPHOCYTES # BLD: 0.4 K/UL (ref 0.8–3.5)
LYMPHOCYTES NFR BLD: 16 % (ref 12–49)
MCH RBC QN AUTO: 28.5 PG (ref 26–34)
MCHC RBC AUTO-ENTMCNC: 29.3 G/DL (ref 30–36.5)
MCV RBC AUTO: 97.4 FL (ref 80–99)
MONOCYTES # BLD: 0.5 K/UL (ref 0–1)
MONOCYTES NFR BLD: 17 % (ref 5–13)
NEUTS SEG # BLD: 1.7 K/UL (ref 1.8–8)
NEUTS SEG NFR BLD: 62 % (ref 32–75)
NRBC # BLD: 0 K/UL (ref 0–0.01)
NRBC BLD-RTO: 0 PER 100 WBC
PLATELET # BLD AUTO: 74 K/UL (ref 150–400)
PMV BLD AUTO: 12.7 FL (ref 8.9–12.9)
RBC # BLD AUTO: 3.05 M/UL (ref 3.8–5.2)
RBC MORPH BLD: ABNORMAL
SPECIMEN EXP DATE BLD: NORMAL
STATUS OF UNIT,%ST: NORMAL
UNIT DIVISION, %UDIV: 0
WBC # BLD AUTO: 2.7 K/UL (ref 3.6–11)

## 2020-05-28 PROCEDURE — 74011250636 HC RX REV CODE- 250/636: Performed by: INTERNAL MEDICINE

## 2020-05-28 PROCEDURE — 85025 COMPLETE CBC W/AUTO DIFF WBC: CPT

## 2020-05-28 PROCEDURE — 74011000250 HC RX REV CODE- 250: Performed by: INTERNAL MEDICINE

## 2020-05-28 PROCEDURE — 94640 AIRWAY INHALATION TREATMENT: CPT

## 2020-05-28 PROCEDURE — 77010033678 HC OXYGEN DAILY

## 2020-05-28 PROCEDURE — 74011250637 HC RX REV CODE- 250/637: Performed by: FAMILY MEDICINE

## 2020-05-28 PROCEDURE — 36415 COLL VENOUS BLD VENIPUNCTURE: CPT

## 2020-05-28 PROCEDURE — 74011250637 HC RX REV CODE- 250/637: Performed by: INTERNAL MEDICINE

## 2020-05-28 PROCEDURE — 0DJ07ZZ INSPECTION OF UPPER INTESTINAL TRACT, VIA NATURAL OR ARTIFICIAL OPENING: ICD-10-PCS | Performed by: INTERNAL MEDICINE

## 2020-05-28 PROCEDURE — 65270000029 HC RM PRIVATE

## 2020-05-28 PROCEDURE — C9113 INJ PANTOPRAZOLE SODIUM, VIA: HCPCS | Performed by: INTERNAL MEDICINE

## 2020-05-28 RX ORDER — IBUPROFEN 200 MG
1 TABLET ORAL DAILY
Status: DISCONTINUED | OUTPATIENT
Start: 2020-05-28 | End: 2020-06-01 | Stop reason: HOSPADM

## 2020-05-28 RX ADMIN — IPRATROPIUM BROMIDE AND ALBUTEROL SULFATE 3 ML: .5; 3 SOLUTION RESPIRATORY (INHALATION) at 16:06

## 2020-05-28 RX ADMIN — CARVEDILOL 3.12 MG: 3.12 TABLET, FILM COATED ORAL at 07:31

## 2020-05-28 RX ADMIN — IPRATROPIUM BROMIDE AND ALBUTEROL SULFATE 3 ML: .5; 3 SOLUTION RESPIRATORY (INHALATION) at 08:50

## 2020-05-28 RX ADMIN — GLYCOPYRROLATE AND FORMOTEROL FUMARATE 2 PUFF: 9; 4.8 AEROSOL, METERED RESPIRATORY (INHALATION) at 21:51

## 2020-05-28 RX ADMIN — SUCRALFATE 1 G: 1 TABLET ORAL at 09:18

## 2020-05-28 RX ADMIN — BUDESONIDE 250 MCG: 0.25 SUSPENSION RESPIRATORY (INHALATION) at 08:50

## 2020-05-28 RX ADMIN — SODIUM CHLORIDE 40 MG: 9 INJECTION INTRAMUSCULAR; INTRAVENOUS; SUBCUTANEOUS at 09:19

## 2020-05-28 RX ADMIN — FUROSEMIDE 20 MG: 20 TABLET ORAL at 09:19

## 2020-05-28 RX ADMIN — ATORVASTATIN CALCIUM 20 MG: 20 TABLET, FILM COATED ORAL at 21:13

## 2020-05-28 RX ADMIN — LORATADINE 10 MG: 10 TABLET ORAL at 09:19

## 2020-05-28 RX ADMIN — DULOXETINE HYDROCHLORIDE 60 MG: 60 CAPSULE, DELAYED RELEASE ORAL at 09:19

## 2020-05-28 RX ADMIN — PREGABALIN 150 MG: 75 CAPSULE ORAL at 18:01

## 2020-05-28 RX ADMIN — SUCRALFATE 1 G: 1 TABLET ORAL at 21:13

## 2020-05-28 RX ADMIN — SUCRALFATE 1 G: 1 TABLET ORAL at 12:49

## 2020-05-28 RX ADMIN — SUCRALFATE 1 G: 1 TABLET ORAL at 18:01

## 2020-05-28 RX ADMIN — IPRATROPIUM BROMIDE AND ALBUTEROL SULFATE 3 ML: .5; 3 SOLUTION RESPIRATORY (INHALATION) at 12:33

## 2020-05-28 RX ADMIN — PREGABALIN 150 MG: 75 CAPSULE ORAL at 09:19

## 2020-05-28 RX ADMIN — MELATONIN 4 TABLET: at 09:18

## 2020-05-28 RX ADMIN — BUPROPION HYDROCHLORIDE 150 MG: 150 TABLET, EXTENDED RELEASE ORAL at 09:19

## 2020-05-28 RX ADMIN — THERA TABS 1 TABLET: TAB at 09:18

## 2020-05-28 RX ADMIN — OXYCODONE HYDROCHLORIDE AND ACETAMINOPHEN 1 TABLET: 7.5; 325 TABLET ORAL at 10:54

## 2020-05-28 RX ADMIN — GLYCOPYRROLATE AND FORMOTEROL FUMARATE 2 PUFF: 9; 4.8 AEROSOL, METERED RESPIRATORY (INHALATION) at 08:50

## 2020-05-28 RX ADMIN — FERROUS SULFATE TAB 325 MG (65 MG ELEMENTAL FE) 325 MG: 325 (65 FE) TAB at 09:18

## 2020-05-28 RX ADMIN — CYANOCOBALAMIN TAB 500 MCG 250 MCG: 500 TAB at 09:19

## 2020-05-28 RX ADMIN — IPRATROPIUM BROMIDE AND ALBUTEROL SULFATE 3 ML: .5; 3 SOLUTION RESPIRATORY (INHALATION) at 19:16

## 2020-05-28 RX ADMIN — ARIPIPRAZOLE 10 MG: 10 TABLET ORAL at 09:18

## 2020-05-28 RX ADMIN — SODIUM CHLORIDE 40 MG: 9 INJECTION INTRAMUSCULAR; INTRAVENOUS; SUBCUTANEOUS at 18:01

## 2020-05-28 NOTE — PROGRESS NOTES
Spoke with Dr. Brandon Reece, partner with Dr. Sharon Danielle, regarding patient obtaining a Nicoderm patch. Dr. Brandon Reece approved nicotine 14 mg patch, verbal readback and I have placed the order.

## 2020-05-28 NOTE — PROGRESS NOTES
[facility]: [facility] Subjective: No new complaints. She has taken the M2A capsule. Labs this am pending. Current Facility-Administered Medications Medication Dose Route Frequency  0.9% sodium chloride infusion 250 mL  250 mL IntraVENous PRN  
 0.9% sodium chloride infusion 250 mL  250 mL IntraVENous PRN  polyvinyl alcohol-povidone (NATURAL TEARS) 0.5-0.6 % ophthalmic solution 2 Drop  2 Drop Both Eyes Q6H PRN  
 0.9% sodium chloride infusion 250 mL  250 mL IntraVENous PRN  
 albuterol-ipratropium (DUO-NEB) 2.5 MG-0.5 MG/3 ML  3 mL Nebulization QID RT  
 albuterol (PROVENTIL VENTOLIN) nebulizer solution 2.5 mg  2.5 mg Nebulization Q4H PRN  
 ARIPiprazole (ABILIFY) tablet 10 mg  10 mg Oral DAILY  atorvastatin (LIPITOR) tablet 20 mg  20 mg Oral QHS  buPROPion SR (WELLBUTRIN SR) tablet 150 mg  150 mg Oral DAILY  calcium carbonate (TUMS) chewable tablet 200-400 mg [elemental]  200-400 mg Oral DAILY PRN  
 carvediloL (COREG) tablet 3.125 mg  3.125 mg Oral BID WITH MEALS  cholecalciferol (VITAMIN D3) (1000 Units /25 mcg) tablet 4 Tab  4,000 Units Oral DAILY  cyanocobalamin (VITAMIN B12) tablet 250 mcg  250 mcg Oral DAILY  DULoxetine (CYMBALTA) capsule 60 mg  60 mg Oral DAILY  ferrous sulfate tablet 325 mg  325 mg Oral DAILY  budesonide (PULMICORT) 250 mcg/2ml nebulizer susp  250 mcg Nebulization BID RT  
 furosemide (LASIX) tablet 20 mg  20 mg Oral DAILY  guaiFENesin (ROBITUSSIN) 100 mg/5 mL oral liquid 200 mg  200 mg Oral Q4H PRN  
 loratadine (CLARITIN) tablet 10 mg  10 mg Oral DAILY  oxyCODONE-acetaminophen (PERCOCET 7.5) 7.5-325 mg per tablet 1 Tab  1 Tab Oral TID PRN  pantoprazole (PROTONIX) 40 mg in 0.9% sodium chloride 10 mL injection  40 mg IntraVENous BID  pregabalin (LYRICA) capsule 150 mg  150 mg Oral BID  senna-docusate (PERICOLACE) 8.6-50 mg per tablet 1 Tab  1 Tab Oral DAILY PRN  
  sucralfate (CARAFATE) tablet 1 g  1 g Oral QID  therapeutic multivitamin (THERAGRAN) tablet 1 Tab  1 Tab Oral DAILY  glycopyrrolate-formoterol (BEVESPI AEROSPHERE) 9 mcg-4.8 mcg inhaler  2 Puff Inhalation BID RT  
 acetaminophen (TYLENOL) tablet 650 mg  650 mg Oral Q4H PRN  
 ondansetron (ZOFRAN) injection 4 mg  4 mg IntraVENous Q6H PRN Objective:  
 
No data found. No intake/output data recorded. No intake/output data recorded. Physical Exam: NAD. A&O. Neck -- Supple. No JVD. Heart -- RRR. Lungs -- Decreased wheezing with good air movement. No crackles. Abd -- Benign. Ext -- No LE edema, b/l. Data Review Recent Results (from the past 24 hour(s)) HGB & HCT Collection Time: 05/27/20  2:54 PM  
Result Value Ref Range HGB 8.9 (L) 11.5 - 16.0 g/dL HCT 30.1 (L) 35.0 - 47.0 % Assessment:  
 
Principal Problem: 
  GI bleed, prob upper. Active Problems: 
  Cirrhosis of liver ([facility] (8/30/2016) Chronic respiratory failure with hypoxia ([facility] (8/30/2016) Acute blood loss anemia (10/25/2016) Lung cancer ([facility] (4/1/2020) Hypokalemia -- Better after repletion. Thrombocytopenia -- Likely due to cirrhosis. Plan: 1. Follow Hgb. 2. M2A capsule study started -- Appreciate GI input. 3. Cont BID PPI. 4. Follow Plts. 5. PT for ambulation. 6. If Hgb stable into tomorrow, then she may be able to be discharged home tomorrow.  
 
 
 
 
 
 
Shannan Ruiz MD

## 2020-05-28 NOTE — PROGRESS NOTES
The documentation for this period is being entered following the guidelines as defined in the [facility] policy by Isabelle Kraus from [id number].

## 2020-05-28 NOTE — PROCEDURES
[address] 
[address] [facility], [address] 
[phone] M2A Capsule Endoscopy Procedure NAME:  Pranav Louis :   1960 MRN:   [id number] Date/Time:  2020 Procedure Type: M2A Capsule Indications:   Melena/hematochezia Pre-operative Diagnosis: see indication above Post-operative Diagnosis:  See findings below : Herminia Alfaro MD 
 
Referring Provider: Belen Mckeon MD 
 
Anethesia/Sedation: none Procedure Details After obtaining informed consent, the RIDERSSBO wireless capsule endoscopy exam of the small intestine commenced per our usual protocol. I reviewed the 8 hour video and documented the standard anatomic landmarks. I then carefully examined the small intestinal images from the duodenal bulb to the cecum. The quality of the small intestinal preparation was good. Findings:  
First gastric image = 00:00:13 First duodenal image = 00:12:27 First cecal image= 02:53:36 Small bowel passage time: 2 h 41 m Multiple AVMs in the small bowel starting in the duodenum and to at least 66% of the SB viewing time. Mild oozing of blood from a few of them. A blood clot was seen at 99% of the SB viewing time. Impression:           
Bleeding from small bowel AVMs Recommendation: 
Serial CBCs, transfuse as needed Will discuss with Dr. Emeli Barrera timing of single balloon enteroscopy. Findings discussed with patient. NPO after midnight for now.  
 
Herminia Alfaro MD 
2020  5:31 PM

## 2020-05-29 ENCOUNTER — ANESTHESIA (OUTPATIENT)
Dept: ENDOSCOPY | Age: 60
DRG: 378 | End: 2020-05-29
Payer: MEDICARE

## 2020-05-29 ENCOUNTER — ANESTHESIA EVENT (OUTPATIENT)
Dept: ENDOSCOPY | Age: 60
DRG: 378 | End: 2020-05-29
Payer: MEDICARE

## 2020-05-29 LAB
ANION GAP SERPL CALC-SCNC: 6 MMOL/L (ref 5–15)
BASOPHILS # BLD: 0 K/UL (ref 0–0.1)
BASOPHILS NFR BLD: 0 % (ref 0–1)
BUN SERPL-MCNC: 13 MG/DL (ref 6–20)
BUN/CREAT SERPL: 14 (ref 12–20)
CALCIUM SERPL-MCNC: 9 MG/DL (ref 8.5–10.1)
CHLORIDE SERPL-SCNC: 105 MMOL/L (ref 97–108)
CO2 SERPL-SCNC: 27 MMOL/L (ref 21–32)
CREAT SERPL-MCNC: 0.92 MG/DL (ref 0.55–1.02)
DIFFERENTIAL METHOD BLD: ABNORMAL
EOSINOPHIL # BLD: 0.1 K/UL (ref 0–0.4)
EOSINOPHIL NFR BLD: 3 % (ref 0–7)
ERYTHROCYTE [DISTWIDTH] IN BLOOD BY AUTOMATED COUNT: 16.3 % (ref 11.5–14.5)
GLUCOSE SERPL-MCNC: 147 MG/DL (ref 65–100)
HCT VFR BLD AUTO: 27.1 % (ref 35–47)
HGB BLD-MCNC: 8.1 G/DL (ref 11.5–16)
IMM GRANULOCYTES # BLD AUTO: 0 K/UL (ref 0–0.04)
IMM GRANULOCYTES NFR BLD AUTO: 0 % (ref 0–0.5)
LYMPHOCYTES # BLD: 0.4 K/UL (ref 0.8–3.5)
LYMPHOCYTES NFR BLD: 10 % (ref 12–49)
MCH RBC QN AUTO: 29 PG (ref 26–34)
MCHC RBC AUTO-ENTMCNC: 29.9 G/DL (ref 30–36.5)
MCV RBC AUTO: 97.1 FL (ref 80–99)
MONOCYTES # BLD: 0.4 K/UL (ref 0–1)
MONOCYTES NFR BLD: 11 % (ref 5–13)
NEUTS SEG # BLD: 2.6 K/UL (ref 1.8–8)
NEUTS SEG NFR BLD: 76 % (ref 32–75)
NRBC # BLD: 0 K/UL (ref 0–0.01)
NRBC BLD-RTO: 0 PER 100 WBC
PLATELET # BLD AUTO: 70 K/UL (ref 150–400)
PMV BLD AUTO: 12.8 FL (ref 8.9–12.9)
POTASSIUM SERPL-SCNC: 3.6 MMOL/L (ref 3.5–5.1)
RBC # BLD AUTO: 2.79 M/UL (ref 3.8–5.2)
RBC MORPH BLD: ABNORMAL
SODIUM SERPL-SCNC: 138 MMOL/L (ref 136–145)
WBC # BLD AUTO: 3.5 K/UL (ref 3.6–11)

## 2020-05-29 PROCEDURE — 65270000029 HC RM PRIVATE

## 2020-05-29 PROCEDURE — 77030022875 HC PRB AR PLSM COAG ERBE -C: Performed by: INTERNAL MEDICINE

## 2020-05-29 PROCEDURE — 85025 COMPLETE CBC W/AUTO DIFF WBC: CPT

## 2020-05-29 PROCEDURE — 74011250636 HC RX REV CODE- 250/636: Performed by: NURSE ANESTHETIST, CERTIFIED REGISTERED

## 2020-05-29 PROCEDURE — 76040000009: Performed by: INTERNAL MEDICINE

## 2020-05-29 PROCEDURE — 94640 AIRWAY INHALATION TREATMENT: CPT

## 2020-05-29 PROCEDURE — 74011000250 HC RX REV CODE- 250: Performed by: NURSE ANESTHETIST, CERTIFIED REGISTERED

## 2020-05-29 PROCEDURE — 74011250637 HC RX REV CODE- 250/637: Performed by: FAMILY MEDICINE

## 2020-05-29 PROCEDURE — 74011250637 HC RX REV CODE- 250/637: Performed by: INTERNAL MEDICINE

## 2020-05-29 PROCEDURE — C9113 INJ PANTOPRAZOLE SODIUM, VIA: HCPCS | Performed by: INTERNAL MEDICINE

## 2020-05-29 PROCEDURE — 74011000250 HC RX REV CODE- 250: Performed by: INTERNAL MEDICINE

## 2020-05-29 PROCEDURE — 0W3P8ZZ CONTROL BLEEDING IN GASTROINTESTINAL TRACT, VIA NATURAL OR ARTIFICIAL OPENING ENDOSCOPIC: ICD-10-PCS | Performed by: INTERNAL MEDICINE

## 2020-05-29 PROCEDURE — 74011000250 HC RX REV CODE- 250

## 2020-05-29 PROCEDURE — 36415 COLL VENOUS BLD VENIPUNCTURE: CPT

## 2020-05-29 PROCEDURE — 74011250636 HC RX REV CODE- 250/636: Performed by: INTERNAL MEDICINE

## 2020-05-29 PROCEDURE — 76060000034 HC ANESTHESIA 1.5 TO 2 HR: Performed by: INTERNAL MEDICINE

## 2020-05-29 PROCEDURE — 77030040361 HC SLV COMPR DVT MDII -B: Performed by: INTERNAL MEDICINE

## 2020-05-29 PROCEDURE — 80048 BASIC METABOLIC PNL TOTAL CA: CPT

## 2020-05-29 RX ORDER — IPRATROPIUM BROMIDE AND ALBUTEROL SULFATE 2.5; .5 MG/3ML; MG/3ML
SOLUTION RESPIRATORY (INHALATION)
Status: COMPLETED
Start: 2020-05-29 | End: 2020-05-29

## 2020-05-29 RX ORDER — POTASSIUM CHLORIDE 750 MG/1
20 TABLET, FILM COATED, EXTENDED RELEASE ORAL
Status: COMPLETED | OUTPATIENT
Start: 2020-05-29 | End: 2020-05-29

## 2020-05-29 RX ORDER — EPINEPHRINE 0.1 MG/ML
1 INJECTION INTRACARDIAC; INTRAVENOUS
Status: DISCONTINUED | OUTPATIENT
Start: 2020-05-29 | End: 2020-05-29 | Stop reason: HOSPADM

## 2020-05-29 RX ORDER — MIDAZOLAM HYDROCHLORIDE 1 MG/ML
.25-5 INJECTION, SOLUTION INTRAMUSCULAR; INTRAVENOUS
Status: DISCONTINUED | OUTPATIENT
Start: 2020-05-29 | End: 2020-05-29 | Stop reason: HOSPADM

## 2020-05-29 RX ORDER — DEXTROMETHORPHAN/PSEUDOEPHED 2.5-7.5/.8
1.2 DROPS ORAL
Status: DISCONTINUED | OUTPATIENT
Start: 2020-05-29 | End: 2020-05-29 | Stop reason: HOSPADM

## 2020-05-29 RX ORDER — LIDOCAINE HYDROCHLORIDE 20 MG/ML
INJECTION, SOLUTION EPIDURAL; INFILTRATION; INTRACAUDAL; PERINEURAL AS NEEDED
Status: DISCONTINUED | OUTPATIENT
Start: 2020-05-29 | End: 2020-05-29 | Stop reason: HOSPADM

## 2020-05-29 RX ORDER — GLYCOPYRROLATE 0.2 MG/ML
INJECTION INTRAMUSCULAR; INTRAVENOUS AS NEEDED
Status: DISCONTINUED | OUTPATIENT
Start: 2020-05-29 | End: 2020-05-29 | Stop reason: HOSPADM

## 2020-05-29 RX ORDER — SODIUM CHLORIDE 0.9 % (FLUSH) 0.9 %
5-40 SYRINGE (ML) INJECTION EVERY 8 HOURS
Status: DISCONTINUED | OUTPATIENT
Start: 2020-05-29 | End: 2020-06-01 | Stop reason: HOSPADM

## 2020-05-29 RX ORDER — SODIUM CHLORIDE 0.9 % (FLUSH) 0.9 %
5-40 SYRINGE (ML) INJECTION AS NEEDED
Status: DISCONTINUED | OUTPATIENT
Start: 2020-05-29 | End: 2020-06-01 | Stop reason: HOSPADM

## 2020-05-29 RX ORDER — ONDANSETRON 2 MG/ML
INJECTION INTRAMUSCULAR; INTRAVENOUS AS NEEDED
Status: DISCONTINUED | OUTPATIENT
Start: 2020-05-29 | End: 2020-05-29 | Stop reason: HOSPADM

## 2020-05-29 RX ORDER — FLUMAZENIL 0.1 MG/ML
0.2 INJECTION INTRAVENOUS
Status: DISCONTINUED | OUTPATIENT
Start: 2020-05-29 | End: 2020-05-29 | Stop reason: HOSPADM

## 2020-05-29 RX ORDER — ATROPINE SULFATE 0.1 MG/ML
0.5 INJECTION INTRAVENOUS
Status: DISCONTINUED | OUTPATIENT
Start: 2020-05-29 | End: 2020-05-29 | Stop reason: HOSPADM

## 2020-05-29 RX ORDER — NEOSTIGMINE METHYLSULFATE 1 MG/ML
INJECTION, SOLUTION INTRAVENOUS AS NEEDED
Status: DISCONTINUED | OUTPATIENT
Start: 2020-05-29 | End: 2020-05-29 | Stop reason: HOSPADM

## 2020-05-29 RX ORDER — NALOXONE HYDROCHLORIDE 0.4 MG/ML
0.4 INJECTION, SOLUTION INTRAMUSCULAR; INTRAVENOUS; SUBCUTANEOUS
Status: DISCONTINUED | OUTPATIENT
Start: 2020-05-29 | End: 2020-05-29 | Stop reason: HOSPADM

## 2020-05-29 RX ORDER — FENTANYL CITRATE 50 UG/ML
25-200 INJECTION, SOLUTION INTRAMUSCULAR; INTRAVENOUS
Status: DISCONTINUED | OUTPATIENT
Start: 2020-05-29 | End: 2020-05-29 | Stop reason: HOSPADM

## 2020-05-29 RX ORDER — SUCCINYLCHOLINE CHLORIDE 20 MG/ML
INJECTION INTRAMUSCULAR; INTRAVENOUS AS NEEDED
Status: DISCONTINUED | OUTPATIENT
Start: 2020-05-29 | End: 2020-05-29 | Stop reason: HOSPADM

## 2020-05-29 RX ORDER — ROCURONIUM BROMIDE 10 MG/ML
INJECTION, SOLUTION INTRAVENOUS AS NEEDED
Status: DISCONTINUED | OUTPATIENT
Start: 2020-05-29 | End: 2020-05-29 | Stop reason: HOSPADM

## 2020-05-29 RX ORDER — SODIUM CHLORIDE 9 MG/ML
50 INJECTION, SOLUTION INTRAVENOUS CONTINUOUS
Status: DISPENSED | OUTPATIENT
Start: 2020-05-29 | End: 2020-05-29

## 2020-05-29 RX ORDER — SODIUM CHLORIDE 9 MG/ML
INJECTION, SOLUTION INTRAVENOUS
Status: DISCONTINUED | OUTPATIENT
Start: 2020-05-29 | End: 2020-05-29 | Stop reason: HOSPADM

## 2020-05-29 RX ORDER — PROPOFOL 10 MG/ML
INJECTION, EMULSION INTRAVENOUS AS NEEDED
Status: DISCONTINUED | OUTPATIENT
Start: 2020-05-29 | End: 2020-05-29 | Stop reason: HOSPADM

## 2020-05-29 RX ORDER — POTASSIUM CHLORIDE 750 MG/1
20 TABLET, FILM COATED, EXTENDED RELEASE ORAL DAILY
Status: DISCONTINUED | OUTPATIENT
Start: 2020-05-30 | End: 2020-06-01 | Stop reason: HOSPADM

## 2020-05-29 RX ADMIN — LORATADINE 10 MG: 10 TABLET ORAL at 09:01

## 2020-05-29 RX ADMIN — GLYCOPYRROLATE AND FORMOTEROL FUMARATE 2 PUFF: 9; 4.8 AEROSOL, METERED RESPIRATORY (INHALATION) at 20:19

## 2020-05-29 RX ADMIN — SODIUM CHLORIDE: 900 INJECTION, SOLUTION INTRAVENOUS at 15:48

## 2020-05-29 RX ADMIN — ROCURONIUM BROMIDE 5 MG: 10 SOLUTION INTRAVENOUS at 15:17

## 2020-05-29 RX ADMIN — SUCRALFATE 1 G: 1 TABLET ORAL at 17:43

## 2020-05-29 RX ADMIN — GLYCOPYRROLATE 0.2 MG: 0.2 INJECTION, SOLUTION INTRAMUSCULAR; INTRAVENOUS at 16:12

## 2020-05-29 RX ADMIN — ARIPIPRAZOLE 10 MG: 10 TABLET ORAL at 09:15

## 2020-05-29 RX ADMIN — MELATONIN 4 TABLET: at 08:59

## 2020-05-29 RX ADMIN — PREGABALIN 150 MG: 75 CAPSULE ORAL at 17:43

## 2020-05-29 RX ADMIN — GLYCOPYRROLATE AND FORMOTEROL FUMARATE 2 PUFF: 9; 4.8 AEROSOL, METERED RESPIRATORY (INHALATION) at 09:30

## 2020-05-29 RX ADMIN — POTASSIUM CHLORIDE 20 MEQ: 750 TABLET, FILM COATED, EXTENDED RELEASE ORAL at 09:00

## 2020-05-29 RX ADMIN — PROPOFOL 100 MG: 10 INJECTION, EMULSION INTRAVENOUS at 15:17

## 2020-05-29 RX ADMIN — CYANOCOBALAMIN TAB 500 MCG 250 MCG: 500 TAB at 08:58

## 2020-05-29 RX ADMIN — DULOXETINE HYDROCHLORIDE 60 MG: 60 CAPSULE, DELAYED RELEASE ORAL at 09:00

## 2020-05-29 RX ADMIN — BUDESONIDE 250 MCG: 0.25 SUSPENSION RESPIRATORY (INHALATION) at 09:32

## 2020-05-29 RX ADMIN — SODIUM CHLORIDE 40 MG: 9 INJECTION INTRAMUSCULAR; INTRAVENOUS; SUBCUTANEOUS at 17:43

## 2020-05-29 RX ADMIN — LIDOCAINE HYDROCHLORIDE 50 MG: 20 INJECTION, SOLUTION EPIDURAL; INFILTRATION; INTRACAUDAL; PERINEURAL at 15:17

## 2020-05-29 RX ADMIN — FERROUS SULFATE TAB 325 MG (65 MG ELEMENTAL FE) 325 MG: 325 (65 FE) TAB at 08:59

## 2020-05-29 RX ADMIN — SUCRALFATE 1 G: 1 TABLET ORAL at 08:59

## 2020-05-29 RX ADMIN — Medication 10 ML: at 21:41

## 2020-05-29 RX ADMIN — SUCRALFATE 1 G: 1 TABLET ORAL at 21:41

## 2020-05-29 RX ADMIN — IPRATROPIUM BROMIDE AND ALBUTEROL SULFATE 3 ML: .5; 3 SOLUTION RESPIRATORY (INHALATION) at 20:17

## 2020-05-29 RX ADMIN — OXYCODONE HYDROCHLORIDE AND ACETAMINOPHEN 1 TABLET: 7.5; 325 TABLET ORAL at 17:59

## 2020-05-29 RX ADMIN — ATORVASTATIN CALCIUM 20 MG: 20 TABLET, FILM COATED ORAL at 21:41

## 2020-05-29 RX ADMIN — ONDANSETRON HYDROCHLORIDE 4 MG: 2 INJECTION, SOLUTION INTRAMUSCULAR; INTRAVENOUS at 16:07

## 2020-05-29 RX ADMIN — GLUCAGON HYDROCHLORIDE 0.5 MG: KIT at 15:50

## 2020-05-29 RX ADMIN — CARVEDILOL 3.12 MG: 3.12 TABLET, FILM COATED ORAL at 09:04

## 2020-05-29 RX ADMIN — ROCURONIUM BROMIDE 10 MG: 10 SOLUTION INTRAVENOUS at 15:26

## 2020-05-29 RX ADMIN — FUROSEMIDE 20 MG: 20 TABLET ORAL at 09:01

## 2020-05-29 RX ADMIN — OXYCODONE HYDROCHLORIDE AND ACETAMINOPHEN 1 TABLET: 7.5; 325 TABLET ORAL at 09:15

## 2020-05-29 RX ADMIN — Medication 2 MG: at 16:12

## 2020-05-29 RX ADMIN — SODIUM CHLORIDE: 900 INJECTION, SOLUTION INTRAVENOUS at 15:17

## 2020-05-29 RX ADMIN — PREGABALIN 150 MG: 75 CAPSULE ORAL at 09:00

## 2020-05-29 RX ADMIN — SUCCINYLCHOLINE CHLORIDE 100 MG: 20 INJECTION, SOLUTION INTRAMUSCULAR; INTRAVENOUS; PARENTERAL at 15:17

## 2020-05-29 RX ADMIN — IPRATROPIUM BROMIDE AND ALBUTEROL SULFATE 3 ML: .5; 3 SOLUTION RESPIRATORY (INHALATION) at 09:11

## 2020-05-29 RX ADMIN — Medication 10 ML: at 17:44

## 2020-05-29 RX ADMIN — IPRATROPIUM BROMIDE AND ALBUTEROL SULFATE 3 ML: .5; 3 SOLUTION RESPIRATORY (INHALATION) at 13:15

## 2020-05-29 RX ADMIN — BUPROPION HYDROCHLORIDE 150 MG: 150 TABLET, EXTENDED RELEASE ORAL at 09:01

## 2020-05-29 RX ADMIN — THERA TABS 1 TABLET: TAB at 09:01

## 2020-05-29 RX ADMIN — SODIUM CHLORIDE 40 MG: 9 INJECTION INTRAMUSCULAR; INTRAVENOUS; SUBCUTANEOUS at 09:01

## 2020-05-29 NOTE — ANESTHESIA POSTPROCEDURE EVALUATION
Procedure(s): ENTEROSCOPY 
ENDOSCOPIC ARGON PLASMA COAGULATION. general 
 
Anesthesia Post Evaluation Patient location during evaluation: PACU Level of consciousness: awake and alert Pain management: adequate Airway patency: patent Anesthetic complications: no 
Cardiovascular status: acceptable Respiratory status: acceptable Hydration status: acceptable Comments: Seen, no complaints Post anesthesia nausea and vomiting:  none Final Post Anesthesia Temperature Assessment:  Normothermia (36.0-37.5 degrees C) INITIAL Post-op Vital signs:  
Vitals Value Taken Time /55 5/29/2020  4:47 PM  
Temp 37 °C (98.6 °F) 5/29/2020  4:25 PM  
Pulse 94 5/29/2020  4:50 PM  
Resp 18 5/29/2020  4:50 PM  
SpO2 93 % 5/29/2020  4:50 PM  
Vitals shown include unvalidated device data.

## 2020-05-29 NOTE — PERIOP NOTES
TRANSFER - IN REPORT: 
 
Verbal report received from Mi Richard. RN on Jessy Matos  being received from 21 [phone] for ordered procedure Report consisted of patients Situation, Background, Assessment and  
Recommendations(SBAR). Information from the following report(s) SBAR, Intake/Output, Cardiac Rhythm SR and Pre Procedure Checklist was reviewed with the receiving nurse. Opportunity for questions and clarification was provided. Assessment completed upon patients arrival to unit and care assumed.

## 2020-05-29 NOTE — PERIOP NOTES

## 2020-05-29 NOTE — PROCEDURES
[address] Deep Entersocopy Procedure Note Vaughn Welch 1960 
[id number] Procedure: Single Balloon Deep Enteroscopy with control of bleeding Indication:  Melena/hematochezia, bleeding AVM's seen on M2A capsule : Dr. Jose Sosa MD 
 
Surgical Assistant: None Implants:  None Referring Provider: Jamila Ellis MD 
 
 
Anesthesia/Sedation: General anesthesia . Procedure Details After infomed consent was obtained for the procedure, with all risks and 
benefits of procedure explained the patient was taken to the endoscopy 
suite and placed in the left lateral decubitus position. Following 
sequential administration of sedation as per above, the enteroscopy was 
inserted into the mouth and advanced under direct vision to distal -jejunum  A careful inspection was completed, findings and interventions 
are described below. Findings: 
 
Esophagus:hiatal hernia 3 cm in size Stomach: normal   
 
Duodenum: normal 
 
Jejunum: there were 15 AVM's ( arteriovenous malformation  ) located in proximal and mid jejunum, ranging in size between 5 and 15 mm. 2 of the AVM's seen were actively bleeding I applied successfully cautery and ablated all of the 15 AVM's by using APC ( argon plasma coagulation) EBL: minimal 
 
 
Therapies: as above Specimens: see above Complications: None; patient tolerated the procedure well. Impression: See above post diagnosis Recommendations: 
 
-follow H/H 
-GI lite 
-weekend team will follow Dr. Jose Sosa MD 
 
 
 
5/29/2020 4:19 PM

## 2020-05-29 NOTE — PROGRESS NOTES
[facility] 
[address] [facility], [address] GI PROGRESS NOTE Nemo Yuly, [address] office [phone] NP in-hospital cell phone M-F until 4:30 After 5pm or on weekends, please call  for physician on call NAME: Trey Easley :  1960 MRN:  [id number] Subjective: She is feeling well, no bowel movements, no abdominal pain. Objective: VITALS:  
Last 24hrs VS reviewed since prior progress note. Most recent are: 
Visit Vitals /79 (BP 1 Location: Left arm, BP Patient Position: At rest) Pulse 87 Temp 98.7 °F (37.1 °C) Resp 17 Wt 58.6 kg (129 lb 3.2 oz) SpO2 97% BMI 25.23 kg/m² PHYSICAL EXAM: 
General: Cooperative, no acute distress   
Neurologic:  Alert and oriented X 3. HEENT: EOMI, no scleral icterus Lungs:  CTA bilaterally. No wheezing Heart:  S1 S2 Abdomen: Soft, non-distended, no tenderness. +Bowel sounds Extremities: No edema Psych:   Good insight. Not anxious or agitated. Lab Data Reviewed:  
 
Recent Results (from the past 24 hour(s)) CBC WITH AUTOMATED DIFF Collection Time: 20  6:14 PM  
Result Value Ref Range WBC 2.7 (L) 3.6 - 11.0 K/uL  
 RBC 3.05 (L) 3.80 - 5.20 M/uL HGB 8.7 (L) 11.5 - 16.0 g/dL HCT 29.7 (L) 35.0 - 47.0 % MCV 97.4 80.0 - 99.0 FL  
 MCH 28.5 26.0 - 34.0 PG  
 MCHC 29.3 (L) 30.0 - 36.5 g/dL  
 RDW 16.2 (H) 11.5 - 14.5 % PLATELET 74 (L) 713 - 400 K/uL MPV 12.7 8.9 - 12.9 FL  
 NRBC 0.0 0  WBC ABSOLUTE NRBC 0.00 0.00 - 0.01 K/uL NEUTROPHILS 62 32 - 75 % LYMPHOCYTES 16 12 - 49 % MONOCYTES 17 (H) 5 - 13 % EOSINOPHILS 5 0 - 7 % BASOPHILS 0 0 - 1 % IMMATURE GRANULOCYTES 0 0.0 - 0.5 % ABS. NEUTROPHILS 1.7 (L) 1.8 - 8.0 K/UL  
 ABS. LYMPHOCYTES 0.4 (L) 0.8 - 3.5 K/UL  
 ABS. MONOCYTES 0.5 0.0 - 1.0 K/UL  
 ABS. EOSINOPHILS 0.1 0.0 - 0.4 K/UL  
 ABS. BASOPHILS 0.0 0.0 - 0.1 K/UL  
 ABS. IMM. GRANS. 0.0 0.00 - 0.04 K/UL DF SMEAR SCANNED    
 RBC COMMENTS ANISOCYTOSIS 
1+ 
    
CBC WITH AUTOMATED DIFF Collection Time: 05/29/20  3:31 AM  
Result Value Ref Range WBC 3.5 (L) 3.6 - 11.0 K/uL  
 RBC 2.79 (L) 3.80 - 5.20 M/uL HGB 8.1 (L) 11.5 - 16.0 g/dL HCT 27.1 (L) 35.0 - 47.0 % MCV 97.1 80.0 - 99.0 FL  
 MCH 29.0 26.0 - 34.0 PG  
 MCHC 29.9 (L) 30.0 - 36.5 g/dL  
 RDW 16.3 (H) 11.5 - 14.5 % PLATELET 70 (L) 107 - 400 K/uL MPV 12.8 8.9 - 12.9 FL  
 NRBC 0.0 0  WBC ABSOLUTE NRBC 0.00 0.00 - 0.01 K/uL NEUTROPHILS 76 (H) 32 - 75 % LYMPHOCYTES 10 (L) 12 - 49 % MONOCYTES 11 5 - 13 % EOSINOPHILS 3 0 - 7 % BASOPHILS 0 0 - 1 % IMMATURE GRANULOCYTES 0 0.0 - 0.5 % ABS. NEUTROPHILS 2.6 1.8 - 8.0 K/UL  
 ABS. LYMPHOCYTES 0.4 (L) 0.8 - 3.5 K/UL  
 ABS. MONOCYTES 0.4 0.0 - 1.0 K/UL  
 ABS. EOSINOPHILS 0.1 0.0 - 0.4 K/UL  
 ABS. BASOPHILS 0.0 0.0 - 0.1 K/UL  
 ABS. IMM. GRANS. 0.0 0.00 - 0.04 K/UL  
 DF SMEAR SCANNED    
 RBC COMMENTS ANISOCYTOSIS 1+ 
    
 RBC COMMENTS MACROCYTOSIS 1+ 
    
 RBC COMMENTS POLYCHROMASIA 1+ METABOLIC PANEL, BASIC Collection Time: 05/29/20  3:31 AM  
Result Value Ref Range Sodium 138 136 - 145 mmol/L Potassium 3.6 3.5 - 5.1 mmol/L Chloride 105 97 - 108 mmol/L  
 CO2 27 21 - 32 mmol/L Anion gap 6 5 - 15 mmol/L Glucose 147 (H) 65 - 100 mg/dL BUN 13 6 - 20 MG/DL Creatinine 0.92 0.55 - 1.02 MG/DL  
 BUN/Creatinine ratio 14 12 - 20 GFR est AA >60 >60 ml/min/1.73m2 GFR est non-AA >60 >60 ml/min/1.73m2 Calcium 9.0 8.5 - 10.1 MG/DL Assessment:  
· GI bleed: melena and anemia with hemoccult positive stool. Hgb 8.1, platelets 70, INR 1.0. No anticoagulation. Received 2 unit PRBCs. EGD in 4/2020. Capsule endoscopy 5/28 with small bowel AVM bleeding · COPD · Lung cancer · Thrombocytopenia · Cirrhosis: seen previously by hepatology, Dr. Nakul Street Patient Active Problem List  
Diagnosis Code  Anemia D64.9  GI bleed K92.2  Cirrhosis of liver ([facility] K74.60  Chronic respiratory failure with hypoxia (HCC) J96.11  
 Acute blood loss anemia D62  
 GIB (gastrointestinal bleeding) K92.2  CHF (congestive heart failure) (HCC) I50.9  Lung cancer ([facility] C34.90  Hyperlipidemia E78.5  [facility] pulmonary [facility] J44.9  Hepatic encephalopathy (HCC) K72.90  Hypokalemia E87.6  Thrombocytopenia ([facility] D69.6 Plan:  
· BID PPI 
· NPO, no anticoagulation · Monitor CBC, transfuse as necessary · Plan for single balloon enteroscopy with Dr. Emeli Barrera today 1500 Signed By: Medardo Hong NP   
 5/29/2020  12:24 PM 
  
 
 
Patient was seen and examined Discussed case with Dr Mario Schulte Deep enteroscopy today, discussed risks and benefits, agreeable

## 2020-05-29 NOTE — PROGRESS NOTES
Problem: Discharge Planning Goal: *Discharge to safe environment Description: See CM note Outcome: Progressing Towards Goal 
  
Problem: Patient Education: Go to Patient Education Activity Goal: Patient/Family Education Outcome: Progressing Towards Goal 
  
Problem: Falls - Risk of 
Goal: *Absence of Falls Description: Document Selvin Jessi Fall Risk and appropriate interventions in the flowsheet. Outcome: Progressing Towards Goal 
Note: Fall Risk Interventions: 
Mobility Interventions: Communicate number of staff needed for ambulation/transfer, Patient to call before getting OOB, Strengthening exercises (ROM-active/passive), Utilize walker, cane, or other assistive device Medication Interventions: Evaluate medications/consider consulting pharmacy, Patient to call before getting OOB Elimination Interventions: Call light in reach, Patient to call for help with toileting needs History of Falls Interventions: Evaluate medications/consider consulting pharmacy Problem: Patient Education: Go to Patient Education Activity Goal: Patient/Family Education Outcome: Progressing Towards Goal

## 2020-05-29 NOTE — ROUTINE PROCESS
TRANSFER - OUT REPORT: 
 
Verbal report given to Juan David Smith RN on Ileana Caal  being transferred to  for routine progression of care Report consisted of patients Situation, Background, Assessment and  
Recommendations(SBAR). Information from the following report(s) SBAR, Procedure Summary and Cardiac Rhythm SR was reviewed with the receiving nurse. Lines:  
Peripheral IV 05/28/20 Posterior;Right Forearm (Active) Site Assessment Clean, dry, & intact 5/29/2020  9:10 AM  
Phlebitis Assessment 0 5/29/2020  9:10 AM  
Infiltration Assessment 0 5/29/2020  9:10 AM  
Dressing Status Clean, dry, & intact 5/29/2020  9:10 AM  
Dressing Type Transparent;Tape 5/29/2020  9:10 AM  
Hub Color/Line Status Blue;Flushed;Capped 5/29/2020  9:10 AM  
Action Taken Open ports on tubing capped 5/29/2020  9:10 AM  
Alcohol Cap Used Yes 5/29/2020  9:10 AM  
  
 
Opportunity for questions and clarification was provided. Patient transported with: 
 O2 @ 2 liters Tech, Chart, IV

## 2020-05-29 NOTE — PROGRESS NOTES
[facility] Admit Date: 5/26/2020 Subjective: No new complaints. The M2A capsule study showed multiple small bowel AVM's with evidence of active oozing/bleeding. Hgb 8.1 this am. 
 
 
 
 
Current Facility-Administered Medications Medication Dose Route Frequency  potassium chloride SR (KLOR-CON 10) tablet 20 mEq  20 mEq Oral NOW  
 [START ON 5/30/2020] potassium chloride SR (KLOR-CON 10) tablet 20 mEq  20 mEq Oral DAILY  nicotine (NICODERM CQ) 14 mg/24 hr patch 1 Patch  1 Patch TransDERmal DAILY  0.9% sodium chloride infusion 250 mL  250 mL IntraVENous PRN  polyvinyl alcohol-povidone (NATURAL TEARS) 0.5-0.6 % ophthalmic solution 2 Drop  2 Drop Both Eyes Q6H PRN  
 albuterol-ipratropium (DUO-NEB) 2.5 MG-0.5 MG/3 ML  3 mL Nebulization QID RT  
 albuterol (PROVENTIL VENTOLIN) nebulizer solution 2.5 mg  2.5 mg Nebulization Q4H PRN  
 ARIPiprazole (ABILIFY) tablet 10 mg  10 mg Oral DAILY  atorvastatin (LIPITOR) tablet 20 mg  20 mg Oral QHS  buPROPion SR (WELLBUTRIN SR) tablet 150 mg  150 mg Oral DAILY  calcium carbonate (TUMS) chewable tablet 200-400 mg [elemental]  200-400 mg Oral DAILY PRN  
 carvediloL (COREG) tablet 3.125 mg  3.125 mg Oral BID WITH MEALS  cholecalciferol (VITAMIN D3) (1000 Units /25 mcg) tablet 4 Tab  4,000 Units Oral DAILY  cyanocobalamin (VITAMIN B12) tablet 250 mcg  250 mcg Oral DAILY  DULoxetine (CYMBALTA) capsule 60 mg  60 mg Oral DAILY  ferrous sulfate tablet 325 mg  325 mg Oral DAILY  budesonide (PULMICORT) 250 mcg/2ml nebulizer susp  250 mcg Nebulization BID RT  
 furosemide (LASIX) tablet 20 mg  20 mg Oral DAILY  guaiFENesin (ROBITUSSIN) 100 mg/5 mL oral liquid 200 mg  200 mg Oral Q4H PRN  
 loratadine (CLARITIN) tablet 10 mg  10 mg Oral DAILY  oxyCODONE-acetaminophen (PERCOCET 7.5) 7.5-325 mg per tablet 1 Tab  1 Tab Oral TID PRN  
  pantoprazole (PROTONIX) 40 mg in 0.9% sodium chloride 10 mL injection  40 mg IntraVENous BID  pregabalin (LYRICA) capsule 150 mg  150 mg Oral BID  senna-docusate (PERICOLACE) 8.6-50 mg per tablet 1 Tab  1 Tab Oral DAILY PRN  
 sucralfate (CARAFATE) tablet 1 g  1 g Oral QID  therapeutic multivitamin (THERAGRAN) tablet 1 Tab  1 Tab Oral DAILY  glycopyrrolate-formoterol (BEVESPI AEROSPHERE) 9 mcg-4.8 mcg inhaler  2 Puff Inhalation BID RT  
 acetaminophen (TYLENOL) tablet 650 mg  650 mg Oral Q4H PRN  
 ondansetron (ZOFRAN) injection 4 mg  4 mg IntraVENous Q6H PRN Objective:  
 
Patient Vitals for the past 8 hrs: 
 BP Temp Pulse Resp SpO2 Weight 05/29/20 0519      129 lb 3.2 oz (58.6 kg) 05/29/20 0315 116/73 98.2 °F (36.8 °C) 90 16 95 %  No intake/output data recorded. No intake/output data recorded. Physical Exam: NAD. A&O. Neck -- Supple. No JVD. Heart -- RRR. Lungs -- Grossly CTA. Abd -- Benign. Ext -- No LE edema, b/l. Data Review Recent Results (from the past 24 hour(s)) CBC WITH AUTOMATED DIFF Collection Time: 05/28/20  6:14 PM  
Result Value Ref Range WBC 2.7 (L) 3.6 - 11.0 K/uL  
 RBC 3.05 (L) 3.80 - 5.20 M/uL HGB 8.7 (L) 11.5 - 16.0 g/dL HCT 29.7 (L) 35.0 - 47.0 % MCV 97.4 80.0 - 99.0 FL  
 MCH 28.5 26.0 - 34.0 PG  
 MCHC 29.3 (L) 30.0 - 36.5 g/dL  
 RDW 16.2 (H) 11.5 - 14.5 % PLATELET 74 (L) 191 - 400 K/uL MPV 12.7 8.9 - 12.9 FL  
 NRBC 0.0 0  WBC ABSOLUTE NRBC 0.00 0.00 - 0.01 K/uL NEUTROPHILS 62 32 - 75 % LYMPHOCYTES 16 12 - 49 % MONOCYTES 17 (H) 5 - 13 % EOSINOPHILS 5 0 - 7 % BASOPHILS 0 0 - 1 % IMMATURE GRANULOCYTES 0 0.0 - 0.5 % ABS. NEUTROPHILS 1.7 (L) 1.8 - 8.0 K/UL  
 ABS. LYMPHOCYTES 0.4 (L) 0.8 - 3.5 K/UL  
 ABS. MONOCYTES 0.5 0.0 - 1.0 K/UL ABS. EOSINOPHILS 0.1 0.0 - 0.4 K/UL  
 ABS. BASOPHILS 0.0 0.0 - 0.1 K/UL  
 ABS. IMM. GRANS. 0.0 0.00 - 0.04 K/UL  
 DF SMEAR SCANNED    
 RBC COMMENTS ANISOCYTOSIS 
1+ 
    
CBC WITH AUTOMATED DIFF Collection Time: 05/29/20  3:31 AM  
Result Value Ref Range WBC 3.5 (L) 3.6 - 11.0 K/uL  
 RBC 2.79 (L) 3.80 - 5.20 M/uL HGB 8.1 (L) 11.5 - 16.0 g/dL HCT 27.1 (L) 35.0 - 47.0 % MCV 97.1 80.0 - 99.0 FL  
 MCH 29.0 26.0 - 34.0 PG  
 MCHC 29.9 (L) 30.0 - 36.5 g/dL  
 RDW 16.3 (H) 11.5 - 14.5 % PLATELET 70 (L) 533 - 400 K/uL MPV 12.8 8.9 - 12.9 FL  
 NRBC 0.0 0  WBC ABSOLUTE NRBC 0.00 0.00 - 0.01 K/uL NEUTROPHILS 76 (H) 32 - 75 % LYMPHOCYTES 10 (L) 12 - 49 % MONOCYTES 11 5 - 13 % EOSINOPHILS 3 0 - 7 % BASOPHILS 0 0 - 1 % IMMATURE GRANULOCYTES 0 0.0 - 0.5 % ABS. NEUTROPHILS 2.6 1.8 - 8.0 K/UL  
 ABS. LYMPHOCYTES 0.4 (L) 0.8 - 3.5 K/UL  
 ABS. MONOCYTES 0.4 0.0 - 1.0 K/UL  
 ABS. EOSINOPHILS 0.1 0.0 - 0.4 K/UL  
 ABS. BASOPHILS 0.0 0.0 - 0.1 K/UL  
 ABS. IMM. GRANS. 0.0 0.00 - 0.04 K/UL  
 DF SMEAR SCANNED    
 RBC COMMENTS ANISOCYTOSIS 1+ 
    
 RBC COMMENTS MACROCYTOSIS 1+ 
    
 RBC COMMENTS POLYCHROMASIA 1+ METABOLIC PANEL, BASIC Collection Time: 05/29/20  3:31 AM  
Result Value Ref Range Sodium 138 136 - 145 mmol/L Potassium 3.6 3.5 - 5.1 mmol/L Chloride 105 97 - 108 mmol/L  
 CO2 27 21 - 32 mmol/L Anion gap 6 5 - 15 mmol/L Glucose 147 (H) 65 - 100 mg/dL BUN 13 6 - 20 MG/DL Creatinine 0.92 0.55 - 1.02 MG/DL  
 BUN/Creatinine ratio 14 12 - 20 GFR est AA >60 >60 ml/min/1.73m2 GFR est non-AA >60 >60 ml/min/1.73m2 Calcium 9.0 8.5 - 10.1 MG/DL Assessment:  
 
Principal Problem: 
  GI bleed -- Appears to be from small bowel AVM's. Active Problems: 
  Cirrhosis of liver ([facility] (8/30/2016) Chronic respiratory failure with hypoxia ([facility] (8/30/2016) Acute blood loss anemia (10/25/2016) Lung cancer ([facility] (4/1/2020) Hypokalemia -- Better after repletion. Thrombocytopenia -- Likely due to cirrhosis. Plan: 1. For small bowel enteroscopy today -- Appreciate help from GI. 
2. Follow Hgb. 3. Cont BID PPI. 4. Follow Plts.  
 
 
 
 
Nan Borrero MD

## 2020-05-29 NOTE — ANESTHESIA PREPROCEDURE EVALUATION
Anesthetic History No history of anesthetic complications Review of Systems / Medical History Patient summary reviewed, nursing notes reviewed and pertinent labs reviewed Pulmonary COPD Smoker Neuro/Psych Psychiatric history Cardiovascular CHF Dysrhythmias : sinus tachycardia GI/Hepatic/Renal 
  
 
 
 
Liver disease Endo/Other Arthritis and cancer Other Findings Physical Exam 
 
Airway Mallampati: II 
TM Distance: 4 - 6 cm Neck ROM: normal range of motion Mouth opening: Normal 
 
 Cardiovascular Regular rate and rhythm,  S1 and S2 normal,  no murmur, click, rub, or gallop Dental 
No notable dental hx Pulmonary Breath sounds clear to auscultation Abdominal 
GI exam deferred Other Findings Anesthetic Plan ASA: 3 Anesthesia type: general 
 
 
 
 
Induction: Intravenous Anesthetic plan and risks discussed with: Patient

## 2020-05-30 LAB
ANION GAP SERPL CALC-SCNC: 5 MMOL/L (ref 5–15)
BASOPHILS # BLD: 0 K/UL (ref 0–0.1)
BASOPHILS NFR BLD: 0 % (ref 0–1)
BUN SERPL-MCNC: 13 MG/DL (ref 6–20)
BUN/CREAT SERPL: 13 (ref 12–20)
CALCIUM SERPL-MCNC: 8.3 MG/DL (ref 8.5–10.1)
CHLORIDE SERPL-SCNC: 105 MMOL/L (ref 97–108)
CO2 SERPL-SCNC: 29 MMOL/L (ref 21–32)
CREAT SERPL-MCNC: 0.99 MG/DL (ref 0.55–1.02)
DIFFERENTIAL METHOD BLD: ABNORMAL
EOSINOPHIL # BLD: 0.2 K/UL (ref 0–0.4)
EOSINOPHIL NFR BLD: 4 % (ref 0–7)
ERYTHROCYTE [DISTWIDTH] IN BLOOD BY AUTOMATED COUNT: 15.8 % (ref 11.5–14.5)
GLUCOSE SERPL-MCNC: 112 MG/DL (ref 65–100)
HCT VFR BLD AUTO: 26.8 % (ref 35–47)
HGB BLD-MCNC: 8 G/DL (ref 11.5–16)
IMM GRANULOCYTES # BLD AUTO: 0 K/UL (ref 0–0.04)
IMM GRANULOCYTES NFR BLD AUTO: 0 % (ref 0–0.5)
LYMPHOCYTES # BLD: 0.4 K/UL (ref 0.8–3.5)
LYMPHOCYTES NFR BLD: 11 % (ref 12–49)
MCH RBC QN AUTO: 28.9 PG (ref 26–34)
MCHC RBC AUTO-ENTMCNC: 29.9 G/DL (ref 30–36.5)
MCV RBC AUTO: 96.8 FL (ref 80–99)
MONOCYTES # BLD: 0.5 K/UL (ref 0–1)
MONOCYTES NFR BLD: 12 % (ref 5–13)
NEUTS SEG # BLD: 2.7 K/UL (ref 1.8–8)
NEUTS SEG NFR BLD: 73 % (ref 32–75)
NRBC # BLD: 0 K/UL (ref 0–0.01)
NRBC BLD-RTO: 0 PER 100 WBC
PLATELET # BLD AUTO: 68 K/UL (ref 150–400)
PMV BLD AUTO: 11.5 FL (ref 8.9–12.9)
POTASSIUM SERPL-SCNC: 3.6 MMOL/L (ref 3.5–5.1)
RBC # BLD AUTO: 2.77 M/UL (ref 3.8–5.2)
RBC MORPH BLD: ABNORMAL
RBC MORPH BLD: ABNORMAL
SODIUM SERPL-SCNC: 139 MMOL/L (ref 136–145)
WBC # BLD AUTO: 3.8 K/UL (ref 3.6–11)

## 2020-05-30 PROCEDURE — 74011250637 HC RX REV CODE- 250/637: Performed by: INTERNAL MEDICINE

## 2020-05-30 PROCEDURE — 74011250636 HC RX REV CODE- 250/636: Performed by: INTERNAL MEDICINE

## 2020-05-30 PROCEDURE — 74011000250 HC RX REV CODE- 250: Performed by: INTERNAL MEDICINE

## 2020-05-30 PROCEDURE — 80048 BASIC METABOLIC PNL TOTAL CA: CPT

## 2020-05-30 PROCEDURE — 94640 AIRWAY INHALATION TREATMENT: CPT

## 2020-05-30 PROCEDURE — 74011250637 HC RX REV CODE- 250/637: Performed by: FAMILY MEDICINE

## 2020-05-30 PROCEDURE — 94760 N-INVAS EAR/PLS OXIMETRY 1: CPT

## 2020-05-30 PROCEDURE — 85025 COMPLETE CBC W/AUTO DIFF WBC: CPT

## 2020-05-30 PROCEDURE — 36415 COLL VENOUS BLD VENIPUNCTURE: CPT

## 2020-05-30 PROCEDURE — 65270000029 HC RM PRIVATE

## 2020-05-30 PROCEDURE — C9113 INJ PANTOPRAZOLE SODIUM, VIA: HCPCS | Performed by: INTERNAL MEDICINE

## 2020-05-30 PROCEDURE — 77010033678 HC OXYGEN DAILY

## 2020-05-30 RX ORDER — IPRATROPIUM BROMIDE AND ALBUTEROL SULFATE 2.5; .5 MG/3ML; MG/3ML
3 SOLUTION RESPIRATORY (INHALATION)
Status: DISCONTINUED | OUTPATIENT
Start: 2020-05-30 | End: 2020-06-01 | Stop reason: HOSPADM

## 2020-05-30 RX ADMIN — MELATONIN 4 TABLET: at 09:42

## 2020-05-30 RX ADMIN — OXYCODONE HYDROCHLORIDE AND ACETAMINOPHEN 1 TABLET: 7.5; 325 TABLET ORAL at 03:28

## 2020-05-30 RX ADMIN — BUPROPION HYDROCHLORIDE 150 MG: 150 TABLET, EXTENDED RELEASE ORAL at 09:42

## 2020-05-30 RX ADMIN — IPRATROPIUM BROMIDE AND ALBUTEROL SULFATE 3 ML: .5; 3 SOLUTION RESPIRATORY (INHALATION) at 07:55

## 2020-05-30 RX ADMIN — BUDESONIDE 250 MCG: 0.25 SUSPENSION RESPIRATORY (INHALATION) at 07:59

## 2020-05-30 RX ADMIN — PREGABALIN 150 MG: 75 CAPSULE ORAL at 17:15

## 2020-05-30 RX ADMIN — ATORVASTATIN CALCIUM 20 MG: 20 TABLET, FILM COATED ORAL at 22:08

## 2020-05-30 RX ADMIN — POTASSIUM CHLORIDE 20 MEQ: 750 TABLET, FILM COATED, EXTENDED RELEASE ORAL at 09:43

## 2020-05-30 RX ADMIN — Medication 10 ML: at 13:37

## 2020-05-30 RX ADMIN — SUCRALFATE 1 G: 1 TABLET ORAL at 22:08

## 2020-05-30 RX ADMIN — THERA TABS 1 TABLET: TAB at 09:43

## 2020-05-30 RX ADMIN — DULOXETINE HYDROCHLORIDE 60 MG: 60 CAPSULE, DELAYED RELEASE ORAL at 09:44

## 2020-05-30 RX ADMIN — SODIUM CHLORIDE 40 MG: 9 INJECTION INTRAMUSCULAR; INTRAVENOUS; SUBCUTANEOUS at 09:45

## 2020-05-30 RX ADMIN — Medication 10 ML: at 22:08

## 2020-05-30 RX ADMIN — IPRATROPIUM BROMIDE AND ALBUTEROL SULFATE 3 ML: .5; 3 SOLUTION RESPIRATORY (INHALATION) at 15:31

## 2020-05-30 RX ADMIN — OXYCODONE HYDROCHLORIDE AND ACETAMINOPHEN 1 TABLET: 7.5; 325 TABLET ORAL at 13:36

## 2020-05-30 RX ADMIN — SODIUM CHLORIDE 40 MG: 9 INJECTION INTRAMUSCULAR; INTRAVENOUS; SUBCUTANEOUS at 17:15

## 2020-05-30 RX ADMIN — CYANOCOBALAMIN TAB 500 MCG 250 MCG: 500 TAB at 09:43

## 2020-05-30 RX ADMIN — FERROUS SULFATE TAB 325 MG (65 MG ELEMENTAL FE) 325 MG: 325 (65 FE) TAB at 09:44

## 2020-05-30 RX ADMIN — IPRATROPIUM BROMIDE AND ALBUTEROL SULFATE 3 ML: .5; 3 SOLUTION RESPIRATORY (INHALATION) at 11:48

## 2020-05-30 RX ADMIN — FUROSEMIDE 20 MG: 20 TABLET ORAL at 09:44

## 2020-05-30 RX ADMIN — SUCRALFATE 1 G: 1 TABLET ORAL at 13:36

## 2020-05-30 RX ADMIN — GLYCOPYRROLATE AND FORMOTEROL FUMARATE 2 PUFF: 9; 4.8 AEROSOL, METERED RESPIRATORY (INHALATION) at 08:01

## 2020-05-30 RX ADMIN — LORATADINE 10 MG: 10 TABLET ORAL at 09:43

## 2020-05-30 RX ADMIN — PREGABALIN 150 MG: 75 CAPSULE ORAL at 09:42

## 2020-05-30 RX ADMIN — Medication 10 ML: at 09:45

## 2020-05-30 RX ADMIN — SUCRALFATE 1 G: 1 TABLET ORAL at 17:15

## 2020-05-30 RX ADMIN — SUCRALFATE 1 G: 1 TABLET ORAL at 09:42

## 2020-05-30 RX ADMIN — ARIPIPRAZOLE 10 MG: 10 TABLET ORAL at 09:43

## 2020-05-30 RX ADMIN — OXYCODONE HYDROCHLORIDE AND ACETAMINOPHEN 1 TABLET: 7.5; 325 TABLET ORAL at 22:16

## 2020-05-30 NOTE — PROGRESS NOTES
Mony Chua, Samia Moran, and Paula Admit Date: 5/26/2020 Subjective:  
 
Patient feeling OK. Had 15 SB AVM ablations done yesterday by Dr Josee Magana. No abd pain. Current Facility-Administered Medications Medication Dose Route Frequency  potassium chloride SR (KLOR-CON 10) tablet 20 mEq  20 mEq Oral DAILY  sodium chloride (NS) flush 5-40 mL  5-40 mL IntraVENous Q8H  
 sodium chloride (NS) flush 5-40 mL  5-40 mL IntraVENous PRN  
 nicotine (NICODERM CQ) 14 mg/24 hr patch 1 Patch  1 Patch TransDERmal DAILY  0.9% sodium chloride infusion 250 mL  250 mL IntraVENous PRN  polyvinyl alcohol-povidone (NATURAL TEARS) 0.5-0.6 % ophthalmic solution 2 Drop  2 Drop Both Eyes Q6H PRN  
 albuterol-ipratropium (DUO-NEB) 2.5 MG-0.5 MG/3 ML  3 mL Nebulization QID RT  
 albuterol (PROVENTIL VENTOLIN) nebulizer solution 2.5 mg  2.5 mg Nebulization Q4H PRN  
 ARIPiprazole (ABILIFY) tablet 10 mg  10 mg Oral DAILY  atorvastatin (LIPITOR) tablet 20 mg  20 mg Oral QHS  buPROPion SR (WELLBUTRIN SR) tablet 150 mg  150 mg Oral DAILY  calcium carbonate (TUMS) chewable tablet 200-400 mg [elemental]  200-400 mg Oral DAILY PRN  
 carvediloL (COREG) tablet 3.125 mg  3.125 mg Oral BID WITH MEALS  cholecalciferol (VITAMIN D3) (1000 Units /25 mcg) tablet 4 Tab  4,000 Units Oral DAILY  cyanocobalamin (VITAMIN B12) tablet 250 mcg  250 mcg Oral DAILY  DULoxetine (CYMBALTA) capsule 60 mg  60 mg Oral DAILY  ferrous sulfate tablet 325 mg  325 mg Oral DAILY  budesonide (PULMICORT) 250 mcg/2ml nebulizer susp  250 mcg Nebulization BID RT  
 furosemide (LASIX) tablet 20 mg  20 mg Oral DAILY  guaiFENesin (ROBITUSSIN) 100 mg/5 mL oral liquid 200 mg  200 mg Oral Q4H PRN  
 loratadine (CLARITIN) tablet 10 mg  10 mg Oral DAILY  oxyCODONE-acetaminophen (PERCOCET 7.5) 7.5-325 mg per tablet 1 Tab  1 Tab Oral TID PRN  
  pantoprazole (PROTONIX) 40 mg in 0.9% sodium chloride 10 mL injection  40 mg IntraVENous BID  pregabalin (LYRICA) capsule 150 mg  150 mg Oral BID  senna-docusate (PERICOLACE) 8.6-50 mg per tablet 1 Tab  1 Tab Oral DAILY PRN  
 sucralfate (CARAFATE) tablet 1 g  1 g Oral QID  therapeutic multivitamin (THERAGRAN) tablet 1 Tab  1 Tab Oral DAILY  glycopyrrolate-formoterol (BEVESPI AEROSPHERE) 9 mcg-4.8 mcg inhaler  2 Puff Inhalation BID RT  
 acetaminophen (TYLENOL) tablet 650 mg  650 mg Oral Q4H PRN  
 ondansetron (ZOFRAN) injection 4 mg  4 mg IntraVENous Q6H PRN Objective:  
 
Patient Vitals for the past 8 hrs: 
 BP Temp Pulse Resp SpO2 Weight 05/30/20 0844 111/68 98.4 °F (36.9 °C) 79 16 97 %   
05/30/20 0803     98 %   
05/30/20 0801     97 %   
05/30/20 0736      129 lb 8 oz (58.7 kg) 05/30/20 0657 103/55  80  96 %  No intake/output data recorded. 05/28 1901 - 05/30 0700 In: 700 [I.V.:700] Out: - Physical Exam: Lungs: clear to auscultation bilaterally Heart: regular rate and rhythm, S1, S2 normal, no murmur, click, rub or gallop Abdomen: soft, non-tender. Bowel sounds normal. No masses,  no organomegaly Data Review Recent Results (from the past 24 hour(s)) CBC WITH AUTOMATED DIFF Collection Time: 05/30/20  1:34 AM  
Result Value Ref Range WBC 3.8 3.6 - 11.0 K/uL  
 RBC 2.77 (L) 3.80 - 5.20 M/uL HGB 8.0 (L) 11.5 - 16.0 g/dL HCT 26.8 (L) 35.0 - 47.0 % MCV 96.8 80.0 - 99.0 FL  
 MCH 28.9 26.0 - 34.0 PG  
 MCHC 29.9 (L) 30.0 - 36.5 g/dL  
 RDW 15.8 (H) 11.5 - 14.5 % PLATELET 68 (L) 045 - 400 K/uL MPV 11.5 8.9 - 12.9 FL  
 NRBC 0.0 0  WBC ABSOLUTE NRBC 0.00 0.00 - 0.01 K/uL NEUTROPHILS 73 32 - 75 % LYMPHOCYTES 11 (L) 12 - 49 % MONOCYTES 12 5 - 13 % EOSINOPHILS 4 0 - 7 % BASOPHILS 0 0 - 1 % IMMATURE GRANULOCYTES 0 0.0 - 0.5 % ABS. NEUTROPHILS 2.7 1.8 - 8.0 K/UL ABS. LYMPHOCYTES 0.4 (L) 0.8 - 3.5 K/UL  
 ABS. MONOCYTES 0.5 0.0 - 1.0 K/UL  
 ABS. EOSINOPHILS 0.2 0.0 - 0.4 K/UL  
 ABS. BASOPHILS 0.0 0.0 - 0.1 K/UL  
 ABS. IMM. GRANS. 0.0 0.00 - 0.04 K/UL  
 DF SMEAR SCANNED    
 RBC COMMENTS ANISOCYTOSIS 1+ 
    
 RBC COMMENTS HYPOCHROMIA 1+ METABOLIC PANEL, BASIC Collection Time: 05/30/20  1:34 AM  
Result Value Ref Range Sodium 139 136 - 145 mmol/L Potassium 3.6 3.5 - 5.1 mmol/L Chloride 105 97 - 108 mmol/L  
 CO2 29 21 - 32 mmol/L Anion gap 5 5 - 15 mmol/L Glucose 112 (H) 65 - 100 mg/dL BUN 13 6 - 20 MG/DL Creatinine 0.99 0.55 - 1.02 MG/DL  
 BUN/Creatinine ratio 13 12 - 20 GFR est AA >60 >60 ml/min/1.73m2 GFR est non-AA 57 (L) >60 ml/min/1.73m2 Calcium 8.3 (L) 8.5 - 10.1 MG/DL Assessment:  
 
Principal Problem: 
  GI bleed (7/19/2016) Active Problems: 
  Cirrhosis of liver ([facility] [address] (8/30/2016) Chronic respiratory failure with hypoxia ([facility] [address] (8/30/2016) Acute blood loss anemia (10/25/2016) [facility] (4/1/2020) Hypokalemia (5/26/2020) Thrombocytopenia ([facility] [address] (5/27/2020) Plan:  
 
1) Cont to follow Hgb

## 2020-05-30 NOTE — PROGRESS NOTES
GI Progress Note (for [facility]) NAME:Portia Wei :1960 OVF:[id number] ATTG: CARLOTA Chapman MD  Prim GI: Piyush Damon MD PCP: Radha Shelton MD 
Date/Time:  2020 1:22 PM  
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  
Discussed with RN events overnight. No further bleeding. No pain with PO intake Complaint Y/N Description Abdominal Pain n Hematemesis n Hematochezia n Melena n   
Constipation n   
Diarrhea n Dyspepsia n Dysphagia n   
Jaundiced n   
Nausea/vomiting n Review of Systems: 
Symptom Y/N Comments  Symptom Y/N Comments Fever/Chills n   Chest Pain n   
Cough n   Headaches n Sputum n   Joint Pain n   
SOB/REDMOND n   Pruritis/Rash n Tolerating Diet y GI lite  Other Could NOT obtain due to:   
 
Objective: VITALS:  
Last 24hrs VS reviewed since prior progress note. Most recent are: 
Visit Vitals /68 (BP 1 Location: Left arm, BP Patient Position: At rest) Pulse 79 Temp 98.4 °F (36.9 °C) Resp 16 Ht 5' (1.524 m) Wt 58.7 kg (129 lb 8 oz) SpO2 96% BMI 25.29 kg/m² Intake/Output Summary (Last 24 hours) at 2020 1322 Last data filed at 2020 0162 Gross per 24 hour Intake 700 ml Output  Net 700 ml PHYSICAL EXAM: 
General: WD, WN. Alert, cooperative, no acute distress   
HEENT: NC, Atraumatic. PERRL. Anicteric sclerae. Lungs:  CTA Bilaterally. No Wheezing/Rhonchi/Rales. Heart:  Regular  rhythm,  No murmur/Rub/Gallops Abdomen: Soft, Non distended, Non tender.  +BS Extremities: No c/c/e Neurologic:  CN 2-12 gi, A/O X 3. No acute neurological distress Psych:   Good insight. Not anxious nor agitated. Lab and Radiology Data Reviewed: (see below) Medications Reviewed: (see below) PMH/SH reviewed - no change compared to H&P 
________________________________________________________________________ Total time spent with patient: 15 minutes ________________________________________________________________________ Care Plan discussed with: 
Patient y Family RN y  
           Consultant:    
 
Alexandrea Harry MD  
 
Procedures: see electronic medical records for all procedures/Xrays and details which were not copied into this note but were reviewed prior to creation of Plan. LABS: 
Recent Labs 05/30/20 
0134 05/29/20 
6735 WBC 3.8 3.5* HGB 8.0* 8.1* HCT 26.8* 27.1*  
PLT 68* 70* Recent Labs 05/30/20 
0134 05/29/20 
2673  138  
K 3.6 3.6  105 CO2 29 27 BUN 13 13 CREA 0.99 0.92  
* 147* CA 8.3* 9.0 No results for input(s): AP, TBIL, TP, ALB, GLOB, GGT, AML, LPSE in the last 72 hours. No lab exists for component: SGOT, GPT, AMYP, HLPSE No results for input(s): INR, PTP, APTT, INREXT in the last 72 hours. No results for input(s): FE, TIBC, PSAT, FERR in the last 72 hours. Lab Results Component Value Date/Time Folate 35.2 (H) 02/09/2017 03:26 AM  
 
No results for input(s): PH, PCO2, PO2 in the last 72 hours. No results for input(s): CPK, CKMB in the last 72 hours. No lab exists for component: TROPONINI Lab Results Component Value Date/Time  Color YELLOW/STRAW 05/26/2020 10:49 AM  
 Appearance CLEAR 05/26/2020 10:49 AM  
 Specific gravity 1.006 05/26/2020 10:49 AM  
 pH (UA) 7.0 05/26/2020 10:49 AM  
 Protein Negative 05/26/2020 10:49 AM  
 Glucose Negative 05/26/2020 10:49 AM  
 Ketone Negative 05/26/2020 10:49 AM  
 Bilirubin Negative 05/26/2020 10:49 AM  
 Urobilinogen 0.2 05/26/2020 10:49 AM  
 Nitrites Negative 05/26/2020 10:49 AM  
 Leukocyte Esterase Negative 05/26/2020 10:49 AM  
 Epithelial cells FEW 05/26/2020 10:49 AM  
 Bacteria Negative 05/26/2020 10:49 AM  
 WBC 0-4 05/26/2020 10:49 AM  
 RBC 0-5 05/26/2020 10:49 AM  
 
 
MEDICATIONS: 
Current Facility-Administered Medications Medication Dose Route Frequency  potassium chloride SR (KLOR-CON 10) tablet 20 mEq  20 mEq Oral DAILY  sodium chloride (NS) flush 5-40 mL  5-40 mL IntraVENous Q8H  
 sodium chloride (NS) flush 5-40 mL  5-40 mL IntraVENous PRN  
 nicotine (NICODERM CQ) 14 mg/24 hr patch 1 Patch  1 Patch TransDERmal DAILY  0.9% sodium chloride infusion 250 mL  250 mL IntraVENous PRN  polyvinyl alcohol-povidone (NATURAL TEARS) 0.5-0.6 % ophthalmic solution 2 Drop  2 Drop Both Eyes Q6H PRN  
 albuterol-ipratropium (DUO-NEB) 2.5 MG-0.5 MG/3 ML  3 mL Nebulization QID RT  
 albuterol (PROVENTIL VENTOLIN) nebulizer solution 2.5 mg  2.5 mg Nebulization Q4H PRN  
 ARIPiprazole (ABILIFY) tablet 10 mg  10 mg Oral DAILY  atorvastatin (LIPITOR) tablet 20 mg  20 mg Oral QHS  buPROPion SR (WELLBUTRIN SR) tablet 150 mg  150 mg Oral DAILY  calcium carbonate (TUMS) chewable tablet 200-400 mg [elemental]  200-400 mg Oral DAILY PRN  
 carvediloL (COREG) tablet 3.125 mg  3.125 mg Oral BID WITH MEALS  cholecalciferol (VITAMIN D3) (1000 Units /25 mcg) tablet 4 Tab  4,000 Units Oral DAILY  cyanocobalamin (VITAMIN B12) tablet 250 mcg  250 mcg Oral DAILY  DULoxetine (CYMBALTA) capsule 60 mg  60 mg Oral DAILY  ferrous sulfate tablet 325 mg  325 mg Oral DAILY  budesonide (PULMICORT) 250 mcg/2ml nebulizer susp  250 mcg Nebulization BID RT  
 furosemide (LASIX) tablet 20 mg  20 mg Oral DAILY  guaiFENesin (ROBITUSSIN) 100 mg/5 mL oral liquid 200 mg  200 mg Oral Q4H PRN  
 loratadine (CLARITIN) tablet 10 mg  10 mg Oral DAILY  oxyCODONE-acetaminophen (PERCOCET 7.5) 7.5-325 mg per tablet 1 Tab  1 Tab Oral TID PRN  pantoprazole (PROTONIX) 40 mg in 0.9% sodium chloride 10 mL injection  40 mg IntraVENous BID  pregabalin (LYRICA) capsule 150 mg  150 mg Oral BID  
  senna-docusate (PERICOLACE) 8.6-50 mg per tablet 1 Tab  1 Tab Oral DAILY PRN  
 sucralfate (CARAFATE) tablet 1 g  1 g Oral QID  therapeutic multivitamin (THERAGRAN) tablet 1 Tab  1 Tab Oral DAILY  glycopyrrolate-formoterol (BEVESPI AEROSPHERE) 9 mcg-4.8 mcg inhaler  2 Puff Inhalation BID RT  
 acetaminophen (TYLENOL) tablet 650 mg  650 mg Oral Q4H PRN  
 ondansetron (ZOFRAN) injection 4 mg  4 mg IntraVENous Q6H PRN

## 2020-05-30 NOTE — PROGRESS NOTES
Bedside shift change report given to Adriana Nicole RN (oncoming nurse) by Pedro Harley RN (offgoing nurse). Report included the following information SBAR, Kardex, Intake/Output, MAR and Recent Results.

## 2020-05-31 LAB
BASOPHILS # BLD: 0 K/UL (ref 0–0.1)
BASOPHILS NFR BLD: 0 % (ref 0–1)
DIFFERENTIAL METHOD BLD: ABNORMAL
EOSINOPHIL # BLD: 0.2 K/UL (ref 0–0.4)
EOSINOPHIL NFR BLD: 5 % (ref 0–7)
ERYTHROCYTE [DISTWIDTH] IN BLOOD BY AUTOMATED COUNT: 15.1 % (ref 11.5–14.5)
HCT VFR BLD AUTO: 29 % (ref 35–47)
HGB BLD-MCNC: 8.5 G/DL (ref 11.5–16)
IMM GRANULOCYTES # BLD AUTO: 0 K/UL (ref 0–0.04)
IMM GRANULOCYTES NFR BLD AUTO: 0 % (ref 0–0.5)
LYMPHOCYTES # BLD: 0.4 K/UL (ref 0.8–3.5)
LYMPHOCYTES NFR BLD: 12 % (ref 12–49)
MCH RBC QN AUTO: 28.4 PG (ref 26–34)
MCHC RBC AUTO-ENTMCNC: 29.3 G/DL (ref 30–36.5)
MCV RBC AUTO: 97 FL (ref 80–99)
MONOCYTES # BLD: 0.4 K/UL (ref 0–1)
MONOCYTES NFR BLD: 12 % (ref 5–13)
NEUTS SEG # BLD: 2.6 K/UL (ref 1.8–8)
NEUTS SEG NFR BLD: 71 % (ref 32–75)
NRBC # BLD: 0 K/UL (ref 0–0.01)
NRBC BLD-RTO: 0 PER 100 WBC
PLATELET # BLD AUTO: 70 K/UL (ref 150–400)
PLATELET COMMENTS,PCOM: ABNORMAL
PMV BLD AUTO: 12.9 FL (ref 8.9–12.9)
RBC # BLD AUTO: 2.99 M/UL (ref 3.8–5.2)
RBC MORPH BLD: ABNORMAL
RBC MORPH BLD: ABNORMAL
WBC # BLD AUTO: 3.6 K/UL (ref 3.6–11)

## 2020-05-31 PROCEDURE — 36415 COLL VENOUS BLD VENIPUNCTURE: CPT

## 2020-05-31 PROCEDURE — 94760 N-INVAS EAR/PLS OXIMETRY 1: CPT

## 2020-05-31 PROCEDURE — 74011250637 HC RX REV CODE- 250/637: Performed by: FAMILY MEDICINE

## 2020-05-31 PROCEDURE — 74011000250 HC RX REV CODE- 250: Performed by: INTERNAL MEDICINE

## 2020-05-31 PROCEDURE — C9113 INJ PANTOPRAZOLE SODIUM, VIA: HCPCS | Performed by: INTERNAL MEDICINE

## 2020-05-31 PROCEDURE — 77010033678 HC OXYGEN DAILY

## 2020-05-31 PROCEDURE — 65270000029 HC RM PRIVATE

## 2020-05-31 PROCEDURE — 85025 COMPLETE CBC W/AUTO DIFF WBC: CPT

## 2020-05-31 PROCEDURE — 74011250637 HC RX REV CODE- 250/637: Performed by: INTERNAL MEDICINE

## 2020-05-31 PROCEDURE — 74011250636 HC RX REV CODE- 250/636: Performed by: INTERNAL MEDICINE

## 2020-05-31 PROCEDURE — 94640 AIRWAY INHALATION TREATMENT: CPT

## 2020-05-31 RX ORDER — POLYETHYLENE GLYCOL 3350 17 G/17G
17 POWDER, FOR SOLUTION ORAL DAILY
Status: DISCONTINUED | OUTPATIENT
Start: 2020-05-31 | End: 2020-06-01 | Stop reason: HOSPADM

## 2020-05-31 RX ADMIN — PREGABALIN 150 MG: 75 CAPSULE ORAL at 17:08

## 2020-05-31 RX ADMIN — BUPROPION HYDROCHLORIDE 150 MG: 150 TABLET, EXTENDED RELEASE ORAL at 08:59

## 2020-05-31 RX ADMIN — POTASSIUM CHLORIDE 20 MEQ: 750 TABLET, FILM COATED, EXTENDED RELEASE ORAL at 08:58

## 2020-05-31 RX ADMIN — POLYETHYLENE GLYCOL 3350 17 G: 17 POWDER, FOR SOLUTION ORAL at 11:05

## 2020-05-31 RX ADMIN — Medication 10 ML: at 04:26

## 2020-05-31 RX ADMIN — MELATONIN 4 TABLET: at 08:58

## 2020-05-31 RX ADMIN — GLYCOPYRROLATE AND FORMOTEROL FUMARATE 2 PUFF: 9; 4.8 AEROSOL, METERED RESPIRATORY (INHALATION) at 20:14

## 2020-05-31 RX ADMIN — Medication 10 ML: at 21:30

## 2020-05-31 RX ADMIN — THERA TABS 1 TABLET: TAB at 08:59

## 2020-05-31 RX ADMIN — SUCRALFATE 1 G: 1 TABLET ORAL at 17:08

## 2020-05-31 RX ADMIN — ATORVASTATIN CALCIUM 20 MG: 20 TABLET, FILM COATED ORAL at 21:28

## 2020-05-31 RX ADMIN — BUDESONIDE 250 MCG: 0.25 SUSPENSION RESPIRATORY (INHALATION) at 08:30

## 2020-05-31 RX ADMIN — SENNOSIDES AND DOCUSATE SODIUM 1 TABLET: 8.6; 5 TABLET ORAL at 04:26

## 2020-05-31 RX ADMIN — PREGABALIN 150 MG: 75 CAPSULE ORAL at 09:01

## 2020-05-31 RX ADMIN — OXYCODONE HYDROCHLORIDE AND ACETAMINOPHEN 1 TABLET: 7.5; 325 TABLET ORAL at 13:22

## 2020-05-31 RX ADMIN — FERROUS SULFATE TAB 325 MG (65 MG ELEMENTAL FE) 325 MG: 325 (65 FE) TAB at 09:02

## 2020-05-31 RX ADMIN — GLYCOPYRROLATE AND FORMOTEROL FUMARATE 2 PUFF: 9; 4.8 AEROSOL, METERED RESPIRATORY (INHALATION) at 08:31

## 2020-05-31 RX ADMIN — LORATADINE 10 MG: 10 TABLET ORAL at 08:58

## 2020-05-31 RX ADMIN — OXYCODONE HYDROCHLORIDE AND ACETAMINOPHEN 1 TABLET: 7.5; 325 TABLET ORAL at 21:36

## 2020-05-31 RX ADMIN — OXYCODONE HYDROCHLORIDE AND ACETAMINOPHEN 1 TABLET: 7.5; 325 TABLET ORAL at 09:01

## 2020-05-31 RX ADMIN — SODIUM CHLORIDE 40 MG: 9 INJECTION INTRAMUSCULAR; INTRAVENOUS; SUBCUTANEOUS at 09:06

## 2020-05-31 RX ADMIN — ALBUTEROL SULFATE 2.5 MG: 2.5 SOLUTION RESPIRATORY (INHALATION) at 00:23

## 2020-05-31 RX ADMIN — ARIPIPRAZOLE 10 MG: 10 TABLET ORAL at 08:57

## 2020-05-31 RX ADMIN — Medication 10 ML: at 13:13

## 2020-05-31 RX ADMIN — SUCRALFATE 1 G: 1 TABLET ORAL at 21:28

## 2020-05-31 RX ADMIN — SUCRALFATE 1 G: 1 TABLET ORAL at 13:13

## 2020-05-31 RX ADMIN — CYANOCOBALAMIN TAB 500 MCG 250 MCG: 500 TAB at 09:00

## 2020-05-31 RX ADMIN — SODIUM CHLORIDE 40 MG: 9 INJECTION INTRAMUSCULAR; INTRAVENOUS; SUBCUTANEOUS at 17:09

## 2020-05-31 RX ADMIN — SUCRALFATE 1 G: 1 TABLET ORAL at 08:58

## 2020-05-31 RX ADMIN — DULOXETINE HYDROCHLORIDE 60 MG: 60 CAPSULE, DELAYED RELEASE ORAL at 09:01

## 2020-05-31 NOTE — ROUTINE PROCESS
Patient is complaining of not having a bowel movement since she was admitted (05/26/2020). Bowel sound are active, PRN colace was given to the patient.  Notified MD.

## 2020-05-31 NOTE — PROGRESS NOTES
Mony Livingston, Samia James, and Paula Admit Date: 5/26/2020 Subjective:  
 
Patient feels OK today. Hgb up to 8.5. Is constipated. Yuliet [place] Current Facility-Administered Medications Medication Dose Route Frequency  polyethylene glycol (MIRALAX) packet 17 g  17 g Oral DAILY  albuterol-ipratropium (DUO-NEB) 2.5 MG-0.5 MG/3 ML  3 mL Nebulization QID PRN  potassium chloride SR (KLOR-CON 10) tablet 20 mEq  20 mEq Oral DAILY  sodium chloride (NS) flush 5-40 mL  5-40 mL IntraVENous Q8H  
 sodium chloride (NS) flush 5-40 mL  5-40 mL IntraVENous PRN  
 nicotine (NICODERM CQ) 14 mg/24 hr patch 1 Patch  1 Patch TransDERmal DAILY  0.9% sodium chloride infusion 250 mL  250 mL IntraVENous PRN  polyvinyl alcohol-povidone (NATURAL TEARS) 0.5-0.6 % ophthalmic solution 2 Drop  2 Drop Both Eyes Q6H PRN  
 albuterol (PROVENTIL VENTOLIN) nebulizer solution 2.5 mg  2.5 mg Nebulization Q4H PRN  
 ARIPiprazole (ABILIFY) tablet 10 mg  10 mg Oral DAILY  atorvastatin (LIPITOR) tablet 20 mg  20 mg Oral QHS  buPROPion SR (WELLBUTRIN SR) tablet 150 mg  150 mg Oral DAILY  calcium carbonate (TUMS) chewable tablet 200-400 mg [elemental]  200-400 mg Oral DAILY PRN  
 carvediloL (COREG) tablet 3.125 mg  3.125 mg Oral BID WITH MEALS  cholecalciferol (VITAMIN D3) (1000 Units /25 mcg) tablet 4 Tab  4,000 Units Oral DAILY  cyanocobalamin (VITAMIN B12) tablet 250 mcg  250 mcg Oral DAILY  DULoxetine (CYMBALTA) capsule 60 mg  60 mg Oral DAILY  ferrous sulfate tablet 325 mg  325 mg Oral DAILY  budesonide (PULMICORT) 250 mcg/2ml nebulizer susp  250 mcg Nebulization BID RT  
 furosemide (LASIX) tablet 20 mg  20 mg Oral DAILY  guaiFENesin (ROBITUSSIN) 100 mg/5 mL oral liquid 200 mg  200 mg Oral Q4H PRN  
 loratadine (CLARITIN) tablet 10 mg  10 mg Oral DAILY  oxyCODONE-acetaminophen (PERCOCET 7.5) 7.5-325 mg per tablet 1 Tab  1 Tab Oral TID PRN  
  pantoprazole (PROTONIX) 40 mg in 0.9% sodium chloride 10 mL injection  40 mg IntraVENous BID  pregabalin (LYRICA) capsule 150 mg  150 mg Oral BID  senna-docusate (PERICOLACE) 8.6-50 mg per tablet 1 Tab  1 Tab Oral DAILY PRN  
 sucralfate (CARAFATE) tablet 1 g  1 g Oral QID  therapeutic multivitamin (THERAGRAN) tablet 1 Tab  1 Tab Oral DAILY  glycopyrrolate-formoterol (BEVESPI AEROSPHERE) 9 mcg-4.8 mcg inhaler  2 Puff Inhalation BID RT  
 acetaminophen (TYLENOL) tablet 650 mg  650 mg Oral Q4H PRN  
 ondansetron (ZOFRAN) injection 4 mg  4 mg IntraVENous Q6H PRN Objective:  
 
Patient Vitals for the past 8 hrs: 
 BP Temp Pulse Resp SpO2 Weight 05/31/20 0831     100 %   
05/31/20 0830     98 %   
05/31/20 0822 109/65 97.5 °F (36.4 °C) 64 18 98 %   
05/31/20 0719      128 lb 11.2 oz (58.4 kg) No intake/output data recorded. No intake/output data recorded. Physical Exam: Lungs: clear to auscultation bilaterally Heart: regular rate and rhythm, S1, S2 normal, no murmur, click, rub or gallop Abdomen: soft, non-tender. Bowel sounds normal. No masses,  no organomegaly Data Review Recent Results (from the past 24 hour(s)) CBC WITH AUTOMATED DIFF Collection Time: 05/31/20  2:58 AM  
Result Value Ref Range WBC 3.6 3.6 - 11.0 K/uL  
 RBC 2.99 (L) 3.80 - 5.20 M/uL HGB 8.5 (L) 11.5 - 16.0 g/dL HCT 29.0 (L) 35.0 - 47.0 % MCV 97.0 80.0 - 99.0 FL  
 MCH 28.4 26.0 - 34.0 PG  
 MCHC 29.3 (L) 30.0 - 36.5 g/dL  
 RDW 15.1 (H) 11.5 - 14.5 % PLATELET 70 (L) 937 - 400 K/uL MPV 12.9 8.9 - 12.9 FL  
 NRBC 0.0 0  WBC ABSOLUTE NRBC 0.00 0.00 - 0.01 K/uL NEUTROPHILS 71 32 - 75 % LYMPHOCYTES 12 12 - 49 % MONOCYTES 12 5 - 13 % EOSINOPHILS 5 0 - 7 % BASOPHILS 0 0 - 1 % IMMATURE GRANULOCYTES 0 0.0 - 0.5 % ABS. NEUTROPHILS 2.6 1.8 - 8.0 K/UL  
 ABS. LYMPHOCYTES 0.4 (L) 0.8 - 3.5 K/UL  
 ABS. MONOCYTES 0.4 0.0 - 1.0 K/UL ABS. EOSINOPHILS 0.2 0.0 - 0.4 K/UL  
 ABS. BASOPHILS 0.0 0.0 - 0.1 K/UL  
 ABS. IMM. GRANS. 0.0 0.00 - 0.04 K/UL  
 DF SMEAR SCANNED    
 PLATELET COMMENTS Large Platelets RBC COMMENTS ANISOCYTOSIS 1+ 
    
 RBC COMMENTS POLYCHROMASIA 1+ Assessment:  
 
Principal Problem: 
  GI bleed (7/19/2016) Active Problems: 
  Cirrhosis of liver ([facility] [address] (8/30/2016) Chronic respiratory failure with hypoxia ([facility] [address] (8/30/2016) Acute blood loss anemia (10/25/2016) Lung cancer ([facility] [address] (4/1/2020) Hypokalemia (5/26/2020) Thrombocytopenia ([address] (5/27/2020) Plan:  
 
1) Cont to monitor Hgb 2) Miralax

## 2020-06-01 ENCOUNTER — HOSPITAL ENCOUNTER (OUTPATIENT)
Dept: INFUSION THERAPY | Age: 60
Discharge: HOME | End: 2020-06-01

## 2020-06-01 VITALS
SYSTOLIC BLOOD PRESSURE: 113 MMHG | OXYGEN SATURATION: 96 % | BODY MASS INDEX: 25.87 KG/M2 | RESPIRATION RATE: 13 BRPM | HEART RATE: 90 BPM | TEMPERATURE: 98.3 F | WEIGHT: 131.8 LBS | HEIGHT: 60 IN | DIASTOLIC BLOOD PRESSURE: 76 MMHG

## 2020-06-01 LAB
BASOPHILS # BLD: 0 K/UL (ref 0–0.1)
BASOPHILS NFR BLD: 1 % (ref 0–1)
DIFFERENTIAL METHOD BLD: ABNORMAL
EOSINOPHIL # BLD: 0.2 K/UL (ref 0–0.4)
EOSINOPHIL NFR BLD: 8 % (ref 0–7)
ERYTHROCYTE [DISTWIDTH] IN BLOOD BY AUTOMATED COUNT: 15.5 % (ref 11.5–14.5)
HCT VFR BLD AUTO: 29.2 % (ref 35–47)
HGB BLD-MCNC: 8.2 G/DL (ref 11.5–16)
IMM GRANULOCYTES # BLD AUTO: 0 K/UL (ref 0–0.04)
IMM GRANULOCYTES NFR BLD AUTO: 0 % (ref 0–0.5)
LYMPHOCYTES # BLD: 0.4 K/UL (ref 0.8–3.5)
LYMPHOCYTES NFR BLD: 14 % (ref 12–49)
MCH RBC QN AUTO: 28.1 PG (ref 26–34)
MCHC RBC AUTO-ENTMCNC: 28.1 G/DL (ref 30–36.5)
MCV RBC AUTO: 100 FL (ref 80–99)
MONOCYTES # BLD: 0.4 K/UL (ref 0–1)
MONOCYTES NFR BLD: 14 % (ref 5–13)
NEUTS SEG # BLD: 1.7 K/UL (ref 1.8–8)
NEUTS SEG NFR BLD: 63 % (ref 32–75)
NRBC # BLD: 0 K/UL (ref 0–0.01)
NRBC BLD-RTO: 0 PER 100 WBC
PLATELET # BLD AUTO: 77 K/UL (ref 150–400)
PMV BLD AUTO: 12.3 FL (ref 8.9–12.9)
RBC # BLD AUTO: 2.92 M/UL (ref 3.8–5.2)
RBC MORPH BLD: ABNORMAL
WBC # BLD AUTO: 2.7 K/UL (ref 3.6–11)

## 2020-06-01 PROCEDURE — 94640 AIRWAY INHALATION TREATMENT: CPT

## 2020-06-01 PROCEDURE — 74011250636 HC RX REV CODE- 250/636: Performed by: INTERNAL MEDICINE

## 2020-06-01 PROCEDURE — 85025 COMPLETE CBC W/AUTO DIFF WBC: CPT

## 2020-06-01 PROCEDURE — 74011250637 HC RX REV CODE- 250/637: Performed by: FAMILY MEDICINE

## 2020-06-01 PROCEDURE — C9113 INJ PANTOPRAZOLE SODIUM, VIA: HCPCS | Performed by: INTERNAL MEDICINE

## 2020-06-01 PROCEDURE — 77010033678 HC OXYGEN DAILY

## 2020-06-01 PROCEDURE — 94760 N-INVAS EAR/PLS OXIMETRY 1: CPT

## 2020-06-01 PROCEDURE — 74011250637 HC RX REV CODE- 250/637: Performed by: INTERNAL MEDICINE

## 2020-06-01 PROCEDURE — 74011000250 HC RX REV CODE- 250: Performed by: INTERNAL MEDICINE

## 2020-06-01 PROCEDURE — 36415 COLL VENOUS BLD VENIPUNCTURE: CPT

## 2020-06-01 RX ORDER — OXYCODONE AND ACETAMINOPHEN 10; 325 MG/1; MG/1
1 TABLET ORAL
Qty: 30 TAB | Refills: 0 | Status: SHIPPED | OUTPATIENT
Start: 2020-06-01 | End: 2020-07-01

## 2020-06-01 RX ORDER — PANTOPRAZOLE SODIUM 40 MG/1
40 TABLET, DELAYED RELEASE ORAL 2 TIMES DAILY
Qty: 60 TAB | Refills: 11 | Status: SHIPPED | OUTPATIENT
Start: 2020-06-01

## 2020-06-01 RX ORDER — POTASSIUM CHLORIDE 750 MG/1
20 TABLET, FILM COATED, EXTENDED RELEASE ORAL DAILY
Qty: 60 TAB | Refills: 5 | Status: SHIPPED | OUTPATIENT
Start: 2020-06-01

## 2020-06-01 RX ADMIN — POTASSIUM CHLORIDE 20 MEQ: 750 TABLET, FILM COATED, EXTENDED RELEASE ORAL at 09:12

## 2020-06-01 RX ADMIN — SUCRALFATE 1 G: 1 TABLET ORAL at 09:11

## 2020-06-01 RX ADMIN — FERROUS SULFATE TAB 325 MG (65 MG ELEMENTAL FE) 325 MG: 325 (65 FE) TAB at 09:12

## 2020-06-01 RX ADMIN — POLYETHYLENE GLYCOL 3350 17 G: 17 POWDER, FOR SOLUTION ORAL at 09:10

## 2020-06-01 RX ADMIN — GLYCOPYRROLATE AND FORMOTEROL FUMARATE 2 PUFF: 9; 4.8 AEROSOL, METERED RESPIRATORY (INHALATION) at 08:27

## 2020-06-01 RX ADMIN — PREGABALIN 150 MG: 75 CAPSULE ORAL at 09:12

## 2020-06-01 RX ADMIN — FUROSEMIDE 20 MG: 20 TABLET ORAL at 09:11

## 2020-06-01 RX ADMIN — CYANOCOBALAMIN TAB 500 MCG 250 MCG: 500 TAB at 09:12

## 2020-06-01 RX ADMIN — THERA TABS 1 TABLET: TAB at 09:11

## 2020-06-01 RX ADMIN — DULOXETINE HYDROCHLORIDE 60 MG: 60 CAPSULE, DELAYED RELEASE ORAL at 09:11

## 2020-06-01 RX ADMIN — ARIPIPRAZOLE 10 MG: 10 TABLET ORAL at 09:20

## 2020-06-01 RX ADMIN — CARVEDILOL 3.12 MG: 3.12 TABLET, FILM COATED ORAL at 09:12

## 2020-06-01 RX ADMIN — BUPROPION HYDROCHLORIDE 150 MG: 150 TABLET, EXTENDED RELEASE ORAL at 09:11

## 2020-06-01 RX ADMIN — SODIUM CHLORIDE 40 MG: 9 INJECTION INTRAMUSCULAR; INTRAVENOUS; SUBCUTANEOUS at 09:11

## 2020-06-01 RX ADMIN — BUDESONIDE 250 MCG: 0.25 SUSPENSION RESPIRATORY (INHALATION) at 08:26

## 2020-06-01 RX ADMIN — Medication 10 ML: at 05:17

## 2020-06-01 RX ADMIN — MELATONIN 4 TABLET: at 09:11

## 2020-06-01 RX ADMIN — LORATADINE 10 MG: 10 TABLET ORAL at 09:11

## 2020-06-01 NOTE — DISCHARGE SUMMARY
Physician Discharge Summary Patient ID: Rudell Carrel 
[id number] 
63 y.o. 
1960 Admit date: 5/26/2020 Discharge date and time: 6/1/2020 Briefly, pt was admitted with GI bleed [K92.2]. For details of admission, see H&P. Hospital Course:  Pt was admitted with a recurrent GI bleed, and she required transfusion. Her PPI was increased to BID. Her Hgb continued to trend down, and an M2A capsule study was done. This showed multiple small bowel AVM's with sign of active bleeding. GI then did a balloon endoscopy through much of the small bowel. About 15 AVM's were cauterized, and Hgb remained stable after that. Pt being discharged home with BID PPI. Discharge Dx: GI bleed due to small bowel AVM's 
 
Condition at discharge: Improved. Disposition: home Patient Instructions:  
Current Discharge Medication List  
  
START taking these medications Details  
potassium chloride SR (KLOR-CON 10) 10 mEq tablet Take 2 Tabs by mouth daily. Qty: 60 Tab, Refills: 5 CONTINUE these medications which have CHANGED Details  
oxyCODONE-acetaminophen (PERCOCET 10)  mg per tablet Take 1 Tab by mouth every six (6) hours as needed for Pain for up to 30 days. Max Daily Amount: 4 Tabs. Qty: 30 Tab, Refills: 0 Associated Diagnoses: Malignant neoplasm of lung, unspecified laterality, unspecified part of lung (HCC)  
  
pantoprazole (PROTONIX) 40 mg tablet Take 1 Tab by mouth two (2) times a day. Qty: 60 Tab, Refills: 11 CONTINUE these medications which have NOT CHANGED Details  
multivitamin,tx-iron-ca-min (Thera-M) 27-0.4 mg tab Take 1 Tab by mouth daily. formoterol (PERFOROMIST) 20 mcg/2 mL nebu neb solution 20 mcg by Nebulization route two (2) times a day. (with budesonide) budesonide (PULMICORT) 0.5 mg/2 mL nbsp 500 mcg by Nebulization route two (2) times a day.  (with formoterol)  
  
benzonatate (TESSALON) 100 mg capsule Take 100 mg by mouth three (3) times daily as needed for Cough. senna (Senna) 8.6 mg tablet Take 1 Tab by mouth daily as needed for Constipation. peg 400-propylene glycol (Systane, propylene glycol,) 0.4-0.3 % drop Administer 2 Drops to both eyes every six (6) hours as needed. ferrous sulfate 325 mg (65 mg iron) tablet Take 325 mg by mouth daily. calcium carbonate (Tums E-X) 300 mg (750 mg) chewable tablet Take 1-2 Tabs by mouth daily as needed for Indigestion (Gas). propylene glycol (Systane Complete) 0.6 % drop Administer 1 Drop to both eyes two (2) times a day. vit A,C,E-zinc-copper (PreserVision AREDS) cap capsule Take 1 Tab by mouth two (2) times a day. acetaminophen (TYLENOL) 500 mg tablet Take 500 mg by mouth every eight (8) hours as needed for Pain.  
  
guaiFENesin (ROBITUSSIN) 100 mg/5 mL liquid Take 200 mg by mouth every four (4) hours as needed for Cough. ondansetron hcl (ZOFRAN) 4 mg tablet Take 4 mg by mouth two (2) times daily as needed for Nausea. promethazine (PHENERGAN) 12.5 mg tablet Take 12.5 mg by mouth every six (6) hours as needed (Vomiting). albuterol-ipratropium (DUO-NEB) 2.5 mg-0.5 mg/3 ml nebu 3 mL by Nebulization route four (4) times daily. tiotropium-olodateroL (Stiolto Respimat) 2.5-2.5 mcg/actuation inhaler Take 2 Puffs by inhalation daily. ARIPiprazole (ABILIFY) 10 mg tablet Take 10 mg by mouth daily. cyanocobalamin (Vitamin B-12) 250 mcg tablet Take 250 mcg by mouth daily. pregabalin (LYRICA) 150 mg capsule Take 150 mg by mouth two (2) times a day. DULoxetine (CYMBALTA) 60 mg capsule Take 60 mg by mouth daily. atorvastatin (LIPITOR) 20 mg tablet Take 20 mg by mouth nightly. sucralfate (CARAFATE) 1 gram tablet Take 1 g by mouth Before breakfast, lunch, dinner and at bedtime. buPROPion XL (WELLBUTRIN XL) 150 mg tablet Take 150 mg by mouth every morning.   
  
fluticasone propionate (Flovent HFA) 44 mcg/actuation inhaler Take 2 Puffs by inhalation two (2) times a day. furosemide (LASIX) 20 mg tablet Take 20 mg by mouth daily. carvedilol (COREG) 3.125 mg tablet Take 3.125 mg by mouth two (2) times daily (with meals). loratadine (CLARITIN) 10 mg tablet Take 10 mg by mouth daily. cholecalciferol, vitamin D3, (VITAMIN D3) 2,000 unit tab Take 4,000 Units by mouth daily. STOP taking these medications  
  
 oxyCODONE-acetaminophen (Percocet) 7.5-325 mg per tablet Comments:  
Reason for Stopping:   
   
 therapeutic multivitamin (THERAGRAN) tablet Comments:  
Reason for Stopping:   
   
 senna-docusate (SENNA PLUS) 8.6-50 mg per tablet Comments:  
Reason for Stopping: Follow-up with Dr. Adelina Egan in 1 week. Signed: Kwame Zabala MD 
6/1/2020 
7:26 AM

## 2020-06-01 NOTE — PROGRESS NOTES
Problem: Discharge Planning Goal: *Discharge to safe environment Description: See CM note Outcome: Progressing Towards Goal 
  
Problem: Falls - Risk of 
Goal: *Absence of Falls Description: Document Avila Daniels Fall Risk and appropriate interventions in the flowsheet. Outcome: Progressing Towards Goal 
Note: Fall Risk Interventions: 
Mobility Interventions: Patient to call before getting OOB Medication Interventions: Evaluate medications/consider consulting pharmacy Elimination Interventions: Call light in reach History of Falls Interventions: Evaluate medications/consider consulting pharmacy

## 2020-06-01 NOTE — DISCHARGE INSTRUCTIONS
Patient Discharge Instructions    Abdoulaye Daugherty / [id number] : 1960    Admitted 2020 Discharged: 2020     Take Home Medications       · It is important that you take the medication exactly as they are prescribed. · Keep your medication in the bottles provided by the pharmacist and keep a list of the medication names, dosages, and times to be taken in your wallet. · Do not take other medications without consulting your doctor. What to do at Home    Recommended diet: Cardiac Diet. Recommended activity: Activity as tolerated. Follow-up with Dr. Libra Lyon in 1 week. Information obtained by :  I understand that if any problems occur once I am at home I am to contact my physician. I understand and acknowledge receipt of the instructions indicated above.                                                                                                                                            Physician's or R.N.'s Signature                                                                  Date/Time                                                                                                                                              Patient or Representative Signature                                                          Date/Time

## 2020-06-01 NOTE — PROGRESS NOTES
Problem: Discharge Planning Goal: *Discharge to safe environment Description: See CM note Outcome: Progressing Towards Goal 
  
Problem: Patient Education: Go to Patient Education Activity Goal: Patient/Family Education Outcome: Progressing Towards Goal 
  
Problem: Falls - Risk of 
Goal: *Absence of Falls Description: Document Yumiko Carcamo Fall Risk and appropriate interventions in the flowsheet. Outcome: Progressing Towards Goal 
Note: Fall Risk Interventions: 
Mobility Interventions: Patient to call before getting OOB Medication Interventions: Evaluate medications/consider consulting pharmacy Elimination Interventions: Call light in reach History of Falls Interventions: Evaluate medications/consider consulting pharmacy Problem: Patient Education: Go to Patient Education Activity Goal: Patient/Family Education Outcome: Progressing Towards Goal

## 2020-06-02 ENCOUNTER — HOSPITAL ENCOUNTER (OUTPATIENT)
Dept: INFUSION THERAPY | Age: 60
End: 2020-06-02

## 2020-06-02 ENCOUNTER — PATIENT OUTREACH (OUTPATIENT)
Dept: FAMILY MEDICINE CLINIC | Age: 60
End: 2020-06-02

## 2020-06-02 NOTE — PROGRESS NOTES
Patient contacted regarding recent discharge and COVID-19 risk. Discussed COVID-19 related testing which was not done at this time. Test results were not done. Patient informed of results, if available? Not done. Care Transition Nurse/ Ambulatory Care Manager contacted the patient by telephone to perform post discharge assessment. Verified name and  with patient as identifiers. Patient reports she is doing well. Denies any of the Covid 19 symptoms at this time. Lives in retirement at [facility]. Reminded to schedule ROXY with pcp. Patient has following risk factors of: COPD. CTN/ACM reviewed discharge instructions, medical action plan and red flags related to discharge diagnosis. Reviewed and educated them on any new and changed medications related to discharge diagnosis. Advised obtaining a 90-day supply of all daily and as-needed medications. Education provided regarding infection prevention, and signs and symptoms of COVID-19 and when to seek medical attention with patient who verbalized understanding. Discussed exposure protocols and quarantine from [address] you at higher risk for severe illness  and given an opportunity for questions and concerns. The patient agrees to contact the COVID-19 hotline [phone] or PCP office for questions related to their healthcare. CTN/ACM provided contact information for future reference. From CDC: Are you at higher risk for severe illness?  Wash your hands often.  Avoid close contact (6 feet, which is about two arm lengths) with people who are sick.  Put distance between yourself and other people if COVID-19 is spreading in your community.  Clean and disinfect frequently touched surfaces.  Avoid all cruise travel and non-essential air travel.  Call your healthcare professional if you have concerns about COVID-19 and your underlying condition or if you are sick. For more information on steps you can take to protect yourself, see CDC's How to Protect Yourself Patient/family/caregiver given information for [employer] and agrees to enroll no Patient's preferred e-mail:  Mouna Camarillo- does not have email Patient's preferred phone number: declined Plan for follow-up call in 5-7 days based on severity of symptoms and risk factors.

## 2020-06-09 ENCOUNTER — APPOINTMENT (OUTPATIENT)
Dept: CT IMAGING | Age: 60
DRG: 054 | End: 2020-06-09
Attending: FAMILY MEDICINE
Payer: MEDICARE

## 2020-06-09 ENCOUNTER — APPOINTMENT (OUTPATIENT)
Dept: MRI IMAGING | Age: 60
DRG: 054 | End: 2020-06-09
Attending: FAMILY MEDICINE
Payer: MEDICARE

## 2020-06-09 ENCOUNTER — HOSPITAL ENCOUNTER (INPATIENT)
Age: 60
LOS: 2 days | Discharge: HOSPICE | DRG: 054 | End: 2020-06-12
Attending: EMERGENCY MEDICINE | Admitting: INTERNAL MEDICINE
Payer: MEDICARE

## 2020-06-09 DIAGNOSIS — Z71.89 ACP (ADVANCE CARE PLANNING): ICD-10-CM

## 2020-06-09 DIAGNOSIS — C34.90 MALIGNANT NEOPLASM OF LUNG, UNSPECIFIED LATERALITY, UNSPECIFIED PART OF LUNG (HCC): ICD-10-CM

## 2020-06-09 DIAGNOSIS — D64.9 ANEMIA, UNSPECIFIED TYPE: Primary | ICD-10-CM

## 2020-06-09 DIAGNOSIS — G93.9 BRAIN LESION: ICD-10-CM

## 2020-06-09 DIAGNOSIS — K92.1 MELENA: ICD-10-CM

## 2020-06-09 DIAGNOSIS — C79.31 METASTASIS TO BRAIN (HCC): ICD-10-CM

## 2020-06-09 DIAGNOSIS — R53.81 PHYSICAL DEBILITY: ICD-10-CM

## 2020-06-09 DIAGNOSIS — K92.2 GASTROINTESTINAL HEMORRHAGE, UNSPECIFIED GASTROINTESTINAL HEMORRHAGE TYPE: ICD-10-CM

## 2020-06-09 LAB
ALBUMIN SERPL-MCNC: 3.2 G/DL (ref 3.5–5)
ALBUMIN/GLOB SERPL: 0.9 {RATIO} (ref 1.1–2.2)
ALP SERPL-CCNC: 119 U/L (ref 45–117)
ALT SERPL-CCNC: 30 U/L (ref 12–78)
ANION GAP SERPL CALC-SCNC: 5 MMOL/L (ref 5–15)
ARTERIAL PATENCY WRIST A: YES
AST SERPL-CCNC: 33 U/L (ref 15–37)
BASE EXCESS BLD CALC-SCNC: 2 MMOL/L
BASOPHILS # BLD: 0 K/UL (ref 0–0.1)
BASOPHILS NFR BLD: 0 % (ref 0–1)
BDY SITE: ABNORMAL
BILIRUB SERPL-MCNC: 0.3 MG/DL (ref 0.2–1)
BUN SERPL-MCNC: 22 MG/DL (ref 6–20)
BUN/CREAT SERPL: 21 (ref 12–20)
CA-I BLD-SCNC: 1.35 MMOL/L (ref 1.12–1.32)
CALCIUM SERPL-MCNC: 8.8 MG/DL (ref 8.5–10.1)
CHLORIDE SERPL-SCNC: 106 MMOL/L (ref 97–108)
CHOLEST SERPL-MCNC: 137 MG/DL
CO2 SERPL-SCNC: 30 MMOL/L (ref 21–32)
COMMENT, HOLDF: NORMAL
CREAT SERPL-MCNC: 1.05 MG/DL (ref 0.55–1.02)
DIFFERENTIAL METHOD BLD: ABNORMAL
EOSINOPHIL # BLD: 0.1 K/UL (ref 0–0.4)
EOSINOPHIL NFR BLD: 5 % (ref 0–7)
ERYTHROCYTE [DISTWIDTH] IN BLOOD BY AUTOMATED COUNT: 15.7 % (ref 11.5–14.5)
GAS FLOW.O2 O2 DELIVERY SYS: ABNORMAL L/MIN
GAS FLOW.O2 SETTING OXYMISER: 3 L/M
GLOBULIN SER CALC-MCNC: 3.5 G/DL (ref 2–4)
GLUCOSE SERPL-MCNC: 87 MG/DL (ref 65–100)
HCO3 BLD-SCNC: 29.5 MMOL/L (ref 22–26)
HCT VFR BLD AUTO: 28.5 % (ref 35–47)
HDLC SERPL-MCNC: 68 MG/DL
HDLC SERPL: 2 {RATIO} (ref 0–5)
HEMOCCULT STL QL: POSITIVE
HGB BLD-MCNC: 7.9 G/DL (ref 11.5–16)
IMM GRANULOCYTES # BLD AUTO: 0 K/UL (ref 0–0.04)
IMM GRANULOCYTES NFR BLD AUTO: 0 % (ref 0–0.5)
LDLC SERPL CALC-MCNC: 49.6 MG/DL (ref 0–100)
LIPID PROFILE,FLP: NORMAL
LYMPHOCYTES # BLD: 0.5 K/UL (ref 0.8–3.5)
LYMPHOCYTES NFR BLD: 18 % (ref 12–49)
MCH RBC QN AUTO: 27.8 PG (ref 26–34)
MCHC RBC AUTO-ENTMCNC: 27.7 G/DL (ref 30–36.5)
MCV RBC AUTO: 100.4 FL (ref 80–99)
MONOCYTES # BLD: 0.3 K/UL (ref 0–1)
MONOCYTES NFR BLD: 11 % (ref 5–13)
NEUTS SEG # BLD: 1.7 K/UL (ref 1.8–8)
NEUTS SEG NFR BLD: 66 % (ref 32–75)
NRBC # BLD: 0 K/UL (ref 0–0.01)
NRBC BLD-RTO: 0 PER 100 WBC
O2/TOTAL GAS SETTING VFR VENT: 32 %
PCO2 BLD: 65.4 MMHG (ref 35–45)
PH BLD: 7.26 [PH] (ref 7.35–7.45)
PLATELET # BLD AUTO: 88 K/UL (ref 150–400)
PMV BLD AUTO: 11.5 FL (ref 8.9–12.9)
PO2 BLD: 96 MMHG (ref 80–100)
POTASSIUM SERPL-SCNC: 3.8 MMOL/L (ref 3.5–5.1)
PROT SERPL-MCNC: 6.7 G/DL (ref 6.4–8.2)
RBC # BLD AUTO: 2.84 M/UL (ref 3.8–5.2)
RBC MORPH BLD: ABNORMAL
SAMPLES BEING HELD,HOLD: NORMAL
SAO2 % BLD: 96 % (ref 92–97)
SODIUM SERPL-SCNC: 141 MMOL/L (ref 136–145)
SPECIMEN TYPE: ABNORMAL
TRIGL SERPL-MCNC: 97 MG/DL (ref ?–150)
VLDLC SERPL CALC-MCNC: 19.4 MG/DL
WBC # BLD AUTO: 2.6 K/UL (ref 3.6–11)

## 2020-06-09 PROCEDURE — 82272 OCCULT BLD FECES 1-3 TESTS: CPT

## 2020-06-09 PROCEDURE — 70553 MRI BRAIN STEM W/O & W/DYE: CPT

## 2020-06-09 PROCEDURE — 94760 N-INVAS EAR/PLS OXIMETRY 1: CPT

## 2020-06-09 PROCEDURE — 86921 COMPATIBILITY TEST INCUBATE: CPT

## 2020-06-09 PROCEDURE — 80053 COMPREHEN METABOLIC PANEL: CPT

## 2020-06-09 PROCEDURE — 96375 TX/PRO/DX INJ NEW DRUG ADDON: CPT

## 2020-06-09 PROCEDURE — 99285 EMERGENCY DEPT VISIT HI MDM: CPT

## 2020-06-09 PROCEDURE — 82803 BLOOD GASES ANY COMBINATION: CPT

## 2020-06-09 PROCEDURE — 36415 COLL VENOUS BLD VENIPUNCTURE: CPT

## 2020-06-09 PROCEDURE — 74011250636 HC RX REV CODE- 250/636: Performed by: PHYSICIAN ASSISTANT

## 2020-06-09 PROCEDURE — 80061 LIPID PANEL: CPT

## 2020-06-09 PROCEDURE — 86900 BLOOD TYPING SEROLOGIC ABO: CPT

## 2020-06-09 PROCEDURE — 94640 AIRWAY INHALATION TREATMENT: CPT

## 2020-06-09 PROCEDURE — 86920 COMPATIBILITY TEST SPIN: CPT

## 2020-06-09 PROCEDURE — C9113 INJ PANTOPRAZOLE SODIUM, VIA: HCPCS | Performed by: PHYSICIAN ASSISTANT

## 2020-06-09 PROCEDURE — 77010033678 HC OXYGEN DAILY

## 2020-06-09 PROCEDURE — 74011000250 HC RX REV CODE- 250: Performed by: FAMILY MEDICINE

## 2020-06-09 PROCEDURE — 86922 COMPATIBILITY TEST ANTIGLOB: CPT

## 2020-06-09 PROCEDURE — 96374 THER/PROPH/DIAG INJ IV PUSH: CPT

## 2020-06-09 PROCEDURE — 36600 WITHDRAWAL OF ARTERIAL BLOOD: CPT

## 2020-06-09 PROCEDURE — 99218 HC RM OBSERVATION: CPT

## 2020-06-09 PROCEDURE — 74011000258 HC RX REV CODE- 258: Performed by: FAMILY MEDICINE

## 2020-06-09 PROCEDURE — 74011250636 HC RX REV CODE- 250/636: Performed by: FAMILY MEDICINE

## 2020-06-09 PROCEDURE — 85025 COMPLETE CBC W/AUTO DIFF WBC: CPT

## 2020-06-09 PROCEDURE — 70450 CT HEAD/BRAIN W/O DYE: CPT

## 2020-06-09 RX ORDER — IPRATROPIUM BROMIDE AND ALBUTEROL SULFATE 2.5; .5 MG/3ML; MG/3ML
3 SOLUTION RESPIRATORY (INHALATION)
Status: DISCONTINUED | OUTPATIENT
Start: 2020-06-09 | End: 2020-06-12 | Stop reason: HOSPADM

## 2020-06-09 RX ORDER — DEXAMETHASONE SODIUM PHOSPHATE 10 MG/ML
6 INJECTION INTRAMUSCULAR; INTRAVENOUS EVERY 8 HOURS
Status: DISCONTINUED | OUTPATIENT
Start: 2020-06-09 | End: 2020-06-10 | Stop reason: SDUPTHER

## 2020-06-09 RX ORDER — PANTOPRAZOLE SODIUM 40 MG/10ML
40 INJECTION, POWDER, LYOPHILIZED, FOR SOLUTION INTRAVENOUS EVERY 24 HOURS
Status: DISCONTINUED | OUTPATIENT
Start: 2020-06-09 | End: 2020-06-10 | Stop reason: HOSPADM

## 2020-06-09 RX ADMIN — PANTOPRAZOLE SODIUM 40 MG: 40 INJECTION, POWDER, FOR SOLUTION INTRAVENOUS at 18:42

## 2020-06-09 RX ADMIN — DEXAMETHASONE SODIUM PHOSPHATE 6 MG: 10 INJECTION, SOLUTION INTRAMUSCULAR; INTRAVENOUS at 22:09

## 2020-06-09 RX ADMIN — IPRATROPIUM BROMIDE AND ALBUTEROL SULFATE 3 ML: .5; 3 SOLUTION RESPIRATORY (INHALATION) at 20:21

## 2020-06-09 RX ADMIN — LEVETIRACETAM 500 MG: 100 INJECTION, SOLUTION INTRAVENOUS at 20:48

## 2020-06-09 NOTE — ED PROVIDER NOTES
Javad Cruz is a 63 y.o. female  who presents by private vehicle to ER with c/o Patient presents with: 
Diarrhea Patient presents with complaints of tarry diarrhea stools for two days. Patient reports history of GI bleed. Admits to feeling dizzy and lightheaded. Patient also with history of lung cancer, on oxygen at home. She specifically denies any fevers, chills, nausea, vomiting, chest pain, shortness of breath, headache, rash,  abdominal pain, urinary/bowel changes, sweating or weight loss. PCP: Ruth Tristan MD  
PMHx significant for: Past Medical History: 
No date: Anemia No date: Arthritis Comment:  Rheumatoid No date: CHF (congestive heart failure) ([facility] No date: Chronic obstructive pulmonary disease ([facility] No date: Cirrhosis of liver ([facility] Comment:  ETOH No date: COPD (chronic obstructive pulmonary disease) ([facility] No date: Gastrointestinal disorder Comment:  \"lacerations in the large part of my small intestine. \" No date: Gastrointestinal disorder Comment:  liver cirrhosis No date: Heart failure ([facility] 
2020: Hyperlipidemia No date: Internal bleeding 2020: Lung cancer ([facility] No date: Psychiatric disorder Comment:  depression, anxiety 2016: Tachycardia PSHx significant for: Past Surgical History: 
No date: HX CHOLECYSTECTOMY No date: HX GI Comment:  Cholecystectomoy No date: HX GYN Comment:   x 2. 
2016: [place] ENDOSCOPY UPPER SMALL INTESTINE Comment:    
Social Hx: Tobacco use: Social History Tobacco Use Smoking status: Current Every Day Smoker Packs/day: 0.75 Years: 45.00 Pack years: 33.75 Smokeless tobacco: Never Used 
; EtOH use: The patient states she drinks 0 per week.; Illicit Drug use: Allergies: 
 -- Pcn (Penicillins) -- Angioedema --  Childhood reaction There are no other complaints, changes or physical findings at this time. Past Medical History: Diagnosis Date  Anemia  Arthritis Rheumatoid  CHF (congestive heart failure) ([facility]  Chronic obstructive pulmonary disease ([facility]  Cirrhosis of liver ([facility] ETOH  
 COPD (chronic obstructive pulmonary disease) ([facility]  Gastrointestinal disorder \"lacerations in the large part of my small intestine. \"  Gastrointestinal disorder   
 liver cirrhosis  Heart failure ([facility]  Hyperlipidemia 2020  Internal bleeding  Lung cancer ([facility] 2020  Psychiatric disorder   
 depression, anxiety  Tachycardia 2016 Past Surgical History:  
Procedure Laterality Date  HX CHOLECYSTECTOMY  HX GI Cholecystectomoy  HX GYN    
  x 2.  [place] ENDOSCOPY UPPER SMALL INTESTINE  2016 Family History:  
Problem Relation Age of Onset  Cancer Mother   
     pancreatic  Alcohol abuse Father  Cancer Sister Lung cancer (smoker). Social History Socioeconomic History  Marital status:  Spouse name: Not on file  Number of children: Not on file  Years of education: Not on file  Highest education level: Not on file Occupational History  Not on file Social Needs  Financial resource strain: Not on file  Food insecurity Worry: Not on file Inability: Not on file  Transportation needs Medical: Not on file Non-medical: Not on file Tobacco Use  Smoking status: Current Every Day Smoker Packs/day: 0.75 Years: 45.00 Pack years: 33.75  Smokeless tobacco: Never Used Substance and Sexual Activity  Alcohol use: No  
  Comment: in the past was an alcoholic. Has been sober  since about   Drug use: No  
 Sexual activity: Yes  
  Partners: Male Lifestyle  Physical activity Days per week: Not on file Minutes per session: Not on file  Stress: Not on file Relationships  Social connections Talks on phone: Not on file Gets together: Not on file Attends Gnosticist service: Not on file Active member of club or organization: Not on file Attends meetings of clubs or organizations: Not on file Relationship status: Not on file  Intimate partner violence Fear of current or ex partner: Not on file Emotionally abused: Not on file Physically abused: Not on file Forced sexual activity: Not on file Other Topics Concern  Not on file Social History Narrative  Not on file ALLERGIES: Pcn [penicillins] Review of Systems Constitutional: Negative for activity change, chills and fever. HENT: Negative for congestion, rhinorrhea and sore throat. Respiratory: Negative for shortness of breath. Cardiovascular: Negative for chest pain and leg swelling. Gastrointestinal: Positive for blood in stool and diarrhea. Negative for abdominal pain, nausea and vomiting. Genitourinary: Negative for dysuria, vaginal bleeding and vaginal discharge. Musculoskeletal: Negative for arthralgias and myalgias. Neurological: Positive for dizziness and weakness. Psychiatric/Behavioral: Negative for confusion. All other systems reviewed and are negative. Vitals:  
 06/09/20 1652 BP: 99/51 Pulse: 85 Resp: 16 Temp: 98.4 °F (36.9 °C) SpO2: 97% Physical Exam 
Vitals signs and nursing note reviewed. Constitutional:   
   Appearance: Normal appearance. She is well-developed. HENT:  
   Head: Normocephalic and atraumatic. Right Ear: External ear normal.  
   Left Ear: External ear normal.  
   Nose: Nose normal.  
   Mouth/Throat:  
   Pharynx: No oropharyngeal exudate. Eyes:  
   General: Lids are normal. No scleral icterus. Right eye: No discharge. Left eye: No discharge. Conjunctiva/sclera: Conjunctivae normal.  
   Pupils: Pupils are equal, round, and reactive to light. Neck: Musculoskeletal: Normal range of motion. Thyroid: No thyromegaly. Trachea: No tracheal deviation. Cardiovascular:  
   Rate and Rhythm: Normal rate and regular rhythm. Heart sounds: Normal heart sounds. No murmur. Pulmonary:  
   Effort: Pulmonary effort is normal. No respiratory distress. Breath sounds: Normal breath sounds. No wheezing or rales. Chest:  
   Chest wall: No tenderness. Abdominal:  
   General: Bowel sounds are normal. There is no distension. Palpations: Abdomen is soft. Tenderness: There is no abdominal tenderness. There is no guarding or rebound. Genitourinary: 
   Rectum: Guaiac result positive. Comments: Dark tarry stool on ZOEY Musculoskeletal: Normal range of motion. General: No tenderness. Lymphadenopathy:  
   Cervical: No cervical adenopathy. Skin: 
   General: Skin is warm and dry. Findings: No erythema. Neurological:  
   Mental Status: She is alert and oriented to person, place, and time. Cranial Nerves: No cranial nerve deficit. Coordination: Coordination normal.  
Psychiatric:     
   Behavior: Behavior normal.     
   Thought Content: Thought content normal.     
   Judgment: Judgment normal.  
 
  
 
MDM Number of Diagnoses or Management Options Anemia, unspecified type:  
Gastrointestinal hemorrhage, unspecified gastrointestinal hemorrhage type:  
 
  
 
Procedures Hospitalist Perfect Serve for Admission 6:47 PM 
 
ED Room Number: R20/S58 Patient Name and age: Ileana Caal 63 y.o.  female Working Diagnosis: 1. Anemia, unspecified type 2. Gastrointestinal hemorrhage, unspecified gastrointestinal hemorrhage type COVID-19 Suspicion:  no 
 
Code Status:  Full Code Readmission: no 
Isolation Requirements:  no 
Recommended Level of Care:  med/surg Department:[facility] Adult ED - [phone] Other:  Patient with dark tarry stool x 2 days. Hemoglobin is 7.9 today with dizziness. Hemoccult positive

## 2020-06-09 NOTE — ED NOTES
Verbal shift change report given to Kanwal Perez [facility] (oncoming nurse) by Elizabeth Yuan RN (offgoing nurse). Report included the following information SBAR, ED Summary, Intake/Output, MAR and Recent Results.

## 2020-06-10 ENCOUNTER — APPOINTMENT (OUTPATIENT)
Dept: CT IMAGING | Age: 60
DRG: 054 | End: 2020-06-10
Attending: NEUROLOGICAL SURGERY
Payer: MEDICARE

## 2020-06-10 PROBLEM — C79.31 METASTASIS TO BRAIN (HCC): Status: ACTIVE | Noted: 2020-06-10

## 2020-06-10 LAB
AMMONIA PLAS-SCNC: <10 UMOL/L
ANION GAP SERPL CALC-SCNC: 5 MMOL/L (ref 5–15)
BUN SERPL-MCNC: 21 MG/DL (ref 6–20)
BUN/CREAT SERPL: 24 (ref 12–20)
CALCIUM SERPL-MCNC: 9.2 MG/DL (ref 8.5–10.1)
CHLORIDE SERPL-SCNC: 107 MMOL/L (ref 97–108)
CO2 SERPL-SCNC: 29 MMOL/L (ref 21–32)
CREAT SERPL-MCNC: 0.88 MG/DL (ref 0.55–1.02)
GLUCOSE SERPL-MCNC: 184 MG/DL (ref 65–100)
HGB BLD-MCNC: 8 G/DL (ref 11.5–16)
HGB BLD-MCNC: 8.3 G/DL (ref 11.5–16)
POTASSIUM SERPL-SCNC: 3.8 MMOL/L (ref 3.5–5.1)
SODIUM SERPL-SCNC: 141 MMOL/L (ref 136–145)

## 2020-06-10 PROCEDURE — 74177 CT ABD & PELVIS W/CONTRAST: CPT

## 2020-06-10 PROCEDURE — 74011000250 HC RX REV CODE- 250: Performed by: FAMILY MEDICINE

## 2020-06-10 PROCEDURE — 99218 HC RM OBSERVATION: CPT

## 2020-06-10 PROCEDURE — 94640 AIRWAY INHALATION TREATMENT: CPT

## 2020-06-10 PROCEDURE — 74011636320 HC RX REV CODE- 636/320: Performed by: RADIOLOGY

## 2020-06-10 PROCEDURE — 94760 N-INVAS EAR/PLS OXIMETRY 1: CPT

## 2020-06-10 PROCEDURE — 36415 COLL VENOUS BLD VENIPUNCTURE: CPT

## 2020-06-10 PROCEDURE — 85018 HEMOGLOBIN: CPT

## 2020-06-10 PROCEDURE — 82140 ASSAY OF AMMONIA: CPT

## 2020-06-10 PROCEDURE — 30233N1 TRANSFUSION OF NONAUTOLOGOUS RED BLOOD CELLS INTO PERIPHERAL VEIN, PERCUTANEOUS APPROACH: ICD-10-PCS | Performed by: INTERNAL MEDICINE

## 2020-06-10 PROCEDURE — 65270000029 HC RM PRIVATE

## 2020-06-10 PROCEDURE — 74011250636 HC RX REV CODE- 250/636: Performed by: FAMILY MEDICINE

## 2020-06-10 PROCEDURE — 96376 TX/PRO/DX INJ SAME DRUG ADON: CPT

## 2020-06-10 PROCEDURE — A9575 INJ GADOTERATE MEGLUMI 0.1ML: HCPCS | Performed by: EMERGENCY MEDICINE

## 2020-06-10 PROCEDURE — 74011000258 HC RX REV CODE- 258: Performed by: RADIOLOGY

## 2020-06-10 PROCEDURE — 74011250637 HC RX REV CODE- 250/637: Performed by: INTERNAL MEDICINE

## 2020-06-10 PROCEDURE — 94664 DEMO&/EVAL PT USE INHALER: CPT

## 2020-06-10 PROCEDURE — 77010033678 HC OXYGEN DAILY

## 2020-06-10 PROCEDURE — 74011000258 HC RX REV CODE- 258: Performed by: FAMILY MEDICINE

## 2020-06-10 PROCEDURE — 71260 CT THORAX DX C+: CPT

## 2020-06-10 PROCEDURE — 74011250636 HC RX REV CODE- 250/636: Performed by: EMERGENCY MEDICINE

## 2020-06-10 PROCEDURE — C9113 INJ PANTOPRAZOLE SODIUM, VIA: HCPCS | Performed by: FAMILY MEDICINE

## 2020-06-10 PROCEDURE — 80048 BASIC METABOLIC PNL TOTAL CA: CPT

## 2020-06-10 RX ORDER — LORAZEPAM 0.5 MG/1
0.5 TABLET ORAL
Status: DISCONTINUED | OUTPATIENT
Start: 2020-06-10 | End: 2020-06-12 | Stop reason: HOSPADM

## 2020-06-10 RX ORDER — SODIUM CHLORIDE 0.9 % (FLUSH) 0.9 %
10 SYRINGE (ML) INJECTION
Status: COMPLETED | OUTPATIENT
Start: 2020-06-10 | End: 2020-06-10

## 2020-06-10 RX ORDER — SODIUM CHLORIDE 0.9 % (FLUSH) 0.9 %
5-40 SYRINGE (ML) INJECTION EVERY 8 HOURS
Status: DISCONTINUED | OUTPATIENT
Start: 2020-06-10 | End: 2020-06-12 | Stop reason: HOSPADM

## 2020-06-10 RX ORDER — SODIUM CHLORIDE 0.9 % (FLUSH) 0.9 %
5-40 SYRINGE (ML) INJECTION AS NEEDED
Status: DISCONTINUED | OUTPATIENT
Start: 2020-06-10 | End: 2020-06-12 | Stop reason: HOSPADM

## 2020-06-10 RX ORDER — GADOTERATE MEGLUMINE 376.9 MG/ML
12 INJECTION INTRAVENOUS
Status: COMPLETED | OUTPATIENT
Start: 2020-06-10 | End: 2020-06-10

## 2020-06-10 RX ORDER — DEXAMETHASONE SODIUM PHOSPHATE 4 MG/ML
6 INJECTION, SOLUTION INTRA-ARTICULAR; INTRALESIONAL; INTRAMUSCULAR; INTRAVENOUS; SOFT TISSUE EVERY 8 HOURS
Status: DISCONTINUED | OUTPATIENT
Start: 2020-06-10 | End: 2020-06-12 | Stop reason: HOSPADM

## 2020-06-10 RX ORDER — SODIUM CHLORIDE 9 MG/ML
75 INJECTION, SOLUTION INTRAVENOUS CONTINUOUS
Status: DISCONTINUED | OUTPATIENT
Start: 2020-06-10 | End: 2020-06-11

## 2020-06-10 RX ADMIN — Medication 10 ML: at 21:29

## 2020-06-10 RX ADMIN — IPRATROPIUM BROMIDE AND ALBUTEROL SULFATE 3 ML: .5; 3 SOLUTION RESPIRATORY (INHALATION) at 17:31

## 2020-06-10 RX ADMIN — GADOTERATE MEGLUMINE 12 ML: 376.9 INJECTION INTRAVENOUS at 00:11

## 2020-06-10 RX ADMIN — LORAZEPAM 0.5 MG: 0.5 TABLET ORAL at 20:47

## 2020-06-10 RX ADMIN — IPRATROPIUM BROMIDE AND ALBUTEROL SULFATE 3 ML: .5; 3 SOLUTION RESPIRATORY (INHALATION) at 00:52

## 2020-06-10 RX ADMIN — DEXAMETHASONE SODIUM PHOSPHATE 6 MG: 4 INJECTION, SOLUTION INTRAMUSCULAR; INTRAVENOUS at 21:27

## 2020-06-10 RX ADMIN — IPRATROPIUM BROMIDE AND ALBUTEROL SULFATE 3 ML: .5; 3 SOLUTION RESPIRATORY (INHALATION) at 06:15

## 2020-06-10 RX ADMIN — IPRATROPIUM BROMIDE AND ALBUTEROL SULFATE 3 ML: .5; 3 SOLUTION RESPIRATORY (INHALATION) at 20:41

## 2020-06-10 RX ADMIN — SODIUM CHLORIDE 75 ML/HR: 900 INJECTION, SOLUTION INTRAVENOUS at 21:27

## 2020-06-10 RX ADMIN — IOHEXOL 50 ML: 240 INJECTION, SOLUTION INTRATHECAL; INTRAVASCULAR; INTRAVENOUS; ORAL at 13:13

## 2020-06-10 RX ADMIN — Medication 10 ML: at 00:11

## 2020-06-10 RX ADMIN — DEXAMETHASONE SODIUM PHOSPHATE 6 MG: 4 INJECTION, SOLUTION INTRAMUSCULAR; INTRAVENOUS at 13:44

## 2020-06-10 RX ADMIN — Medication 10 ML: at 06:13

## 2020-06-10 RX ADMIN — SODIUM CHLORIDE 40 MG: 9 INJECTION INTRAMUSCULAR; INTRAVENOUS; SUBCUTANEOUS at 17:22

## 2020-06-10 RX ADMIN — SODIUM CHLORIDE 40 MG: 9 INJECTION INTRAMUSCULAR; INTRAVENOUS; SUBCUTANEOUS at 09:09

## 2020-06-10 RX ADMIN — LEVETIRACETAM 500 MG: 100 INJECTION, SOLUTION INTRAVENOUS at 20:47

## 2020-06-10 RX ADMIN — LEVETIRACETAM 500 MG: 100 INJECTION, SOLUTION INTRAVENOUS at 09:14

## 2020-06-10 RX ADMIN — DEXAMETHASONE SODIUM PHOSPHATE 6 MG: 4 INJECTION, SOLUTION INTRAMUSCULAR; INTRAVENOUS at 06:13

## 2020-06-10 RX ADMIN — Medication 10 ML: at 13:13

## 2020-06-10 RX ADMIN — Medication 10 ML: at 13:42

## 2020-06-10 RX ADMIN — SODIUM CHLORIDE 100 ML: 900 INJECTION, SOLUTION INTRAVENOUS at 13:13

## 2020-06-10 RX ADMIN — SODIUM CHLORIDE 75 ML/HR: 900 INJECTION, SOLUTION INTRAVENOUS at 04:28

## 2020-06-10 RX ADMIN — IOPAMIDOL 100 ML: 755 INJECTION, SOLUTION INTRAVENOUS at 13:13

## 2020-06-10 NOTE — CONSULTS
Consult dictated. 65-year-old diagnosed with lung cancer in March, plan was to do radiation therapy at that time without pathology. She also has liver cirrhosis, gastrointestinal AVMs with bleeding. Now admitted with dark stools and left-sided weakness. 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 Started on Decadron and Keppra. CT chest abdomen and pelvis ordered. Overall prognosis poor. I do not see oncology evaluation in record. Dr. Miriam Mills for palliative care consult noted.  
Nieves Castro MD

## 2020-06-10 NOTE — PROGRESS NOTES
Spiritual Care Assessment/Progress Note [address] 
 
 
NAME: Leonel Vital      MRN: [id number] AGE: 63 y.o. SEX: female Mandaeism Affiliation: Tana Language: [place] 6/10/2020     Total Time (in minutes): 39 Spiritual Assessment begun in [facility] through conversation with: 
  
    [x]Patient        [] Family    [] Friend(s) Reason for Consult: Palliative Care, Initial/Spiritual Assessment Spiritual beliefs: (Please include comment if needed) [x] Identifies with a ramiro tradition:     
   [] Supported by a ramiro community:        
   [] Claims no spiritual orientation:       
   [] Seeking spiritual identity:            
   [] Adheres to an individual form of spirituality:       
   [] Not able to assess:                   
 
    
Identified resources for coping:  
   [x] Prayer                           
   [] Music                  [] Guided Imagery [x] Family/friends                 [] Pet visits [] Devotional reading                         [] Unknown 
   [] Other Interventions offered during this visit: (See comments for more details) Patient Interventions: Affirmation of emotions/emotional suffering, Catharsis/review of pertinent events in supportive environment, Iconic (affirming the presence of God/Higher Power), Integration of medical assessment with existing values and beliefs, Normalization of emotional/spiritual concerns, Prayer (actual), Prayer (assurance of), Mandaeism beliefs/image of God discussed Plan of Care: 
 
 [x] Support spiritual and/or cultural needs  
 [] Support AMD and/or advance care planning process [x] Support grieving process 
 [] Coordinate Rites and/or Rituals  
 [] Coordination with community clergy [] No spiritual needs identified at this time 
 [] Detailed Plan of Care below (See Comments)  [] Make referral to Music Therapy [] Make referral to Pet Therapy    
[] Make referral to Addiction services 
[] Make referral to [facility] 
[] Make referral to Spiritual Care Partner 
[] No future visits requested       
[] Follow up visits as needed Comments: Visited with Ms. Tana Jaimes for initial spiritual assessment. Pt is grieving as she continues to process news regarding her condition. She shared some end of life concerns. She expresses [place] ramiro, which may be a source of comfort as she amauri. She has spoken with her two young adult daughters as well as her best friend. Pt shared some of her hopes for her daughters as well as concerns related to the future.  offered listening presence, acknowledging and normalizing her feelings of grief, sadness, and concern. Shared a spoken prayer, which pt expressed  brought her  some comfort. Assured her of ongoing  support. Pascale Kay, Palliative

## 2020-06-10 NOTE — PROGRESS NOTES
Spoke with Dr. Maria Morel Agrees with current plan of care No additional recommendations Will assess the patient in a.m.

## 2020-06-10 NOTE — PROGRESS NOTES
Spiritual Care Assessment/Progress Note [address] 
 
 
NAME: Héctor Perry      MRN: [id number] AGE: 63 y.o. SEX: female Caodaism Affiliation: Manual [place] Language: [place] 6/10/2020     Total Time (in minutes): 7 Spiritual Assessment begun in [facility] through conversation with: 
  
    []Patient        [] Family    [] Friend(s) Reason for Consult: Palliative Care, Initial/Spiritual Assessment Spiritual beliefs: (Please include comment if needed) 
   [] Identifies with a ramiro tradition:     
   [] Supported by a ramiro community:        
   [] Claims no spiritual orientation:       
   [] Seeking spiritual identity:            
   [] Adheres to an individual form of spirituality:       
   [x] Not able to assess: staff assisting patient Identified resources for coping:  
   [] Prayer                           
   [] Music                  [] Guided Imagery 
   [] Family/friends                 [] Pet visits [] Devotional reading                         [x] Unknown 
   [] Other Interventions offered during this visit: (See comments for more details) Patient Interventions: Initial visit Plan of Care: 
 
 [] Support spiritual and/or cultural needs  
 [] Support AMD and/or advance care planning process    
 [] Support grieving process 
 [] Coordinate Rites and/or Rituals  
 [] Coordination with community clergy [] No spiritual needs identified at this time 
 [] Detailed Plan of Care below (See Comments)  [] Make referral to Music Therapy 
[] Make referral to Pet Therapy    
[] Make referral to Addiction services 
[] Make referral to [facility] 
[] Make referral to Spiritual Care Partner 
[] No future visits requested       
[x] Follow up visits as needed Comments: Attempted visit with Ms. Radha Rodney.   Pt was being assisted by staff;  will attempt to visit again as able/needed. Pascale Condon, Palliative

## 2020-06-10 NOTE — PROGRESS NOTES
TRANSFER - OUT REPORT: 
 
Verbal report given to MIKE Hodges(name) on Hershal Gerard  being transferred to 601(unit) for ordered procedure Report consisted of patients Situation, Background, Assessment and  
Recommendations(SBAR). Information from the following report(s) SBAR, Kardex, ED Summary, Procedure Summary, Intake/Output, MAR, Recent Results and Cardiac Rhythm NSR,PAC was reviewed with the receiving nurse. Lines:  
Peripheral IV 06/09/20 Right Antecubital (Active) Site Assessment Clean, dry, & intact 6/10/2020  3:52 AM  
Phlebitis Assessment 0 6/10/2020  3:52 AM  
Infiltration Assessment 0 6/10/2020  3:52 AM  
Dressing Status Clean, dry, & intact 6/10/2020  3:52 AM  
Dressing Type Transparent 6/10/2020  3:52 AM  
Hub Color/Line Status Pink; Infusing 6/10/2020  3:52 AM  
Action Taken Open ports on tubing capped 6/10/2020  3:52 AM  
Alcohol Cap Used Yes 6/10/2020  3:52 AM  
  
 
Opportunity for questions and clarification was provided. Patient transported with: 
 O2 @ 1 liters

## 2020-06-10 NOTE — ROUTINE PROCESS
TRANSFER - IN REPORT: 
 
Verbal report received from Kurt Diop RN(name) on José Miguel Brenda  being received from ED(unit) for routine progression of care Report consisted of patients Situation, Background, Assessment and  
Recommendations(SBAR). Information from the following report(s) SBAR, ED Summary, Intake/Output, MAR and Recent Results was reviewed with the receiving nurse. Opportunity for questions and clarification was provided. Assessment completed upon patients arrival to unit and care assumed.

## 2020-06-10 NOTE — PROGRESS NOTES
TRANSFER - IN REPORT: 
 
Verbal report received from Miriam Prince RN(name) on Lisandra Son  being received from 3N(unit) for routine progression of care Report consisted of patients Situation, Background, Assessment and  
Recommendations(SBAR). Information from the following report(s) SBAR, MAR, Recent Results and Med Rec Status was reviewed with the receiving nurse. Opportunity for questions and clarification was provided. Assessment completed upon patients arrival to unit and care assumed.

## 2020-06-10 NOTE — CONSULTS
Spoke to Dr. Octavio Messer and films reviewed. Multiple brain lesions concerning for mets. Would consider resection of large left cerebellar lesions pending further work up. Ct chest abd and pelvis ordered

## 2020-06-10 NOTE — CONSULTS
Palliative Medicine Consult Raul: [phone] ([phone]) Patient Name: Madison Pickett YOB: 1960 Date of Initial Consult: 06/10/20 Reason for Consult: Care decisions Requesting Provider:  Dr. Ean Raymundo Primary Care Physician: Sergio Cruz MD 
 
 SUMMARY:  
Madison Pickett is a 63 y.o. with a past history of CHF, COPD, liver cirrhosis, recurrent GI bleeds due to gastric AVMs, laceration of large part of her small intestines, and lung cancer, who was admitted on 6/9/2020 from her assisted living facility with a diagnosis of another acute GI bleed with associated acute on chronic blood loss anemia. She also complained of left sided weakness and upon initial work up for this with head CT, she was found to have multiple brain lesions (presumed metastases) from an unclear primary site. This is a new diagnosis for her. She was previously diagnosed with lung cancer in March of this year with plans to start radiation therapy at some point. Current medical issues leading to Palliative Medicine involvement include new diagnosis of suspected metastatic cancer of the brain, recent diagnosis of lung cancer and comorbid conditions of CHF, COPD, liver cirrhosis, and recurrent GI bleeds. Social: single, lives in [facility] x 5 years, 2 daughters (both out of state), +tobb PALLIATIVE DIAGNOSES:  
1. Brain lesions, probable brain mets 2. Lung cancer 3. Left sided weakness 4. Anemia 5. Melena 6. Goals of care 7. Physical debility PLAN:  
1. Patient seen without family present. Aris Franco called. Patient did not recall being informed of her brain lesions and became very emotional with the knowledge of this. 2. Inquired about wishes. Patient stated \"I'm not ready to die\". Discussed aggressive care vs hospice. Patient undecided. 3. Plans for additional imaging today. 4. Patient to call her daughters today.  Will meet with patient again tomorrow. Possible plan for meeting with daughters tomorrow if they are available. 5. Initial consult note routed to primary continuity provider and/or primary health care team members 6. Communicated plan of care with: Palliative Erick RODRIGUEZ 192 Team 
 
 GOALS OF CARE / TREATMENT PREFERENCES:  
 
GOALS OF CARE: 
Patient/Health Care Proxy Stated Goals: Other (comment)(still discussing) TREATMENT PREFERENCES:  
Code Status: DNR Advance Care Planning: 
[x] [facility] Interdisciplinary Team has updated the ACP Navigator with [place] and Patient Capacity Primary Decision Maker: Naeem Washington - Daughter - [phone] Secondary Decision Maker: Ariana Yadav - Daughter - [phone] Advance Care Planning 6/10/2020 Patient's Healthcare Decision Maker is: -  
Primary Decision Maker Name -  
Primary Decision Maker Phone Number -  
Primary Decision Maker Relationship to Patient - Secondary Decision Maker Name - Secondary Decision Maker Phone Number - Secondary Decision Maker Relationship to Patient -  
Confirm Advance Directive Yes, on file Patient Would Like to Complete Advance Directive - Medical Interventions: Limited additional interventions Other Instructions:  
Artificially Administered Nutrition: No feeding tube Other: As far as possible, the palliative care team has discussed with patient / health care proxy about goals of care / treatment preferences for patient. HISTORY:  
 
History obtained from: Patient and chart CHIEF COMPLAINT: Black stools, left arm weakness HPI/SUBJECTIVE: The patient is:  
[x] Verbal and participatory [] Non-participatory due to: She presented with complaints of black stools and left sided weakness. Stool was heme positive. Upon evaluation with imaging, she was found to have brain lesions (probable metastasis from an unclear primary site).  Of note, she was diagnosed with lung cancer in March. She denies any pain currently. Clinical Pain Assessment (nonverbal scale for severity on nonverbal patients):  
Clinical Pain Assessment Severity: 0 Duration: for how long has pt been experiencing pain (e.g., 2 days, 1 month, years) Frequency: how often pain is an issue (e.g., several times per day, once every few days, constant) FUNCTIONAL ASSESSMENT:  
 
Palliative Performance Scale (PPS): PPS: 40 PSYCHOSOCIAL/SPIRITUAL SCREENING:  
 
Palliative IDT has assessed this patient for cultural preferences / practices and a referral made as appropriate to needs (Cultural Services, Patient Advocacy, Ethics, etc.) Any spiritual / Confucianist concerns: 
[] Yes /  [x] No 
 
Caregiver Burnout: 
[] Yes /  [x] No /  [] No Caregiver Present Anticipatory grief assessment:  
[x] Normal  / [] Maladaptive ESAS Anxiety: Anxiety: 3 ESAS Depression:    
 
 
 REVIEW OF SYSTEMS:  
 
Positive and pertinent negative findings in ROS are noted above in HPI. The following systems were [x] reviewed / [] unable to be reviewed as noted in HPI Other findings are noted below. Systems: constitutional, ears/nose/mouth/throat, respiratory, gastrointestinal, genitourinary, musculoskeletal, integumentary, neurologic, psychiatric, endocrine. Positive findings noted below. Modified ESAS Completed by: provider Fatigue: 2 Drowsiness: 0 Pain: 0 Anxiety: 3 Nausea: 0 Anorexia: 0 Dyspnea: 0 Constipation: No  
     
 
 
 PHYSICAL EXAM:  
 
From RN flowsheet: 
Wt Readings from Last 3 Encounters:  
06/10/20 132 lb 1.6 oz (59.9 kg) 06/09/20 130 lb (59 kg) 06/01/20 131 lb 12.8 oz (59.8 kg) Blood pressure 122/64, pulse 93, temperature 98.5 °F (36.9 °C), resp. rate 16, weight 132 lb 1.6 oz (59.9 kg), SpO2 95 %. Pain Scale 1: Numeric (0 - 10) Pain Intensity 1: 0 Last bowel movement, if known: Constitutional: alert, oriented, pleasant, conversive Eyes: anicteric ENMT: no nasal discharge, moist mucous membranes Respiratory: breathing not labored, symmetric Gastrointestinal: soft non-tender Musculoskeletal: no deformity, no tenderness to palpation Skin: warm, dry Neurologic: following commands, moving all extremities Psychiatric: full affect, no hallucinations Other: 
 
 
 HISTORY:  
 
Principal Problem: 
  Metastasis to brain ([facility] [address] (6/10/2020) Active Problems: 
  Anemia (2016) Cirrhosis of liver ([address] (2016) Chronic respiratory failure with hypoxia ([address] (2016) Lung cancer ([facility] [address] (2020) Chronic obstructive pulmonary disease (HCC) () Past Medical History:  
Diagnosis Date  Anemia  Arthritis Rheumatoid  CHF (congestive heart failure) ([facility] [address]  Chronic obstructive pulmonary disease ([address]  Cirrhosis of liver ([facility] [address] ETOH  
 COPD (chronic obstructive pulmonary disease) ([facility] [address]  Gastrointestinal disorder \"lacerations in the large part of my small intestine. \"  Gastrointestinal disorder   
 liver cirrhosis  Heart failure ([address]  Hyperlipidemia 2020  Internal bleeding  Lung cancer ([facility] [address] 2020  Psychiatric disorder   
 depression, anxiety  Tachycardia 2016 Past Surgical History:  
Procedure Laterality Date  HX CHOLECYSTECTOMY  HX GI Cholecystectomoy  HX GYN    
  x 2.  [place] ENDOSCOPY UPPER SMALL INTESTINE  2016 Family History Problem Relation Age of Onset  Cancer Mother   
     pancreatic  Alcohol abuse Father  Cancer Sister Lung cancer (smoker). History reviewed, no pertinent family history. Social History Tobacco Use  Smoking status: Current Every Day Smoker Packs/day: 0.75 Years: 45.00 Pack years: 33.75  Smokeless tobacco: Never Used Substance Use Topics  Alcohol use:  No  
 Comment: in the past was an alcoholic. Has been sober  since about 2010 Allergies Allergen Reactions  Pcn [Penicillins] Angioedema Childhood reaction Current Facility-Administered Medications Medication Dose Route Frequency  sodium chloride (NS) flush 5-40 mL  5-40 mL IntraVENous Q8H  
 sodium chloride (NS) flush 5-40 mL  5-40 mL IntraVENous PRN  
 0.9% sodium chloride infusion  75 mL/hr IntraVENous CONTINUOUS  
 pantoprazole (PROTONIX) 40 mg in 0.9% sodium chloride 10 mL injection  40 mg IntraVENous BID  dexamethasone (DECADRON) 4 mg/mL injection 6 mg  6 mg IntraVENous Q8H  
 albuterol-ipratropium (DUO-NEB) 2.5 MG-0.5 MG/3 ML  3 mL Nebulization Q4H RT  
 levETIRAcetam (KEPPRA) 500 mg in 0.9% sodium chloride 100 mL IVPB  500 mg IntraVENous Q12H  
 
 
 
 LAB AND IMAGING FINDINGS:  
 
Lab Results Component Value Date/Time WBC 2.6 (L) 06/09/2020 04:57 PM  
 HGB 8.0 (L) 06/10/2020 08:53 AM  
 PLATELET 88 (L) 99/64/2941 04:57 PM  
 
Lab Results Component Value Date/Time Sodium 141 06/10/2020 04:28 AM  
 Potassium 3.8 06/10/2020 04:28 AM  
 Chloride 107 06/10/2020 04:28 AM  
 CO2 29 06/10/2020 04:28 AM  
 BUN 21 (H) 06/10/2020 04:28 AM  
 Creatinine 0.88 06/10/2020 04:28 AM  
 Calcium 9.2 06/10/2020 04:28 AM  
 Magnesium 2.2 03/18/2020 03:57 AM  
 Phosphorus 3.7 12/28/2016 11:00 PM  
  
Lab Results Component Value Date/Time Alk. phosphatase 119 (H) 06/09/2020 04:57 PM  
 Protein, total 6.7 06/09/2020 04:57 PM  
 Albumin 3.2 (L) 06/09/2020 04:57 PM  
 Globulin 3.5 06/09/2020 04:57 PM  
 
Lab Results Component Value Date/Time INR 1.0 05/26/2020 10:49 AM  
 Prothrombin time 10.7 05/26/2020 10:49 AM  
 aPTT 22.0 (L) 05/26/2020 10:49 AM  
  
Lab Results Component Value Date/Time  Iron 73 03/16/2020 11:58 AM  
 TIBC 353 03/16/2020 11:58 AM  
 Iron % saturation 21 03/16/2020 11:58 AM  
 Ferritin 9 03/16/2020 11:58 AM  
  
No results found for: PH, PCO2, PO2 
 No components found for: [place] Lab Results Component Value Date/Time CK 31 09/26/2016 11:23 AM  
 CK - MB 0.7 09/26/2016 11:23 AM  
  
 
 
   
 
Total time:  70 min Counseling / coordination time, spent as noted above: 55 min 
> 50% counseling / coordination?: Yes Prolonged service was provided for  []30 min   []75 min in face to face time in the presence of the patient, spent as noted above. Time Start:  
Time End:  
Note: this can only be billed with 73023 (initial) or 52643 (follow up). If multiple start / stop times, list each separately.

## 2020-06-10 NOTE — ROUTINE PROCESS
Primary Nurse Bobbi Peguero and Kenyon Monge, MIKE performed a dual skin assessment on this patient Impairment noted- see wound doc flow sheet Roger score is 18

## 2020-06-10 NOTE — PROGRESS NOTES
Problem: Falls - Risk of 
Goal: *Absence of Falls Description: Document Clydene Bone Fall Risk and appropriate interventions in the flowsheet. Outcome: Progressing Towards Goal 
Note: Fall Risk Interventions: 
Mobility Interventions: Bed/chair exit alarm, Patient to call before getting OOB Medication Interventions: Assess postural VS orthostatic hypotension, Patient to call before getting OOB, Teach patient to arise slowly Elimination Interventions: Bed/chair exit alarm, Call light in reach Problem: Pressure Injury - Risk of 
Goal: *Prevention of pressure injury Description: Document Roger Scale and appropriate interventions in the flowsheet. Outcome: Progressing Towards Goal 
Note: Pressure Injury Interventions: 
Sensory Interventions: Assess changes in LOC Moisture Interventions: Absorbent underpads Activity Interventions: Increase time out of bed Mobility Interventions: HOB 30 degrees or less, PT/OT evaluation Nutrition Interventions: Document food/fluid/supplement intake

## 2020-06-10 NOTE — PROGRESS NOTES
Christopher Aguirre Admit Date: 6/9/2020 Subjective:  
 
Events noted. Pt was sent to ER by me yesterday afternoon for worsening fatigue, etc.  I had concerns that her Hgb was dropping again, but it was stable around 8. However, she was found to have brain mets. She is sleepy this am, but she is interactive when I talk to her. No new c/o's. Current Facility-Administered Medications Medication Dose Route Frequency  sodium chloride (NS) flush 5-40 mL  5-40 mL IntraVENous Q8H  
 sodium chloride (NS) flush 5-40 mL  5-40 mL IntraVENous PRN  
 0.9% sodium chloride infusion  75 mL/hr IntraVENous CONTINUOUS  
 pantoprazole (PROTONIX) 40 mg in 0.9% sodium chloride 10 mL injection  40 mg IntraVENous BID  dexamethasone (DECADRON) 4 mg/mL injection 6 mg  6 mg IntraVENous Q8H  
 albuterol-ipratropium (DUO-NEB) 2.5 MG-0.5 MG/3 ML  3 mL Nebulization Q4H RT  
 levETIRAcetam (KEPPRA) 500 mg in 0.9% sodium chloride 100 mL IVPB  500 mg IntraVENous Q12H Objective:  
 
Patient Vitals for the past 8 hrs: 
 BP Temp Pulse Resp SpO2 Weight  
06/10/20 0616     95 %   
06/10/20 0405 102/66       
06/10/20 0353 104/59       
06/10/20 0352 93/54 99 °F (37.2 °C) 79 16 100 %   
06/10/20 0226 110/70 97.7 °F (36.5 °C) 79 16 98 % 132 lb 1.6 oz (59.9 kg) 06/10/20 0100 91/46  85 14 96 %   
06/10/20 0053     94 %  No intake/output data recorded. 06/08 1901 - 06/10 0700 In: 26 [P.O.:360; I.V.:100] Out: - Physical Exam: NAD. Neck -- Supple. No JVD. Heart -- RRR. Lungs -- CTA. Abd -- Benign. Ext -- No LE edema, b/l. Data Review Recent Results (from the past 24 hour(s)) CBC WITH AUTOMATED DIFF Collection Time: 06/09/20  4:57 PM  
Result Value Ref Range WBC 2.6 (L) 3.6 - 11.0 K/uL RBC 2.84 (L) 3.80 - 5.20 M/uL HGB 7.9 (L) 11.5 - 16.0 g/dL HCT 28.5 (L) 35.0 - 47.0 % .4 (H) 80.0 - 99.0 FL  
 MCH 27.8 26.0 - 34.0 PG  
 MCHC 27.7 (L) 30.0 - 36.5 g/dL  
 RDW 15.7 (H) 11.5 - 14.5 % PLATELET 88 (L) 857 - 400 K/uL MPV 11.5 8.9 - 12.9 FL  
 NRBC 0.0 0  WBC ABSOLUTE NRBC 0.00 0.00 - 0.01 K/uL NEUTROPHILS 66 32 - 75 % LYMPHOCYTES 18 12 - 49 % MONOCYTES 11 5 - 13 % EOSINOPHILS 5 0 - 7 % BASOPHILS 0 0 - 1 % IMMATURE GRANULOCYTES 0 0.0 - 0.5 % ABS. NEUTROPHILS 1.7 (L) 1.8 - 8.0 K/UL  
 ABS. LYMPHOCYTES 0.5 (L) 0.8 - 3.5 K/UL  
 ABS. MONOCYTES 0.3 0.0 - 1.0 K/UL  
 ABS. EOSINOPHILS 0.1 0.0 - 0.4 K/UL  
 ABS. BASOPHILS 0.0 0.0 - 0.1 K/UL  
 ABS. IMM. GRANS. 0.0 0.00 - 0.04 K/UL  
 DF SMEAR SCANNED    
 RBC COMMENTS STOMATOCYTES PRESENT 
    
 RBC COMMENTS ANISOCYTOSIS 1+ 
    
 RBC COMMENTS MACROCYTOSIS 
1+ METABOLIC PANEL, COMPREHENSIVE Collection Time: 06/09/20  4:57 PM  
Result Value Ref Range Sodium 141 136 - 145 mmol/L Potassium 3.8 3.5 - 5.1 mmol/L Chloride 106 97 - 108 mmol/L  
 CO2 30 21 - 32 mmol/L Anion gap 5 5 - 15 mmol/L Glucose 87 65 - 100 mg/dL BUN 22 (H) 6 - 20 MG/DL Creatinine 1.05 (H) 0.55 - 1.02 MG/DL  
 BUN/Creatinine ratio 21 (H) 12 - 20 GFR est AA >60 >60 ml/min/1.73m2 GFR est non-AA 53 (L) >60 ml/min/1.73m2 Calcium 8.8 8.5 - 10.1 MG/DL Bilirubin, total 0.3 0.2 - 1.0 MG/DL  
 ALT (SGPT) 30 12 - 78 U/L  
 AST (SGOT) 33 15 - 37 U/L Alk. phosphatase 119 (H) 45 - 117 U/L Protein, total 6.7 6.4 - 8.2 g/dL Albumin 3.2 (L) 3.5 - 5.0 g/dL Globulin 3.5 2.0 - 4.0 g/dL A-G Ratio 0.9 (L) 1.1 - 2.2 SAMPLES BEING HELD Collection Time: 06/09/20  4:57 PM  
Result Value Ref Range SAMPLES BEING HELD 1RED, 1BLU   
 COMMENT Add-on orders for these samples will be processed based on acceptable specimen integrity and analyte stability, which may vary by analyte.   
TYPE & SCREEN  
 Collection Time: 06/09/20  4:57 PM  
Result Value Ref Range Crossmatch Expiration 06/12/2020 ABO/Rh(D) O POSITIVE Antibody screen NEG Comment Previously identified anti K and nonspecific antibody Unit number [id number] Blood component type  LRIR Unit division 00 Status of unit ALLOCATED Crossmatch result Compatible ANTIGEN/ANTIBODY INFO JEFF NEGATIVE,   
LIPID PANEL Collection Time: 06/09/20  4:57 PM  
Result Value Ref Range LIPID PROFILE Cholesterol, total 137 <200 MG/DL Triglyceride 97 <150 MG/DL  
 HDL Cholesterol 68 MG/DL  
 LDL, calculated 49.6 0 - 100 MG/DL VLDL, calculated 19.4 MG/DL  
 CHOL/HDL Ratio 2.0 0.0 - 5.0 OCCULT BLOOD, STOOL Collection Time: 06/09/20  6:54 PM  
Result Value Ref Range Occult blood, stool Positive (A) NEG    
POC EG7 Collection Time: 06/09/20  8:37 PM  
Result Value Ref Range Calcium, ionized (POC) 1.35 (H) 1.12 - 1.32 mmol/L  
 FIO2 (POC) 32 % pH (POC) 7.262 (L) 7.35 - 7.45    
 pCO2 (POC) 65.4 (H) 35.0 - 45.0 MMHG  
 pO2 (POC) 96 80 - 100 MMHG  
 HCO3 (POC) 29.5 (H) 22 - 26 MMOL/L Base excess (POC) 2 mmol/L  
 sO2 (POC) 96 92 - 97 % Site RIGHT RADIAL Device: NASAL CANNULA Flow rate (POC) 3 L/M Allens test (POC) YES Specimen type (POC) ARTERIAL METABOLIC PANEL, BASIC Collection Time: 06/10/20  4:28 AM  
Result Value Ref Range Sodium 141 136 - 145 mmol/L Potassium 3.8 3.5 - 5.1 mmol/L Chloride 107 97 - 108 mmol/L  
 CO2 29 21 - 32 mmol/L Anion gap 5 5 - 15 mmol/L Glucose 184 (H) 65 - 100 mg/dL BUN 21 (H) 6 - 20 MG/DL Creatinine 0.88 0.55 - 1.02 MG/DL  
 BUN/Creatinine ratio 24 (H) 12 - 20 GFR est AA >60 >60 ml/min/1.73m2 GFR est non-AA >60 >60 ml/min/1.73m2 Calcium 9.2 8.5 - 10.1 MG/DL  
HEMOGLOBIN Collection Time: 06/10/20  4:28 AM  
Result Value Ref Range HGB 8.3 (L) 11.5 - 16.0 g/dL Assessment: Principal Problem: 
  Metastasis to brain -- Has lung cancer for which she recently started palliative XRT. I don't believe a bx of the lung lesion was done. Active Problems: 
  Anemia -- Due to recurrent small bowel AVM's, s/p recent cauterization by GI. Cirrhosis of liver ([facility] [address] (8/30/2016) Chronic respiratory failure with hypoxia ([facility] [address] (8/30/2016) Lung cancer ([facility] [address] (4/1/2020) Chronic obstructive pulmonary disease (HCC) () Plan: 1. Considering her metastatic disease and her multiple comorbidities, I feel palliative care would be best for her. I am asking palliative care team to discuss further with her. Hospice may be best option. I spoke with pt about her poor prognosis, and she is in agreement with DNR status. 2. Decrease O2 to help prevent CO2 retention. I called an emergency contact in the chart & left a VM. Janet Watersnd [phone] Pt has a dtr, Angelito Mcmillan, but she does not know her phone #. ... Addendum -- I spoke with Who@, and she gave me dtr Juju's # -- [phone] -- I left a VM for her.  
 
 
 
Jose Luis Null MD

## 2020-06-10 NOTE — H&P
[address] HISTORY AND PHYSICAL Name:  Yessica Perea 
MR#:  [id number] :  1960 ACCOUNT #:  [de-identified] ADMIT DATE:  2020 CHIEF COMPLAINT:  Black stools. HISTORY OF PRESENT ILLNESS:  The patient is a 34-year-old female with past medical history of GI bleed, gastric AVMs, COPD, arrhythmias, depression, rheumatoid arthritis, CHF, liver cirrhosis secondary to EtOH consumption, laceration of large part of small intestines, hyperlipidemia, lung cancer, who presents to the hospital with the above-mentioned symptom. History was primarily obtained from the patient. The patient reports that she started having some black stools recently, got concerned and decided to come to the hospital.  The patient was recently admitted to the hospital per chart review and was admitted from 2020 to 2020. During that time, the patient was worked up for a GI bleed. Came in with GI bleed, received transfusion, had a M2A capsule study done showed multiple small bowel AVMs with sign of active bleeding and had a balloon endoscopy through much of the small bowel and had multiple AVMs cauterized. Hemoglobin remained stable and the patient was discharged. The patient continued to be weak and lethargic at home and noticed her stools were black. She feels that she is weak in her left arm, but reports that she is able to hold things and does not describe any objective weakness. The patient came to the ER and was requested to be admitted under the hospitalist service. The patient denies any headache, blurry vision, sore throat, trouble swallowing, trouble with speech, any chest pain, abdominal pain, constipation, diarrhea, urinary symptoms, focal or generalized neurological weakness besides what is mentioned above, recent travel, sick contacts, falls, injuries, hematemesis, melena, hemoptysis, hematuria, or other concerns or problems. PAST MEDICAL HISTORY:  See above. HOME MEDICATIONS:  Currently, the patient is on Percocet 10/325 mg every 6 hours as needed for pain, pantoprazole 40 mg b.i.d., potassium chloride 20 mEq daily, multivitamin, Perforomist nebulizer b.i.d., Tessalon 100 mg t.i.d., ferrous sulfate 325 mg daily, calcium carbonate, acetaminophen, guaifenesin, Phenergan 12.5 mg every 6 hours, Abilify 10 mg daily, cyanocobalamin, Lyrica 150 mg b.i.d., Cymbalta 60 mg daily, Lipitor 20 mg nightly, Carafate, Wellbutrin 150 mg every morning, furosemide 20 mg daily, Coreg 3.125 mg b.i.d., Claritin as needed, and cholecalciferol. SOCIAL HISTORY:  Current everyday smoker, smokes three fourth of a pack every day for 45 years. No alcohol currently. No IV drug abuse. ALLERGIES:  PENICILLIN. FAMILY HISTORY:  Mother has a history of pancreatic cancer. Father has a history of alcohol abuse. REVIEW OF SYMPTOMS:  All systems were reviewed and found to be essentially negative except for the symptoms mentioned above. PHYSICAL EXAMINATION: 
VITAL SIGNS:  Temperature 98.4, pulse 85, respiratory rate 16, blood pressure 100/51, pulse oximetry 97% on 4 L. GENERAL:  Alert x3, awake, mildly distressed, weak female, appears to be stated age. HEENT:  Pupils equal and reactive to light. Dry mucous membranes. Tympanic membranes clear. NECK:  Supple. No meningeal signs. CHEST:  Decreased basilar breath sounds. Scattered wheezes. CORONARY:  S1 and S2 heard. ABDOMEN:  Soft, nontender, nondistended. Bowel sounds are physiological. 
EXTREMITIES:  No clubbing, no cyanosis, no edema. NEURO/PSYCH:  Pleasant mood and affect. Cranial nerves II through XII grossly intact. Sensory grossly within normal limits. DTRs 1+ x4. Strength 5/5. SKIN:  Warm. LABORATORY DATA:  White count 2.6, hemoglobin 7.9, hematocrit 28.5, platelets 72,547.   Sodium 141, potassium 3.8, chloride 106, bicarbonate 39, anion gap 5, glucose 87, BUN 22, creatinine 1.05, calcium 8.8, bilirubin total 0.3, ALT 30, AST 33, alkaline phosphatase 119. Stool occult blood is positive. CT of the head shows at least 4 intracranial metastases, 2 in the right parietal lobe and 2 in the left cerebral  with mild edema surrounding few of the metastases with no significant mass effect, midline shift, or herniation. Recommend brain MRI with and without contrast for delineation. ASSESSMENT AND PLAN: 
1. Acute gastrointestinal bleeding. The patient will be admitted on a telemetry bed. We will keep the patient n.p.o.  Start the patient on Protonix b.i.d. Intravenous hydration. Monitor H and H and transfuse further as needed. We will get Gastroenterology consult and further intervention per hospital course. Continue to monitor. We will transfuse as needed. 2.  Acute on chronic blood loss anemia, likely secondary to acute gastrointestinal bleeding. Closely monitor H and H. Transfuse as needed. Further intervention per hospital course. Closely monitor. Treat the underlying problem. Monitor for now. 3.  Left arm weakness. The patient's CT with multiple brain metastases, unclear primary, although the patient has a history of lung cancer. We may consider getting CT of the chest, abdomen and pelvis for screening. We will get neurosurgical consult. We will provide intravenous Decadron, neurovascular checks, Keppra for seizure prophylaxis. Any change in neurological status will mandate emergent intervention. We will get a MRI and further intervention per hospital course. Continue to closely monitor. Reassess as needed. Neurology consult has been obtained. 4.  History of cirrhosis. The patient appears to be compensated. Closely monitor volume status. Further intervention per hospital course. Reassess as needed. 5.  Chronic obstructive pulmonary disease. The patient with mild wheezing. Start DuoNebs. Continue steroids.   Continue intravenous hydration. Continue oxygen support, pulse oximetry monitoring, DuoNebs and we will reassess as needed. Further intervention per hospital course. 6.  Gastrointestinal and deep venous thrombosis prophylaxis. The patient will be on sequential compression devices. Aniket Perez MD [email or web address] 
D:  06/09/2020 20:32 
T:  06/09/2020 22:58 JOB #:  [id number]

## 2020-06-10 NOTE — CDMP QUERY
Pt admitted with GI Bleed. Pt noted to have Brain METS and radiology finding or documentation of {Mild associated edema). If clinically significant, please document in progress notes and discharge summary if you are evaluating/treating any of the following:  Cerebral edema  Brain compression  Other, please specify  Clinically unable to determine The medical record reflects the following: 
 
  Risk Factors:59 y/o female admitted with GI Bleed and Brain METS, and hx of Lung CA, Chronic Anemia, 
 
  Clinical Indicators: MRI findings \"Mild associated Edema\" hgb 7.9, Plt 88,. Per Neurology PN \" prominent lesions in the right posterior frontal and parietal lobe and left cerebellum\" Treatment: Started on Decadron and Keppra, IVF NS @75ml/hr, Neurology Consult, Consult for Palliative Care Thank you, Garrett Barrera RN [address] [place] [address] [phone]

## 2020-06-10 NOTE — CONSULTS
Cody Louis Name:  Tate Wallis 
MR#:  [id number] :  1960 ACCOUNT #:  [de-identified] DATE OF SERVICE:  06/10/2020 REQUESTING PHYSICIAN:  Dr. Tereza Bustos. REASON FOR EVALUATION:  Left-sided weakness. HISTORY:  The patient is 56-year-old female who was diagnosed with lung cancer back in March, and was evaluated by Radiation Oncology. Plan was to do radiation therapy without pathology and to not do a bronchoscopic-assisted procedure due to ongoing COVID-19 pandemic. She also has history of liver cirrhosis secondary to alcohol consumption versus autoimmune. She has gastrointestinal multiple small AVMs that have had active bleeding, and she has had balloon endoscopy and multiple AVMs were cauterized. She was recently hospitalized for the same and was discharged. She continues to notice dark stools and was noted that her left arm and left leg were weak over the past week or so. She had a CT scan of the brain done which showed multiple metastatic lesions. She was started on Keppra and Decadron. Denies any headache, changes in vision, speech or swallowing ability. No chest pain, shortness of breath, palpitations. No nausea or vomiting. PAST MEDICAL HISTORY:  As mentioned above. HOME MEDICATIONS: 
1. Percocet as needed. 2.  Multivitamins. 3.  Tessalon. 4.  Iron sulfate. 5.  Phenergan. 6.  Lyrica. 7.  Cymbalta. 8.  Lipitor. 9.  Wellbutrin. 10.  Coreg. SOCIAL HISTORY:  Current everyday smoker, smokes about three-fourth of a pack every day. No alcohol use. Lives at an assisted living. ALLERGIES:  PENICILLIN. FAMILY HISTORY:  Mother had history of pancreatic cancer. REVIEW OF SYSTEMS:  Negative except as mentioned above. PHYSICAL EXAMINATION: 
GENERAL:  The patient is sitting in bed, in no acute distress. VITAL SIGNS:  Blood pressure 116/75, temperature 97.9, pulse is 75. NEUROLOGIC:  Speech is clear. Comprehension is normal.  Pupils are equal, round, reactive. Extraocular movements are full. Face is symmetric. Tongue is midline. Hearing is baseline. Muscle tone and bulk normal.  Strength normal in the right upper and lower extremity. She has pronator drift on the left side. Left lower extremity strength is 4+/5. Deep tendon flexes 2/2, symmetric. Toe upgoing on the left. Sensation impaired to light touch, pinprick over the left hand and forearm when compared to the right. Gait exam is deferred. HEART:  Regular rate and rhythm. CHEST:  Clear. ABDOMEN:  Soft, nontender. Positive bowel sounds. EXTREMITIES:  No edema. LABORATORY DATA:  CBC with WBC 2.6, hemoglobin 7.9, hematocrit is 28.5, platelets 88. Chemistry, sodium 141, potassium 3.8, BUN 21, creatinine 0.88. DIAGNOSTIC DATA:  MRI brain images were independently reviewed. There are multiple metastatic lesions, more prominent lesions being in the right posterior frontal and parietal region and also in the left cerebellum. No significant local mass effect noted. ASSESSMENT AND PLAN:  A 58-year-old female who was diagnosed with lung cancer in March with plans to do radiation therapy at that time. She also has liver cirrhosis, gastrointestinal arteriovenous malformations with bleeding. She is now admitted with dark stools and left-sided weakness. She is found to have multiple metastatic lesions in the brain with prominent lesions in the right posterior frontal and parietal lobe with left cerebellum. This is the likely cause of her left-sided weakness. She has been started on Decadron, which may help with reduction of edema and she may see improvement in the strength on the left side. Agree with Keppra for seizure prophylaxis. CT of the chest, abdomen and pelvis has been ordered.   Overall prognosis is poor, and Dr. Ric Temple for palliative care consult noted. I will remain available as needed. Thank you for this consultation. Sydell Bloch, MD 
 
 
[id number] 
D:  06/10/2020 11:32 
T:  06/10/2020 12:15 JOB #:  [id number]

## 2020-06-10 NOTE — ROUTINE PROCESS
TRANSFER - OUT REPORT: 
 
Verbal report given to Lyla Hendricks RN on Li Tiwari  being transferred to  [phone] for routine progression of care Report consisted of patients Situation, Background, Assessment and  
Recommendations(SBAR). Information from the following report(s) SBAR, ED Summary, [facility] and Recent Results was reviewed with the receiving nurse. Lines:  
Peripheral IV 06/09/20 Right Antecubital (Active) Site Assessment Clean, dry, & intact 6/9/2020  6:48 PM  
Phlebitis Assessment 0 6/9/2020  6:48 PM  
Infiltration Assessment 0 6/9/2020  6:48 PM  
Dressing Status Clean, dry, & intact 6/9/2020  6:48 PM  
Dressing Type 4 X 4;Transparent 6/9/2020  6:48 PM  
Hub Color/Line Status Pink;Patent; Flushed 6/9/2020  6:48 PM  
  
 
Opportunity for questions and clarification was provided.

## 2020-06-10 NOTE — ROUTINE PROCESS
Bedside shift change report given to Arlen Reyes (oncoming nurse) by Adama Dillard (offgoing nurse). Report included the following information SBAR, ED Summary, Procedure Summary, Intake/Output, MAR and Recent Results.

## 2020-06-11 LAB — HGB BLD-MCNC: 6.8 G/DL (ref 11.5–16)

## 2020-06-11 PROCEDURE — 85018 HEMOGLOBIN: CPT

## 2020-06-11 PROCEDURE — 74011000258 HC RX REV CODE- 258: Performed by: FAMILY MEDICINE

## 2020-06-11 PROCEDURE — 94760 N-INVAS EAR/PLS OXIMETRY 1: CPT

## 2020-06-11 PROCEDURE — 74011000250 HC RX REV CODE- 250: Performed by: INTERNAL MEDICINE

## 2020-06-11 PROCEDURE — 65270000029 HC RM PRIVATE

## 2020-06-11 PROCEDURE — 77010033678 HC OXYGEN DAILY

## 2020-06-11 PROCEDURE — 74011250636 HC RX REV CODE- 250/636: Performed by: FAMILY MEDICINE

## 2020-06-11 PROCEDURE — C9113 INJ PANTOPRAZOLE SODIUM, VIA: HCPCS | Performed by: FAMILY MEDICINE

## 2020-06-11 PROCEDURE — 36415 COLL VENOUS BLD VENIPUNCTURE: CPT

## 2020-06-11 PROCEDURE — 94640 AIRWAY INHALATION TREATMENT: CPT

## 2020-06-11 PROCEDURE — 74011250637 HC RX REV CODE- 250/637: Performed by: INTERNAL MEDICINE

## 2020-06-11 PROCEDURE — 74011000250 HC RX REV CODE- 250: Performed by: FAMILY MEDICINE

## 2020-06-11 RX ORDER — SENNOSIDES 8.6 MG/1
1 TABLET ORAL
Status: DISCONTINUED | OUTPATIENT
Start: 2020-06-11 | End: 2020-06-12 | Stop reason: HOSPADM

## 2020-06-11 RX ORDER — LANOLIN ALCOHOL/MO/W.PET/CERES
325 CREAM (GRAM) TOPICAL DAILY
Status: DISCONTINUED | OUTPATIENT
Start: 2020-06-11 | End: 2020-06-12 | Stop reason: HOSPADM

## 2020-06-11 RX ORDER — DULOXETIN HYDROCHLORIDE 60 MG/1
60 CAPSULE, DELAYED RELEASE ORAL DAILY
Status: DISCONTINUED | OUTPATIENT
Start: 2020-06-11 | End: 2020-06-12 | Stop reason: HOSPADM

## 2020-06-11 RX ORDER — BUPROPION HYDROCHLORIDE 150 MG/1
150 TABLET, EXTENDED RELEASE ORAL DAILY
Status: DISCONTINUED | OUTPATIENT
Start: 2020-06-11 | End: 2020-06-12 | Stop reason: HOSPADM

## 2020-06-11 RX ORDER — POTASSIUM CHLORIDE 750 MG/1
20 TABLET, FILM COATED, EXTENDED RELEASE ORAL DAILY
Status: DISCONTINUED | OUTPATIENT
Start: 2020-06-11 | End: 2020-06-12 | Stop reason: HOSPADM

## 2020-06-11 RX ORDER — IBUPROFEN 200 MG
1 TABLET ORAL DAILY
Status: DISCONTINUED | OUTPATIENT
Start: 2020-06-11 | End: 2020-06-12 | Stop reason: HOSPADM

## 2020-06-11 RX ORDER — CARVEDILOL 3.12 MG/1
3.12 TABLET ORAL 2 TIMES DAILY WITH MEALS
Status: DISCONTINUED | OUTPATIENT
Start: 2020-06-11 | End: 2020-06-12 | Stop reason: HOSPADM

## 2020-06-11 RX ORDER — PREGABALIN 75 MG/1
150 CAPSULE ORAL 2 TIMES DAILY
Status: DISCONTINUED | OUTPATIENT
Start: 2020-06-11 | End: 2020-06-12 | Stop reason: HOSPADM

## 2020-06-11 RX ORDER — FUROSEMIDE 20 MG/1
20 TABLET ORAL DAILY
Status: DISCONTINUED | OUTPATIENT
Start: 2020-06-11 | End: 2020-06-12 | Stop reason: HOSPADM

## 2020-06-11 RX ORDER — BUDESONIDE 0.5 MG/2ML
500 INHALANT ORAL
Status: DISCONTINUED | OUTPATIENT
Start: 2020-06-11 | End: 2020-06-12 | Stop reason: HOSPADM

## 2020-06-11 RX ORDER — ARIPIPRAZOLE 10 MG/1
10 TABLET ORAL DAILY
Status: DISCONTINUED | OUTPATIENT
Start: 2020-06-11 | End: 2020-06-12 | Stop reason: HOSPADM

## 2020-06-11 RX ORDER — OXYCODONE AND ACETAMINOPHEN 10; 325 MG/1; MG/1
1 TABLET ORAL
Status: DISCONTINUED | OUTPATIENT
Start: 2020-06-11 | End: 2020-06-12 | Stop reason: HOSPADM

## 2020-06-11 RX ADMIN — SODIUM CHLORIDE 40 MG: 9 INJECTION INTRAMUSCULAR; INTRAVENOUS; SUBCUTANEOUS at 17:37

## 2020-06-11 RX ADMIN — PREGABALIN 150 MG: 75 CAPSULE ORAL at 09:20

## 2020-06-11 RX ADMIN — FUROSEMIDE 20 MG: 20 TABLET ORAL at 09:19

## 2020-06-11 RX ADMIN — OXYCODONE HYDROCHLORIDE AND ACETAMINOPHEN 1 TABLET: 10; 325 TABLET ORAL at 18:41

## 2020-06-11 RX ADMIN — Medication 1 DROP: at 10:37

## 2020-06-11 RX ADMIN — LEVETIRACETAM 500 MG: 100 INJECTION, SOLUTION INTRAVENOUS at 09:19

## 2020-06-11 RX ADMIN — ARIPIPRAZOLE 10 MG: 10 TABLET ORAL at 09:19

## 2020-06-11 RX ADMIN — SODIUM CHLORIDE 40 MG: 9 INJECTION INTRAMUSCULAR; INTRAVENOUS; SUBCUTANEOUS at 09:19

## 2020-06-11 RX ADMIN — IPRATROPIUM BROMIDE AND ALBUTEROL SULFATE 3 ML: .5; 3 SOLUTION RESPIRATORY (INHALATION) at 04:51

## 2020-06-11 RX ADMIN — DEXAMETHASONE SODIUM PHOSPHATE 6 MG: 4 INJECTION, SOLUTION INTRAMUSCULAR; INTRAVENOUS at 21:22

## 2020-06-11 RX ADMIN — IPRATROPIUM BROMIDE AND ALBUTEROL SULFATE 3 ML: .5; 3 SOLUTION RESPIRATORY (INHALATION) at 20:09

## 2020-06-11 RX ADMIN — IPRATROPIUM BROMIDE AND ALBUTEROL SULFATE 3 ML: .5; 3 SOLUTION RESPIRATORY (INHALATION) at 16:18

## 2020-06-11 RX ADMIN — IPRATROPIUM BROMIDE AND ALBUTEROL SULFATE 3 ML: .5; 3 SOLUTION RESPIRATORY (INHALATION) at 00:35

## 2020-06-11 RX ADMIN — PREGABALIN 150 MG: 75 CAPSULE ORAL at 17:37

## 2020-06-11 RX ADMIN — DULOXETINE HYDROCHLORIDE 60 MG: 60 CAPSULE, DELAYED RELEASE ORAL at 09:19

## 2020-06-11 RX ADMIN — DEXAMETHASONE SODIUM PHOSPHATE 6 MG: 4 INJECTION, SOLUTION INTRAMUSCULAR; INTRAVENOUS at 06:31

## 2020-06-11 RX ADMIN — BUDESONIDE 500 MCG: 0.5 INHALANT RESPIRATORY (INHALATION) at 11:29

## 2020-06-11 RX ADMIN — IPRATROPIUM BROMIDE AND ALBUTEROL SULFATE 3 ML: .5; 3 SOLUTION RESPIRATORY (INHALATION) at 11:29

## 2020-06-11 RX ADMIN — Medication 1 DROP: at 21:22

## 2020-06-11 RX ADMIN — IPRATROPIUM BROMIDE AND ALBUTEROL SULFATE 3 ML: .5; 3 SOLUTION RESPIRATORY (INHALATION) at 07:30

## 2020-06-11 RX ADMIN — Medication 10 ML: at 06:31

## 2020-06-11 RX ADMIN — LEVETIRACETAM 500 MG: 100 INJECTION, SOLUTION INTRAVENOUS at 21:22

## 2020-06-11 RX ADMIN — CARVEDILOL 3.12 MG: 3.12 TABLET, FILM COATED ORAL at 09:19

## 2020-06-11 RX ADMIN — BUPROPION HYDROCHLORIDE 150 MG: 150 TABLET, EXTENDED RELEASE ORAL at 10:37

## 2020-06-11 RX ADMIN — IPRATROPIUM BROMIDE AND ALBUTEROL SULFATE 3 ML: .5; 3 SOLUTION RESPIRATORY (INHALATION) at 23:26

## 2020-06-11 RX ADMIN — Medication 10 ML: at 16:38

## 2020-06-11 RX ADMIN — FERROUS SULFATE TAB 325 MG (65 MG ELEMENTAL FE) 325 MG: 325 (65 FE) TAB at 09:20

## 2020-06-11 RX ADMIN — CARVEDILOL 3.12 MG: 3.12 TABLET, FILM COATED ORAL at 17:37

## 2020-06-11 RX ADMIN — Medication 10 ML: at 21:22

## 2020-06-11 RX ADMIN — POTASSIUM CHLORIDE 20 MEQ: 750 TABLET, FILM COATED, EXTENDED RELEASE ORAL at 09:20

## 2020-06-11 RX ADMIN — DEXAMETHASONE SODIUM PHOSPHATE 6 MG: 4 INJECTION, SOLUTION INTRAMUSCULAR; INTRAVENOUS at 16:40

## 2020-06-11 RX ADMIN — BUDESONIDE 500 MCG: 0.5 INHALANT RESPIRATORY (INHALATION) at 20:09

## 2020-06-11 NOTE — PROGRESS NOTES
Care Management Interventions PCP Verified by CM: Yes Last Visit to PCP: 06/11/20 Mode of Transport at Discharge: (tbd) Transition of Care Consult (CM Consult): Home Hospice [facility]: No 
Reason Outside [place]: (patient doesnt want bs hospice) Discharge Durable Medical Equipment: No 
Current Support Network: Assisted Living([facility]) Confirm Follow Up Transport: Family(patient's daughter? ? ) The Plan for Transition of Care is Related to the Following Treatment Goals : return to assisted living with hospice The Patient and/or Patient Representative was Provided with a Choice of Provider and Agrees with the Discharge Plan?: Yes Discharge Location Discharge Placement: Home with hospice Chart reviewed. Patient admitted here on 6/10/20 with Acute GI bleed, acute on chronic blood loss secondary to acute GI bleed. The patient is admitted here from [facility]. Palliative team visited patient today and consult received for hospice services. CM met with patient to introduce self and role. The patient has been a resident at [facility] for several years. She lives on the first floor and uses a rollator walker. CM discussed hospice consult. Patient had no preferences and deferred to the facility as she is aware of hospice services at the facility. She states she wants same agency, however does not know the name. CM called the the facility ([phone]) and asked for the admin Shameka Monae. Ms. Herman George is off today. PAT spoke with Thom Rocha (Supervisor). She states facility [facility] with [facility]. CM sending referral via [facility]. CM requested facility fax (to send updated clinicals). CM sending clinicals to [phone]). CM will call daughter(s) to confirm discharge transport for tomorrow. 4:32p  Phone call placed to [facility] ([facility]) at [phone]. Message left on  to have call returned at [phone]. Phone call placed to dtanabel Couch) at [phone]. Message left on voicemail. 5:17p  PAT awaiting response from [facility]. Call placed to [facility] [phone]) to advise of referral.  CM provided him with patient's cell number and the phone number to the nurses' station in the event he cant reach patient.

## 2020-06-11 NOTE — PROGRESS NOTES
Music Therapy Assessment [facility]idi [facility] [id number] 1960  63 y.o.  female Patient Telephone Number: [phone] (home) Muslim Affiliation: Adama Sosa Language: [place] Patient Active Problem List  
 Diagnosis Date Noted  Metastasis to brain [facility]) 06/10/2020  Thrombocytopenia ([facility] 05/27/2020  Hypokalemia 05/26/2020  Hepatic encephalopathy ([facility] 04/07/2020  Lung cancer ([facility] 04/01/2020  Hyperlipidemia 04/01/2020  Chronic obstructive pulmonary disease ([facility]  CHF (congestive heart failure) ([facility] 06/27/2019  GIB (gastrointestinal bleeding) 02/08/2017  Acute blood loss anemia 10/25/2016  Cirrhosis of liver ([facility] 08/30/2016  Chronic respiratory failure with hypoxia ([facility] 08/30/2016  Anemia 07/19/2016  GI bleed 07/19/2016 Date: 6/11/2020            Total Time (in minutes): 33          [facility]\"" Mental Status:   [x] Alert [  ] Manohar Mimes [  ]  Confused  [  ] Minimally responsive  [  ] Sleeping Communication Status: [  ] Impaired Speech [  ] Nonverbal -N/A Physical Status:   [x] Oxygen in use  [  ] Hard of Hearing [  ] Vision Impaired [  ] Ambulatory  [  ] Ambulatory with assistance [  ] Non-ambulatory Music Preferences, Background: Pt said she likes everything. She said she likes some Country, especially [address], [place], [address] [place], and that her daughters have introduced her to some of the music she likes. Clinical Problem addressed: Support healthy coping, self-expression, improve mood. Goal(s) met in session: 
Physical/Pain management (Scale of 1-10): Pre-session rating: Pt didn't report on pain. Post-session rating: Pt didn't report on pain. [  ] Increased relaxation   [  ] Affected breathing patterns [  ] Decreased muscle tension   [  ] Decreased agitation [  ] Affected heart rate    [  ] Increased alertness Emotional/Psychological: [x] Increased self-expression   [  ] Decreased aggressive behavior [  ] Decreased feelings of stress  [x] Discussed healthy coping skills [x] Improved mood    [  ] Decreased withdrawn behavior Social: 
[  ] Decreased feelings of isolation/loneliness [x] Positive social interaction [  ] Provided support and/or comfort for family/friends Spiritual: 
[  ] Spiritual support    [  ] Expressed peace [  ] Expressed ramiro    [  ] Discussed beliefs Techniques Utilized (Check all that apply):  
[  ] Procedural support MT [  ] Music for relaxation [x] Patient preferred music 
[  ] Lala analysis  [  ] Song choice  [  ] Music for validation [  ] Entrainment  [  ] Movement to music [  ] Guided visualization 
[x] ISO Principle  [  ] Patient instrument playing [  ] Latasha Malin writing 
[x] Soham Austin along   [  ] Olman Cochran  [  ] Sensory stimulation 
[x] Active Listening  [  ] Music for spiritual support [  ] Making of CDs as gifts Session Observations:  Referral from [facility], Palliative . Patient (pt) was alert sitting up in bed with a sad affect. When this music therapist (MT) asked pt how she was feeling, pt increased self-expression as evidenced by (AEB) becoming tearful. She expressed themes of love and protectiveness of her two daughters. MT provided active listening and pt went on to share that there was no hope for further cancer treatment. MT offered words of validation and then asked pt if there was anything giving her hope right now, apart from treatment. Pt said she wasn't sure. MT asked pt about her music preferences, which pt shared. She chose to hear a [employer]. MT used the ISO principle of matching the theme of the music to the pt's mood and gradually singing songs with themes aligned with healthy coping. MT started by singing and playing the [employer] with guyulissa. Pt's affect was sad. She was tearful and sang along in response to the music.  Pt said she could relate to some of the song lyrics. Pt asked MT to choose a song for her next. MT then sang and played the [employer] Got a Friend. Pt was tearful, but her affect brightened in response to this song. She expressed enjoyment in the music and gratitude for MT's listening. MT concluded the session when pt's lunch tray arrived. Will follow as able. MILTON Bernard (Music Therapist-Board Certified) Spiritual Care Department Referral-based service

## 2020-06-11 NOTE — PROGRESS NOTES
Follow-up visit with Ms. Yamilex Allison. Pt appeared to be resting, but opened her eyes when I gently spoke her name. She provided an update on how she is feeing and coping. Offered emotional support and spoken prayer. Visit ended to allow pt to rest. 
 
Chaplains remain available for support as needed. Pascale Oscar, Palliative

## 2020-06-11 NOTE — PROGRESS NOTES
Paulo Mccray Trey Lacey Admit Date: 6/9/2020 Subjective:  
 
Pt more alert this am. 
No new c/o's, but she was anxious yesterday about her new dx, etc. 
Hgb 6.8 this am, but she seems asymptomatic. Current Facility-Administered Medications Medication Dose Route Frequency  . PHARMACY TO SUBSTITUTE PER PROTOCOL (Reordered from: propylene glycol (Systane Complete) 0.6 % drop)    Per Protocol  ARIPiprazole (ABILIFY) tablet 10 mg  10 mg Oral DAILY  budesonide (PULMICORT) 500 mcg/2 ml nebulizer suspension  500 mcg Nebulization BID RT  
 . PHARMACY TO SUBSTITUTE PER PROTOCOL (Reordered from: buPROPion XL (WELLBUTRIN XL) 150 mg tablet)    Per Protocol  carvediloL (COREG) tablet 3.125 mg  3.125 mg Oral BID WITH MEALS  DULoxetine (CYMBALTA) capsule 60 mg  60 mg Oral DAILY  ferrous sulfate tablet 325 mg  325 mg Oral DAILY  furosemide (LASIX) tablet 20 mg  20 mg Oral DAILY  oxyCODONE-acetaminophen (PERCOCET 10)  mg per tablet 1 Tab  1 Tab Oral Q6H PRN  
 . PHARMACY TO SUBSTITUTE PER PROTOCOL (Reordered from: peg 400-propylene glycol (Systane, propylene glycol,) 0.4-0.3 % drop)    Per Protocol  potassium chloride SR (KLOR-CON 10) tablet 20 mEq  20 mEq Oral DAILY  pregabalin (LYRICA) capsule 150 mg  150 mg Oral BID  senna (SENOKOT) tablet 8.6 mg  1 Tab Oral DAILY PRN  
 . PHARMACY TO SUBSTITUTE PER PROTOCOL (Reordered from: vit A,C,E-zinc-copper (PreserVision AREDS) cap capsule)    Per Protocol  nicotine (NICODERM CQ) 21 mg/24 hr patch 1 Patch  1 Patch TransDERmal DAILY  sodium chloride (NS) flush 5-40 mL  5-40 mL IntraVENous Q8H  
 sodium chloride (NS) flush 5-40 mL  5-40 mL IntraVENous PRN  pantoprazole (PROTONIX) 40 mg in 0.9% sodium chloride 10 mL injection  40 mg IntraVENous BID  dexamethasone (DECADRON) 4 mg/mL injection 6 mg  6 mg IntraVENous Q8H  
 LORazepam (ATIVAN) tablet 0.5 mg  0.5 mg Oral Q4H PRN  
  albuterol-ipratropium (DUO-NEB) 2.5 MG-0.5 MG/3 ML  3 mL Nebulization Q4H RT  
 levETIRAcetam (KEPPRA) 500 mg in 0.9% sodium chloride 100 mL IVPB  500 mg IntraVENous Q12H Objective:  
 
Patient Vitals for the past 8 hrs: 
 BP Temp Pulse Resp SpO2 Weight  
06/11/20 0730     97 %   
06/11/20 0451     98 %   
06/11/20 0422      131 lb 4.8 oz (59.6 kg) 06/11/20 0225 112/64 98.1 °F (36.7 °C) 78 18 97 %   
06/11/20 0035     94 %  No intake/output data recorded. 06/09 1901 - 06/11 0700 In: 0763 [P.O.:360; I.V.:1335] Out: 1 Physical Exam: NAD. A&O. Neck -- Supple. No JVD. Heart -- RRR. Lungs -- Relatively CTA. Abd -- Benign. Ext -- No LE edema, b/l. Data Review Recent Results (from the past 24 hour(s)) AMMONIA Collection Time: 06/10/20  8:53 AM  
Result Value Ref Range Ammonia <10 <32 UMOL/L  
HEMOGLOBIN Collection Time: 06/10/20  8:53 AM  
Result Value Ref Range HGB 8.0 (L) 11.5 - 16.0 g/dL HEMOGLOBIN Collection Time: 06/11/20  3:06 AM  
Result Value Ref Range HGB 6.8 (L) 11.5 - 16.0 g/dL Assessment:  
 
Principal Problem: 
  Metastasis to brain -- Has lung cancer for which she recently started palliative XRT. I don't believe a bx of the lung lesion was done. Active Problems: 
  Anemia -- Due to recurrent small bowel AVM's, s/p recent cauterization by GI. Cirrhosis of liver ([facility] (8/30/2016) Chronic respiratory failure with hypoxia ([facility] (8/30/2016) Lung cancer ([facility] (4/1/2020) Chronic obstructive pulmonary disease (HCC) () Plan: 1.  Appreciate input from palliative care -- I had conversation with pt & dtr Lalit Capellan -- I feel palliative/Hospice care would be best for pt as I don't think putting her through aggressive mgmt would be that helpful for her, and it would not be curative. 2. I am not transfusing PRBC's at this point as she is currently not symptomatic. 3. Resume home meds. D/w dtr Pramod Hidden on speaker phone with pt present -- [phone] Shannan Ruiz MD 
   
   
  
 
 
 
 
  
   
Addendum (for billing/coding purposes): · Cerebral edema

## 2020-06-11 NOTE — PROGRESS NOTES
Physical [facility] Screening: [facility] based on results obtained during the nursing admission assessment. The patients chart was reviewed and the patient is appropriate for a skilled therapy evaluation if there is a decline in functional mobility from baseline. Please order a consult for physical therapy if you are in agreement and would like an evaluation to be completed. Thank you.  
 
Eleuterio Adams, PT

## 2020-06-11 NOTE — PROGRESS NOTES
Palliative Medicine Consult Raul: [id number] ([id number]) Patient Name: Deepthi Bee YOB: 1960 Date of Initial Consult: 06/10/20 Reason for Consult: Care decisions Requesting Provider:  Dr. Monika Daniel Primary Care Physician: Mukund Dill MD 
 
 SUMMARY:  
Deepthi Bee is a 63 y.o. with a past history of CHF, COPD, liver cirrhosis, recurrent GI bleeds due to gastric AVMs, laceration of large part of her small intestines, and lung cancer, who was admitted on 6/9/2020 from her assisted living facility with a diagnosis of another acute GI bleed with associated acute on chronic blood loss anemia. She also complained of left sided weakness and upon initial work up for this with head CT, she was found to have multiple brain lesions (presumed metastases) from an unclear primary site. This is a new diagnosis for her. She was previously diagnosed with lung cancer in March of this year and had started radiation. CTs of the chest, abdomen, and pelvis done 6/10/20 did not show any new abnormalities. Current medical issues leading to Palliative Medicine involvement include new diagnosis of suspected metastatic cancer of the brain, recent diagnosis of lung cancer and comorbid conditions of CHF, COPD, liver cirrhosis, and recurrent GI bleeds. Social: single, lives in [facility] 5 years, 2 daughters (both out of state), +tobb PALLIATIVE DIAGNOSES:  
1. Brain lesions, probable brain mets 2. Lung cancer 3. Left sided weakness 4. Anemia 5. Melena 6. Goals of care 7. Physical debility PLAN:  
1. Patient seen without family present. She was able to talk with her daughters and update them on her diagnosis. She also spoke to her daughter Jorge on speaker phone along with her PCP Dr. Monika Daniel earlier today. 2. We discussed that aggressive measures would be more of a burden than a benefit. 3. She understands that there is not much else that can be done for her condition and is now open to hospice care. 4. Hospice consult placed. Pt will need DDNR prior to dc. Will assist with completing as well as AMD if she wishes. 5. Will continue to follow up 6. Initial consult note routed to primary continuity provider and/or primary health care team members 7. Communicated plan of care with: Palliative IDT, [facility] Team 
 
 GOALS OF CARE / TREATMENT PREFERENCES:  
 
GOALS OF CARE: 
Patient/Health Care Proxy Stated Goals: Comfort TREATMENT PREFERENCES:  
Code Status: DNR Advance Care Planning: 
[x] [facility] Interdisciplinary Team has updated the ACP Navigator with [place] and Patient Capacity Primary Decision Maker: Marialuisa Rodriguez - Daughter - [phone] Secondary Decision Maker: Lizette Correa - Daughter - [phone] Advance Care Planning 6/10/2020 Patient's Healthcare Decision Maker is: -  
Primary Decision Maker Name -  
Primary Decision Maker Phone Number -  
Primary Decision Maker Relationship to Patient - Secondary Decision Maker Name - Secondary Decision Maker Phone Number - Secondary Decision Maker Relationship to Patient -  
Confirm Advance Directive Yes, on file Patient Would Like to Complete Advance Directive - Medical Interventions: Limited additional interventions Other Instructions:  
Artificially Administered Nutrition: No feeding tube Other: As far as possible, the palliative care team has discussed with patient / health care proxy about goals of care / treatment preferences for patient. HISTORY:  
 
History obtained from: Patient and chart CHIEF COMPLAINT: Black stools, left arm weakness HPI/SUBJECTIVE: The patient is:  
[x] Verbal and participatory [] Non-participatory due to: She presented with complaints of black stools and left sided weakness. Stool was heme positive. Upon evaluation with imaging, she was found to have brain lesions (probable metastasis from an unclear primary site). Of note, she was diagnosed with lung cancer in March. She denies any pain currently. She does report feeling depressed due to this new diagnosis. Clinical Pain Assessment (nonverbal scale for severity on nonverbal patients):  
Clinical Pain Assessment Severity: 0 Duration: for how long has pt been experiencing pain (e.g., 2 days, 1 month, years) Frequency: how often pain is an issue (e.g., several times per day, once every few days, constant) FUNCTIONAL ASSESSMENT:  
 
Palliative Performance Scale (PPS): PPS: 40 PSYCHOSOCIAL/SPIRITUAL SCREENING:  
 
Palliative IDT has assessed this patient for cultural preferences / practices and a referral made as appropriate to needs (Cultural Services, Patient Advocacy, Ethics, etc.) Any spiritual / Christianity concerns: 
[] Yes /  [x] No 
 
Caregiver Burnout: 
[] Yes /  [x] No /  [] No Caregiver Present Anticipatory grief assessment:  
[x] Normal  / [] Maladaptive ESAS Anxiety: Anxiety: 0 
 
ESAS Depression: Depression: 4 REVIEW OF SYSTEMS:  
 
Positive and pertinent negative findings in ROS are noted above in HPI. The following systems were [x] reviewed / [] unable to be reviewed as noted in HPI Other findings are noted below. Systems: constitutional, ears/nose/mouth/throat, respiratory, gastrointestinal, genitourinary, musculoskeletal, integumentary, neurologic, psychiatric, endocrine. Positive findings noted below. Modified ESAS Completed by: provider Fatigue: 2 Drowsiness: 0 Depression: 4 Pain: 0 Anxiety: 0 Nausea: 0 Anorexia: 1 Dyspnea: 0 Constipation: No  
     
 
 
 PHYSICAL EXAM:  
 
From RN flowsheet: 
Wt Readings from Last 3 Encounters:  
06/11/20 131 lb 4.8 oz (59.6 kg) 06/09/20 130 lb (59 kg) 06/01/20 131 lb 12.8 oz (59.8 kg) Blood pressure (P) 90/59, pulse (P) 90, temperature (P) 98.4 °F (36.9 °C), resp. rate (P) 18, weight 131 lb 4.8 oz (59.6 kg), SpO2 97 %. Pain Scale 1: Numeric (0 - 10) Pain Intensity 1: 0 Last bowel movement, if known:  
 
Constitutional: alert, oriented, pleasant, conversive Eyes: anicteric ENMT: no nasal discharge, moist mucous membranes Respiratory: breathing not labored, symmetric Gastrointestinal: soft non-tender Musculoskeletal: no deformity, no tenderness to palpation Skin: warm, dry Neurologic: following commands, moving all extremities Psychiatric: full affect, no hallucinations Other: 
 
 
 HISTORY:  
 
Principal Problem: 
  Metastasis to brain ([address] (6/10/2020) Active Problems: 
  Anemia (2016) Cirrhosis of liver ([address] (2016) Chronic respiratory failure with hypoxia ([address] (2016) Lung cancer ([address] (2020) Chronic obstructive pulmonary disease (HCC) () Past Medical History:  
Diagnosis Date  Anemia  Arthritis Rheumatoid  CHF (congestive heart failure) ([address]  Chronic obstructive pulmonary disease ([address]  Cirrhosis of liver ([address] ETOH  
 COPD (chronic obstructive pulmonary disease) ([address]  Gastrointestinal disorder \"lacerations in the large part of my small intestine. \"  Gastrointestinal disorder   
 liver cirrhosis  Heart failure ([address]  Hyperlipidemia 2020  Internal bleeding  Lung cancer ([address] 2020  Psychiatric disorder   
 depression, anxiety  Tachycardia 2016 Past Surgical History:  
Procedure Laterality Date  HX CHOLECYSTECTOMY  HX GI Cholecystectomoy  HX GYN    
  x 2.  [place] ENDOSCOPY UPPER SMALL INTESTINE  2016 Family History Problem Relation Age of Onset  Cancer Mother   
     pancreatic  Alcohol abuse Father  Cancer Sister Lung cancer (smoker). History reviewed, no pertinent family history. Social History Tobacco Use  Smoking status: Current Every Day Smoker Packs/day: 0.75 Years: 45.00 Pack years: 33.75  Smokeless tobacco: Never Used Substance Use Topics  Alcohol use: No  
  Comment: in the past was an alcoholic. Has been sober  since about 2010 Allergies Allergen Reactions  Pcn [Penicillins] Angioedema Childhood reaction Current Facility-Administered Medications Medication Dose Route Frequency  ARIPiprazole (ABILIFY) tablet 10 mg  10 mg Oral DAILY  budesonide (PULMICORT) 500 mcg/2 ml nebulizer suspension  500 mcg Nebulization BID RT  
 carvediloL (COREG) tablet 3.125 mg  3.125 mg Oral BID WITH MEALS  DULoxetine (CYMBALTA) capsule 60 mg  60 mg Oral DAILY  ferrous sulfate tablet 325 mg  325 mg Oral DAILY  furosemide (LASIX) tablet 20 mg  20 mg Oral DAILY  oxyCODONE-acetaminophen (PERCOCET 10)  mg per tablet 1 Tab  1 Tab Oral Q6H PRN  potassium chloride SR (KLOR-CON 10) tablet 20 mEq  20 mEq Oral DAILY  pregabalin (LYRICA) capsule 150 mg  150 mg Oral BID  senna (SENOKOT) tablet 8.6 mg  1 Tab Oral DAILY PRN  
 nicotine (NICODERM CQ) 21 mg/24 hr patch 1 Patch  1 Patch TransDERmal DAILY  polyvinyl alcohol-povidone (NATURAL TEARS) 0.5-0.6 % ophthalmic solution 2 Drop  2 Drop Both Eyes Q6H PRN  polyvinyl alcohol-povidone (NATURAL TEARS) 0.5-0.6 % ophthalmic solution 1 Drop  1 Drop Both Eyes BID  buPROPion SR (WELLBUTRIN SR) tablet 150 mg  150 mg Oral DAILY  sodium chloride (NS) flush 5-40 mL  5-40 mL IntraVENous Q8H  
 sodium chloride (NS) flush 5-40 mL  5-40 mL IntraVENous PRN  pantoprazole (PROTONIX) 40 mg in 0.9% sodium chloride 10 mL injection  40 mg IntraVENous BID  dexamethasone (DECADRON) 4 mg/mL injection 6 mg  6 mg IntraVENous Q8H  
 LORazepam (ATIVAN) tablet 0.5 mg  0.5 mg Oral Q4H PRN  
 albuterol-ipratropium (DUO-NEB) 2.5 MG-0.5 MG/3 ML  3 mL Nebulization Q4H RT  
  levETIRAcetam (KEPPRA) 500 mg in 0.9% sodium chloride 100 mL IVPB  500 mg IntraVENous Q12H  
 
 
 
 LAB AND IMAGING FINDINGS:  
 
Lab Results Component Value Date/Time WBC 2.6 (L) 06/09/2020 04:57 PM  
 HGB 6.8 (L) 06/11/2020 03:06 AM  
 PLATELET 88 (L) 95/59/5340 04:57 PM  
 
Lab Results Component Value Date/Time Sodium 141 06/10/2020 04:28 AM  
 Potassium 3.8 06/10/2020 04:28 AM  
 Chloride 107 06/10/2020 04:28 AM  
 CO2 29 06/10/2020 04:28 AM  
 BUN 21 (H) 06/10/2020 04:28 AM  
 Creatinine 0.88 06/10/2020 04:28 AM  
 Calcium 9.2 06/10/2020 04:28 AM  
 Magnesium 2.2 03/18/2020 03:57 AM  
 Phosphorus 3.7 12/28/2016 11:00 PM  
  
Lab Results Component Value Date/Time Alk. phosphatase 119 (H) 06/09/2020 04:57 PM  
 Protein, total 6.7 06/09/2020 04:57 PM  
 Albumin 3.2 (L) 06/09/2020 04:57 PM  
 Globulin 3.5 06/09/2020 04:57 PM  
 
Lab Results Component Value Date/Time INR 1.0 05/26/2020 10:49 AM  
 Prothrombin time 10.7 05/26/2020 10:49 AM  
 aPTT 22.0 (L) 05/26/2020 10:49 AM  
  
Lab Results Component Value Date/Time Iron 73 03/16/2020 11:58 AM  
 TIBC 353 03/16/2020 11:58 AM  
 Iron % saturation 21 03/16/2020 11:58 AM  
 Ferritin 9 03/16/2020 11:58 AM  
  
No results found for: PH, PCO2, PO2 No components found for: [place] Lab Results Component Value Date/Time CK 31 09/26/2016 11:23 AM  
 CK - MB 0.7 09/26/2016 11:23 AM  
  
 
 
   
 
Total time: 35   min Counseling / coordination time, spent as noted above: 30  min 
> 50% counseling / coordination?: Yes Prolonged service was provided for  []30 min   []75 min in face to face time in the presence of the patient, spent as noted above. Time Start:  
Time End:  
Note: this can only be billed with 94077 (initial) or 99236 (follow up). If multiple start / stop times, list each separately.

## 2020-06-11 NOTE — ROUTINE PROCESS
Bedside shift change report given to [address]  (oncoming nurse) by Laxmi Harp RN (offgoing nurse). Report included the following information SBAR and Kardex.

## 2020-06-12 VITALS
HEART RATE: 85 BPM | TEMPERATURE: 98.1 F | OXYGEN SATURATION: 98 % | SYSTOLIC BLOOD PRESSURE: 111 MMHG | RESPIRATION RATE: 17 BRPM | WEIGHT: 136.1 LBS | BODY MASS INDEX: 26.58 KG/M2 | DIASTOLIC BLOOD PRESSURE: 69 MMHG

## 2020-06-12 LAB
ANION GAP SERPL CALC-SCNC: 4 MMOL/L (ref 5–15)
BASOPHILS # BLD: 0 K/UL (ref 0–0.1)
BASOPHILS NFR BLD: 0 % (ref 0–1)
BUN SERPL-MCNC: 19 MG/DL (ref 6–20)
BUN/CREAT SERPL: 23 (ref 12–20)
CALCIUM SERPL-MCNC: 8.8 MG/DL (ref 8.5–10.1)
CHLORIDE SERPL-SCNC: 113 MMOL/L (ref 97–108)
CO2 SERPL-SCNC: 24 MMOL/L (ref 21–32)
CREAT SERPL-MCNC: 0.84 MG/DL (ref 0.55–1.02)
DIFFERENTIAL METHOD BLD: ABNORMAL
EOSINOPHIL # BLD: 0 K/UL (ref 0–0.4)
EOSINOPHIL NFR BLD: 0 % (ref 0–7)
ERYTHROCYTE [DISTWIDTH] IN BLOOD BY AUTOMATED COUNT: 16.8 % (ref 11.5–14.5)
GLUCOSE SERPL-MCNC: 146 MG/DL (ref 65–100)
HCT VFR BLD AUTO: 23.1 % (ref 35–47)
HGB BLD-MCNC: 6.5 G/DL (ref 11.5–16)
IMM GRANULOCYTES # BLD AUTO: 0 K/UL
IMM GRANULOCYTES NFR BLD AUTO: 0 %
LYMPHOCYTES # BLD: 0.1 K/UL (ref 0.8–3.5)
LYMPHOCYTES NFR BLD: 5 % (ref 12–49)
MCH RBC QN AUTO: 28 PG (ref 26–34)
MCHC RBC AUTO-ENTMCNC: 28.1 G/DL (ref 30–36.5)
MCV RBC AUTO: 99.6 FL (ref 80–99)
MONOCYTES # BLD: 0 K/UL (ref 0–1)
MONOCYTES NFR BLD: 0 % (ref 5–13)
NEUTS SEG # BLD: 2.6 K/UL (ref 1.8–8)
NEUTS SEG NFR BLD: 95 % (ref 32–75)
NRBC # BLD: 0 K/UL (ref 0–0.01)
NRBC BLD-RTO: 0 PER 100 WBC
PLATELET # BLD AUTO: 67 K/UL (ref 150–400)
PMV BLD AUTO: 12.4 FL (ref 8.9–12.9)
POTASSIUM SERPL-SCNC: 3.9 MMOL/L (ref 3.5–5.1)
RBC # BLD AUTO: 2.32 M/UL (ref 3.8–5.2)
RBC MORPH BLD: ABNORMAL
SODIUM SERPL-SCNC: 141 MMOL/L (ref 136–145)
WBC # BLD AUTO: 2.7 K/UL (ref 3.6–11)

## 2020-06-12 PROCEDURE — 94760 N-INVAS EAR/PLS OXIMETRY 1: CPT

## 2020-06-12 PROCEDURE — 74011000250 HC RX REV CODE- 250: Performed by: INTERNAL MEDICINE

## 2020-06-12 PROCEDURE — P9016 RBC LEUKOCYTES REDUCED: HCPCS

## 2020-06-12 PROCEDURE — 36430 TRANSFUSION BLD/BLD COMPNT: CPT

## 2020-06-12 PROCEDURE — 74011250637 HC RX REV CODE- 250/637: Performed by: INTERNAL MEDICINE

## 2020-06-12 PROCEDURE — 36415 COLL VENOUS BLD VENIPUNCTURE: CPT

## 2020-06-12 PROCEDURE — 74011000250 HC RX REV CODE- 250: Performed by: FAMILY MEDICINE

## 2020-06-12 PROCEDURE — 80048 BASIC METABOLIC PNL TOTAL CA: CPT

## 2020-06-12 PROCEDURE — C9113 INJ PANTOPRAZOLE SODIUM, VIA: HCPCS | Performed by: FAMILY MEDICINE

## 2020-06-12 PROCEDURE — 85025 COMPLETE CBC W/AUTO DIFF WBC: CPT

## 2020-06-12 PROCEDURE — 74011250636 HC RX REV CODE- 250/636: Performed by: FAMILY MEDICINE

## 2020-06-12 PROCEDURE — 94640 AIRWAY INHALATION TREATMENT: CPT

## 2020-06-12 PROCEDURE — 77010033678 HC OXYGEN DAILY

## 2020-06-12 PROCEDURE — P9040 RBC LEUKOREDUCED IRRADIATED: HCPCS

## 2020-06-12 RX ORDER — LEVETIRACETAM 500 MG/1
500 TABLET ORAL 2 TIMES DAILY
Status: DISCONTINUED | OUTPATIENT
Start: 2020-06-12 | End: 2020-06-12 | Stop reason: HOSPADM

## 2020-06-12 RX ORDER — SODIUM CHLORIDE 9 MG/ML
250 INJECTION, SOLUTION INTRAVENOUS AS NEEDED
Status: DISCONTINUED | OUTPATIENT
Start: 2020-06-12 | End: 2020-06-12 | Stop reason: HOSPADM

## 2020-06-12 RX ORDER — LEVETIRACETAM 500 MG/1
500 TABLET ORAL 2 TIMES DAILY
Qty: 60 TAB | Refills: 5 | Status: SHIPPED | OUTPATIENT
Start: 2020-06-12

## 2020-06-12 RX ADMIN — IPRATROPIUM BROMIDE AND ALBUTEROL SULFATE 3 ML: .5; 3 SOLUTION RESPIRATORY (INHALATION) at 12:02

## 2020-06-12 RX ADMIN — DULOXETINE HYDROCHLORIDE 60 MG: 60 CAPSULE, DELAYED RELEASE ORAL at 08:30

## 2020-06-12 RX ADMIN — SODIUM CHLORIDE 40 MG: 9 INJECTION INTRAMUSCULAR; INTRAVENOUS; SUBCUTANEOUS at 08:30

## 2020-06-12 RX ADMIN — PREGABALIN 150 MG: 75 CAPSULE ORAL at 08:30

## 2020-06-12 RX ADMIN — FERROUS SULFATE TAB 325 MG (65 MG ELEMENTAL FE) 325 MG: 325 (65 FE) TAB at 08:30

## 2020-06-12 RX ADMIN — Medication 10 ML: at 05:59

## 2020-06-12 RX ADMIN — ARIPIPRAZOLE 10 MG: 10 TABLET ORAL at 08:35

## 2020-06-12 RX ADMIN — LEVETIRACETAM 500 MG: 500 TABLET ORAL at 08:30

## 2020-06-12 RX ADMIN — IPRATROPIUM BROMIDE AND ALBUTEROL SULFATE 3 ML: .5; 3 SOLUTION RESPIRATORY (INHALATION) at 08:10

## 2020-06-12 RX ADMIN — IPRATROPIUM BROMIDE AND ALBUTEROL SULFATE 3 ML: .5; 3 SOLUTION RESPIRATORY (INHALATION) at 04:36

## 2020-06-12 RX ADMIN — FUROSEMIDE 20 MG: 20 TABLET ORAL at 08:30

## 2020-06-12 RX ADMIN — BUPROPION HYDROCHLORIDE 150 MG: 150 TABLET, EXTENDED RELEASE ORAL at 08:30

## 2020-06-12 RX ADMIN — DEXAMETHASONE SODIUM PHOSPHATE 6 MG: 4 INJECTION, SOLUTION INTRAMUSCULAR; INTRAVENOUS at 05:59

## 2020-06-12 RX ADMIN — Medication 1 DROP: at 08:31

## 2020-06-12 RX ADMIN — BUDESONIDE 500 MCG: 0.5 INHALANT RESPIRATORY (INHALATION) at 08:18

## 2020-06-12 RX ADMIN — POTASSIUM CHLORIDE 20 MEQ: 750 TABLET, FILM COATED, EXTENDED RELEASE ORAL at 08:30

## 2020-06-12 NOTE — PROGRESS NOTES
Problem: Falls - Risk of 
Goal: *Absence of Falls Description: Document Alona Mccloud Fall Risk and appropriate interventions in the flowsheet. Outcome: Resolved/Met Note: Fall Risk Interventions: 
Mobility Interventions: Communicate number of staff needed for ambulation/transfer, Patient to call before getting OOB Medication Interventions: Evaluate medications/consider consulting pharmacy Elimination Interventions: Call light in reach, Patient to call for help with toileting needs Problem: Patient Education: Go to Patient Education Activity Goal: Patient/Family Education Outcome: Resolved/Met Problem: Airway Clearance - Ineffective Goal: *Patent airway Outcome: Resolved/Met Goal: *Absence of airway secretions Outcome: Resolved/Met Goal: *Able to cough effectively Outcome: Resolved/Met Goal: *PALLIATIVE CARE:  Alleviation of secretions, cough and/or nasal congestion Outcome: Resolved/Met Problem: Patient Education: Go to Patient Education Activity Goal: Patient/Family Education Outcome: Resolved/Met Problem: Pressure Injury - Risk of 
Goal: *Prevention of pressure injury Description: Document Roger Scale and appropriate interventions in the flowsheet. Outcome: Resolved/Met Note: Pressure Injury Interventions: 
Sensory Interventions: Assess changes in LOC, Minimize linen layers, Maintain/enhance activity level Moisture Interventions: Apply protective barrier, creams and emollients, Limit adult briefs Activity Interventions: Increase time out of bed Mobility Interventions: Pressure redistribution bed/mattress (bed type) Nutrition Interventions: Document food/fluid/supplement intake Friction and Shear Interventions: Minimize layers Problem: Patient Education: Go to Patient Education Activity Goal: Patient/Family Education Outcome: Resolved/Met

## 2020-06-12 NOTE — DISCHARGE INSTRUCTIONS
Patient Discharge Instructions    Jessy Matos / [id number] : 1960    Admitted 2020 Discharged: 2020     Take Home Medications       · It is important that you take the medication exactly as they are prescribed. · Keep your medication in the bottles provided by the pharmacist and keep a list of the medication names, dosages, and times to be taken in your wallet. · Do not take other medications without consulting your doctor. What to do at Home    Recommended diet: Regular Diet. Recommended activity: Activity as tolerated. Further care per Home Hospice. Information obtained by :  I understand that if any problems occur once I am at home I am to contact my physician. I understand and acknowledge receipt of the instructions indicated above.                                                                                                                                            Physician's or R.N.'s Signature                                                                  Date/Time                                                                                                                                              Patient or Representative Signature                                                          Date/Time

## 2020-06-12 NOTE — DISCHARGE SUMMARY
Physician Discharge Summary Patient ID: Jessy Matos 
[id number] 
63 y.o. 
1960 Admit date: 6/9/2020 Discharge date and time: 6/12/2020 Briefly, pt was admitted with GI bleed [K92.2]; Metastasis to brain ([address] [C79.31]. For details of admission, see H&P. Hospital Course:  Pt presented to the ER with general decline, and she was found to have metastatic dz to her brain. She has a primary lung cancer for which she had started palliative XRT. Now that she has metastatic disease to her brain & also probably calvarium & vertebrae, the best option is felt to be palliative care. The palliative care team saw her in consultation. Pt is being discharged home with Home Hospice. Keppra was started for seizure prophylaxis with the brain mets. She is being transfused 1 unit PRBC before discharge for comfort/palliative reasons. Discharge Dx: metastatic lung cancer with brain mets. Condition at discharge: terminal. 
 
Disposition: home with Home Hospice. Patient Instructions:  
Current Discharge Medication List  
  
START taking these medications Details  
levETIRAcetam (KEPPRA) 500 mg tablet Take 1 Tab by mouth two (2) times a day. Qty: 60 Tab, Refills: 5 CONTINUE these medications which have NOT CHANGED Details  
oxyCODONE-acetaminophen (PERCOCET 10)  mg per tablet Take 1 Tab by mouth every six (6) hours as needed for Pain for up to 30 days. Max Daily Amount: 4 Tabs. Qty: 30 Tab, Refills: 0 Associated Diagnoses: Malignant neoplasm of lung, unspecified laterality, unspecified part of lung (HCC)  
  
pantoprazole (PROTONIX) 40 mg tablet Take 1 Tab by mouth two (2) times a day. Qty: 60 Tab, Refills: 11  
  
formoterol (PERFOROMIST) 20 mcg/2 mL nebu neb solution 20 mcg by Nebulization route two (2) times a day. (with budesonide) budesonide (PULMICORT) 0.5 mg/2 mL nbsp 500 mcg by Nebulization route two (2) times a day. (with formoterol) peg 400-propylene glycol (Systane, propylene glycol,) 0.4-0.3 % drop Administer 2 Drops to both eyes every six (6) hours as needed. ferrous sulfate 325 mg (65 mg iron) tablet Take 325 mg by mouth daily. calcium carbonate (Tums E-X) 300 mg (750 mg) chewable tablet Take 1-2 Tabs by mouth daily as needed for Indigestion (Gas). propylene glycol (Systane Complete) 0.6 % drop Administer 1 Drop to both eyes two (2) times a day. albuterol-ipratropium (DUO-NEB) 2.5 mg-0.5 mg/3 ml nebu 3 mL by Nebulization route four (4) times daily. tiotropium-olodateroL (Stiolto Respimat) 2.5-2.5 mcg/actuation inhaler Take 2 Puffs by inhalation daily. ARIPiprazole (ABILIFY) 10 mg tablet Take 10 mg by mouth daily. pregabalin (LYRICA) 150 mg capsule Take 150 mg by mouth two (2) times a day. DULoxetine (CYMBALTA) 60 mg capsule Take 60 mg by mouth daily. sucralfate (CARAFATE) 1 gram tablet Take 1 g by mouth Before breakfast, lunch, dinner and at bedtime. buPROPion XL (WELLBUTRIN XL) 150 mg tablet Take 150 mg by mouth every morning. fluticasone propionate (Flovent HFA) 44 mcg/actuation inhaler Take 2 Puffs by inhalation two (2) times a day. furosemide (LASIX) 20 mg tablet Take 20 mg by mouth daily. carvedilol (COREG) 3.125 mg tablet Take 3.125 mg by mouth two (2) times daily (with meals). loratadine (CLARITIN) 10 mg tablet Take 10 mg by mouth daily. potassium chloride SR (KLOR-CON 10) 10 mEq tablet Take 2 Tabs by mouth daily. Qty: 60 Tab, Refills: 5  
  
benzonatate (TESSALON) 100 mg capsule Take 100 mg by mouth three (3) times daily as needed for Cough. senna (Senna) 8.6 mg tablet Take 1 Tab by mouth daily as needed for Constipation. vit A,C,E-zinc-copper (PreserVision AREDS) cap capsule Take 1 Tab by mouth two (2) times a day. acetaminophen (TYLENOL) 500 mg tablet Take 500 mg by mouth every eight (8) hours as needed for Pain. guaiFENesin (ROBITUSSIN) 100 mg/5 mL liquid Take 200 mg by mouth every four (4) hours as needed for Cough. ondansetron hcl (ZOFRAN) 4 mg tablet Take 4 mg by mouth two (2) times daily as needed for Nausea. promethazine (PHENERGAN) 12.5 mg tablet Take 12.5 mg by mouth every six (6) hours as needed (Vomiting). STOP taking these medications  
  
 atorvastatin (LIPITOR) 20 mg tablet Comments:  
Reason for Stopping:   
   
 cholecalciferol, vitamin D3, (VITAMIN D3) 2,000 unit tab Comments:  
Reason for Stopping:   
   
 multivitamin,tx-iron-ca-min (Thera-M) 27-0.4 mg tab Comments:  
Reason for Stopping:   
   
 cyanocobalamin (Vitamin B-12) 250 mcg tablet Comments:  
Reason for Stopping: Further care per Home Hospice. Signed: Cydney Messina MD 
6/12/2020 
8:04 AM

## 2020-06-12 NOTE — PROGRESS NOTES
Transition of Care Notes; 
 
RUR 40 % This CM was alerted that patient is ready for dc today 6/12/2020. Patient will return to [facility], called and spoke with [facility] nurse manager [phone] who stated patient can return. Informed her that patient will discharge at 1400. Family was called and left message. Spoke with [facility] who accepted patient and sent information in [employer]. Confirmed acceptance with [facility] Daughter will transport patient to patient's residence. [address] aware. Made nurse aware to call and give discharge nursing report. Offered to fax information to facility. Robert Hernandez 10:30 AM 
 
Addendum: 
 
Offered to assist with getting 02 for transport, patient declined. Robert Hernandez 1:27 PM 
 
Addendum:  Patient declined second IM Medicare letter. Robert Hernandez 2:04 PM

## 2020-06-12 NOTE — PROGRESS NOTES
Attempted to call report to [facility] and [phone], left name and number with nursing assistant with instructions to have RN call me back. [facility] Attempted to call report again, no answer and unable to leave message due to full voicemail. [facility] Report given to charge nurse at [facility].

## 2020-06-12 NOTE — PROGRESS NOTES
Music Therapy Assessment [address] [phone] 1960  63 y.o.  female Patient Telephone Number: [phone] (home) Anglican Affiliation: [facility] Language: [place] Patient Active Problem List  
 Diagnosis Date Noted  Metastasis to brain [facility]) 06/10/2020  Thrombocytopenia ([facility] 05/27/2020  Hypokalemia 05/26/2020  Hepatic encephalopathy ([facility] 04/07/2020  Lung cancer ([facility] 04/01/2020  Hyperlipidemia 04/01/2020  Chronic obstructive pulmonary disease ([facility]  CHF (congestive heart failure) ([facility] 06/27/2019  GIB (gastrointestinal bleeding) 02/08/2017  Acute blood loss anemia 10/25/2016  Cirrhosis of liver ([facility] 08/30/2016  Chronic respiratory failure with hypoxia ([facility] 08/30/2016  Anemia 07/19/2016  GI bleed 07/19/2016 Date: 6/12/2020            Total Time (in minutes): 5          [address] MED ONCOLOGY Mental Status:   [x] Alert [  ] Tennille Moulding [  ]  Confused  [  ] Minimally responsive  [  ] Sleeping Communication Status: [  ] Impaired Speech [  ] Nonverbal -N/A Physical Status:   [  ] Oxygen in use  [  ] Hard of Hearing [  ] Vision Impaired [  ] Ambulatory  [x] Ambulatory with assistance-Pt used walker. [  ] Non-ambulatory Music Preferences, Background:  Pt said she likes everything. She said she likes some Country, especially [address], [place], [address] [employer] [place], and that her daughters have introduced her to some of the music she likes. 
  
Clinical Problem to be addressed: Support healthy coping. Goal(s) met in session: 
Physical/Pain management (Scale of 1-10): Pre-session rating: N/A: Please see Session Observations below. Post-session rating: N/A: Please see Session Observations below. [  ] Increased relaxation   [  ] Affected breathing patterns [  ] Decreased muscle tension   [  ] Decreased agitation [  ] Affected heart rate    [  ] Increased alertness Emotional/Psychological: [  ] Increased self-expression   [  ] Decreased aggressive behavior [  ] Decreased feelings of stress  [  ] Discussed healthy coping skills [  ] Improved mood    [  ] Decreased withdrawn behavior Social: 
[  ] Decreased feelings of isolation/loneliness [x] Positive social interaction [  ] Provided support and/or comfort for family/friends Spiritual: 
[  ] Spiritual support    [  ] Expressed peace [  ] Expressed ramiro    [  ] Discussed beliefs Techniques Utilized (Check all that apply): N/A: Please see Session Observations below. [  ] Procedural support MT [  ] Music for relaxation [  ] Patient preferred music 
[  ] Lala analysis  [  ] Song choice  [  ] Music for validation [  ] Entrainment  [  ] Movement to music [  ] Guided visualization [  ] Daiana Newby  [  ] Patient instrument playing [  ] Vera Score writing [  ] Donlynne Colleen along   [  ] Nick Dubose  [  ] Sensory stimulation [  ] Active Listening  [  ] Music for spiritual support [  ] Making of CDs as gifts Session Observations:  F/up visit; Patient (pt) was sitting on the side of her bed with her walker positioned in front of her. She was wearing regular \"street\" clothing. When this music therapist (MT) greeted pt, she shared she'd been discharged. MT asked pt how she was doing with that. Pt said she was looking forward to getting on with her life. She said she didn't want to grieve or feel sorry for herself anymore. Pt said she plans to listen to music when she gets home. A staff member arrived to transport pt for her discharge. Pt thanked MT for coming and MT expressed being glad to be able to say good bye to the pt before she goes. MT wished pt well. Will follow as able. SHAILA AnguianoBC (Music Therapist-Board Certified) Spiritual Care Department Referral-based service

## 2020-06-12 NOTE — PROGRESS NOTES
No new c/o's. Hgb down to 6.5 Pt wants to go home with home hospice today; so will transfuse 1 unit PRBC for comfort, then discharge home with home hospice today. Left VM for dtr, Agnieszka Teague -- [phone].

## 2020-06-12 NOTE — PROGRESS NOTES
Palliative Medicine Consult Raul: [phone] ([phone]) Patient Name: Radha Yao YOB: 1960 Date of Initial Consult: 06/10/20 Reason for Consult: Care decisions Requesting Provider:  Dr. Ellen Akbar Primary Care Physician: Marah Torres MD 
 
 SUMMARY:  
Radha Yao is a 63 y.o. with a past history of CHF, COPD, liver cirrhosis, recurrent GI bleeds due to gastric AVMs, laceration of large part of her small intestines, and lung cancer, who was admitted on 6/9/2020 from her assisted living facility with a diagnosis of another acute GI bleed with associated acute on chronic blood loss anemia. She also complained of left sided weakness and upon initial work up for this with head CT, she was found to have multiple brain lesions (presumed metastases) from an unclear primary site. This is a new diagnosis for her. She was previously diagnosed with lung cancer in March of this year and had started radiation. CTs of the chest, abdomen, and pelvis done 6/10/20 did not show any new abnormalities. Current medical issues leading to Palliative Medicine involvement include new diagnosis of suspected metastatic cancer of the brain, recent diagnosis of lung cancer and comorbid conditions of CHF, COPD, liver cirrhosis, and recurrent GI bleeds. Social: single, [facility], 2 daughters (both out of state), +tobb PALLIATIVE DIAGNOSES:  
1. Brain lesions, probable brain mets 2. Lung cancer 3. Left sided weakness 4. Anemia 5. Melena 6. Goals of care 7. Physical debility 8. ACP PLAN:  
1. [facility] provided by [facility]. 2. She is happy to be going home and looking forward to seeing her best friend [address] [facility] and her daughters Monique Wilkins and Yoli.  Although she is still mourning her diagnosis of terminal cancer, she has decided to live every moment that she has left to the fullest and enjoy being with her loved ones. 3. We again discussed that aggressive measures, such as CPR and placement on machines, would be more of a burden than a benefit due to her terminal illness. 4. Explained and completed AMD with patient. She would like her daughter Eusebio Junior to be the primary MPOA, with her daughter Lehman Olszewski as secondary MPOA. 5. Explained and completed DDNR form with patient. 6. Copies made of both AMD and DDNR and placed in chart. Original DDNR form given to patient, with instructions to place on refrigerator or bedroom door. 7. Initial consult note routed to primary continuity provider and/or primary health care team members 8. Communicated plan of care with: Palliative IDTErick 192 Team 
 
 GOALS OF CARE / TREATMENT PREFERENCES:  
 
GOALS OF CARE: 
Patient/Health Care Proxy Stated Goals: Comfort TREATMENT PREFERENCES:  
Code Status: DNR Advance Care Planning: 
[x] [facility] Interdisciplinary Team has updated the ACP Navigator with [address] and Patient Capacity Primary Decision Maker: Lori Garcia - Daughter - [phone] Secondary Decision Maker: Vira Zhu - Daughter - [phone] Advance Care Planning 6/10/2020 Patient's Healthcare Decision Maker is: -  
Primary Decision Maker Name -  
Primary Decision Maker Phone Number -  
Primary Decision Maker Relationship to Patient - Secondary Decision Maker Name - Secondary Decision Maker Phone Number - Secondary Decision Maker Relationship to Patient -  
Confirm Advance Directive Yes, on file Patient Would Like to Complete Advance Directive - Medical Interventions: Limited additional interventions Other Instructions:  
Artificially Administered Nutrition: No feeding tube Other: As far as possible, the palliative care team has discussed with patient / health care proxy about goals of care / treatment preferences for patient. HISTORY:  
 
History obtained from: Patient and chart CHIEF COMPLAINT: Black stools, left arm weakness HPI/SUBJECTIVE: The patient is:  
[x] Verbal and participatory [] Non-participatory due to: She presented with complaints of black stools and left sided weakness. Stool was heme positive. Upon evaluation with imaging, she was found to have brain lesions (probable metastasis from an unclear primary site). Of note, she was diagnosed with lung cancer in March. She denies any pain currently. She does report feeling depressed due to this new diagnosis, but is trying to maintain a positive outlook and enjoy the time she has left. Clinical Pain Assessment (nonverbal scale for severity on nonverbal patients):  
Clinical Pain Assessment Severity: 0 Duration: for how long has pt been experiencing pain (e.g., 2 days, 1 month, years) Frequency: how often pain is an issue (e.g., several times per day, once every few days, constant) FUNCTIONAL ASSESSMENT:  
 
Palliative Performance Scale (PPS): PPS: 40 PSYCHOSOCIAL/SPIRITUAL SCREENING:  
 
Palliative IDT has assessed this patient for cultural preferences / practices and a referral made as appropriate to needs (Cultural Services, Patient Advocacy, Ethics, etc.) Any spiritual / Buddhism concerns: 
[] Yes /  [x] No 
 
Caregiver Burnout: 
[] Yes /  [x] No /  [] No Caregiver Present Anticipatory grief assessment:  
[x] Normal  / [] Maladaptive ESAS Anxiety: Anxiety: 1 ESAS Depression: Depression: 1 REVIEW OF SYSTEMS:  
 
Positive and pertinent negative findings in ROS are noted above in HPI. The following systems were [x] reviewed / [] unable to be reviewed as noted in HPI Other findings are noted below.  
Systems: constitutional, ears/nose/mouth/throat, respiratory, gastrointestinal, genitourinary, musculoskeletal, integumentary, neurologic, psychiatric, endocrine. Positive findings noted below. Modified ESAS Completed by: provider Fatigue: 1 Drowsiness: 0 Depression: 1 Pain: 0 Anxiety: 1 Nausea: 0 Anorexia: 0 Dyspnea: 0 Constipation: No  
     
 
 
 PHYSICAL EXAM:  
 
From RN flowsheet: 
Wt Readings from Last 3 Encounters:  
06/12/20 136 lb 1.6 oz (61.7 kg) 06/09/20 130 lb (59 kg) 06/01/20 131 lb 12.8 oz (59.8 kg) Blood pressure 117/72, pulse 78, temperature 97.7 °F (36.5 °C), resp. rate 17, weight 136 lb 1.6 oz (61.7 kg), SpO2 98 %. Pain Scale 1: Numeric (0 - 10) Pain Intensity 1: 0 Pain Onset 1: acute Pain Location 1: Back Pain Orientation 1: Lower Pain Description 1: Aching Pain Intervention(s) 1: Medication (see MAR) Last bowel movement, if known:  
 
Constitutional: alert, oriented, pleasant, conversive Eyes: anicteric ENMT: no nasal discharge, moist mucous membranes Respiratory: breathing not labored, symmetric Gastrointestinal: soft non-tender Musculoskeletal: no deformity, no tenderness to palpation Skin: warm, dry Neurologic: following commands, moving all extremities Psychiatric: full affect, no hallucinations Other: 
 
 
 HISTORY:  
 
Principal Problem: 
  Metastasis to brain ([address] (6/10/2020) Active Problems: 
  Anemia (7/19/2016) Cirrhosis of liver ([address] (8/30/2016) Chronic respiratory failure with hypoxia ([address] (8/30/2016) Lung cancer ([address] (4/1/2020) Chronic obstructive pulmonary disease (HCC) () Past Medical History:  
Diagnosis Date  Anemia  Arthritis Rheumatoid  CHF (congestive heart failure) ([address]  Chronic obstructive pulmonary disease ([address]  Cirrhosis of liver ([address] ETOH  
 COPD (chronic obstructive pulmonary disease) ([address]  Gastrointestinal disorder \"lacerations in the large part of my small intestine. \"  Gastrointestinal disorder   
 liver cirrhosis  Heart failure ([address]  Hyperlipidemia 4/1/2020  Internal bleeding  Lung cancer ([facility] 2020  Psychiatric disorder   
 depression, anxiety  Tachycardia 2016 Past Surgical History:  
Procedure Laterality Date  HX CHOLECYSTECTOMY  HX GI Cholecystectomoy  HX GYN    
  x 2.  [place] ENDOSCOPY UPPER SMALL INTESTINE  2016 Family History Problem Relation Age of Onset  Cancer Mother   
     pancreatic  Alcohol abuse Father  Cancer Sister Lung cancer (smoker). History reviewed, no pertinent family history. Social History Tobacco Use  Smoking status: Current Every Day Smoker Packs/day: 0.75 Years: 45.00 Pack years: 33.75  Smokeless tobacco: Never Used Substance Use Topics  Alcohol use: No  
  Comment: in the past was an alcoholic. Has been sober  since about  Allergies Allergen Reactions  Pcn [Penicillins] Angioedema Childhood reaction Current Facility-Administered Medications Medication Dose Route Frequency  levETIRAcetam (KEPPRA) tablet 500 mg  500 mg Oral BID  
 0.9% sodium chloride infusion 250 mL  250 mL IntraVENous PRN  
 ARIPiprazole (ABILIFY) tablet 10 mg  10 mg Oral DAILY  budesonide (PULMICORT) 500 mcg/2 ml nebulizer suspension  500 mcg Nebulization BID RT  
 carvediloL (COREG) tablet 3.125 mg  3.125 mg Oral BID WITH MEALS  DULoxetine (CYMBALTA) capsule 60 mg  60 mg Oral DAILY  ferrous sulfate tablet 325 mg  325 mg Oral DAILY  furosemide (LASIX) tablet 20 mg  20 mg Oral DAILY  oxyCODONE-acetaminophen (PERCOCET 10)  mg per tablet 1 Tab  1 Tab Oral Q6H PRN  potassium chloride SR (KLOR-CON 10) tablet 20 mEq  20 mEq Oral DAILY  pregabalin (LYRICA) capsule 150 mg  150 mg Oral BID  senna (SENOKOT) tablet 8.6 mg  1 Tab Oral DAILY PRN  
 nicotine (NICODERM CQ) 21 mg/24 hr patch 1 Patch  1 Patch TransDERmal DAILY  polyvinyl alcohol-povidone (NATURAL TEARS) 0.5-0.6 % ophthalmic solution 2 Drop  2 Drop Both Eyes Q6H PRN  polyvinyl alcohol-povidone (NATURAL TEARS) 0.5-0.6 % ophthalmic solution 1 Drop  1 Drop Both Eyes BID  buPROPion SR (WELLBUTRIN SR) tablet 150 mg  150 mg Oral DAILY  sodium chloride (NS) flush 5-40 mL  5-40 mL IntraVENous Q8H  
 sodium chloride (NS) flush 5-40 mL  5-40 mL IntraVENous PRN  pantoprazole (PROTONIX) 40 mg in 0.9% sodium chloride 10 mL injection  40 mg IntraVENous BID  dexamethasone (DECADRON) 4 mg/mL injection 6 mg  6 mg IntraVENous Q8H  
 LORazepam (ATIVAN) tablet 0.5 mg  0.5 mg Oral Q4H PRN  
 albuterol-ipratropium (DUO-NEB) 2.5 MG-0.5 MG/3 ML  3 mL Nebulization Q4H RT  
 
 
 
 LAB AND IMAGING FINDINGS:  
 
Lab Results Component Value Date/Time WBC 2.7 (L) 06/12/2020 01:44 AM  
 HGB 6.5 (L) 06/12/2020 01:44 AM  
 PLATELET 67 (L) 13/93/2946 01:44 AM  
 
Lab Results Component Value Date/Time Sodium 141 06/12/2020 01:44 AM  
 Potassium 3.9 06/12/2020 01:44 AM  
 Chloride 113 (H) 06/12/2020 01:44 AM  
 CO2 24 06/12/2020 01:44 AM  
 BUN 19 06/12/2020 01:44 AM  
 Creatinine 0.84 06/12/2020 01:44 AM  
 Calcium 8.8 06/12/2020 01:44 AM  
 Magnesium 2.2 03/18/2020 03:57 AM  
 Phosphorus 3.7 12/28/2016 11:00 PM  
  
Lab Results Component Value Date/Time Alk. phosphatase 119 (H) 06/09/2020 04:57 PM  
 Protein, total 6.7 06/09/2020 04:57 PM  
 Albumin 3.2 (L) 06/09/2020 04:57 PM  
 Globulin 3.5 06/09/2020 04:57 PM  
 
Lab Results Component Value Date/Time INR 1.0 05/26/2020 10:49 AM  
 Prothrombin time 10.7 05/26/2020 10:49 AM  
 aPTT 22.0 (L) 05/26/2020 10:49 AM  
  
Lab Results Component Value Date/Time Iron 73 03/16/2020 11:58 AM  
 TIBC 353 03/16/2020 11:58 AM  
 Iron % saturation 21 03/16/2020 11:58 AM  
 Ferritin 9 03/16/2020 11:58 AM  
  
No results found for: PH, PCO2, PO2 No components found for: [place] Lab Results Component Value Date/Time  CK 31 09/26/2016 11:23 AM  
 CK - MB 0.7 09/26/2016 11:23 AM  
  
 
 
   
 
 Total time: 45  min Counseling / coordination time, spent as noted above: 40  min 
> 50% counseling / coordination?: Yes Prolonged service was provided for  []30 min   []75 min in face to face time in the presence of the patient, spent as noted above. Time Start:  
Time End:  
Note: this can only be billed with 97310 (initial) or 46812 (follow up). If multiple start / stop times, list each separately.

## 2020-06-24 ENCOUNTER — DOCUMENTATION ONLY (OUTPATIENT)
Dept: FAMILY MEDICINE CLINIC | Age: 60
End: 2020-06-24

## 2020-06-24 NOTE — PROGRESS NOTES
Patient admitted to [facility] 6/9-6/12 for GI bleed and new mets to brain (lung ca). [facility] confirmed that they saw her on 6/12 to begin care. Will close encounter at this time as now under care of hospice.

## 2020-07-07 DIAGNOSIS — K70.30 ALCOHOLIC CIRRHOSIS OF LIVER WITHOUT ASCITES (HCC): ICD-10-CM

## 2023-07-19 NOTE — ED PROVIDER NOTES
HPI Comments: 61 y.o. female with past medical history significant for heart failure, COPD, GI disorder, arthritis, tachycardia, CHF, anemia, cirrhosis of liver, endoscopy, and cholecystectomy who presents from home with chief complaint of vomiting. Pt c/o vomiting, HA, cough, weakness, and dizziness with occasional sweats for the past 3 days. Pt reports that her vomit is brown and looks like coffee grounds, but denies seeing any bright blood in the emesis. Pt has hx of uncontrolled bleeding requiring transfusions and iron supplements. Pt's GI specialist is Dr. Rikki Lim. Pt reports last having her blood checked a few days ago but doesn't have the results yet. Pt reports a loss of appetite. Pt denies blood in stool, ab pain, CP. Pt denies being on any blood thinners. There are no other acute medical concerns at this time. Social hx: + tobacco use, no ETOH for 3 years  PCP: Not On File [facility]    Note written by Gerardo Mejia, as dictated by Biju Shankar MD 2:45 PM      The history is provided by the patient. No  was used. Past Medical History:   Diagnosis Date    Anemia     Arthritis     Rheumatoid    CHF (congestive heart failure) (HCC)     Chronic obstructive pulmonary disease (HCC)     Cirrhosis of liver (HCC)     COPD (chronic obstructive pulmonary disease) (HCC)     Gastrointestinal disorder     \"lacerations in the large part of my small intestine. \"    Gastrointestinal disorder     liver cirrhosis    [facility]     Internal bleeding     Tachycardia 2016       Past Surgical History:   Procedure Laterality Date    HX CHOLECYSTECTOMY      HX GI      Cholecystectomoy    HX GYN       x 2.     [place] ENDOSCOPY UPPER SMALL INTESTINE  2016              Family History:   Problem Relation Age of Onset    Cancer Mother     Alcohol abuse Father     Cancer Sister        Social History     Social History    Marital status: SINGLE     Spouse name: N/A    Number of children: N/A    Years of education: N/A     Occupational History    Not on file. Social History Main Topics    Smoking status: Current Every Day Smoker     Packs/day: 0.75     Years: 45.00    Smokeless tobacco: Never Used    Alcohol use No      Comment: in the past was an alcoholic. Has been sober x 2 years.  Drug use: No    Sexual activity: Yes     Partners: Male     Other Topics Concern    Not on file     Social History Narrative         ALLERGIES: Pcn [penicillins]    Review of Systems   Constitutional: Positive for diaphoresis. Respiratory: Positive for cough. Cardiovascular: Negative for chest pain. Gastrointestinal: Positive for vomiting. Negative for abdominal pain and blood in stool. Neurological: Positive for dizziness, weakness and headaches. All other systems reviewed and are negative. Vitals:    10/01/17 1335   BP: 105/69   Pulse: 77   Resp: 16   Temp: 98.3 °F (36.8 °C)   SpO2: 94%   Weight: 57.2 kg (126 lb)   Height: 5' (1.524 m)            Physical Exam   Constitutional: She is oriented to person, place, and time. She appears well-developed. No distress. HENT:   Head: Normocephalic and atraumatic. Eyes: Conjunctivae and EOM are normal. Pupils are equal, round, and reactive to light. Neck: Normal range of motion. Neck supple. Cardiovascular: Normal rate, regular rhythm and normal heart sounds. No murmur heard. Pulmonary/Chest: Effort normal. No respiratory distress. She has wheezes (faint expiratory wheezing). Abdominal: Soft. Bowel sounds are normal. She exhibits no distension. There is no tenderness. There is no rebound. Genitourinary: Rectal exam shows guaiac positive stool (dark brown stool, does not appear to be melena). Rectal exam shows no tenderness and anal tone normal.   Musculoskeletal: Normal range of motion. She exhibits no edema. Neurological: She is alert and oriented to person, place, and time. No cranial nerve deficit. She exhibits normal muscle tone. Coordination normal.   Skin: Skin is warm and dry. No rash noted. Psychiatric: She has a normal mood and affect. Her behavior is normal.   Nursing note and vitals reviewed. Note written by Gerardo Pro, as dictated by Karyle Cristal, MD 2:57 PM      [facility]  ED Course       Procedures    I spoke to Dr. Stanley Win, gastroenterology on-call, at [phone]. We discussed the case. He recommends admitting the patient for serial hemoglobin, PPI, and possible EGD tomorrow. I spoke with Dr. Dalila Brown, the hospitalists on call, at . We discussed the case. He accepts this patient for admission. normal/clear to auscultation bilaterally/no wheezes/no rales/no rhonchi

## 2025-05-13 NOTE — PROGRESS NOTES
Attempted to make follow up appointment with PCP but office said that they do not allow third party scheduling. Only appointments requested from Patient will be made by practice. Please advise patient to call and schedule follow up appointment From has been completed and dad called.He will  the form from the  and I weill send a copy to scan/to be scanned folder.    Nestor Shannon CMA    Forms/Letter     Verify patient name and date of birth.  Was this done?: Yes    Verify Photo ID needed of person  item.  Name of person picking up: Javier Armendariz

## (undated) DEVICE — KENDALL RADIOLUCENT FOAM MONITORING ELECTRODE -RECTANGULAR SHAPE: Brand: KENDALL

## (undated) DEVICE — 1200 GUARD II KIT W/5MM TUBE W/O VAC TUBE: Brand: GUARDIAN

## (undated) DEVICE — CONNECTOR ELECSURG ARCONNECT AR PRB SGL USE DISP

## (undated) DEVICE — BAG BELONG PT PERS CLEAR HANDL

## (undated) DEVICE — CLIP HEMO ENDOSCP 235CM BX/10 -- RESOLUTION 360

## (undated) DEVICE — CATH IV AUTOGRD BC BLU 22GA 25 -- INSYTE

## (undated) DEVICE — Device

## (undated) DEVICE — FIAPC® PROBE W/ FILTER 2200 A OD 2.3MM/6.9FR; L 2.2M/7.2FT: Brand: ERBE

## (undated) DEVICE — FORCEPS BX L240CM JAW DIA2.8MM L CAP W/ NDL MIC MESH TOOTH

## (undated) DEVICE — SET ADMIN 16ML TBNG L100IN 2 Y INJ SITE IV PIGGY BK DISP

## (undated) DEVICE — NEEDLE HYPO 18GA L1.5IN PNK S STL HUB POLYPR SHLD REG BVL

## (undated) DEVICE — CANN NASAL O2 CAPNOGRAPHY AD -- FILTERLINE

## (undated) DEVICE — GARMENT,MEDLINE,DVT,INT,CALF,LG, GEN2: Brand: MEDLINE

## (undated) DEVICE — KIT IV STRT W CHLORAPREP PD 1ML

## (undated) DEVICE — BW-412T DISP COMBO CLEANING BRUSH: Brand: SINGLE USE COMBINATION CLEANING BRUSH

## (undated) DEVICE — ENDO CARRY-ON PROCEDURE KIT INCLUDES ENZYMATIC SPONGE, GAUZE, BIOHAZARD LABEL, TRAY, LUBRICANT, DIRTY SCOPE LABEL, WATER LABEL, TRAY, DRAWSTRING PAD, AND DEFENDO 4-PIECE KIT.: Brand: ENDO CARRY-ON PROCEDURE KIT

## (undated) DEVICE — APC PROBE 2200 C, SINGLE USE OD 2.3MM/6.9FR; L 2.2M/7.2FT: Brand: ERBE

## (undated) DEVICE — TUBING HYDR IRR --

## (undated) DEVICE — REM POLYHESIVE ADULT PATIENT RETURN ELECTRODE: Brand: VALLEYLAB

## (undated) DEVICE — SOLIDIFIER FLUID 3000 CC ABSORB

## (undated) DEVICE — CAPSULE ENDOSCP L26.2MM DIA11.4MM BIOCOMPATIBLE PLAS SB 3

## (undated) DEVICE — SET EXTN TBNG L BOR 4 W STPCOCK ST 32IN PRIMING VOL 6ML

## (undated) DEVICE — FIAPC® PROBE W/ FILTER 2200 C OD 2.3MM/6.9FR; L 2.2M/7.2FT: Brand: ERBE

## (undated) DEVICE — SYRINGE MED 20ML STD CLR PLAS LUERLOCK TIP N CTRL DISP